# Patient Record
Sex: MALE | Race: WHITE | Employment: OTHER | URBAN - METROPOLITAN AREA
[De-identification: names, ages, dates, MRNs, and addresses within clinical notes are randomized per-mention and may not be internally consistent; named-entity substitution may affect disease eponyms.]

---

## 2017-01-06 DIAGNOSIS — D72.829 LEUKOCYTOSIS, UNSPECIFIED TYPE: ICD-10-CM

## 2017-01-06 LAB
BASOPHILS # BLD AUTO: 0 10E9/L (ref 0–0.2)
BASOPHILS NFR BLD AUTO: 0.3 %
COPATH REPORT: NORMAL
DIFFERENTIAL METHOD BLD: NORMAL
EOSINOPHIL # BLD AUTO: 0.1 10E9/L (ref 0–0.7)
EOSINOPHIL NFR BLD AUTO: 0.8 %
ERYTHROCYTE [DISTWIDTH] IN BLOOD BY AUTOMATED COUNT: 13.4 % (ref 10–15)
HCT VFR BLD AUTO: 47.8 % (ref 40–53)
HGB BLD-MCNC: 15.6 G/DL (ref 13.3–17.7)
LYMPHOCYTES # BLD AUTO: 1.9 10E9/L (ref 0.8–5.3)
LYMPHOCYTES NFR BLD AUTO: 17.8 %
MCH RBC QN AUTO: 32.4 PG (ref 26.5–33)
MCHC RBC AUTO-ENTMCNC: 32.6 G/DL (ref 31.5–36.5)
MCV RBC AUTO: 99 FL (ref 78–100)
MONOCYTES # BLD AUTO: 1 10E9/L (ref 0–1.3)
MONOCYTES NFR BLD AUTO: 9.4 %
NEUTROPHILS # BLD AUTO: 7.6 10E9/L (ref 1.6–8.3)
NEUTROPHILS NFR BLD AUTO: 71.7 %
PLATELET # BLD AUTO: 324 10E9/L (ref 150–450)
RBC # BLD AUTO: 4.81 10E12/L (ref 4.4–5.9)
RETICS # AUTO: 85.1 10E9/L (ref 25–95)
RETICS/RBC NFR AUTO: 1.8 % (ref 0.5–2)
WBC # BLD AUTO: 10.6 10E9/L (ref 4–11)

## 2017-01-06 PROCEDURE — 99207 ZZC NO CHARGE LOS: CPT | Performed by: INTERNAL MEDICINE

## 2017-01-06 PROCEDURE — 36415 COLL VENOUS BLD VENIPUNCTURE: CPT | Performed by: INTERNAL MEDICINE

## 2017-01-06 PROCEDURE — 99000 SPECIMEN HANDLING OFFICE-LAB: CPT | Performed by: INTERNAL MEDICINE

## 2017-01-06 PROCEDURE — 85060 BLOOD SMEAR INTERPRETATION: CPT | Mod: 90 | Performed by: INTERNAL MEDICINE

## 2017-01-06 PROCEDURE — 85027 COMPLETE CBC AUTOMATED: CPT | Performed by: INTERNAL MEDICINE

## 2017-01-06 PROCEDURE — 85045 AUTOMATED RETICULOCYTE COUNT: CPT | Mod: 90 | Performed by: INTERNAL MEDICINE

## 2017-05-09 ENCOUNTER — OFFICE VISIT (OUTPATIENT)
Dept: PODIATRY | Facility: CLINIC | Age: 77
End: 2017-05-09
Payer: COMMERCIAL

## 2017-05-09 ENCOUNTER — RADIANT APPOINTMENT (OUTPATIENT)
Dept: GENERAL RADIOLOGY | Facility: CLINIC | Age: 77
End: 2017-05-09
Attending: PODIATRIST
Payer: COMMERCIAL

## 2017-05-09 VITALS
DIASTOLIC BLOOD PRESSURE: 81 MMHG | BODY MASS INDEX: 29.83 KG/M2 | SYSTOLIC BLOOD PRESSURE: 146 MMHG | HEART RATE: 61 BPM | WEIGHT: 185.6 LBS | HEIGHT: 66 IN

## 2017-05-09 DIAGNOSIS — M19.072 PRIMARY OSTEOARTHRITIS OF LEFT FOOT: ICD-10-CM

## 2017-05-09 DIAGNOSIS — M21.40 PES PLANUS, UNSPECIFIED LATERALITY: Primary | ICD-10-CM

## 2017-05-09 DIAGNOSIS — M79.672 ARCH PAIN, LEFT: ICD-10-CM

## 2017-05-09 PROCEDURE — 99203 OFFICE O/P NEW LOW 30 MIN: CPT | Performed by: PODIATRIST

## 2017-05-09 PROCEDURE — 73630 X-RAY EXAM OF FOOT: CPT | Mod: LT

## 2017-05-09 NOTE — MR AVS SNAPSHOT
After Visit Summary   5/9/2017    Aly Sorensen    MRN: [id number]           Patient Information     Date Of Birth          1940        Visit Information        Provider Department      5/9/2017 11:00 AM Mamadou Quintana DPM [facility]        Today's Diagnoses     Pes planus, unspecified laterality    -  1    Arch pain, left        Primary osteoarthritis of left foot          Care Instructions    Over the Counter Inserts    Super Feet are the most common and easiest to find.    Locations include any [employer], [employer] in [place] on [address]  and in [place] on [address], [employer] in [facility] on [address], [employer] in [facility] on [address], [facility] in [place] on [address], [employer] in [place] on [address] and [employer] in [facility] on [address] and in [place] on [address].    Spenco can be found online and at [employer] in [facility] on [address], [employer] in [facility] on [address], [employer] in [place] on [address], [employer] in [place] on [address] and [employer] in [place] on [address].    Power Step can be a little harder to find.  Locations include [employer] in [place] on [address], [place] in [facility], [employer] in [place] on [address] and online    [employer] - [employer]     [facility]    **  A good high quality over the counter insert can cost around $40-$50.      PRICE Therapy    Many aches and pains throughout the foot and ankle can be helped with many simple treatments.  This is usually described as PRICE Therapy.      P - Protection - often times, inflammation/pain in the lower extremity is not able to improve simply because the areas involved are never allowed to rest.  Every step we take can bother the problematic area.  Protecting those areas is an important step in the healing  process.  This may involve a walking cast boot, a special insert/orthotic device, an ankle brace, or simply avoiding barefoot walking.    R - Rest - in addition to protecting the foot/ankle, resting is an important, but often times difficult, treatment option.  Getting off your feet when they bother you, and specifically avoiding activities that cause pain/discomfort, are very beneficial to prevent, and treat, foot/ankle pain.      I - Ice - icing regularly can help to decrease inflammation and swelling in the foot, thus decreasing pain.  Using an ice pack or a bag of frozen peas works very well.  Ice for 20 minutes multiple times per day as needed.  Do not place the ice directly on the skin as this can cause tissue damage.    C - Compression - using a compression wrap or an ACE wrap can help to decrease swelling, which can help to decrease pain.  Wearing the wraps is generally not needed at night, but they should be worn on a regular basis when you are going to be on your feet for prolonged periods as gravity tends to pull fluids down to your feet/ankles.    E - Elevation - elevating your lower extremities multiple times daily for 15-20 minutes can help to decrease swelling, which works well in decreasing pain levels.      NSAID/Tylenol - An anti-inflammatory, like Aleve or ibuprofen, and/or a pain medication, such as Tylenol, can help to improve pain levels and get the issue resolved sooner rather than later.  Anyone with liver issues should be careful with Tylenol, and anyone with high blood pressure or heart, stomach or kidney issues should be careful with anti-inflammatories.  Please ask if you have questions about these medications, including dosage.        FLAT FEET     Flatfoot is often a complex disorder, with diverse symptoms and varying degrees of deformity and disability. There are several types of flatfoot, all of which have one characteristic in common: partial or total collapse (loss) of the  arch.  Other characteristics shared by most types of flatfoot include:   Toe drift,  in which the toes and front part of the foot point outward   The heel tilts toward the outside and the ankle appears to turn in   A tight Achilles tendon, which causes the heel to lift off the ground earlier when walking and may make the problem worse   Bunions and hammertoes may develop as a result of a flatfoot.   Flexible Flatfoot  Flexible flatfoot is one of the most common types of flatfoot. It typically begins in childhood or adolescence and continues into adulthood. It usually occurs in both feet and progresses in severity throughout the adult years. As the deformity worsens, the soft tissues (tendons and ligaments) of the arch may stretch or tear and can become inflamed.  The term  flexible  means that while the foot is flat when standing (weight-bearing), the arch returns when not standing.  Symptoms  Pain in the heel, arch, ankle, or along the outside of the foot    Rolled-in  ankle (over-pronation)   Pain along the shin bone (shin splint)   General aching or fatigue in the foot or leg   Low back, hip or knee pain.   Diagnosis  In diagnosing flatfoot, the foot and ankle surgeon examines the foot and observes how it looks when you stand and sit. X-rays are usually taken to determine the severity of the disorder. If you are diagnosed with flexible flatfoot but you don t have any symptoms, your surgeon will explain what you might expect in the future.  Non-surgical Treatment  If you experience symptoms with flexible flatfoot, the surgeon may recommend non-surgical treatment options, including:  Activity modifications. Cut down on activities that bring you pain and avoid prolonged walking and standing to give your arches a rest.   Weight loss. If you are overweight, try to lose weight. Putting too much weight on your arches may aggravate your symptoms.   Orthotic devices. Your foot and ankle surgeon can provide you with custom  orthotic devices for your shoes to give more support to the arches.   Immobilization. In some cases, it may be necessary to use a walking cast or to completely avoid weight-bearing.   Medications. Nonsteroidal anti-inflammatory drugs (NSAIDs), such as ibuprofen, help reduce pain and inflammation.   Physical therapy. Ultrasound therapy or other physical therapy modalities may be used to provide temporary relief.   Shoe modifications. Wearing shoes that support the arches is important for anyone who has flatfoot.   When is Surgery Necessary?  In some patients whose pain is not adequately relieved by other treatments, surgery may be considered. A variety of surgical techniques is available to correct flexible flatfoot, and one or a combination of procedures may be required to relieve the symptoms and improve foot function.  In selecting the procedure or combination of procedures for your particular case, the foot and ankle surgeon will take into consideration the extent of your deformity based on the x-ray findings, your age, your activity level, and other factors. The length of the recovery period will vary, depending on the procedure or procedures performed.      OSTEOARTHRITIS OF THE FOOT & ANKLE  Osteoarthritis is a condition characterized by the breakdown and eventual loss of cartilage in one or more joints. Cartilage (the connective tissue found at the end of the bones in the joints) protects and cushions the bones during movement. When cartilage deteriorates or is lost, symptoms develop that can restrict one s ability to easily perform daily activities.  Osteoarthritis is also known as degenerative arthritis, reflecting its nature to develop as part of the aging process. As the most common form of arthritis, osteoarthritis affects millions of Americans. Some people refer to osteoarthritis simply as arthritis, even though there are many different types of arthritis.  Osteoarthritis appears at various joints  throughout the body, including the hands, feet, spine, hips, and knees. In the foot, the disease most frequently occurs in the big toe, although it is also often found in the midfoot and ankle.  CAUSES  Osteoarthritis is considered a  wear and tear  disease because the cartilage in the joint wears down with repeated stress and use over time. As the cartilage deteriorates and gets thinner, the bones lose their protective covering and eventually may rub together, causing pain and inflammation of the joint.  An injury may also lead to osteoarthritis, although it may take months or years after the injury for the condition to develop. For example, osteoarthritis in the big toe is often caused by kicking or jamming the toe, or by dropping something on the toe. Osteoarthritis in the midfoot is often caused by dropping something on it, or by a sprain or fracture. In the ankle, osteoarthritis is usually caused by a fracture and occasionally by a severe sprain.  Sometimes osteoarthritis develops as a result of abnormal foot mechanics such as flat feet or high arches. A flat foot causes less stability in the ligaments (bands of tissue that connect bones), resulting in excessive strain on the joints, which can cause arthritis. A high arch is rigid and lacks mobility, causing a jamming of joints that creates an increased risk of arthritis.  SYMPTOMS  People with osteoarthritis in the foot or ankle experience, in varying degrees, one or more of the following: Pain and stiffness in the joint, swelling in or near the joint, or difficulty walking or bending the joint.   Some patients with osteoarthritis also develop a bone spur (a bony protrusion) at the affected joint. Shoe pressure may cause pain at the site of a bone spur, and in some cases blisters or calluses may form over its surface. Bone spurs can also limit the movement of the joint.    DIAGNOSIS  In diagnosing osteoarthritis, the foot and ankle surgeon will examine the  foot thoroughly, looking for swelling in the joint, limited mobility, and pain with movement. In some cases, deformity and/or enlargement (spur) of the joint may be noted. X-rays may be ordered to evaluate the extent of the disease.  NON-SURGICAL TREATMENT  To help relieve symptoms, the surgeon may begin treating osteoarthritis with one or more of the following non-surgical approaches:  Oral medications. Nonsteroidal anti-inflammatory drugs (NSAIDs), such as ibuprofen, are often helpful in reducing the inflammation and pain. Occasionally a prescription for a steroid medication is needed to adequately reduce symptoms.   Orthotic devices. Custom orthotic devices (shoe inserts) are often prescribed to provide support to improve the foot s mechanics or cushioning to help minimize pain.   Bracing. Bracing, which restricts motion and supports the joint, can reduce pain during walking and help prevent further deformity.   Immobilization. Protecting the foot from movement by wearing a cast or removable cast-boot may be necessary to allow the inflammation to resolve.   Steroid injections. In some cases, steroid injections are applied to the affected joint to deliver anti-inflammatory medication.   Physical therapy. Exercises to strengthen the muscles, especially when the osteoarthritis occurs in the ankle, may give the patient greater stability and help avoid injury that might worsen the condition.   DO I NEED SURGERY?  When osteoarthritis has progressed substantially or failed to improve with non-surgical treatment, surgery may be recommended. In advanced cases, surgery may be the only option. The goal of surgery is to decrease pain and improve function. The foot and ankle surgeon will consider a number of factors when selecting the procedure best suited to the patient s condition and lifestyle.      Body Mass Index (BMI)  Many things can cause foot and ankle problems. Foot structure, activity level, foot mechanics and  "injuries are common causes of pain.  One very important issue that often goes unmentioned is body weight. Extra weight can cause increased stress on muscles, ligaments, bones and tendons. Sometimes just a few extra pounds is all it takes to put one over her/his threshold. Without reducing that stress, it can be difficult to alleviate pain.   Some people are uncomfortable addressing this issue, but we feel it is important for you to think about it. As Foot & Ankle specialists, our job is addressing the lower extremity problem and possible causes.   Regarding extra body weight, we encourage patients to discuss diet and weight management plans with their primary care doctors. It is this team approach that gives you the best opportunity for pain relief and getting you back on your feet.           Follow-ups after your visit        Follow-up notes from your care team     Return if symptoms worsen or fail to improve.      Who to contact     If you have questions or need follow up information about today's clinic visit or your schedule please contact [facility] directly at [phone].  Normal or non-critical lab and imaging results will be communicated to you by PeopleAdminhart, letter or phone within 4 business days after the clinic has received the results. If you do not hear from us within 7 days, please contact the clinic through [employer] or phone. If you have a critical or abnormal lab result, we will notify you by phone as soon as possible.  Submit refill requests through [employer] or call your pharmacy and they will forward the refill request to us. Please allow 3 business days for your refill to be completed.          Additional Information About Your Visit        [employer] Information     [employer] lets you send messages to your doctor, view your test results, renew your prescriptions, schedule appointments and more. To sign up, go to [email or web address] . Click on \"Log in\" on the left side of the screen, " "which will take you to the Welcome page. Then click on \"Sign up Now\" on the right side of the page.     You will be asked to enter the access code listed below, as well as some personal information. Please follow the directions to create your username and password.     Your access code is: [email or web address]  Expires: 2017 11:47 AM     Your access code will  in 90 days. If you need help or a new code, please call your [facility] clinic or [phone].        Care EveryWhere ID     This is your Care EveryWhere ID. This could be used by other organizations to access your [facility] medical records  [id number]        Your Vitals Were     Pulse Height BMI (Body Mass Index)             61 5' 5.5\" (1.664 m) 30.42 kg/m2          Blood Pressure from Last 3 Encounters:   17 146/81   16 121/78   11/17/15 115/79    Weight from Last 3 Encounters:   17 185 lb 9.6 oz (84.2 kg)   16 182 lb (82.6 kg)   11/17/15 199 lb (90.3 kg)              We Performed the Following     XR Foot Left G/E 3 Views        Primary Care Provider Office Phone # Fax #    Merlin Emery Brown, [phone]       [facility] INTERNAL [facility] [address]  [address] [address]        Thank you!     Thank you for choosing [facility]  for your care. Our goal is always to provide you with excellent care. Hearing back from our patients is one way we can continue to improve our services. Please take a few minutes to complete the written survey that you may receive in the mail after your visit with us. Thank you!             Your Updated Medication List - Protect others around you: Learn how to safely use, store and throw away your medicines at www.disposemymeds.org.          This list is accurate as of: 17 11:47 AM.  Always use your most recent med list.                   Brand Name Dispense Instructions for use    diazepam 5 MG tablet    VALIUM    12 tablet    Take 1 tablet by mouth every 6 hours as needed " for anxiety.       ibuprofen 600 MG tablet    ADVIL/MOTRIN    30 tablet    Take 1 tablet by mouth every 6 hours as needed for pain.       oxyCODONE-acetaminophen 5-325 MG per tablet    PERCOCET    20 tablet    Take 1-2 tablets by mouth every 6 hours as needed for pain.

## 2017-05-09 NOTE — PATIENT INSTRUCTIONS
Over the Counter Inserts    Super Feet are the most common and easiest to find.    Locations include any [employer], [facility] [employer] in [place] on [address] and in [place] on [address], [employer] in hospitals on [address], [employer] in hospitals on [address], [facility] in [place] on [address], [employer] in [place] on [address] and [employer] in hospitals on [address] and in [place] on [address].    Spenco can be found online and at [employer] in hospitals on [address], [employer] in [facility] on [address], [employer] in [place] on [address], [employer] in [place] on [address] and [employer] in [place] on [address].    Power Step can be a little harder to find.  Locations include [employer] in [place] on [address], [place] in hospitals, [employer] in [place] on [address] and online    [employer] - [employer]     [facility]    **  A good high quality over the counter insert can cost around $40-$50.      PRICE Therapy    Many aches and pains throughout the foot and ankle can be helped with many simple treatments.  This is usually described as PRICE Therapy.      P - Protection - often times, inflammation/pain in the lower extremity is not able to improve simply because the areas involved are never allowed to rest.  Every step we take can bother the problematic area.  Protecting those areas is an important step in the healing process.  This may involve a walking cast boot, a special insert/orthotic device, an ankle brace, or simply avoiding barefoot walking.    R - Rest - in addition to protecting the foot/ankle, resting is an important, but often times difficult, treatment option.  Getting off your feet when they bother you, and specifically avoiding activities that cause pain/discomfort, are very beneficial to prevent, and treat, foot/ankle pain.       I - Ice - icing regularly can help to decrease inflammation and swelling in the foot, thus decreasing pain.  Using an ice pack or a bag of frozen peas works very well.  Ice for 20 minutes multiple times per day as needed.  Do not place the ice directly on the skin as this can cause tissue damage.    C - Compression - using a compression wrap or an ACE wrap can help to decrease swelling, which can help to decrease pain.  Wearing the wraps is generally not needed at night, but they should be worn on a regular basis when you are going to be on your feet for prolonged periods as gravity tends to pull fluids down to your feet/ankles.    E - Elevation - elevating your lower extremities multiple times daily for 15-20 minutes can help to decrease swelling, which works well in decreasing pain levels.      NSAID/Tylenol - An anti-inflammatory, like Aleve or ibuprofen, and/or a pain medication, such as Tylenol, can help to improve pain levels and get the issue resolved sooner rather than later.  Anyone with liver issues should be careful with Tylenol, and anyone with high blood pressure or heart, stomach or kidney issues should be careful with anti-inflammatories.  Please ask if you have questions about these medications, including dosage.        FLAT FEET     Flatfoot is often a complex disorder, with diverse symptoms and varying degrees of deformity and disability. There are several types of flatfoot, all of which have one characteristic in common: partial or total collapse (loss) of the arch.  Other characteristics shared by most types of flatfoot include:   Toe drift,  in which the toes and front part of the foot point outward   The heel tilts toward the outside and the ankle appears to turn in   A tight Achilles tendon, which causes the heel to lift off the ground earlier when walking and may make the problem worse   Bunions and hammertoes may develop as a result of a flatfoot.   Flexible Flatfoot  Flexible flatfoot  is one of the most common types of flatfoot. It typically begins in childhood or adolescence and continues into adulthood. It usually occurs in both feet and progresses in severity throughout the adult years. As the deformity worsens, the soft tissues (tendons and ligaments) of the arch may stretch or tear and can become inflamed.  The term  flexible  means that while the foot is flat when standing (weight-bearing), the arch returns when not standing.  Symptoms  Pain in the heel, arch, ankle, or along the outside of the foot    Rolled-in  ankle (over-pronation)   Pain along the shin bone (shin splint)   General aching or fatigue in the foot or leg   Low back, hip or knee pain.   Diagnosis  In diagnosing flatfoot, the foot and ankle surgeon examines the foot and observes how it looks when you stand and sit. X-rays are usually taken to determine the severity of the disorder. If you are diagnosed with flexible flatfoot but you don t have any symptoms, your surgeon will explain what you might expect in the future.  Non-surgical Treatment  If you experience symptoms with flexible flatfoot, the surgeon may recommend non-surgical treatment options, including:  Activity modifications. Cut down on activities that bring you pain and avoid prolonged walking and standing to give your arches a rest.   Weight loss. If you are overweight, try to lose weight. Putting too much weight on your arches may aggravate your symptoms.   Orthotic devices. Your foot and ankle surgeon can provide you with custom orthotic devices for your shoes to give more support to the arches.   Immobilization. In some cases, it may be necessary to use a walking cast or to completely avoid weight-bearing.   Medications. Nonsteroidal anti-inflammatory drugs (NSAIDs), such as ibuprofen, help reduce pain and inflammation.   Physical therapy. Ultrasound therapy or other physical therapy modalities may be used to provide temporary relief.   Shoe modifications.  Wearing shoes that support the arches is important for anyone who has flatfoot.   When is Surgery Necessary?  In some patients whose pain is not adequately relieved by other treatments, surgery may be considered. A variety of surgical techniques is available to correct flexible flatfoot, and one or a combination of procedures may be required to relieve the symptoms and improve foot function.  In selecting the procedure or combination of procedures for your particular case, the foot and ankle surgeon will take into consideration the extent of your deformity based on the x-ray findings, your age, your activity level, and other factors. The length of the recovery period will vary, depending on the procedure or procedures performed.      OSTEOARTHRITIS OF THE FOOT & ANKLE  Osteoarthritis is a condition characterized by the breakdown and eventual loss of cartilage in one or more joints. Cartilage (the connective tissue found at the end of the bones in the joints) protects and cushions the bones during movement. When cartilage deteriorates or is lost, symptoms develop that can restrict one s ability to easily perform daily activities.  Osteoarthritis is also known as degenerative arthritis, reflecting its nature to develop as part of the aging process. As the most common form of arthritis, osteoarthritis affects millions of Americans. Some people refer to osteoarthritis simply as arthritis, even though there are many different types of arthritis.  Osteoarthritis appears at various joints throughout the body, including the hands, feet, spine, hips, and knees. In the foot, the disease most frequently occurs in the big toe, although it is also often found in the midfoot and ankle.  CAUSES  Osteoarthritis is considered a  wear and tear  disease because the cartilage in the joint wears down with repeated stress and use over time. As the cartilage deteriorates and gets thinner, the bones lose their protective covering and  eventually may rub together, causing pain and inflammation of the joint.  An injury may also lead to osteoarthritis, although it may take months or years after the injury for the condition to develop. For example, osteoarthritis in the big toe is often caused by kicking or jamming the toe, or by dropping something on the toe. Osteoarthritis in the midfoot is often caused by dropping something on it, or by a sprain or fracture. In the ankle, osteoarthritis is usually caused by a fracture and occasionally by a severe sprain.  Sometimes osteoarthritis develops as a result of abnormal foot mechanics such as flat feet or high arches. A flat foot causes less stability in the ligaments (bands of tissue that connect bones), resulting in excessive strain on the joints, which can cause arthritis. A high arch is rigid and lacks mobility, causing a jamming of joints that creates an increased risk of arthritis.  SYMPTOMS  People with osteoarthritis in the foot or ankle experience, in varying degrees, one or more of the following: Pain and stiffness in the joint, swelling in or near the joint, or difficulty walking or bending the joint.   Some patients with osteoarthritis also develop a bone spur (a bony protrusion) at the affected joint. Shoe pressure may cause pain at the site of a bone spur, and in some cases blisters or calluses may form over its surface. Bone spurs can also limit the movement of the joint.    DIAGNOSIS  In diagnosing osteoarthritis, the foot and ankle surgeon will examine the foot thoroughly, looking for swelling in the joint, limited mobility, and pain with movement. In some cases, deformity and/or enlargement (spur) of the joint may be noted. X-rays may be ordered to evaluate the extent of the disease.  NON-SURGICAL TREATMENT  To help relieve symptoms, the surgeon may begin treating osteoarthritis with one or more of the following non-surgical approaches:  Oral medications. Nonsteroidal anti-inflammatory  drugs (NSAIDs), such as ibuprofen, are often helpful in reducing the inflammation and pain. Occasionally a prescription for a steroid medication is needed to adequately reduce symptoms.   Orthotic devices. Custom orthotic devices (shoe inserts) are often prescribed to provide support to improve the foot s mechanics or cushioning to help minimize pain.   Bracing. Bracing, which restricts motion and supports the joint, can reduce pain during walking and help prevent further deformity.   Immobilization. Protecting the foot from movement by wearing a cast or removable cast-boot may be necessary to allow the inflammation to resolve.   Steroid injections. In some cases, steroid injections are applied to the affected joint to deliver anti-inflammatory medication.   Physical therapy. Exercises to strengthen the muscles, especially when the osteoarthritis occurs in the ankle, may give the patient greater stability and help avoid injury that might worsen the condition.   DO I NEED SURGERY?  When osteoarthritis has progressed substantially or failed to improve with non-surgical treatment, surgery may be recommended. In advanced cases, surgery may be the only option. The goal of surgery is to decrease pain and improve function. The foot and ankle surgeon will consider a number of factors when selecting the procedure best suited to the patient s condition and lifestyle.      Body Mass Index (BMI)  Many things can cause foot and ankle problems. Foot structure, activity level, foot mechanics and injuries are common causes of pain.  One very important issue that often goes unmentioned is body weight. Extra weight can cause increased stress on muscles, ligaments, bones and tendons. Sometimes just a few extra pounds is all it takes to put one over her/his threshold. Without reducing that stress, it can be difficult to alleviate pain.   Some people are uncomfortable addressing this issue, but we feel it is important for you to think  about it. As Foot & Ankle specialists, our job is addressing the lower extremity problem and possible causes.   Regarding extra body weight, we encourage patients to discuss diet and weight management plans with their primary care doctors. It is this team approach that gives you the best opportunity for pain relief and getting you back on your feet.

## 2017-05-09 NOTE — LETTER
5/9/2017       RE: Aly Sorensen  [address]   [facility] [address]           Dear Colleague,    Thank you for referring your patient, Aly Sorensen, to the [facility]. Please see a copy of my visit note below.    PATIENT HISTORY:  Aly Sorensen is a 76 year old male who presents to clinic for left plantar arch pain with walking.  Several month duration.  No injury.  Seen in an ED in [place] in March with diagnosis of tendonitis.  XRs at that time were neg.  2-9/10 pain.  He has tried ice, heat, rest, with some improvement.      Review of Systems:  Patient denies fever, chills, rash, wound, stiffness, numbness, weakness, heart burn, blood in stool, chest pain with activity, calf pain when walking, shortness of breath with activity, chronic cough, easy bleeding/bruising, swelling of ankles, excessive thirst, fatigue, depression, anxiety.  Patient admits to limping.     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        PAST SURGICAL HISTORY:   Past Surgical History:   Procedure Laterality Date     ARTHROPLASTY KNEE  12/8/2011    Procedure:ARTHROPLASTY KNEE; Left Total Knee Arthroplasty (JILLIAN)^; Surgeon:YASMIN PELAEZ; Location: OR     BACK SURGERY       COLONOSCOPY       GENITOURINARY SURGERY  history of prostatectomy     HERNIA REPAIR  inguinal hernia repair as a teenager     ORTHOPEDIC SURGERY  back fusion 2002     PHACOEMULSIFICATION CLEAR CORNEA WITH STANDARD INTRAOCULAR LENS IMPLANT Right 11/10/2015    Procedure: PHACOEMULSIFICATION CLEAR CORNEA WITH STANDARD INTRAOCULAR LENS IMPLANT;  Surgeon: Criss Pulliam MD;  [facility]     PHACOEMULSIFICATION CLEAR CORNEA WITH STANDARD INTRAOCULAR LENS IMPLANT Left  "11/17/2015    Procedure: PHACOEMULSIFICATION CLEAR CORNEA WITH STANDARD INTRAOCULAR LENS IMPLANT;  Surgeon: Criss Pulliam MD;  Location: [facility]     removal of melanoma[  approx. 20 years ago        MEDICATIONS:   Current Outpatient Prescriptions:      oxyCODONE-acetaminophen (PERCOCET) 5-325 MG per tablet, Take 1-2 tablets by mouth every 6 hours as needed for pain., Disp: 20 tablet, Rfl: 0     diazepam (VALIUM) 5 MG tablet, Take 1 tablet by mouth every 6 hours as needed for anxiety., Disp: 12 tablet, Rfl: 0     ibuprofen (ADVIL,MOTRIN) 600 MG tablet, Take 1 tablet by mouth every 6 hours as needed for pain., Disp: 30 tablet, Rfl: 0     ALLERGIES:  No Known Allergies     SOCIAL HISTORY:   Social History     Social History     Marital status:      Spouse name: N/A     Number of children: N/A     Years of education: N/A     Occupational History     Not on file.     Social History Main Topics     Smoking status: Former Smoker     Types: Cigars     Smokeless tobacco: Never Used      Comment: quit smoking cigars 2008     Alcohol use 1.8 - 2.4 oz/week     3 - 4 Standard drinks or equivalent per week      Comment: beer, wine or cocktail  1 glass approx 3-4 x week     Drug use: No     Sexual activity: Not Currently     Other Topics Concern     Not on file     Social History Narrative        FAMILY HISTORY:   Family History   Problem Relation Age of Onset     Uterine Cancer Mother      Colon Cancer Father      DIABETES Brother      Heart Failure Brother         EXAM:Vitals: /81 (BP Location: Right arm, Patient Position: Chair, Cuff Size: Adult Large)  Pulse 61  Ht 5' 5.5\" (1.664 m)  Wt 185 lb 9.6 oz (84.2 kg)  BMI 30.42 kg/m2  BMI= Body mass index is 30.42 kg/(m^2).    General appearance: Patient is alert and fully cooperative with history & exam.  No sign of distress is noted during the visit.     Psychiatric: Affect is pleasant & appropriate.  Patient appears motivated to improve health.     Respiratory: " Breathing is regular & unlabored while sitting.     HEENT: Hearing is intact to spoken word.  Speech is clear.  No gross evidence of visual impairment that would impact ambulation.     Dermatologic: Skin is intact to both feet.  No paronychia or evidence of soft tissue infection is noted.     Vascular: DP & PT pulses are intact & regular bilaterally.  No significant edema or varicosities noted.  CFT and skin temperature are normal to both lower extremities.     Neurologic: Lower extremity sensation is intact to light touch.  No evidence of weakness or contracture in the lower extremities.  No evidence of neuropathy.     Musculoskeletal: Pes planus noted.  Stiffness of medial column consistent with arthritis.  Pain with plantar palpation of left arch area.  No medial ankle pain.  No heel or plantar fascial pain.  Patient is ambulatory without assistive device or brace.  No gross ankle deformity noted.  No foot or ankle joint effusion is noted.    XRs of left foot reviewed with pt.  Pes planus with medial column osteoarthritis.  No acute findings.     ASSESSMENT: Left foot pain, arch pain, related pes planus with OA, possible tendonitis     PLAN:  Reviewed patient's chart in epic.  Discussed condition and treatment options including pros and cons.    Discussed flatfoot, OA, possibly concurrent PT tendonitis.  This is a progressive problem.      I advised PRICE, orthotics.  Pt will start with otc options.  Stiff soled athletic shoes advised.  No barefoot walking.    Discussed possible custom inserts if not improving.  Possible MRI, immobilization.    F/u with me if worsening or failing to improve.       Mamadou Quintana DPM, FACFAS      Weight management plan: Patient was referred to their PCP to discuss a diet and exercise plan.     OMAR Abad MA May 9, 2017 11:03 AM        Again, thank you for allowing me to participate in the care of your patient.        Sincerely,              Mamadou Quintana DPM

## 2017-05-09 NOTE — PROGRESS NOTES
PATIENT HISTORY:  Aly Sorensen is a 76 year old male who presents to clinic for left plantar arch pain with walking.  Several month duration.  No injury.  Seen in an ED in [place] in March with diagnosis of tendonitis.  XRs at that time were neg.  2-9/10 pain.  He has tried ice, heat, rest, with some improvement.      Review of Systems:  Patient denies fever, chills, rash, wound, stiffness, numbness, weakness, heart burn, blood in stool, chest pain with activity, calf pain when walking, shortness of breath with activity, chronic cough, easy bleeding/bruising, swelling of ankles, excessive thirst, fatigue, depression, anxiety.  Patient admits to limping.     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        PAST SURGICAL HISTORY:   Past Surgical History:   Procedure Laterality Date     ARTHROPLASTY KNEE  12/8/2011    Procedure:ARTHROPLASTY KNEE; Left Total Knee Arthroplasty (JILLIAN)^; Surgeon:YASMIN PELAEZ; Location: OR     BACK SURGERY       COLONOSCOPY       GENITOURINARY SURGERY  history of prostatectomy     HERNIA REPAIR  inguinal hernia repair as a teenager     ORTHOPEDIC SURGERY  back fusion 2002     PHACOEMULSIFICATION CLEAR CORNEA WITH STANDARD INTRAOCULAR LENS IMPLANT Right 11/10/2015    Procedure: PHACOEMULSIFICATION CLEAR CORNEA WITH STANDARD INTRAOCULAR LENS IMPLANT;  Surgeon: Criss Pulliam MD;  [facility]     PHACOEMULSIFICATION CLEAR CORNEA WITH STANDARD INTRAOCULAR LENS IMPLANT Left 11/17/2015    Procedure: PHACOEMULSIFICATION CLEAR CORNEA WITH STANDARD INTRAOCULAR LENS IMPLANT;  Surgeon: Criss Pulliam MD;  [facility]     removal of melanoma[  approx. 20 years ago        MEDICATIONS:   Current Outpatient  "Prescriptions:      oxyCODONE-acetaminophen (PERCOCET) 5-325 MG per tablet, Take 1-2 tablets by mouth every 6 hours as needed for pain., Disp: 20 tablet, Rfl: 0     diazepam (VALIUM) 5 MG tablet, Take 1 tablet by mouth every 6 hours as needed for anxiety., Disp: 12 tablet, Rfl: 0     ibuprofen (ADVIL,MOTRIN) 600 MG tablet, Take 1 tablet by mouth every 6 hours as needed for pain., Disp: 30 tablet, Rfl: 0     ALLERGIES:  No Known Allergies     SOCIAL HISTORY:   Social History     Social History     Marital status:      Spouse name: N/A     Number of children: N/A     Years of education: N/A     Occupational History     Not on file.     Social History Main Topics     Smoking status: Former Smoker     Types: Cigars     Smokeless tobacco: Never Used      Comment: quit smoking cigars 2008     Alcohol use 1.8 - 2.4 oz/week     3 - 4 Standard drinks or equivalent per week      Comment: beer, wine or cocktail  1 glass approx 3-4 x week     Drug use: No     Sexual activity: Not Currently     Other Topics Concern     Not on file     Social History Narrative        FAMILY HISTORY:   Family History   Problem Relation Age of Onset     Uterine Cancer Mother      Colon Cancer Father      DIABETES Brother      Heart Failure Brother         EXAM:Vitals: /81 (BP Location: Right arm, Patient Position: Chair, Cuff Size: Adult Large)  Pulse 61  Ht 5' 5.5\" (1.664 m)  Wt 185 lb 9.6 oz (84.2 kg)  BMI 30.42 kg/m2  BMI= Body mass index is 30.42 kg/(m^2).    General appearance: Patient is alert and fully cooperative with history & exam.  No sign of distress is noted during the visit.     Psychiatric: Affect is pleasant & appropriate.  Patient appears motivated to improve health.     Respiratory: Breathing is regular & unlabored while sitting.     HEENT: Hearing is intact to spoken word.  Speech is clear.  No gross evidence of visual impairment that would impact ambulation.     Dermatologic: Skin is intact to both feet.  No " paronychia or evidence of soft tissue infection is noted.     Vascular: DP & PT pulses are intact & regular bilaterally.  No significant edema or varicosities noted.  CFT and skin temperature are normal to both lower extremities.     Neurologic: Lower extremity sensation is intact to light touch.  No evidence of weakness or contracture in the lower extremities.  No evidence of neuropathy.     Musculoskeletal: Pes planus noted.  Stiffness of medial column consistent with arthritis.  Pain with plantar palpation of left arch area.  No medial ankle pain.  No heel or plantar fascial pain.  Patient is ambulatory without assistive device or brace.  No gross ankle deformity noted.  No foot or ankle joint effusion is noted.    XRs of left foot reviewed with pt.  Pes planus with medial column osteoarthritis.  No acute findings.     ASSESSMENT: Left foot pain, arch pain, related pes planus with OA, possible tendonitis     PLAN:  Reviewed patient's chart in epic.  Discussed condition and treatment options including pros and cons.    Discussed flatfoot, OA, possibly concurrent PT tendonitis.  This is a progressive problem.      I advised PRICE, orthotics.  Pt will start with otc options.  Stiff soled athletic shoes advised.  No barefoot walking.    Discussed possible custom inserts if not improving.  Possible MRI, immobilization.    F/u with me if worsening or failing to improve.       Mamadou Quintana, CRISTOPHER, FACFAS

## 2017-05-09 NOTE — NURSING NOTE
"Chief Complaint   Patient presents with     Plantar Fascitis     L arch pain       Initial /81 (BP Location: Right arm, Patient Position: Chair, Cuff Size: Adult Large)  Pulse 61  Ht 5' 5.5\" (1.664 m)  Wt 185 lb 9.6 oz (84.2 kg)  BMI 30.42 kg/m2 Estimated body mass index is 30.42 kg/(m^2) as calculated from the following:    Height as of this encounter: 5' 5.5\" (1.664 m).    Weight as of this encounter: 185 lb 9.6 oz (84.2 kg).  Medication Reconciliation: memo Abad MA May 9, 2017 11:03 AM      "

## 2017-05-09 NOTE — PROGRESS NOTES
Weight management plan: Patient was referred to their PCP to discuss a diet and exercise plan.     OMAR Abad MA May 9, 2017 11:03 AM

## 2017-12-04 ENCOUNTER — RADIANT APPOINTMENT (OUTPATIENT)
Dept: GENERAL RADIOLOGY | Facility: CLINIC | Age: 77
End: 2017-12-04
Attending: INTERNAL MEDICINE
Payer: COMMERCIAL

## 2017-12-04 ENCOUNTER — OFFICE VISIT (OUTPATIENT)
Dept: FAMILY MEDICINE | Facility: CLINIC | Age: 77
End: 2017-12-04
Payer: COMMERCIAL

## 2017-12-04 VITALS
DIASTOLIC BLOOD PRESSURE: 72 MMHG | HEART RATE: 76 BPM | BODY MASS INDEX: 31.02 KG/M2 | TEMPERATURE: 98.1 F | HEIGHT: 66 IN | WEIGHT: 193 LBS | SYSTOLIC BLOOD PRESSURE: 110 MMHG | OXYGEN SATURATION: 97 %

## 2017-12-04 DIAGNOSIS — Z00.00 ROUTINE GENERAL MEDICAL EXAMINATION AT A HEALTH CARE FACILITY: Primary | ICD-10-CM

## 2017-12-04 DIAGNOSIS — Z13.220 LIPID SCREENING: ICD-10-CM

## 2017-12-04 DIAGNOSIS — Z91.81 AT RISK FOR FALLING: ICD-10-CM

## 2017-12-04 DIAGNOSIS — R06.02 SHORTNESS OF BREATH: ICD-10-CM

## 2017-12-04 DIAGNOSIS — M47.816 SPONDYLOSIS OF LUMBAR REGION WITHOUT MYELOPATHY OR RADICULOPATHY: ICD-10-CM

## 2017-12-04 DIAGNOSIS — C61 MALIGNANT NEOPLASM OF PROSTATE (H): ICD-10-CM

## 2017-12-04 DIAGNOSIS — C43.9 MELANOMA OF SKIN (H): ICD-10-CM

## 2017-12-04 LAB
D DIMER PPP FEU-MCNC: 0.5 UG/ML FEU (ref 0–0.5)
ERYTHROCYTE [DISTWIDTH] IN BLOOD BY AUTOMATED COUNT: 13.7 % (ref 10–15)
FEF 25/75: NORMAL
FEV-1: NORMAL
FEV1/FVC: NORMAL
FVC: NORMAL
HCT VFR BLD AUTO: 45.2 % (ref 40–53)
HGB BLD-MCNC: 15.2 G/DL (ref 13.3–17.7)
MCH RBC QN AUTO: 32.8 PG (ref 26.5–33)
MCHC RBC AUTO-ENTMCNC: 33.6 G/DL (ref 31.5–36.5)
MCV RBC AUTO: 97 FL (ref 78–100)
PLATELET # BLD AUTO: 268 10E9/L (ref 150–450)
RBC # BLD AUTO: 4.64 10E12/L (ref 4.4–5.9)
WBC # BLD AUTO: 9.4 10E9/L (ref 4–11)

## 2017-12-04 PROCEDURE — G0439 PPPS, SUBSEQ VISIT: HCPCS | Performed by: INTERNAL MEDICINE

## 2017-12-04 PROCEDURE — 80061 LIPID PANEL: CPT | Performed by: INTERNAL MEDICINE

## 2017-12-04 PROCEDURE — 80053 COMPREHEN METABOLIC PANEL: CPT | Performed by: INTERNAL MEDICINE

## 2017-12-04 PROCEDURE — 94010 BREATHING CAPACITY TEST: CPT | Performed by: INTERNAL MEDICINE

## 2017-12-04 PROCEDURE — 85379 FIBRIN DEGRADATION QUANT: CPT | Performed by: INTERNAL MEDICINE

## 2017-12-04 PROCEDURE — 36415 COLL VENOUS BLD VENIPUNCTURE: CPT | Performed by: INTERNAL MEDICINE

## 2017-12-04 PROCEDURE — 84153 ASSAY OF PSA TOTAL: CPT | Performed by: INTERNAL MEDICINE

## 2017-12-04 PROCEDURE — 85027 COMPLETE CBC AUTOMATED: CPT | Performed by: INTERNAL MEDICINE

## 2017-12-04 PROCEDURE — 71020 XR CHEST 2 VW: CPT

## 2017-12-04 PROCEDURE — 99214 OFFICE O/P EST MOD 30 MIN: CPT | Mod: 25 | Performed by: INTERNAL MEDICINE

## 2017-12-04 RX ORDER — METHOCARBAMOL 750 MG/1
750 TABLET, FILM COATED ORAL 3 TIMES DAILY PRN
Qty: 45 TABLET | Refills: 2 | Status: SHIPPED | OUTPATIENT
Start: 2017-12-04 | End: 2017-12-06

## 2017-12-04 NOTE — MR AVS SNAPSHOT
After Visit Summary   12/4/2017    Aly Sorensen    MRN: [id number]           Patient Information     Date Of Birth          1940        Visit Information        Provider Department      12/4/2017 1:00 PM Zev Austin MD [facility]        Today's Diagnoses     Routine general medical examination at a health care facility    -  1    Malignant neoplasm of prostate (H)        Melanoma of skin (H)        Shortness of breath        Spondylosis of lumbar region without myelopathy or radiculopathy        At risk for falling        Lipid screening          Care Instructions      Preventive Health Recommendations:       Male Ages 65 and over    Yearly exam:             See your health care provider every year in order to  o   Review health changes.   o   Discuss preventive care.    o   Review your medicines if your doctor has prescribed any.    Talk with your health care provider about whether you should have a test to screen for prostate cancer (PSA).    Every 3 years, have a diabetes test (fasting glucose). If you are at risk for diabetes, you should have this test more often.    Every 5 years, have a cholesterol test. Have this test more often if you are at risk for high cholesterol or heart disease.     Every 10 years, have a colonoscopy. Or, have a yearly FIT test (stool test). These exams will check for colon cancer.    Talk to with your health care provider about screening for Abdominal Aortic Aneurysm if you have a family history of AAA or have a history of smoking.  Shots:     Get a flu shot each year.     Get a tetanus shot every 10 years.     Talk to your doctor about your pneumonia vaccines. There are now two you should receive - Pneumovax (PPSV 23) and Prevnar (PCV 13).    Talk to your doctor about a shingles vaccine.     Talk to your doctor about the hepatitis B vaccine.  Nutrition:     Eat at least 5 servings of fruits and vegetables each day.     Eat whole-grain bread,  whole-wheat pasta and brown rice instead of white grains and rice.     Talk to your doctor about Calcium and Vitamin D.   Lifestyle    Exercise for at least 150 minutes a week (30 minutes a day, 5 days a week). This will help you control your weight and prevent disease.     Limit alcohol to one drink per day.     No smoking.     Wear sunscreen to prevent skin cancer.     See your dentist every six months for an exam and cleaning.     See your eye doctor every 1 to 2 years to screen for conditions such as glaucoma, macular degeneration and cataracts.          Follow-ups after your visit        Additional Services     DERMATOLOGY REFERRAL       Your provider has referred you to: [facility]: [facility] Carlos Garcia [phone]   [email or web address]    Please be aware that coverage of these services is subject to the terms and limitations of your health insurance plan.  Call member services at your health plan with any benefit or coverage questions.      Please bring the following with you to your appointment:    (1) Any X-Rays, CTs or MRIs which have been performed.  Contact the facility where they were done to arrange for  prior to your scheduled appointment.    (2) List of current medications  (3) This referral request   (4) Any documents/labs given to you for this referral                  Future tests that were ordered for you today     Open Future Orders        Priority Expected Expires Ordered    XR Chest 2 Views Routine 12/4/2017 12/4/2018 12/4/2017    General PFT Lab (Please always keep checked) Routine  12/4/2018 12/4/2017    Pulmonary Function Test Routine  2/20/2027 12/4/2017            Who to contact     If you have questions or need follow up information about today's clinic visit or your schedule please contact [facility] directly at [phone].  Normal or non-critical lab and imaging results will be communicated to you by MyChart, letter or phone within 4 business days  "after the clinic has received the results. If you do not hear from us within 7 days, please contact the clinic through [employer] or phone. If you have a critical or abnormal lab result, we will notify you by phone as soon as possible.  Submit refill requests through [employer] or call your pharmacy and they will forward the refill request to us. Please allow 3 business days for your refill to be completed.          Additional Information About Your Visit        [employer] Information     [employer] lets you send messages to your doctor, view your test results, renew your prescriptions, schedule appointments and more. To sign up, go to [email or web address] . Click on \"Log in\" on the left side of the screen, which will take you to the Welcome page. Then click on \"Sign up Now\" on the right side of the page.     You will be asked to enter the access code listed below, as well as some personal information. Please follow the directions to create your username and password.     Your access code is: [email or web address]  Expires: 3/4/2018  1:50 PM     Your access code will  in 90 days. If you need help or a new code, please call your [facility] clinic or [phone].        Care EveryWhere ID     This is your Care EveryWhere ID. This could be used by other organizations to access your [facility] medical records  [id number]        Your Vitals Were     Pulse Temperature Height Pulse Oximetry BMI (Body Mass Index)       76 98.1  F (36.7  C) (Tympanic) 5' 5.5\" (1.664 m) 97% 31.63 kg/m2        Blood Pressure from Last 3 Encounters:   17 110/72   17 146/81   16 121/78    Weight from Last 3 Encounters:   17 193 lb (87.5 kg)   17 185 lb 9.6 oz (84.2 kg)   16 182 lb (82.6 kg)              We Performed the Following     CBC with platelets     Comprehensive metabolic panel     D dimer, quantitative     DERMATOLOGY REFERRAL     Lipid panel reflex to direct LDL Fasting     PSA tumor marker          Today's " Medication Changes          These changes are accurate as of: 12/4/17  1:50 PM.  If you have any questions, ask your nurse or doctor.               Start taking these medicines.        Dose/Directions    methocarbamol 750 MG tablet   Commonly known as:  ROBAXIN   Used for:  Spondylosis of lumbar region without myelopathy or radiculopathy   Started by:  Zev Austin MD        Dose:  750 mg   Take 1 tablet (750 mg) by mouth 3 times daily as needed for muscle spasms   Quantity:  45 tablet   Refills:  2            Where to get your medicines      These medications were sent to [id number] - [place] - [address] [facility] [address]  [address], [place] [address]     Phone:  [phone]     methocarbamol 750 MG tablet                Primary Care Provider Office Phone # Fax #    Zev Austin [phone]       [facility] [address]  [facility] [address]        Equal Access to Services     KRYS PLUNKETT AH: Hadii aad ku hadasho Soomaali, waaxda luqadaha, qaybta kaalmada adeegyada, waxay idiin hayaan juan diego read . So [facility] [phone].    ATENCIÓN: Si habla español, tiene a hinojosa disposición servicios gratuitos de asistencia lingüística. [facility] [phone].    We comply with applicable federal civil rights laws and [place] laws. We do not discriminate on the basis of race, color, national origin, age, disability, sex, sexual orientation, or gender identity.            Thank you!     Thank you for choosing [facility]  for your care. Our goal is always to provide you with excellent care. Hearing back from our patients is one way we can continue to improve our services. Please take a few minutes to complete the written survey that you may receive in the mail after your visit with us. Thank you!             Your Updated Medication List - Protect others around you: Learn how to safely use, store and throw away your  medicines at www.disposemymeds.org.          This list is accurate as of: 12/4/17  1:50 PM.  Always use your most recent med list.                   Brand Name Dispense Instructions for use Diagnosis    diazepam 5 MG tablet    VALIUM    12 tablet    Take 1 tablet by mouth every 6 hours as needed for anxiety.        ibuprofen 600 MG tablet    ADVIL/MOTRIN    30 tablet    Take 1 tablet by mouth every 6 hours as needed for pain.        methocarbamol 750 MG tablet    ROBAXIN    45 tablet    Take 1 tablet (750 mg) by mouth 3 times daily as needed for muscle spasms    Spondylosis of lumbar region without myelopathy or radiculopathy       oxyCODONE-acetaminophen 5-325 MG per tablet    PERCOCET    20 tablet    Take 1-2 tablets by mouth every 6 hours as needed for pain.

## 2017-12-04 NOTE — PROGRESS NOTES
SUBJECTIVE:   Aly Sorensen is a 77 year old male who presents for Preventive Visit.  Are you in the first 12 months of your Medicare Part B coverage?  No    Healthy Habits:    Do you get at least three servings of calcium containing foods daily (dairy, green leafy vegetables, etc.)? yes    Amount of exercise or daily activities, outside of work: 2-3 day(s) per week    Problems taking medications regularly No    Medication side effects: No    Have you had an eye exam in the past two years? yes    Do you see a dentist twice per year? No - once a year    Do you have sleep apnea, excessive snoring or daytime drowsiness? no    COGNITIVE SCREEN  1) Repeat 3 items (Banana, Sunrise, Chair)    2) Clock draw: NORMAL  3) 3 item recall: Recalls 3 objects  Results: 3 items recalled: COGNITIVE IMPAIRMENT LESS LIKELY    Mini-CogTM Copyright S Keyon. Licensed by the author for use in [facility]; reprinted with permission (andra@[facility]). All rights reserved.      He drove to [place] in October  Since then he has had intermittent mild breathlessness with exertion  No chest pains  No cough or hemoptysis  No fevers or chills  No edema, orthopnea, PND      Reviewed and updated as needed this visit by clinical staff  Tobacco  Allergies  Meds  Med Hx  Surg Hx  Fam Hx  Soc Hx        Reviewed and updated as needed this visit by Provider  Tobacco  Med Hx  Surg Hx  Fam Hx  Soc Hx       Social History   Substance Use Topics     Smoking status: Former Smoker     Types: Cigars     Smokeless tobacco: Never Used      Comment: quit smoking cigars 2008     Alcohol use 1.8 - 2.4 oz/week     3 - 4 Standard drinks or equivalent per week      Comment: beer, wine or cocktail  1 glass approx 3-4 x week       The patient does not drink >3 drinks per day nor >7 drinks per week.     Today's PHQ-2 Score:   PHQ-2 ( 1999 Pfizer) 12/29/2016   Q1: Little interest or pleasure in doing things 0   Q2: Feeling down, depressed or hopeless 0    PHQ-2 Score 0         Do you feel safe in your environment - Yes    Do you have a Health Care Directive?: Yes: Advance Directive has been received and scanned.    Current providers sharing in care for this patient include: Patient Care Team:  Zev Austin MD as PCP - General (Internal Medicine)      Hearing impairment: Yes, Difficulty following a conversation in a noisy restaurant or crowded room.    Feel that people are mumbling or not speaking clearly.    Difficulty following dialogue in the theater.    Need to ask people to speak up or repeat themselves.    Find that men's voices are easier to understand than women's.    Difficulty understanding soft or whispered speech.    Ability to successfully perform activities of daily living: Yes, no assistance needed     Fall risk:         Home safety:  none identified      The following health maintenance items are reviewed in Epic and correct as of today:  Health Maintenance   Topic Date Due     ADVANCE DIRECTIVE PLANNING Q5 YRS  07/15/1994     FALL RISK ASSESSMENT  12/29/2017     LIPID SCREEN Q5 YR MALE (SYSTEM ASSIGNED)  10/19/2020     TETANUS IMMUNIZATION (SYSTEM ASSIGNED)  01/26/2025     INFLUENZA VACCINE (SYSTEM ASSIGNED)  Completed     PNEUMOCOCCAL  Completed     Patient Active Problem List   Diagnosis     Malignant neoplasm of prostate (H)     Melanoma of skin (H)     Spondylosis of lumbar region without myelopathy or radiculopathy     Past Surgical History:   Procedure Laterality Date     ARTHROPLASTY KNEE  12/8/2011    Procedure:ARTHROPLASTY KNEE; Left Total Knee Arthroplasty (JILLIAN)^; Surgeon:YASMIN PELAEZ; Location:[facility] OR     BACK SURGERY       COLONOSCOPY       GENITOURINARY SURGERY  history of prostatectomy     HERNIA REPAIR  inguinal hernia repair as a teenager     ORTHOPEDIC SURGERY  back fusion 2002     PHACOEMULSIFICATION CLEAR CORNEA WITH STANDARD INTRAOCULAR LENS IMPLANT Right 11/10/2015    Procedure: PHACOEMULSIFICATION CLEAR CORNEA WITH  "STANDARD INTRAOCULAR LENS IMPLANT;  Surgeon: Criss Pulliam MD;  Location:  [facility]     PHACOEMULSIFICATION CLEAR CORNEA WITH STANDARD INTRAOCULAR LENS IMPLANT Left 11/17/2015    Procedure: PHACOEMULSIFICATION CLEAR CORNEA WITH STANDARD INTRAOCULAR LENS IMPLANT;  Surgeon: Criss Pulliam MD;  Location: [facility]     removal of melanoma[  approx. 20 years ago       Social History   Substance Use Topics     Smoking status: Former Smoker     Types: Cigars     Smokeless tobacco: Never Used      Comment: quit smoking cigars 2008     Alcohol use 1.8 - 2.4 oz/week     3 - 4 Standard drinks or equivalent per week      Comment: beer, wine or cocktail  1 glass approx 3-4 x week     Family History   Problem Relation Age of Onset     Uterine Cancer Mother      Colon Cancer Father      DIABETES Brother      Heart Failure Brother          Current Outpatient Prescriptions   Medication Sig Dispense Refill     oxyCODONE-acetaminophen (PERCOCET) 5-325 MG per tablet Take 1-2 tablets by mouth every 6 hours as needed for pain. 20 tablet 0     diazepam (VALIUM) 5 MG tablet Take 1 tablet by mouth every 6 hours as needed for anxiety. 12 tablet 0     ibuprofen (ADVIL,MOTRIN) 600 MG tablet Take 1 tablet by mouth every 6 hours as needed for pain. 30 tablet 0     No Known Allergies          ROS:  A 12 organ systems ROS is negative    OBJECTIVE:   /72 (BP Location: Right arm, Cuff Size: Adult Regular)  Pulse 76  Temp 98.1  F (36.7  C) (Tympanic)  Ht 5' 5.5\" (1.664 m)  Wt 193 lb (87.5 kg)  SpO2 97%  BMI 31.63 kg/m2 Estimated body mass index is 31.63 kg/(m^2) as calculated from the following:    Height as of this encounter: 5' 5.5\" (1.664 m).    Weight as of this encounter: 193 lb (87.5 kg).  EXAM:   GENERAL: healthy, alert and no distress  EYES: Eyes grossly normal to inspection, PERRL and conjunctivae and sclerae normal  HENT: ear canals and TM's normal, nose and mouth without ulcers or lesions  NECK: no adenopathy, no asymmetry, " "masses, or scars and thyroid normal to palpation  RESP: lungs clear to auscultation - no rales, rhonchi or wheezes  CV: regular rate and rhythm, normal S1 S2, no S3 or S4, no murmur, click or rub, no peripheral edema and peripheral pulses strong  ABDOMEN: soft, nontender, no hepatosplenomegaly, no masses and bowel sounds normal  RECTAL:  No prostate tissue palpated, no rectal masses   MS: no gross musculoskeletal defects noted, no edema  SKIN: no suspicious lesions or rashes  NEURO: Normal strength and tone, mentation intact and speech normal  PSYCH: mentation appears normal, affect normal/bright    ASSESSMENT / PLAN:   1. Routine general medical examination at a health care facility      2. Malignant neoplasm of prostate (H)    - PSA tumor marker    3. Melanoma of skin (H)  He needs to return to dermatology for skin check; referred to Dr. Stapleton  - DERMATOLOGY REFERRAL  - Comprehensive metabolic panel  - CBC with platelets    4. Shortness of breath    Evaluate symptom as below, if D-dimer abnormal then proceed to CT chest PE protocol  -CXR  - D dimer, quantitative  - General PFT Lab (Please always keep checked); Future  - Pulmonary Function Test; Future    5. Spondylosis of lumbar region without myelopathy or radiculopathy  He asked for a refill of oxycodone that he takes intermittently for \"back spasms\" he has needed to go the ER for this in the past  I told him to try below instead of oxycodone given the emerging understanding of the risk associated with oxycodone  Side effects and risks of robaxin discussed  - methocarbamol (ROBAXIN) 750 MG tablet; Take 1 tablet (750 mg) by mouth 3 times daily as needed for muscle spasms  Dispense: 45 tablet; Refill: 2    6. At risk for falling      7. Lipid screening    - Lipid panel reflex to direct LDL Fasting    End of Life Planning:  Patient currently has an advanced directive: Yes.  Practitioner is supportive of decision.    COUNSELING:  Reviewed preventive health " "counseling, as reflected in patient instructions  Special attention given to:       Regular exercise       Healthy diet/nutrition       Immunizations    Vaccinated for: Influenza              Estimated body mass index is 31.63 kg/(m^2) as calculated from the following:    Height as of this encounter: 5' 5.5\" (1.664 m).    Weight as of this encounter: 193 lb (87.5 kg).  Weight management plan: Discussed healthy diet and exercise guidelines and patient will follow up in 12 months in clinic to re-evaluate.   reports that he has quit smoking. His smoking use included Cigars. He has never used smokeless tobacco.        Appropriate preventive services were discussed with this patient, including applicable screening as appropriate for cardiovascular disease, diabetes, osteopenia/osteoporosis, and glaucoma.  As appropriate for age/gender, discussed screening for colorectal cancer, prostate cancer, breast cancer, and cervical cancer. Checklist reviewing preventive services available has been given to the patient.    Reviewed patients plan of care and provided an AVS. The Basic Care Plan (routine screening as documented in Health Maintenance) for Aly meets the Care Plan requirement. This Care Plan has been established and reviewed with the Patient.    Counseling Resources:  ATP IV Guidelines  Pooled Cohorts Equation Calculator  Breast Cancer Risk Calculator  FRAX Risk Assessment  ICSI Preventive Guidelines  Dietary Guidelines for Americans, 2010  USDA's MyPlate  ASA Prophylaxis  Lung CA Screening    Zev Austin MD  [facility]  "

## 2017-12-04 NOTE — NURSING NOTE
"Chief Complaint   Patient presents with     Wellness Visit     Fasting       Initial /72 (BP Location: Right arm, Cuff Size: Adult Regular)  Pulse 76  Temp 98.1  F (36.7  C) (Tympanic)  Ht 5' 5.5\" (1.664 m)  Wt 193 lb (87.5 kg)  SpO2 97%  BMI 31.63 kg/m2 Estimated body mass index is 31.63 kg/(m^2) as calculated from the following:    Height as of this encounter: 5' 5.5\" (1.664 m).    Weight as of this encounter: 193 lb (87.5 kg).  Medication Reconciliation: complete  Bette Martin MA      "

## 2017-12-04 NOTE — LETTER
[facility]  [address] [facility]  [address]  [place]  [address]  Tel: [phone]    December 5, 2017    Aly Sorensen  [address]   [facility] [address]        Dear Mr. Sorensen,    Good news! Your lung function testing did not show evidence of obstructive lung disease (COPD/emphysema).    Your chest x-ray does not show any dangerous findings to explain your breathlessness.     Follow up:  Again, I would schedule a stress test to complete the evaluation for concerning causes for your breathlessness.  Call [phone] to schedule your stress test.      The following letter pertains to your most recent diagnostic tests:     -Your prostate specific antigen (PSA) test result returned normal.     -Liver and gallbladder tests are normal for you. (ALT,AST, Alk phos, bilirubin), kidney function is normal for you (Creatinine, GFR), Sodium is normal, Potassium is normal for you, Calcium is normal for you, Glucose (blood sugar) is normal for you.       -Your cholesterol panel looks healthy.     -Your D-dimer blood test is in the normal range, making blood clots in the lung an unlikely cause of your breathlessness symptoms.   -Your complete blood counts including your hemoglobin returned normal for you.       Bottom line:  Your labs look healthy and at goal.  If your breathlessness is persistent the next step would be to schedule cardiac stress test.  I have sent an order for you to schedule that test.  Please call [phone] to schedule your stress test.  I will inform you of those results when they are available.         If you have any further questions or problems, please contact our office.      Sincerely,    Zev Austin MD/yue    Results for orders placed or performed in visit on 12/04/17   Spirometry, Breathing Capacity: Normal Order, Clinic Performed   Result Value Ref Range    FEV-1      FVC      FEV1/FVC      FEF 25/75     PSA tumor marker   Result Value Ref Range    PSA <0.01 0 - 4 ug/L    Lipid panel reflex to direct LDL Fasting   Result Value Ref Range    Cholesterol 164 <200 mg/dL    Triglycerides 63 <150 mg/dL    HDL Cholesterol 59 >39 mg/dL    LDL Cholesterol Calculated 92 <100 mg/dL    Non HDL Cholesterol 105 <130 mg/dL   Comprehensive metabolic panel   Result Value Ref Range    Sodium 141 133 - 144 mmol/L    Potassium 4.4 3.4 - 5.3 mmol/L    Chloride 107 94 - 109 mmol/L    Carbon Dioxide 27 20 - 32 mmol/L    Anion Gap 7 3 - 14 mmol/L    Glucose 85 70 - 99 mg/dL    Urea Nitrogen 18 7 - 30 mg/dL    Creatinine 0.89 0.66 - 1.25 mg/dL    GFR Estimate 83 >60 mL/min/1.7m2    GFR Estimate If Black >90 >60 mL/min/1.7m2    Calcium 8.7 8.5 - 10.1 mg/dL    Bilirubin Total 0.6 0.2 - 1.3 mg/dL    Albumin 3.8 3.4 - 5.0 g/dL    Protein Total 7.0 6.8 - 8.8 g/dL    Alkaline Phosphatase 78 40 - 150 U/L    ALT 22 0 - 70 U/L    AST 17 0 - 45 U/L   CBC with platelets   Result Value Ref Range    WBC 9.4 4.0 - 11.0 10e9/L    RBC Count 4.64 4.4 - 5.9 10e12/L    Hemoglobin 15.2 13.3 - 17.7 g/dL    Hematocrit 45.2 40.0 - 53.0 %    MCV 97 78 - 100 fl    MCH 32.8 26.5 - 33.0 pg    MCHC 33.6 31.5 - 36.5 g/dL    RDW 13.7 10.0 - 15.0 %    Platelet Count 268 150 - 450 10e9/L   D dimer, quantitative   Result Value Ref Range    D Dimer 0.5 0.0 - 0.50 ug/ml FEU               Enclosure: Lab Results

## 2017-12-05 ENCOUNTER — TELEPHONE (OUTPATIENT)
Dept: FAMILY MEDICINE | Facility: CLINIC | Age: 77
End: 2017-12-05

## 2017-12-05 DIAGNOSIS — M62.830 BACK MUSCLE SPASM: Primary | ICD-10-CM

## 2017-12-05 LAB
ALBUMIN SERPL-MCNC: 3.8 G/DL (ref 3.4–5)
ALP SERPL-CCNC: 78 U/L (ref 40–150)
ALT SERPL W P-5'-P-CCNC: 22 U/L (ref 0–70)
ANION GAP SERPL CALCULATED.3IONS-SCNC: 7 MMOL/L (ref 3–14)
AST SERPL W P-5'-P-CCNC: 17 U/L (ref 0–45)
BILIRUB SERPL-MCNC: 0.6 MG/DL (ref 0.2–1.3)
BUN SERPL-MCNC: 18 MG/DL (ref 7–30)
CALCIUM SERPL-MCNC: 8.7 MG/DL (ref 8.5–10.1)
CHLORIDE SERPL-SCNC: 107 MMOL/L (ref 94–109)
CHOLEST SERPL-MCNC: 164 MG/DL
CO2 SERPL-SCNC: 27 MMOL/L (ref 20–32)
CREAT SERPL-MCNC: 0.89 MG/DL (ref 0.66–1.25)
GFR SERPL CREATININE-BSD FRML MDRD: 83 ML/MIN/1.7M2
GLUCOSE SERPL-MCNC: 85 MG/DL (ref 70–99)
HDLC SERPL-MCNC: 59 MG/DL
LDLC SERPL CALC-MCNC: 92 MG/DL
NONHDLC SERPL-MCNC: 105 MG/DL
POTASSIUM SERPL-SCNC: 4.4 MMOL/L (ref 3.4–5.3)
PROT SERPL-MCNC: 7 G/DL (ref 6.8–8.8)
PSA SERPL-MCNC: <0.01 UG/L (ref 0–4)
SODIUM SERPL-SCNC: 141 MMOL/L (ref 133–144)
TRIGL SERPL-MCNC: 63 MG/DL

## 2017-12-05 RX ORDER — BACLOFEN 20 MG/1
20 TABLET ORAL 3 TIMES DAILY
Qty: 270 TABLET | Refills: 3 | Status: CANCELLED | OUTPATIENT
Start: 2017-12-05

## 2017-12-05 NOTE — PROGRESS NOTES
The following letter pertains to your most recent diagnostic tests:    -Your prostate specific antigen (PSA) test result returned normal.     -Liver and gallbladder tests are normal for you. (ALT,AST, Alk phos, bilirubin), kidney function is normal for you (Creatinine, GFR), Sodium is normal, Potassium is normal for you, Calcium is normal for you, Glucose (blood sugar) is normal for you.      -Your cholesterol panel looks healthy.     -Your D-dimer blood test is in the normal range, making blood clots in the lung an unlikely cause of your breathlessness symptoms.  -Your complete blood counts including your hemoglobin returned normal for you.           Bottom line:  Your labs look healthy and at goal.  If your breathlessness is persistent the next step would be to schedule cardiac stress test.  I have sent an order for you to schedule that test.  Please call [phone] to schedule your stress test.  I will inform you of those results when they are available.        Sincerely,    Dr. Austin

## 2017-12-05 NOTE — PROGRESS NOTES
The following letter pertains to your most recent diagnostic tests:    Good news! Your lung function testing did not show evidence of obstructive lung disease (COPD/emphysema).        Follow up:  Again, I would schedule a stress test to complete the evaluation for concerning causes for your breathlessness.  Call [phone] to schedule your stress test.        Sincerely,    Dr. Austin

## 2017-12-05 NOTE — PROGRESS NOTES
The following letter pertains to your most recent diagnostic tests:    Good news! Your chest x-ray does not show any dangerous findings to explain your breathlessness.         Sincerely,    Dr. Austin

## 2017-12-06 NOTE — TELEPHONE ENCOUNTER
Patient's insurance does not cover methocarbamol. They do cover tizanidine or baclofen, would either of these be suitable for the patient?    If alternatives are not suitable let me know and I will try for PA, thanks.    Austin Gordon, [facility]

## 2017-12-07 NOTE — TELEPHONE ENCOUNTER
Ok I left detailed voice message letting patient know of the change in drug.    Austin Gordon, [facility]

## 2017-12-18 ENCOUNTER — HOSPITAL ENCOUNTER (OUTPATIENT)
Dept: CARDIOLOGY | Facility: CLINIC | Age: 77
Discharge: HOME | End: 2017-12-18
Attending: INTERNAL MEDICINE | Admitting: INTERNAL MEDICINE
Payer: MEDICARE

## 2017-12-18 DIAGNOSIS — R06.02 SHORTNESS OF BREATH: ICD-10-CM

## 2017-12-18 PROCEDURE — 93321 DOPPLER ECHO F-UP/LMTD STD: CPT | Mod: 26 | Performed by: INTERNAL MEDICINE

## 2017-12-18 PROCEDURE — 93325 DOPPLER ECHO COLOR FLOW MAPG: CPT | Mod: 26 | Performed by: INTERNAL MEDICINE

## 2017-12-18 PROCEDURE — 93018 CV STRESS TEST I&R ONLY: CPT | Performed by: INTERNAL MEDICINE

## 2017-12-18 PROCEDURE — 93350 STRESS TTE ONLY: CPT | Mod: 26 | Performed by: INTERNAL MEDICINE

## 2017-12-18 PROCEDURE — 25500064 ZZH RX 255 OP 636: Performed by: INTERNAL MEDICINE

## 2017-12-18 PROCEDURE — 40000264 ECHO STRESS TEST WITH LUMASON

## 2017-12-18 PROCEDURE — 93016 CV STRESS TEST SUPVJ ONLY: CPT | Performed by: INTERNAL MEDICINE

## 2017-12-18 RX ADMIN — SULFUR HEXAFLUORIDE 5 ML: KIT at 15:29

## 2017-12-18 NOTE — LETTER
[facility]  [address]  [address]  [place]  [address]  Tel: [phone]    2017    Aly Alcala  [address]   [place] [facility] [address]        Dear Mr. Alcala,    The following letter pertains to your most recent diagnostic tests:     Good news! Your stress test does not show evidence of blockage of heart blood vessels that could be causing your breathlessness.  If your symptoms are persistent, then return to clinic for further evaluation.         Sincerely,     Dr. Luo/yue    Results for orders placed or performed during the hospital encounter of 17   Echo Stress Test with Lumason    Narrative    [id number]  [id number]  [id number]  [id number]^VALERIO^SEMAJ^BRENDON        [facility]  [address]  [place] [address]  Phone [phone]  Fax [phone]        Name: ALY ALCALA  MRN: [id number]  : 1940  Study Date: 2017 03:03 PM  Age: 77 yrs  Gender: Male  [place] [facility] [place]  Reason For Study: Shortness of breath  Ordering Physician: SEMAJ LUO  Referring Physician: SEMAJ LUO  Performed By: Mathew Bates RDCS     BSA: 2.0 m2  Height: 66 in  Weight: 193 lb  HR: 80  BP: 124/76 mmHg  _____________________________________________________________________________  __     Procedure  Stress Echo Complete. Contrast Lumason.     _____________________________________________________________________________  __        Interpretation Summary  A moderate workload was achieved.  The patient exhibited no chest pain during exercise.  This was a normal stress echocardiogram with no evidence of stress-induced  ischemia.  Right ventricular systolic pressure could not be approximated due to  inadequate tricuspid regurgitation.  _____________________________________________________________________________  __  Stress  The patient exercised 8:15 min.  A moderate workload was achieved.  RPP  88028.  Exercise was stopped due to fatigue.  The patient did not exhibit any symptoms during exercise.  There was a normal BP response to exercise.  The patient exhibited no chest pain during exercise.  Target Heart Rate was achieved.  A treadmill exercise test according to the Gerber protocol was performed.  No arrhythmia noted.  There was a maximum 0.5mm ST segment depression in the inferior lead(s).  The visual ejection fraction is estimated at 65-70%.  This was a normal stress echocardiogram with no evidence of stress-induced  ischemia.  Normal resting wall motion and no stress-induced wall motion abnormality.     Baseline  Normal baseline electrocardiogram.  The visual ejection fraction is estimated at 55-60%.  Normal left ventricular function and wall motion at rest.        Stress Results         Protocol:  Gerber Protocol        Maximum Predicted HR:   142 bpm         Target HR: 121 bpm               % Maximum Predicted HR: 108 %       Stage  DurationHeart Rate  BP                     Comment            (mm:ss)   (bpm)   Stage 1   3:00      114   158/80   Stage 2   3:00      130   170/80   Stage 3   2:15      153   180/80FAC: above average, Duke score: 8 (low risk)  RecoveryR  4:00      94    132/80                Stress Duration:   8:15 mm:ss        Recovery Time: 4:00 mm:ss          Maximum Stress HR: 153 bpm           METS:          10     Tricuspid Valve  Right ventricular systolic pressure could not be approximated due to  inadequate tricuspid regurgitation.     Aortic Valve  No hemodynamically significant valvular aortic stenosis.     _____________________________________________________________________________  __                             Report approved by: Henrry Mcmahon 12/18/2017 04:12 PM                       _____________________________________________________________________________  __              Enclosure: Lab Results

## 2017-12-19 NOTE — PROGRESS NOTES
The following letter pertains to your most recent diagnostic tests:    Good news! Your stress test does not show evidence of blockage of heart blood vessels that could be causing your breathlessness.  If your symptoms are persistent, then return to clinic for further evaluation.        Sincerely,    Dr. Austin

## 2018-02-28 ENCOUNTER — OFFICE VISIT (OUTPATIENT)
Dept: FAMILY MEDICINE | Facility: CLINIC | Age: 78
End: 2018-02-28
Payer: COMMERCIAL

## 2018-02-28 VITALS
SYSTOLIC BLOOD PRESSURE: 120 MMHG | BODY MASS INDEX: 31.02 KG/M2 | HEART RATE: 59 BPM | TEMPERATURE: 98.4 F | DIASTOLIC BLOOD PRESSURE: 73 MMHG | HEIGHT: 66 IN | WEIGHT: 193 LBS | OXYGEN SATURATION: 97 %

## 2018-02-28 DIAGNOSIS — M79.671 RIGHT FOOT PAIN: Primary | ICD-10-CM

## 2018-02-28 PROCEDURE — 99213 OFFICE O/P EST LOW 20 MIN: CPT | Performed by: NURSE PRACTITIONER

## 2018-02-28 NOTE — NURSING NOTE
"Chief Complaint   Patient presents with     Foot Problems       Initial /73 (BP Location: Right arm, Cuff Size: Adult Large)  Pulse 59  Temp 98.4  F (36.9  C) (Tympanic)  Ht 5' 5.5\" (1.664 m)  Wt 193 lb (87.5 kg)  SpO2 97%  BMI 31.63 kg/m2 Estimated body mass index is 31.63 kg/(m^2) as calculated from the following:    Height as of this encounter: 5' 5.5\" (1.664 m).    Weight as of this encounter: 193 lb (87.5 kg).  Medication Reconciliation: complete   Bette Martin MA      "

## 2018-02-28 NOTE — PATIENT INSTRUCTIONS
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

## 2018-02-28 NOTE — MR AVS SNAPSHOT
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  Call member services at your health plan with any benefit or coverage questions.      Please bring the following to your appointment:  >>   Any x-rays, CTs or MRIs which have been performed.  Contact the facility where they were done to arrange for  prior to your scheduled appointment.    >>   List of current medications   >>   This referral request   >>   Any documents/labs given to you for this referral                  Who to contact     If you have questions or need follow up information about today's clinic visit or your schedule please contact [facility] directly at [phone].  Normal or non-critical lab and imaging results will be communicated to you by MyChart, letter or phone within 4 business days after the clinic has received the results. If you do not hear from us within 7 days, please contact the clinic through MyChart or phone. If you have a critical or abnormal lab result, we will notify you by phone as soon as possible.  Submit refill requests through [employer] or call your pharmacy and they will forward the refill request to us. Please allow 3  "business days for your refill to be completed.          Additional Information About Your Visit        MyChart Information     [employer] lets you send messages to your doctor, view your test results, renew your prescriptions, schedule appointments and more. To sign up, go to [email or web address] . Click on \"Log in\" on the left side of the screen, which will take you to the Welcome page. Then click on \"Sign up Now\" on the right side of the page.     You will be asked to enter the access code listed below, as well as some personal information. Please follow the directions to create your username and password.     Your access code is: [email or web address]  Expires: 3/4/2018  1:50 PM     Your access code will  in 90 days. If you need help or a new code, please call your [facility] clinic or [phone].        Care EveryWhere ID     This is your Care EveryWhere ID. This could be used by other organizations to access your [facility] medical records  [id number]        Your Vitals Were     Pulse Temperature Height Pulse Oximetry BMI (Body Mass Index)       59 98.4  F (36.9  C) (Tympanic) 5' 5.5\" (1.664 m) 97% 31.63 kg/m2        Blood Pressure from Last 3 Encounters:   18 120/73   17 110/72   17 146/81    Weight from Last 3 Encounters:   18 193 lb (87.5 kg)   17 193 lb (87.5 kg)   17 185 lb 9.6 oz (84.2 kg)              We Performed the Following     PODIATRY/FOOT & ANKLE SURGERY REFERRAL        Primary Care Provider Office Phone # Fax #    Zev Austin [phone]       [facility] [address] [address]        Equal Access to Services     KIRBY PLUNKETT : Kenji Valentni, waaxda luqadaha, qaybta kaalmada nate, kapil escoto. [facility] [phone].    ATENCIÓN: Si habla español, tiene a hinojosa disposición servicios gratuitos de asistencia lingüística. Llame al [phone].    We comply with " applicable federal civil rights laws and [place] laws. We do not discriminate on the basis of race, color, national origin, age, disability, sex, sexual orientation, or gender identity.            Thank you!     Thank you for choosing [facility]  for your care. Our goal is always to provide you with excellent care. Hearing back from our patients is one way we can continue to improve our services. Please take a few minutes to complete the written survey that you may receive in the mail after your visit with us. Thank you!             Your Updated Medication List - Protect others around you: Learn how to safely use, store and throw away your medicines at www.disposemymeds.org.          This list is accurate as of 2/28/18  2:44 PM.  Always use your most recent med list.                   Brand Name Dispense Instructions for use Diagnosis    ibuprofen 600 MG tablet    ADVIL/MOTRIN    30 tablet    Take 1 tablet by mouth every 6 hours as needed for pain.        tiZANidine 4 MG tablet    ZANAFLEX    90 tablet    Take 1 tablet (4 mg) by mouth 3 times daily    Back muscle spasm

## 2018-02-28 NOTE — PROGRESS NOTES
HPI      SUBJECTIVE:   Aly Sorensen is a 77 year old male who presents to clinic today for the following health issues:    Foot Problem      Duration: several weeks    Description (location/character/radiation): burning sensation on the top of the right foot and a couple of the toes. Feels like someone is holding a match on the skin. Happens at night, when at rest. Does nots happen while walking. Makes it hard to fall asleep at night sometimes. Has not tried any preventative therapies.     This pain comes and goes. Moving into a certain positions it goes away   No back pain but hx multiple back surgeries   Has supportive shoes  Goes to the gym and does the bike   Always better in the morning. Pain Doesn't usually start until late morning/early afternoon        Past Medical History:   Diagnosis Date     Arthritis degenerative joint disease     Malignant neoplasm of prostate (H) 12/29/2016     Melanoma (H)      Melanoma of skin (H) 12/29/2016    Back     Prostate cancer (H)      Spondylosis of lumbar region without myelopathy or radiculopathy 12/29/2016     Past Surgical History:   Procedure Laterality Date     ARTHROPLASTY KNEE  12/8/2011    Procedure:ARTHROPLASTY KNEE; Left Total Knee Arthroplasty (JILLIAN)^; Surgeon:YASMIN PELAEZ; Location: OR     BACK SURGERY       COLONOSCOPY       GENITOURINARY SURGERY  history of prostatectomy     HERNIA REPAIR  inguinal hernia repair as a teenager     ORTHOPEDIC SURGERY  back fusion 2002     PHACOEMULSIFICATION CLEAR CORNEA WITH STANDARD INTRAOCULAR LENS IMPLANT Right 11/10/2015    Procedure: PHACOEMULSIFICATION CLEAR CORNEA WITH STANDARD INTRAOCULAR LENS IMPLANT;  Surgeon: Criss Pulliam MD;  Location: [facility]     PHACOEMULSIFICATION CLEAR CORNEA WITH STANDARD INTRAOCULAR LENS IMPLANT Left 11/17/2015    Procedure: PHACOEMULSIFICATION CLEAR CORNEA WITH STANDARD INTRAOCULAR LENS IMPLANT;  Surgeon: Criss Pulliam MD;  Location:  [facility]     removal of melanoma[  approx. 20  "years ago     Social History   Substance Use Topics     Smoking status: Former Smoker     Types: Cigars     Smokeless tobacco: Never Used      Comment: quit smoking cigars 2008     Alcohol use 1.8 - 2.4 oz/week     3 - 4 Standard drinks or equivalent per week      Comment: beer, wine or cocktail  1 glass approx 3-4 x week     Current Outpatient Prescriptions   Medication Sig Dispense Refill     ibuprofen (ADVIL,MOTRIN) 600 MG tablet Take 1 tablet by mouth every 6 hours as needed for pain. 30 tablet 0     tiZANidine (ZANAFLEX) 4 MG tablet Take 1 tablet (4 mg) by mouth 3 times daily (Patient not taking: Reported on 2/28/2018) 90 tablet 0     No Known Allergies    Reviewed and updated as needed this visit by clinical staff and provider      ROS  Detailed as above      /73 (BP Location: Right arm, Cuff Size: Adult Large)  Pulse 59  Temp 98.4  F (36.9  C) (Tympanic)  Ht 5' 5.5\" (1.664 m)  Wt 193 lb (87.5 kg)  SpO2 97%  BMI 31.63 kg/m2      Physical Exam   Constitutional: He is well-developed, well-nourished, and in no distress.   HENT:   Head: Normocephalic.   Eyes: Conjunctivae are normal.   Cardiovascular: Intact distal pulses.    Pulmonary/Chest: Effort normal.   Musculoskeletal: Normal range of motion. He exhibits no edema or tenderness.   Neurological: He is alert.   Skin: Skin is warm and dry. No rash noted. No erythema.   Psychiatric: Mood and affect normal.   Vitals reviewed.      Assessment and Plan:       ICD-10-CM    1. 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 Recommend comfortable shoes and not too tight.   Will refer to podiatry for f/u       Jovita De Los Santos APRN, CNP  [facility]"

## 2018-03-02 ENCOUNTER — RADIANT APPOINTMENT (OUTPATIENT)
Dept: GENERAL RADIOLOGY | Facility: CLINIC | Age: 78
End: 2018-03-02
Attending: PODIATRIST
Payer: COMMERCIAL

## 2018-03-02 ENCOUNTER — OFFICE VISIT (OUTPATIENT)
Dept: PODIATRY | Facility: CLINIC | Age: 78
End: 2018-03-02
Payer: COMMERCIAL

## 2018-03-02 VITALS
BODY MASS INDEX: 31.02 KG/M2 | SYSTOLIC BLOOD PRESSURE: 116 MMHG | WEIGHT: 193 LBS | DIASTOLIC BLOOD PRESSURE: 70 MMHG | HEIGHT: 66 IN

## 2018-03-02 DIAGNOSIS — D36.10 NEUROMA: ICD-10-CM

## 2018-03-02 DIAGNOSIS — M79.671 RIGHT FOOT PAIN: ICD-10-CM

## 2018-03-02 DIAGNOSIS — M79.671 RIGHT FOOT PAIN: Primary | ICD-10-CM

## 2018-03-02 PROCEDURE — 73630 X-RAY EXAM OF FOOT: CPT | Mod: RT

## 2018-03-02 PROCEDURE — 64450 NJX AA&/STRD OTHER PN/BRANCH: CPT | Mod: RT | Performed by: PODIATRIST

## 2018-03-02 PROCEDURE — 99203 OFFICE O/P NEW LOW 30 MIN: CPT | Mod: 25 | Performed by: PODIATRIST

## 2018-03-02 NOTE — MR AVS SNAPSHOT
After Visit Summary   3/2/2018    Aly Sorensen    MRN: [id number]           Patient Information     Date Of Birth          1940        Visit Information        Provider Department      3/2/2018 7:45 AM Mamadou Quintana DPM [facility]        Today's Diagnoses     Right foot pain    -  1    Neuroma          Care Instructions    Try orthotics with metatarsal pads from [employer]      HITCHCOCK'S NEUROMA     What is a Hitchcock's Neuroma?     Hitchcock's neuroma is an enlargement or thickening of a nerve in the foot. It is also sometimes referred to as an intermetatarsal neuroma, interdigital neuroma, Hitchcock's metatarsalgia (pain in the metatarsal head area), jagjit-neural fibrosis (scar tissue around a nerve) or entrapment neuropathy (abnormal nerve due to compression). A Hitchcock's neuroma most commonly occurs in the third interspace between the third and fourth toes, followed by the second interspace between the second and third toes. Hitchcock's neuromas have also occurred in the fourth and first interspaces, but these are rare. If you have a Hitchcock's neuroma, there is a 15% chance it will occur bilaterally (on both feet). Hitchcock's neuromas occur most commonly in women who are between 30 to 50 years old. The reason they are more common in women is thought to be due to the shoes women wear.   What Causes a Hitchcock's Neuroma?   A Hitchcock's neuroma is thought to be caused by trauma to the nerve, but scientists are still not sure about the exact cause of the trauma. The trauma may be caused by the metatarsal heads, the deep transverse intermetatarsal ligament (holds the metatarsal heads together) or an intermetatarsal bursa (fluid-filled sac). All of these structures can cause compression/trauma on the nerve which initially causes swelling and injury in the nerve. Over time if the compression/trauma continues, the nerve repairs itself with very fibrous tissue that leads to enlargement and  "thickening of the nerve. Other causes of trauma to the nerve may include; overpronation (foot rolls inward), hypermobility (too much motion), cavo varus (high arch foot) and excessive dorsiflexion (toes bend upward) of the toes. These biomechanical (how the foot moves) factors may cause trauma to the nerve with every step. If the nerve becomes irritated and enlarged then it takes up more space and gets even more compressed and irritated. It becomes a vicious cycle.   Signs & Symptoms of a Hitchcock's Neuroma    - Pain (sharp, stabbing, throbbing, shooting)    - Numbness    - Tingling or \"pins & needles\"    - Burning    - Cramping    - A feeling that you are stepping on something or that something is in your shoe    - Initially the symptoms may happen once in a while, but as the condition gets worse, the symptoms may happen all of the time    - It usually feels better by taking off your shoe and massaging your foot     Diagnosis/Tests (Exam) for a Hitchcock's Neuroma   Your podiatrist (foot doctor) will ask many questions about your signs and symptoms and will perform a physical exam. Some of the exams may include a web space compression test. This is done by squeezing the metatarsals together with one hand and using the thumb and index finger of the other hand to compress the affected web space to reproduce the pain/symptoms. A palpable click (Katherine's click) is usually present. This test may also cause pain to shoot into the toes and that is called a Tinel's sign. Alfonzo's test involves squeezing the metatarsals together and moving the toes up and down for 30 seconds. This will usually cause pain or it will bring on your other symptoms. Mcfarland's sign is positive when you stand and the affected toes spread apart. A Hitchcock's neuroma is usually diagnosed based on the history and physical exam findings, but sometimes other tests such as an x-ray, ultrasound or an MRI are needed.   Treatment of a Hitchcock's Neuroma    1.  " Footwear changes: Wear shoes that are wide and deep in the toe box so they do not put pressure on your toes and metatarsals. Avoid wearing high heels because they cause increased pressure on the ball of your foot (forefoot).     2.  Metatarsal pads: These help to lift and separate the metatarsal heads to take pressure off of the nerve. They are placed just behind where you feel the pain, not on top of the painful spot.    3.  Activity modification: For example, you may try swimming instead of running until your symptoms go away.    4.  Taping    5.  Icing    6.  NSAIDs (anti-inflammatories): aleve, ibuprofen, etc.    7.  Arch supports or orthotics: These help to control some of the abnormal motion in your feet. The abnormal motion can lead to extra torque and pressure on the nerve.    8.  Physical Therapy   9.  Cortisone injection: Helps to decrease the size of the irritated, enlarged nerve.    10.  Sclerosing Alcohol injection: Helps to destroy the nerve chemically. Does cause permanent numbness.   11.  Surgery: If conservative treatment does not help surgery may be needed. Surgery may involve cutting out the nerve or cutting the intermetatarsal ligament. Studies have shown surgery has an 80-85% success rate.  This will result in numbness.     Prevention of a Hitchcock's Neuroma    -Avoid wearing narrow, pointed toe shoes    -Avoid wearing high heel shoes             What is Expected After Steroid Injection   The injection that you received today included a steroid. This is a strong steroid that decreases inflammation, reduces pain, and promotes healing. This type of steroid is different than anabolic steroids abused by body builders and professional athletes.   The injection also included a small dose of local anesthetic. This will result in local numbness for at least a few hours. The initial reduction in pain may simply be the effect of the anesthetic. The steroid will start to work as the local anesthetic is  wearing off. It is hard to predict the degree of pain relief or the duration of benefit from a steroid injection. The injection may need to be repeated depending on your body's response and ongoing pain and problems.   Some people experience a few days of increased pain after a steroid injection. This reaction is partly due to bleeding or bruising immediately after the injection. The localized pain may last for a few days and can be treated with extra strength tylenol, local ice, and decreased activity. The pain should resolve as the steroid starts to take effect which can take up to two weeks. Please do not hesitate to call if you continue having problems beyond what is described above.         Body Mass Index (BMI)  Many things can cause foot and ankle problems. Foot structure, activity level, foot mechanics and injuries are common causes of pain.  One very important issue that often goes unmentioned is body weight. Extra weight can cause increased stress on muscles, ligaments, bones and tendons. Sometimes just a few extra pounds is all it takes to put one over her/his threshold. Without reducing that stress, it can be difficult to alleviate pain.   Some people are uncomfortable addressing this issue, but we feel it is important for you to think about it. As Foot & Ankle specialists, our job is addressing the lower extremity problem and possible causes.   Regarding extra body weight, we encourage patients to discuss diet and weight management plans with their primary care doctors. It is this team approach that gives you the best opportunity for pain relief and getting you back on your feet.             Follow-ups after your visit        Follow-up notes from your care team     Return if symptoms worsen or fail to improve.      Who to contact     If you have questions or need follow up information about today's clinic visit or your schedule please contact [facility] directly at [phone].  Normal or  "non-critical lab and imaging results will be communicated to you by MyChart, letter or phone within 4 business days after the clinic has received the results. If you do not hear from us within 7 days, please contact the clinic through Print[employer]art or phone. If you have a critical or abnormal lab result, we will notify you by phone as soon as possible.  Submit refill requests through OrderMyGear or call your pharmacy and they will forward the refill request to us. Please allow 3 business days for your refill to be completed.          Additional Information About Your Visit        [facility] [employer] Information     OrderMyGear lets you send messages to your doctor, view your test results, renew your prescriptions, schedule appointments and more. To sign up, go to [email or web address] . Click on \"Log in\" on the left side of the screen, which will take you to the Welcome page. Then click on \"Sign up Now\" on the right side of the page.     You will be asked to enter the access code listed below, as well as some personal information. Please follow the directions to create your username and password.     Your access code is: [email or web address]  Expires: 3/4/2018  1:50 PM     Your access code will  in 90 days. If you need help or a new code, please call your [facility] clinic or [phone].        Care EveryWhere ID     This is your Care EveryWhere ID. This could be used by other organizations to access your [facility] medical records  [id number]        Your Vitals Were     Height BMI (Body Mass Index)                5' 5.5\" (1.664 m) 31.63 kg/m2           Blood Pressure from Last 3 Encounters:   18 116/70   18 120/73   17 110/72    Weight from Last 3 Encounters:   18 193 lb (87.5 kg)   18 193 lb (87.5 kg)   17 193 lb (87.5 kg)               Primary Care Provider Office Phone # Fax #    Zev Austin [phone]       [facility] [address]  [facility] [address]      "   Equal Access to Services     [facility] : Hadii aad ku hadalljohnathon Beatrizjason, wahanyda luqadaha, qaybta leahtrinykapil rausch. So [place] [phone].    ATENCIÓN: Si habla español, tiene a hinojosa disposición servicios gratuitos de asistencia lingüística. Llame al [phone].    We comply with applicable federal civil rights laws and [place] laws. We do not discriminate on the basis of race, color, national origin, age, disability, sex, sexual orientation, or gender identity.            Thank you!     Thank you for choosing [facility]  for your care. Our goal is always to provide you with excellent care. Hearing back from our patients is one way we can continue to improve our services. Please take a few minutes to complete the written survey that you may receive in the mail after your visit with us. Thank you!             Your Updated Medication List - Protect others around you: Learn how to safely use, store and throw away your medicines at www.disposemymeds.org.          This list is accurate as of 3/2/18  8:29 AM.  Always use your most recent med list.                   Brand Name Dispense Instructions for use Diagnosis    ibuprofen 600 MG tablet    ADVIL/MOTRIN    30 tablet    Take 1 tablet by mouth every 6 hours as needed for pain.        tiZANidine 4 MG tablet    ZANAFLEX    90 tablet    Take 1 tablet (4 mg) by mouth 3 times daily    Back muscle spasm

## 2018-03-02 NOTE — PROGRESS NOTES
PATIENT HISTORY:  Aly Sorensen is a 77 year old male who presents to clinic for right foot burning/tingling pain, near 3rd and 4th toes.  Pain is more on top of the foot.  Worse with activity.  1 month duration.  8/10 pain.  Denies fevers, chills, injury.  No limping.  Reports numbness.  Nonsmoker.  Retired.  Nondiabetic.  Family hx of DM.    I was requested to see this patient for this issue by Kareem De Los Santos CNP.    Review of Systems:  Patient denies fever, chills, injury, limping.  Reports numbness.     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        PAST SURGICAL HISTORY:   Past Surgical History:   Procedure Laterality Date     ARTHROPLASTY KNEE  12/8/2011    Procedure:ARTHROPLASTY KNEE; Left Total Knee Arthroplasty (Qranio)^; Surgeon:YASMIN PELAEZ; Location: OR     BACK SURGERY       COLONOSCOPY       GENITOURINARY SURGERY  history of prostatectomy     HERNIA REPAIR  inguinal hernia repair as a teenager     ORTHOPEDIC SURGERY  back fusion 2002     PHACOEMULSIFICATION CLEAR CORNEA WITH STANDARD INTRAOCULAR LENS IMPLANT Right 11/10/2015    Procedure: PHACOEMULSIFICATION CLEAR CORNEA WITH STANDARD INTRAOCULAR LENS IMPLANT;  Surgeon: Criss Pulliam MD;  Location: [facility]     PHACOEMULSIFICATION CLEAR CORNEA WITH STANDARD INTRAOCULAR LENS IMPLANT Left 11/17/2015    Procedure: PHACOEMULSIFICATION CLEAR CORNEA WITH STANDARD INTRAOCULAR LENS IMPLANT;  Surgeon: Criss Pulliam MD;  [facility]     removal of melanoma[  approx. 20 years ago        MEDICATIONS:   Current Outpatient Prescriptions:      tiZANidine (ZANAFLEX) 4 MG tablet, Take 1 tablet (4 mg) by mouth 3 times daily, Disp: 90 tablet, Rfl: 0     ibuprofen (ADVIL,MOTRIN)  "600 MG tablet, Take 1 tablet by mouth every 6 hours as needed for pain., Disp: 30 tablet, Rfl: 0     ALLERGIES:  No Known Allergies     SOCIAL HISTORY:   Social History     Social History     Marital status:      Spouse name: N/A     Number of children: N/A     Years of education: N/A     Occupational History     Not on file.     Social History Main Topics     Smoking status: Former Smoker     Types: Cigars     Smokeless tobacco: Never Used      Comment: quit smoking cigars 2008     Alcohol use 1.8 - 2.4 oz/week     3 - 4 Standard drinks or equivalent per week      Comment: beer, wine or cocktail  1 glass approx 3-4 x week     Drug use: No     Sexual activity: Not Currently     Other Topics Concern     Not on file     Social History Narrative        FAMILY HISTORY:   Family History   Problem Relation Age of Onset     Uterine Cancer Mother      Colon Cancer Father      DIABETES Brother      Heart Failure Brother         EXAM:Vitals: /70  Ht 5' 5.5\" (1.664 m)  Wt 193 lb (87.5 kg)  BMI 31.63 kg/m2  BMI= Body mass index is 31.63 kg/(m^2).    General appearance: Patient is alert and fully cooperative with history & exam.  No sign of distress is noted during the visit.     Psychiatric: Affect is pleasant & appropriate.  Patient appears motivated to improve health.     Respiratory: Breathing is regular & unlabored while sitting.     HEENT: Hearing is intact to spoken word.  Speech is clear.  No gross evidence of visual impairment that would impact ambulation.     Dermatologic: Skin is intact to both feet without significant lesions, rash or abrasion.  No paronychia or evidence of soft tissue infection is noted.     Vascular: DP & PT pulses are intact & regular bilaterally.  No significant edema or varicosities noted.  CFT and skin temperature are normal to both lower extremities.     Neurologic: Lower extremity sensation is intact to light touch.  No evidence of weakness or contracture in the lower " extremities.  No evidence of neuropathy.     Musculoskeletal: Focal pain to palpation of right forefoot over 3rd interspace with some diffuse pain over dorsal metatarsals 3 and 4.  Patient is ambulatory without assistive device or brace.  No gross ankle deformity noted.  No foot or ankle joint effusion is noted.    XRs of right foot reviewed with pt.  No acute findings.     ASSESSMENT: Right foot pain, neuroma     PLAN:  Reviewed patient's chart in epic.  Discussed condition and treatment options including pros and cons.    Treatment options for Hitchcock's neuroma were discussed.  Non-operative treatment would include wide shoes, orthotics, injection and avoidance of activities that cause pain.  Patient is aware of the progressive nature of this problem and I would anticipate further nerve symptoms over time.  Current symptoms will likely progress and limitations of activities and shoe intolerance may escalate over the years.  Non-operative treatments can be quite effective but that might depend on how much damage has occurred to the nerve.  Narrow fitting shoes will not likely be tolerated.  Surgery is a last resort.    Advised otc orthotics with metatarsal pads, icing, no barefoot walking.  He elected to proceed with steroid injection.    Procedure:  In addition to office visit.  Risks vs benefits discussed including risk of infection, flare.  After consent, the right foot was prepped with alcohol.  A mixture of 0.5cc of 1% lidocaine plain and 1.0cc of Kenalog 10 was injected into the R 3rd interspace from a dorsal approach.  Band-aid applied.  Patient tolerated procedure well.    F/u prn.         Mamadou Quintana DPM, FACFAS    Weight management plan: Patient was referred to their PCP to discuss a diet and exercise plan.

## 2018-03-02 NOTE — LETTER
3/2/2018         RE: Aly Sorensen  [address]   [facility] [address]        Dear Colleague,    Thank you for referring your patient, Aly Sorensen, to the [facility]. Please see a copy of my visit note below.    PATIENT HISTORY:  Aly Sorensen is a 77 year old male who presents to clinic for right foot burning/tingling pain, near 3rd and 4th toes.  Pain is more on top of the foot.  Worse with activity.  1 month duration.  8/10 pain.  Denies fevers, chills, injury.  No limping.  Reports numbness.  Nonsmoker.  Retired.  Nondiabetic.  Family hx of DM.    I was requested to see this patient for this issue by Kareem De Los Santos CNP.    Review of Systems:  Patient denies fever, chills, injury, limping.  Reports numbness.     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        PAST SURGICAL HISTORY:   Past Surgical History:   Procedure Laterality Date     ARTHROPLASTY KNEE  12/8/2011    Procedure:ARTHROPLASTY KNEE; Left Total Knee Arthroplasty (JILLIAN)^; Surgeon:YASMIN PELAEZ; Location: OR     BACK SURGERY       COLONOSCOPY       GENITOURINARY SURGERY  history of prostatectomy     HERNIA REPAIR  inguinal hernia repair as a teenager     ORTHOPEDIC SURGERY  back fusion 2002     PHACOEMULSIFICATION CLEAR CORNEA WITH STANDARD INTRAOCULAR LENS IMPLANT Right 11/10/2015    Procedure: PHACOEMULSIFICATION CLEAR CORNEA WITH STANDARD INTRAOCULAR LENS IMPLANT;  Surgeon: Criss Pulliam MD;  Location: [facility]     PHACOEMULSIFICATION CLEAR CORNEA WITH STANDARD INTRAOCULAR LENS IMPLANT Left 11/17/2015    Procedure: PHACOEMULSIFICATION CLEAR CORNEA WITH STANDARD INTRAOCULAR LENS IMPLANT;  Surgeon: Criss Pulliam MD;  Location: [facility]      "removal of melanoma[  approx. 20 years ago        MEDICATIONS:   Current Outpatient Prescriptions:      tiZANidine (ZANAFLEX) 4 MG tablet, Take 1 tablet (4 mg) by mouth 3 times daily, Disp: 90 tablet, Rfl: 0     ibuprofen (ADVIL,MOTRIN) 600 MG tablet, Take 1 tablet by mouth every 6 hours as needed for pain., Disp: 30 tablet, Rfl: 0     ALLERGIES:  No Known Allergies     SOCIAL HISTORY:   Social History     Social History     Marital status:      Spouse name: N/A     Number of children: N/A     Years of education: N/A     Occupational History     Not on file.     Social History Main Topics     Smoking status: Former Smoker     Types: Cigars     Smokeless tobacco: Never Used      Comment: quit smoking cigars 2008     Alcohol use 1.8 - 2.4 oz/week     3 - 4 Standard drinks or equivalent per week      Comment: beer, wine or cocktail  1 glass approx 3-4 x week     Drug use: No     Sexual activity: Not Currently     Other Topics Concern     Not on file     Social History Narrative        FAMILY HISTORY:   Family History   Problem Relation Age of Onset     Uterine Cancer Mother      Colon Cancer Father      DIABETES Brother      Heart Failure Brother         EXAM:Vitals: /70  Ht 5' 5.5\" (1.664 m)  Wt 193 lb (87.5 kg)  BMI 31.63 kg/m2  BMI= Body mass index is 31.63 kg/(m^2).    General appearance: Patient is alert and fully cooperative with history & exam.  No sign of distress is noted during the visit.     Psychiatric: Affect is pleasant & appropriate.  Patient appears motivated to improve health.     Respiratory: Breathing is regular & unlabored while sitting.     HEENT: Hearing is intact to spoken word.  Speech is clear.  No gross evidence of visual impairment that would impact ambulation.     Dermatologic: Skin is intact to both feet without significant lesions, rash or abrasion.  No paronychia or evidence of soft tissue infection is noted.     Vascular: DP & PT pulses are intact & regular bilaterally.  " No significant edema or varicosities noted.  CFT and skin temperature are normal to both lower extremities.     Neurologic: Lower extremity sensation is intact to light touch.  No evidence of weakness or contracture in the lower extremities.  No evidence of neuropathy.     Musculoskeletal: Focal pain to palpation of right forefoot over 3rd interspace with some diffuse pain over dorsal metatarsals 3 and 4.  Patient is ambulatory without assistive device or brace.  No gross ankle deformity noted.  No foot or ankle joint effusion is noted.    XRs of right foot reviewed with pt.  No acute findings.     ASSESSMENT: Right foot pain, neuroma     PLAN:  Reviewed patient's chart in epic.  Discussed condition and treatment options including pros and cons.    Treatment options for Hitchcock's neuroma were discussed.  Non-operative treatment would include wide shoes, orthotics, injection and avoidance of activities that cause pain.  Patient is aware of the progressive nature of this problem and I would anticipate further nerve symptoms over time.  Current symptoms will likely progress and limitations of activities and shoe intolerance may escalate over the years.  Non-operative treatments can be quite effective but that might depend on how much damage has occurred to the nerve.  Narrow fitting shoes will not likely be tolerated.  Surgery is a last resort.    Advised otc orthotics with metatarsal pads, icing, no barefoot walking.  He elected to proceed with steroid injection.    Procedure:  In addition to office visit.  Risks vs benefits discussed including risk of infection, flare.  After consent, the right foot was prepped with alcohol.  A mixture of 0.5cc of 1% lidocaine plain and 1.0cc of Kenalog 10 was injected into the R 3rd interspace from a dorsal approach.  Band-aid applied.  Patient tolerated procedure well.    F/u prn.         Mamadou Quintana DPM, FACFAS        Again, thank you for allowing me to participate in the  care of your patient.        Sincerely,        Mamadou Quintana DPM

## 2018-03-02 NOTE — PATIENT INSTRUCTIONS
Try orthotics with metatarsal pads from [employer]      HITCHCOCK'S NEUROMA     What is a Hitchcock's Neuroma?     Hitchcock's neuroma is an enlargement or thickening of a nerve in the foot. It is also sometimes referred to as an intermetatarsal neuroma, interdigital neuroma, Hitchcock's metatarsalgia (pain in the metatarsal head area), jagjit-neural fibrosis (scar tissue around a nerve) or entrapment neuropathy (abnormal nerve due to compression). A Hitchcock's neuroma most commonly occurs in the third interspace between the third and fourth toes, followed by the second interspace between the second and third toes. Hitchcock's neuromas have also occurred in the fourth and first interspaces, but these are rare. If you have a Hitchcock's neuroma, there is a 15% chance it will occur bilaterally (on both feet). Hitchcock's neuromas occur most commonly in women who are between 30 to 50 years old. The reason they are more common in women is thought to be due to the shoes women wear.   What Causes a Hitchcock's Neuroma?   A Hitchcock's neuroma is thought to be caused by trauma to the nerve, but scientists are still not sure about the exact cause of the trauma. The trauma may be caused by the metatarsal heads, the deep transverse intermetatarsal ligament (holds the metatarsal heads together) or an intermetatarsal bursa (fluid-filled sac). All of these structures can cause compression/trauma on the nerve which initially causes swelling and injury in the nerve. Over time if the compression/trauma continues, the nerve repairs itself with very fibrous tissue that leads to enlargement and thickening of the nerve. Other causes of trauma to the nerve may include; overpronation (foot rolls inward), hypermobility (too much motion), cavo varus (high arch foot) and excessive dorsiflexion (toes bend upward) of the toes. These biomechanical (how the foot moves) factors may cause trauma to the nerve with every step. If the nerve becomes irritated and enlarged  "then it takes up more space and gets even more compressed and irritated. It becomes a vicious cycle.   Signs & Symptoms of a Hitchcock's Neuroma    - Pain (sharp, stabbing, throbbing, shooting)    - Numbness    - Tingling or \"pins & needles\"    - Burning    - Cramping    - A feeling that you are stepping on something or that something is in your shoe    - Initially the symptoms may happen once in a while, but as the condition gets worse, the symptoms may happen all of the time    - It usually feels better by taking off your shoe and massaging your foot     Diagnosis/Tests (Exam) for a Hitchcock's Neuroma   Your podiatrist (foot doctor) will ask many questions about your signs and symptoms and will perform a physical exam. Some of the exams may include a web space compression test. This is done by squeezing the metatarsals together with one hand and using the thumb and index finger of the other hand to compress the affected web space to reproduce the pain/symptoms. A palpable click (Katherine's click) is usually present. This test may also cause pain to shoot into the toes and that is called a Tinel's sign. Alfonzo's test involves squeezing the metatarsals together and moving the toes up and down for 30 seconds. This will usually cause pain or it will bring on your other symptoms. Mcfarland's sign is positive when you stand and the affected toes spread apart. A Hitchcock's neuroma is usually diagnosed based on the history and physical exam findings, but sometimes other tests such as an x-ray, ultrasound or an MRI are needed.   Treatment of a Hitchcock's Neuroma    1.  Footwear changes: Wear shoes that are wide and deep in the toe box so they do not put pressure on your toes and metatarsals. Avoid wearing high heels because they cause increased pressure on the ball of your foot (forefoot).     2.  Metatarsal pads: These help to lift and separate the metatarsal heads to take pressure off of the nerve. They are placed just behind " where you feel the pain, not on top of the painful spot.    3.  Activity modification: For example, you may try swimming instead of running until your symptoms go away.    4.  Taping    5.  Icing    6.  NSAIDs (anti-inflammatories): aleve, ibuprofen, etc.    7.  Arch supports or orthotics: These help to control some of the abnormal motion in your feet. The abnormal motion can lead to extra torque and pressure on the nerve.    8.  Physical Therapy   9.  Cortisone injection: Helps to decrease the size of the irritated, enlarged nerve.    10.  Sclerosing Alcohol injection: Helps to destroy the nerve chemically. Does cause permanent numbness.   11.  Surgery: If conservative treatment does not help surgery may be needed. Surgery may involve cutting out the nerve or cutting the intermetatarsal ligament. Studies have shown surgery has an 80-85% success rate.  This will result in numbness.     Prevention of a Hitchcock's Neuroma    -Avoid wearing narrow, pointed toe shoes    -Avoid wearing high heel shoes             What is Expected After Steroid Injection   The injection that you received today included a steroid. This is a strong steroid that decreases inflammation, reduces pain, and promotes healing. This type of steroid is different than anabolic steroids abused by body builders and professional athletes.   The injection also included a small dose of local anesthetic. This will result in local numbness for at least a few hours. The initial reduction in pain may simply be the effect of the anesthetic. The steroid will start to work as the local anesthetic is wearing off. It is hard to predict the degree of pain relief or the duration of benefit from a steroid injection. The injection may need to be repeated depending on your body's response and ongoing pain and problems.   Some people experience a few days of increased pain after a steroid injection. This reaction is partly due to bleeding or bruising immediately after the  injection. The localized pain may last for a few days and can be treated with extra strength tylenol, local ice, and decreased activity. The pain should resolve as the steroid starts to take effect which can take up to two weeks. Please do not hesitate to call if you continue having problems beyond what is described above.         Body Mass Index (BMI)  Many things can cause foot and ankle problems. Foot structure, activity level, foot mechanics and injuries are common causes of pain.  One very important issue that often goes unmentioned is body weight. Extra weight can cause increased stress on muscles, ligaments, bones and tendons. Sometimes just a few extra pounds is all it takes to put one over her/his threshold. Without reducing that stress, it can be difficult to alleviate pain.   Some people are uncomfortable addressing this issue, but we feel it is important for you to think about it. As [facility] Ank[facility] specialists, our job is addressing the lower extremity problem and possible causes.   Regarding extra body weight, we encourage patients to discuss diet and weight management plans with their primary care doctors. It is this team approach that gives you the best opportunity for pain relief and getting you back on your feet.

## 2018-03-02 NOTE — NURSING NOTE
"Chief Complaint   Patient presents with     Foot Problems     right foot pain feels burning sensation [id number] on the toes        Initial /70  Ht 5' 5.5\" (1.664 m)  Wt 193 lb (87.5 kg)  BMI 31.63 kg/m2 Estimated body mass index is 31.63 kg/(m^2) as calculated from the following:    Height as of this encounter: 5' 5.5\" (1.664 m).    Weight as of this encounter: 193 lb (87.5 kg).  Medication Reconciliation: complete   Yang Howard MA      "

## 2018-12-03 ENCOUNTER — HOSPITAL ENCOUNTER (OUTPATIENT)
Dept: LAB | Facility: CLINIC | Age: 78
Discharge: HOME | End: 2018-12-03
Attending: ORTHOPAEDIC SURGERY | Admitting: ORTHOPAEDIC SURGERY
Payer: MEDICARE

## 2018-12-03 DIAGNOSIS — Z01.812 PRE-OPERATIVE LABORATORY EXAMINATION: ICD-10-CM

## 2018-12-03 LAB
MRSA DNA SPEC QL NAA+PROBE: NEGATIVE
SPECIMEN SOURCE: NORMAL

## 2018-12-03 PROCEDURE — 87640 STAPH A DNA AMP PROBE: CPT | Performed by: ORTHOPAEDIC SURGERY

## 2018-12-03 PROCEDURE — 40000830 ZZHCL STATISTIC STAPH AUREUS METH RESIST SCREEN PCR: Performed by: ORTHOPAEDIC SURGERY

## 2018-12-03 PROCEDURE — 87641 MR-STAPH DNA AMP PROBE: CPT | Performed by: ORTHOPAEDIC SURGERY

## 2018-12-03 PROCEDURE — 40000829 ZZHCL STATISTIC STAPH AUREUS SUSCEPT SCREEN PCR: Performed by: ORTHOPAEDIC SURGERY

## 2018-12-13 ENCOUNTER — OFFICE VISIT (OUTPATIENT)
Dept: FAMILY MEDICINE | Facility: CLINIC | Age: 78
End: 2018-12-13
Payer: COMMERCIAL

## 2018-12-13 ENCOUNTER — ANCILLARY PROCEDURE (OUTPATIENT)
Dept: GENERAL RADIOLOGY | Facility: CLINIC | Age: 78
End: 2018-12-13
Attending: INTERNAL MEDICINE
Payer: COMMERCIAL

## 2018-12-13 VITALS
HEIGHT: 66 IN | WEIGHT: 192.2 LBS | HEART RATE: 62 BPM | BODY MASS INDEX: 30.89 KG/M2 | TEMPERATURE: 97.8 F | DIASTOLIC BLOOD PRESSURE: 79 MMHG | SYSTOLIC BLOOD PRESSURE: 119 MMHG | OXYGEN SATURATION: 96 %

## 2018-12-13 DIAGNOSIS — C61 MALIGNANT NEOPLASM OF PROSTATE (H): ICD-10-CM

## 2018-12-13 DIAGNOSIS — R05.9 COUGH: ICD-10-CM

## 2018-12-13 DIAGNOSIS — Z87.891 PERSONAL HISTORY OF TOBACCO USE: ICD-10-CM

## 2018-12-13 DIAGNOSIS — Z01.818 PREOP GENERAL PHYSICAL EXAM: Primary | ICD-10-CM

## 2018-12-13 DIAGNOSIS — R73.01 IMPAIRED FASTING GLUCOSE: ICD-10-CM

## 2018-12-13 LAB
ERYTHROCYTE [DISTWIDTH] IN BLOOD BY AUTOMATED COUNT: 13.5 % (ref 10–15)
HBA1C MFR BLD: 5.9 % (ref 0–5.6)
HCT VFR BLD AUTO: 47.2 % (ref 40–53)
HGB BLD-MCNC: 15.3 G/DL (ref 13.3–17.7)
MCH RBC QN AUTO: 31.1 PG (ref 26.5–33)
MCHC RBC AUTO-ENTMCNC: 32.4 G/DL (ref 31.5–36.5)
MCV RBC AUTO: 96 FL (ref 78–100)
PLATELET # BLD AUTO: 315 10E9/L (ref 150–450)
RBC # BLD AUTO: 4.92 10E12/L (ref 4.4–5.9)
WBC # BLD AUTO: 11.1 10E9/L (ref 4–11)

## 2018-12-13 PROCEDURE — 84153 ASSAY OF PSA TOTAL: CPT | Performed by: INTERNAL MEDICINE

## 2018-12-13 PROCEDURE — 85027 COMPLETE CBC AUTOMATED: CPT | Performed by: INTERNAL MEDICINE

## 2018-12-13 PROCEDURE — 71046 X-RAY EXAM CHEST 2 VIEWS: CPT

## 2018-12-13 PROCEDURE — G0296 VISIT TO DETERM LDCT ELIG: HCPCS | Performed by: INTERNAL MEDICINE

## 2018-12-13 PROCEDURE — 80048 BASIC METABOLIC PNL TOTAL CA: CPT | Performed by: INTERNAL MEDICINE

## 2018-12-13 PROCEDURE — 83036 HEMOGLOBIN GLYCOSYLATED A1C: CPT | Performed by: INTERNAL MEDICINE

## 2018-12-13 PROCEDURE — 99215 OFFICE O/P EST HI 40 MIN: CPT | Performed by: INTERNAL MEDICINE

## 2018-12-13 PROCEDURE — 93000 ELECTROCARDIOGRAM COMPLETE: CPT | Performed by: INTERNAL MEDICINE

## 2018-12-13 PROCEDURE — 36415 COLL VENOUS BLD VENIPUNCTURE: CPT | Performed by: INTERNAL MEDICINE

## 2018-12-13 ASSESSMENT — MIFFLIN-ST. JEOR: SCORE: 1521.62

## 2018-12-13 NOTE — NURSING NOTE
"Chief Complaint   Patient presents with     Pre-Op Exam     /79   Pulse 62   Temp 97.8  F (36.6  C) (Tympanic)   Ht 1.664 m (5' 5.5\")   Wt 87.2 kg (192 lb 3.2 oz)   SpO2 96%   BMI 31.50 kg/m   Estimated body mass index is 31.5 kg/m  as calculated from the following:    Height as of this encounter: 1.664 m (5' 5.5\").    Weight as of this encounter: 87.2 kg (192 lb 3.2 oz).  Medication Reconciliation: complete      Health Maintenance that is potentially due pending provider review:  NONE    n/a    JENNIFER Cisse  "

## 2018-12-13 NOTE — PATIENT INSTRUCTIONS
"You should get the new shingles vaccine \"SHINGRIX\" (not Zostavax) at a pharmacy.      Please call [phone] to schedule lung function testing, and try to get this done before surgery.      Please call [facility] at [phone] to schedule your lung cancer screening.          Before Your Surgery      Call your surgeon if there is any change in your health. This includes signs of a cold or flu (such as a sore throat, runny nose, cough, rash or fever).    Do not smoke, drink alcohol or take over the counter medicine (unless your surgeon or primary care doctor tells you to) for the 24 hours before and after surgery.    If you take prescribed drugs: Follow your doctor s orders about which medicines to take and which to stop until after surgery.    Eating and drinking prior to surgery: follow the instructions from your surgeon    Take a shower or bath the night before surgery. Use the soap your surgeon gave you to gently clean your skin. If you do not have soap from your surgeon, use your regular soap. Do not shave or scrub the surgery site.  Wear clean pajamas and have clean sheets on your bed.   Lung Cancer Screening   Frequently Asked Questions  If you are at high-risk for lung cancer, getting screened with low-dose computed tomography (LDCT) every year can help save your life. This handout offers answers to some of the most common questions about lung cancer screening. If you have other questions, please call [phone] ([phone]).     What is it?  Lung cancer screening uses special X-ray technology to create an image of your lung tissue. The exam is quick and easy and takes less than 10 seconds. We don t give you any medicine or use any needles. You can eat before and after the exam. You don t need to change your clothes as long as the clothing on your chest doesn t contain metal. But, you do need to be able to hold your breath for at least 6 seconds during the exam.    What is the goal of " lung cancer screening?  The goal of lung cancer screening is to save lives. Many times, lung cancer is not found until a person starts having physical symptoms. Lung cancer screening can help detect lung cancer in the earliest stages when it may be easier to treat.    Who should be screened for lung cancer?  We suggest lung cancer screening for anyone who is at high-risk for lung cancer. You are in the high-risk group if you:      are between the ages of 55 and 79, and    have smoked at least 1 pack of cigarettes a day for 30 or more years, and    still smoke or have quit within the past 15 years.    However, if you have a new cough or shortness of breath, you should talk to your doctor before being screened.    Some national lung health advocacy groups also recommend screening for people ages 50 to 79 who have smoked an average of 1 pack of cigarettes a day for 20 years. They must also have at least 1 other risk factor for lung cancer, not including exposure to secondhand smoke. Other risk factors are having had cancer in the past, emphysema, pulmonary fibrosis, COPD, a family history of lung cancer, or exposure to certain materials such as arsenic, asbestos, beryllium, cadmium, chromium, diesel fumes, nickel, radon or silica. Your care team can help you know if you have one of these risk factors.     Why does it matter if I have symptoms?  Certain symptoms can be a sign that you have a condition in your lungs that should be checked and treated by your doctor. These symptoms include fever, chest pain, a new or changing cough, shortness of breath that you have never felt before, coughing up blood or unexplained weight loss. Having any of these symptoms can greatly affect the results of lung cancer screening.       Should all smokers get an LDCT lung cancer screening exam?  It depends. Lung cancer screening is for a very specific group of men and women who have a history of heavy smoking over a long period of time  (see  Who should be screened for lung cancer  above).  I am in the high-risk group, but have been diagnosed with cancer in the past. Is LDCT lung cancer screening right for me?  In some cases, you should not have LDCT lung screening, such as when your doctor is already following your cancer with CT scan studies. Your doctor will help you decide if LDCT lung screening is right for you.  Do I need to have a screening exam every year?  Yes. If you are in the high-risk group described earlier, you should get an LDCT lung cancer screening exam every year until you are 79, or are no longer willing or able to undergo screening and possible procedures to diagnose and treat lung cancer.  How effective is LDCT at preventing death from lung cancer?  Studies have shown that LDCT lung cancer screening can lower the risk of death from lung cancer by 20 percent in people who are at high-risk.  What are the risks?  There are some risks and limitations of LDCT lung cancer screening. We want to make sure you understand the risks and benefits, so please let us know if you have any questions. Your doctor may want to talk with you more about these risks.    Radiation exposure: As with any exam that uses radiation, there is a very small increased risk of cancer. The amount of radiation in LDCT is small--about the same amount a person would get from a mammogram. Your doctor orders the exam when he or she feels the potential benefits outweigh the risks.    False negatives: No test is perfect, including LDCT. It is possible that you may have a medical condition, including lung cancer, that is not found during your exam. This is called a false negative result.    False positives and more testing: LDCT very often finds something in the lung that could be cancer, but in fact is not. This is called a false positive result. False positive tests often cause anxiety. To make sure these findings are not cancer, you may need to have more tests.  These tests will be done only if you give us permission. Sometimes patients need a treatment that can have side effects, such as a biopsy. For more information on false positives, see  What can I expect from the results?     Findings not related to lung cancer: Your LDCT exam also takes pictures of areas of your body next to your lungs. In a very small number of cases, the CT scan will show an abnormal finding in one of these areas, such as your kidneys, adrenal glands, liver or thyroid. This finding may not be serious, but you may need more tests. Your doctor can help you decide what other tests you may need, if any.  What can I expect from the results?  About 1 out of 4 LDCT exams will find something that may need more tests. Most of the time, these findings are lung nodules. Lung nodules are very small collections of tissue in the lung. These nodules are very common, and the vast majority--more than 97 percent--are not cancer (benign). Most are normal lymph nodes or small areas of scarring from past infections.  But, if a small lung nodule is found to be cancer, the cancer can be cured more than 90 percent of the time. To know if the nodule is cancer, we may need to get more images before your next yearly screening exam. If the nodule has suspicious features (for example, it is large, has an odd shape or grows over time), we will refer you to a specialist for further testing.  Will my doctor also get the results?  Yes. Your doctor will get a copy of your results.  Is it okay to keep smoking now that there s a cancer screening exam?  No. Tobacco is one of the strongest cancer-causing agents. It causes not only lung cancer, but other cancers and cardiovascular (heart) diseases as well. The damage caused by smoking builds over time. This means that the longer you smoke, the higher your risk of disease. While it is never too late to quit, the sooner you quit, the better.  Where can I find help to quit smoking?  The  best way to prevent lung cancer is to stop smoking. If you have already quit smoking, congratulations and keep it up! For help on quitting smoking, please call [employer] at 0-865-300-NOMO ([phone]) or the [employer] at 1-211.277.3661 to find local resources near you.  One-on-one health coaching:  If you d prefer to work individually with a health care provider on tobacco cessation, we offer:      Medication Therapy Management:  Our specially trained pharmacists work closely with you and your doctor to help you quit smoking.  Call [phone] or [phone] (toll free).     Can Do: Health coaching offered by [facility].  [email or web address]

## 2018-12-13 NOTE — H&P (VIEW-ONLY)
[facility]  [address] [facility] [address]  [phone]  Dept: [phone]    PRE-OP EVALUATION:  Today's date: 2018    Aly Sorensen (: 1940) presents for pre-operative evaluation assessment as requested by Dr. Michael Quintana.  He requires evaluation and anesthesia risk assessment prior to undergoing surgery/procedure for treatment of RT knee.    Proposed Surgery/ Procedure: RIGHT TOTAL KNEE ARTHROPLASTY (JILLIAN)  Date of Surgery/ Procedure: 18  Time of Surgery/ Procedure: 7:30AM  Hospital/Surgical Facility: [facility]   Primary Physician: Zev Austin  Type of Anesthesia Anticipated: to be determined    Patient has a Health Care Directive or Living Will:  NO    1. NO - Do you have a history of heart attack, stroke, stent, bypass or surgery on an artery in the head, neck, heart or legs?  2. NO - Do you ever have any pain or discomfort in your chest?  3. NO - Do you have a history of  Heart Failure?  4. NO - Are you troubled by shortness of breath when: walking on the level, up a slight hill or at night?  5. NO - Do you currently have a cold, bronchitis or other respiratory infection?  6. NO - Do you have a cough, shortness of breath or wheezing?  7. NO - Do you sometimes get pains in the calves of your legs when you walk?  8. NO - Do you or anyone in your family have previous history of blood clots?  9. NO - Do you or does anyone in your family have a serious bleeding problem such as prolonged bleeding following surgeries or cuts?  10. NO - Have you ever had problems with anemia or been told to take iron pills?  11. NO - Have you had any abnormal blood loss such as black, tarry or bloody stools, or abnormal vaginal bleeding?  12. NO - Have you ever had a blood transfusion?  13. NO - Have you or any of your relatives ever had problems with anesthesia?  14. NO - Do you have sleep apnea, excessive snoring or daytime drowsiness?  15. NO - Do you have any  prosthetic heart valves?  16. YES - Do you have prosthetic joints? Left knee replacement.    17. NO - Is there any chance that you may be pregnant?      HPI:     HPI related to upcoming procedure: Several year history of progressive right knee pain that is now severe.  He can perform 4 METS of physical activity without chest pain or dyspnea.   However, he describes a several month history of intermittent cough and sputum production.  He has a history of tobacco use.  He denies recent fevers, wheezing or dyspnea.      MEDICAL HISTORY:     Patient Active Problem List    Diagnosis Date Noted     Malignant neoplasm of prostate (H) 12/29/2016     Priority: Medium     Melanoma of skin (H) 12/29/2016     Priority: Medium     Back       Spondylosis of lumbar region without myelopathy or radiculopathy 12/29/2016     Priority: Medium      Past Medical History:   Diagnosis Date     Arthritis degenerative joint disease     Malignant neoplasm of prostate (H) 12/29/2016     Melanoma (H)      Melanoma of skin (H) 12/29/2016    Back     Prostate cancer (H)      Spondylosis of lumbar region without myelopathy or radiculopathy 12/29/2016     Past Surgical History:   Procedure Laterality Date     ARTHROPLASTY KNEE  12/8/2011    Procedure:ARTHROPLASTY KNEE; Left Total Knee Arthroplasty (JILLIAN)^; Surgeon:YASMIN PELAEZ; Location: OR     BACK SURGERY       COLONOSCOPY       GENITOURINARY SURGERY  history of prostatectomy     HERNIA REPAIR  inguinal hernia repair as a teenager     ORTHOPEDIC SURGERY  back fusion 2002     PHACOEMULSIFICATION CLEAR CORNEA WITH STANDARD INTRAOCULAR LENS IMPLANT Right 11/10/2015    Procedure: PHACOEMULSIFICATION CLEAR CORNEA WITH STANDARD INTRAOCULAR LENS IMPLANT;  Surgeon: Criss Pulliam MD;  Location: [facility]     PHACOEMULSIFICATION CLEAR CORNEA WITH STANDARD INTRAOCULAR LENS IMPLANT Left 11/17/2015    Procedure: PHACOEMULSIFICATION CLEAR CORNEA WITH STANDARD INTRAOCULAR LENS IMPLANT;  Surgeon: Heraclio  "Criss JAQUEZ MD;  Location: [facility]     removal of melanoma[  approx. 20 years ago     Current Outpatient Medications   Medication Sig Dispense Refill     ibuprofen (ADVIL,MOTRIN) 600 MG tablet Take 1 tablet by mouth every 6 hours as needed for pain. 30 tablet 0     tiZANidine (ZANAFLEX) 4 MG tablet Take 1 tablet (4 mg) by mouth 3 times daily 90 tablet 0       No Known Allergies       Social History     Tobacco Use     Smoking status: Former Smoker     Types: Cigars     Smokeless tobacco: Never Used     Tobacco comment: quit smoking cigars 2008   Substance Use Topics     Alcohol use: Yes     Alcohol/week: 1.8 - 2.4 oz     Types: 3 - 4 Standard drinks or equivalent per week     Comment: beer, wine or cocktail  1 glass approx 3-4 x week     History   Drug Use No       REVIEW OF SYSTEMS:   Constitutional, neuro, ENT, endocrine, pulmonary, cardiac, gastrointestinal, genitourinary, musculoskeletal, integument and psychiatric systems are negative, except as otherwise noted.    EXAM:   /79   Pulse 62   Temp 97.8  F (36.6  C) (Tympanic)   Ht 1.664 m (5' 5.5\")   Wt 87.2 kg (192 lb 3.2 oz)   SpO2 96%   BMI 31.50 kg/m      GENERAL APPEARANCE: healthy, alert and no distress     EYES: EOMI,  PERRL     HENT: ear canals and TM's normal and nose and mouth without ulcers or lesions     NECK: no adenopathy, no asymmetry, masses, or scars and thyroid normal to palpation     RESP: lungs clear to auscultation - no rales, rhonchi or wheezes     CV: regular rates and rhythm, normal S1 S2, no S3 or S4 and no murmur, click or rub     ABDOMEN:  soft, nontender, no HSM or masses and bowel sounds normal     MS: extremities normal- no gross deformities noted, no evidence of inflammation in joints, FROM in all extremities.     SKIN: no suspicious lesions or rashes     NEURO: Normal strength and tone, sensory exam grossly normal, mentation intact and speech normal     PSYCH: mentation appears normal. and affect normal/bright     " LYMPHATICS: No cervical adenopathy    DIAGNOSTICS:   EKG: Sinus bradycardia no acute ST changes    Recent Labs   Lab Test 12/04/17  1426 01/06/17  0935 12/29/16  1412 10/19/15   HGB 15.2 15.6 15.5  --     324 316  --      --  140  --    POTASSIUM 4.4  --  4.2 4.4   CR 0.89  --  0.78 0.97   A1C  --   --   --  5.7          IMPRESSION:   Reason for surgery/procedure: Right knee primary osteoarthritis   Diagnosis/reason for consult: preoperative evaluation     The proposed surgical procedure is considered INTERMEDIATE risk.    REVISED CARDIAC RISK INDEX  The patient has the following serious cardiovascular risks for perioperative complications such as (MI, PE, VFib and 3  AV Block):  No serious cardiac risks  INTERPRETATION: 1 risks: Class II (low risk - 0.9% complication rate)    The patient has the following additional risks for perioperative complications:  No identified additional risks      ICD-10-CM    1. Preop general physical exam Z01.818 EKG 12-lead complete w/read - Clinics     Basic metabolic panel     CBC with platelets     CANCELED: MRSA MSSA Presurgical Screen   2. Cough R05 General PFT Lab (Please always keep checked)     Pulmonary Function Test     XR Chest 2 Views   3. Malignant neoplasm of prostate (H) C61 PSA tumor marker   4. Personal history of tobacco use Z87.891 Prof Fee: Shared Decision Making Visit for Lung Cancer Screening     CT Chest Lung Cancer Scrn Low Dose wo   5. Impaired fasting glucose R73.01 Hemoglobin A1c     I think he can proceed with knee replacement surgery.  I recommended that he schedule pulmonary function test to evaluate his more chronic intermittent cough and sputum production symptoms that sound suspicious for COPD.  I did check a chest x-ray today.  He is a candidate for lung cancer screening and can schedule this after his knee replacement.  It has been a year since he has had a PSA check and we will add that to his preop labs today.      RECOMMENDATIONS:      --Consult hospital rounder / IM to assist post-op medical management    --Patient is to take all scheduled medications on the day of surgery EXCEPT for modifications listed below.  --Patient is on no chronic medications    APPROVAL GIVEN to proceed with proposed procedure, without further diagnostic evaluation       Signed Electronically by: Zev Austin MD    Copy of this evaluation report is provided to requesting physician.    [place] Preop Guidelines    Revised Cardiac Risk Index  Lung Cancer Screening Shared Decision Making Visit     Aly Sorensen is eligible for lung cancer screening on the basis of the information provided in my signed lung cancer screening order.     I have discussed with patient the risks and benefits of screening for lung cancer with low-dose CT.     The risks include:  radiation exposure: one low dose chest CT has as much ionizing radiation as about 15 chest x-rays or 6 months of background radiation living in [place]    false positives: 96% of positive findings/nodules are NOT cancer, but some might still require additional diagnostic evaluation, including biopsy  over-diagnosis: some slow growing cancers that might never have been clinically significant will be detected and treated unnecessarily     The benefit of early detection of lung cancer is contingent upon adherence to annual screening or more frequent follow up if indicated.     Furthermore, reaping the benefits of screening requires Aly Sorensen to be willing and physically able to undergo diagnostic procedures, if indicated. Although no specific guide is available for determining severity of comorbidities, it is reasonable to withhold screening in patients who have greater mortality risk from other diseases.     We did discuss that the only way to prevent lung cancer is to not smoke. Smoking cessation assistance was not offered.    I did not offer risk estimation using a calculator such as this  one:    ShouldIScreen

## 2018-12-13 NOTE — PROGRESS NOTES
[facility]  [address] [facility] [address]  [phone]  Dept: [phone]    PRE-OP EVALUATION:  Today's date: 2018    Aly Sorensen (: 1940) presents for pre-operative evaluation assessment as requested by Dr. Michael Quintana.  He requires evaluation and anesthesia risk assessment prior to undergoing surgery/procedure for treatment of RT knee.    Proposed Surgery/ Procedure: RIGHT TOTAL KNEE ARTHROPLASTY (JILLIAN)  Date of Surgery/ Procedure: 18  Time of Surgery/ Procedure: 7:30AM  Hospital/Surgical Facility: [facility]   Primary Physician: Zev Austin  Type of Anesthesia Anticipated: to be determined    Patient has a Health Care Directive or Living Will:  NO    1. NO - Do you have a history of heart attack, stroke, stent, bypass or surgery on an artery in the head, neck, heart or legs?  2. NO - Do you ever have any pain or discomfort in your chest?  3. NO - Do you have a history of  Heart Failure?  4. NO - Are you troubled by shortness of breath when: walking on the level, up a slight hill or at night?  5. NO - Do you currently have a cold, bronchitis or other respiratory infection?  6. NO - Do you have a cough, shortness of breath or wheezing?  7. NO - Do you sometimes get pains in the calves of your legs when you walk?  8. NO - Do you or anyone in your family have previous history of blood clots?  9. NO - Do you or does anyone in your family have a serious bleeding problem such as prolonged bleeding following surgeries or cuts?  10. NO - Have you ever had problems with anemia or been told to take iron pills?  11. NO - Have you had any abnormal blood loss such as black, tarry or bloody stools, or abnormal vaginal bleeding?  12. NO - Have you ever had a blood transfusion?  13. NO - Have you or any of your relatives ever had problems with anesthesia?  14. NO - Do you have sleep apnea, excessive snoring or daytime drowsiness?  15. NO - Do you have any  prosthetic heart valves?  16. YES - Do you have prosthetic joints? Left knee replacement.    17. NO - Is there any chance that you may be pregnant?      HPI:     HPI related to upcoming procedure: Several year history of progressive right knee pain that is now severe.  He can perform 4 METS of physical activity without chest pain or dyspnea.   However, he describes a several month history of intermittent cough and sputum production.  He has a history of tobacco use.  He denies recent fevers, wheezing or dyspnea.      MEDICAL HISTORY:     Patient Active Problem List    Diagnosis Date Noted     Malignant neoplasm of prostate (H) 12/29/2016     Priority: Medium     Melanoma of skin (H) 12/29/2016     Priority: Medium     Back       Spondylosis of lumbar region without myelopathy or radiculopathy 12/29/2016     Priority: Medium      Past Medical History:   Diagnosis Date     Arthritis degenerative joint disease     Malignant neoplasm of prostate (H) 12/29/2016     Melanoma (H)      Melanoma of skin (H) 12/29/2016    Back     Prostate cancer (H)      Spondylosis of lumbar region without myelopathy or radiculopathy 12/29/2016     Past Surgical History:   Procedure Laterality Date     ARTHROPLASTY KNEE  12/8/2011    Procedure:ARTHROPLASTY KNEE; Left Total Knee Arthroplasty (JILLIAN)^; Surgeon:YASMIN PELAEZ; Location: OR     BACK SURGERY       COLONOSCOPY       GENITOURINARY SURGERY  history of prostatectomy     HERNIA REPAIR  inguinal hernia repair as a teenager     ORTHOPEDIC SURGERY  back fusion 2002     PHACOEMULSIFICATION CLEAR CORNEA WITH STANDARD INTRAOCULAR LENS IMPLANT Right 11/10/2015    Procedure: PHACOEMULSIFICATION CLEAR CORNEA WITH STANDARD INTRAOCULAR LENS IMPLANT;  Surgeon: Criss Pulliam MD;  Location: [facility]     PHACOEMULSIFICATION CLEAR CORNEA WITH STANDARD INTRAOCULAR LENS IMPLANT Left 11/17/2015    Procedure: PHACOEMULSIFICATION CLEAR CORNEA WITH STANDARD INTRAOCULAR LENS IMPLANT;  Surgeon: Heraclio  "Criss JAQUEZ MD;  Location: [facility]     removal of melanoma[  approx. 20 years ago     Current Outpatient Medications   Medication Sig Dispense Refill     ibuprofen (ADVIL,MOTRIN) 600 MG tablet Take 1 tablet by mouth every 6 hours as needed for pain. 30 tablet 0     tiZANidine (ZANAFLEX) 4 MG tablet Take 1 tablet (4 mg) by mouth 3 times daily 90 tablet 0       No Known Allergies       Social History     Tobacco Use     Smoking status: Former Smoker     Types: Cigars     Smokeless tobacco: Never Used     Tobacco comment: quit smoking cigars 2008   Substance Use Topics     Alcohol use: Yes     Alcohol/week: 1.8 - 2.4 oz     Types: 3 - 4 Standard drinks or equivalent per week     Comment: beer, wine or cocktail  1 glass approx 3-4 x week     History   Drug Use No       REVIEW OF SYSTEMS:   Constitutional, neuro, ENT, endocrine, pulmonary, cardiac, gastrointestinal, genitourinary, musculoskeletal, integument and psychiatric systems are negative, except as otherwise noted.    EXAM:   /79   Pulse 62   Temp 97.8  F (36.6  C) (Tympanic)   Ht 1.664 m (5' 5.5\")   Wt 87.2 kg (192 lb 3.2 oz)   SpO2 96%   BMI 31.50 kg/m      GENERAL APPEARANCE: healthy, alert and no distress     EYES: EOMI,  PERRL     HENT: ear canals and TM's normal and nose and mouth without ulcers or lesions     NECK: no adenopathy, no asymmetry, masses, or scars and thyroid normal to palpation     RESP: lungs clear to auscultation - no rales, rhonchi or wheezes     CV: regular rates and rhythm, normal S1 S2, no S3 or S4 and no murmur, click or rub     ABDOMEN:  soft, nontender, no HSM or masses and bowel sounds normal     MS: extremities normal- no gross deformities noted, no evidence of inflammation in joints, FROM in all extremities.     SKIN: no suspicious lesions or rashes     NEURO: Normal strength and tone, sensory exam grossly normal, mentation intact and speech normal     PSYCH: mentation appears normal. and affect normal/bright     " LYMPHATICS: No cervical adenopathy    DIAGNOSTICS:   EKG: Sinus bradycardia no acute ST changes    Recent Labs   Lab Test 12/04/17  1426 01/06/17  0935 12/29/16  1412 10/19/15   HGB 15.2 15.6 15.5  --     324 316  --      --  140  --    POTASSIUM 4.4  --  4.2 4.4   CR 0.89  --  0.78 0.97   A1C  --   --   --  5.7          IMPRESSION:   Reason for surgery/procedure: Right knee primary osteoarthritis   Diagnosis/reason for consult: preoperative evaluation     The proposed surgical procedure is considered INTERMEDIATE risk.    REVISED CARDIAC RISK INDEX  The patient has the following serious cardiovascular risks for perioperative complications such as (MI, PE, VFib and 3  AV Block):  No serious cardiac risks  INTERPRETATION: 1 risks: Class II (low risk - 0.9% complication rate)    The patient has the following additional risks for perioperative complications:  No identified additional risks      ICD-10-CM    1. Preop general physical exam Z01.818 EKG 12-lead complete w/read - Clinics     Basic metabolic panel     CBC with platelets     CANCELED: MRSA MSSA Presurgical Screen   2. Cough R05 General PFT Lab (Please always keep checked)     Pulmonary Function Test     XR Chest 2 Views   3. Malignant neoplasm of prostate (H) C61 PSA tumor marker   4. Personal history of tobacco use Z87.891 Prof Fee: Shared Decision Making Visit for Lung Cancer Screening     CT Chest Lung Cancer Scrn Low Dose wo   5. Impaired fasting glucose R73.01 Hemoglobin A1c     I think he can proceed with knee replacement surgery.  I recommended that he schedule pulmonary function test to evaluate his more chronic intermittent cough and sputum production symptoms that sound suspicious for COPD.  I did check a chest x-ray today.  He is a candidate for lung cancer screening and can schedule this after his knee replacement.  It has been a year since he has had a PSA check and we will add that to his preop labs today.      RECOMMENDATIONS:      --Consult hospital rounder / IM to assist post-op medical management    --Patient is to take all scheduled medications on the day of surgery EXCEPT for modifications listed below.  --Patient is on no chronic medications    APPROVAL GIVEN to proceed with proposed procedure, without further diagnostic evaluation       Signed Electronically by: Zev Austin MD    Copy of this evaluation report is provided to requesting physician.    [place] Preop Guidelines    Revised Cardiac Risk Index  Lung Cancer Screening Shared Decision Making Visit     Aly Sorensen is eligible for lung cancer screening on the basis of the information provided in my signed lung cancer screening order.     I have discussed with patient the risks and benefits of screening for lung cancer with low-dose CT.     The risks include:  radiation exposure: one low dose chest CT has as much ionizing radiation as about 15 chest x-rays or 6 months of background radiation living in [place]    false positives: 96% of positive findings/nodules are NOT cancer, but some might still require additional diagnostic evaluation, including biopsy  over-diagnosis: some slow growing cancers that might never have been clinically significant will be detected and treated unnecessarily     The benefit of early detection of lung cancer is contingent upon adherence to annual screening or more frequent follow up if indicated.     Furthermore, reaping the benefits of screening requires Aly Sorensen to be willing and physically able to undergo diagnostic procedures, if indicated. Although no specific guide is available for determining severity of comorbidities, it is reasonable to withhold screening in patients who have greater mortality risk from other diseases.     We did discuss that the only way to prevent lung cancer is to not smoke. Smoking cessation assistance was not offered.    I did not offer risk estimation using a calculator such as this  one:    ShouldIScreen

## 2018-12-13 NOTE — LETTER
[address] [facility]  [address]  [place]  [address]  Tel: [phone]    December 17, 2018    Aly LANDIS Janetnancy  [address]    [facility] [address]        Dear Monica Edu,    The following letter pertains to your most recent diagnostic tests:     -Your prostate specific antigen (PSA) test result returned normal.     -Kidney function is normal for you (Creatinine, GFR), Sodium is normal for you, Potassium is normal for you, Calcium is normal for you, Glucose (blood sugar) is normal for you.     -Your complete blood counts including your hemoglobin returned normal for you.       -Your hemoglobin A1c test which is a diabetes blood test that represents and average of your blood sugars over the last 3 months returned at 5.9 which is OK.         Bottom line:  Schedule your lung tests when you can.  Lab test results look OK for surgery.         Sincerely,     Dr. Austin  / forest     Results for orders placed or performed in visit on 12/13/18   Basic metabolic panel   Result Value Ref Range    Sodium 138 133 - 144 mmol/L    Potassium 4.1 3.4 - 5.3 mmol/L    Chloride 107 94 - 109 mmol/L    Carbon Dioxide 24 20 - 32 mmol/L    Anion Gap 7 3 - 14 mmol/L    Glucose 88 70 - 99 mg/dL    Urea Nitrogen 17 7 - 30 mg/dL    Creatinine 0.85 0.66 - 1.25 mg/dL    GFR Estimate 87 >60 mL/min/1.7m2    GFR Estimate If Black >90 >60 mL/min/1.7m2    Calcium 8.9 8.5 - 10.1 mg/dL   CBC with platelets   Result Value Ref Range    WBC 11.1 (H) 4.0 - 11.0 10e9/L    RBC Count 4.92 4.4 - 5.9 10e12/L    Hemoglobin 15.3 13.3 - 17.7 g/dL    Hematocrit 47.2 40.0 - 53.0 %    MCV 96 78 - 100 fl    MCH 31.1 26.5 - 33.0 pg    MCHC 32.4 31.5 - 36.5 g/dL    RDW 13.5 10.0 - 15.0 %    Platelet Count 315 150 - 450 10e9/L   PSA tumor marker   Result Value Ref Range    PSA <0.01 0 - 4 ug/L   Hemoglobin A1c   Result Value Ref Range    Hemoglobin A1C 5.9 (H) 0 - 5.6 %

## 2018-12-13 NOTE — LETTER
[place] [address] [facility]  [address]  [place]  [address]  Tel: [phone]    December 14, 2018    Aly Sorensen  [address] [facility] [address]  [facility] [address]        Dear Mr. Gonzalesnancy,    The following letter pertains to your most recent diagnostic tests:    Good news!  Your chest x-ray looks okay for surgery.        Sincerely,    Zev Austin MD/MICHELLE

## 2018-12-14 LAB
ANION GAP SERPL CALCULATED.3IONS-SCNC: 7 MMOL/L (ref 3–14)
BUN SERPL-MCNC: 17 MG/DL (ref 7–30)
CALCIUM SERPL-MCNC: 8.9 MG/DL (ref 8.5–10.1)
CHLORIDE SERPL-SCNC: 107 MMOL/L (ref 94–109)
CO2 SERPL-SCNC: 24 MMOL/L (ref 20–32)
CREAT SERPL-MCNC: 0.85 MG/DL (ref 0.66–1.25)
GFR SERPL CREATININE-BSD FRML MDRD: 87 ML/MIN/1.7M2
GLUCOSE SERPL-MCNC: 88 MG/DL (ref 70–99)
POTASSIUM SERPL-SCNC: 4.1 MMOL/L (ref 3.4–5.3)
PSA SERPL-MCNC: <0.01 UG/L (ref 0–4)
SODIUM SERPL-SCNC: 138 MMOL/L (ref 133–144)

## 2018-12-14 NOTE — RESULT ENCOUNTER NOTE
The following letter pertains to your most recent diagnostic tests:    Good news!  Your chest x-ray looks okay for surgery.          Sincerely,    Dr. Austin

## 2018-12-16 NOTE — RESULT ENCOUNTER NOTE
The following letter pertains to your most recent diagnostic tests:    -Your prostate specific antigen (PSA) test result returned normal.     -Kidney function is normal for you (Creatinine, GFR), Sodium is normal for you, Potassium is normal for you, Calcium is normal for you, Glucose (blood sugar) is normal for you.     -Your complete blood counts including your hemoglobin returned normal for you.       -Your hemoglobin A1c test which is a diabetes blood test that represents and average of your blood sugars over the last 3 months returned at 5.9 which is OK.                Bottom line:  Schedule your lung tests when you can.  Lab test results look OK for surgery.          Sincerely,    Dr. Austin

## 2018-12-17 ENCOUNTER — TELEPHONE (OUTPATIENT)
Dept: FAMILY MEDICINE | Facility: CLINIC | Age: 78
End: 2018-12-17

## 2018-12-17 NOTE — TELEPHONE ENCOUNTER
Reason for Call:  Other fyi    Detailed comments: patient was scheduled to get a Lung test done , it was canceled- cannot get in before surgery is that Ok? Please let him know    Phone Number Patient can be reached at: Home number on file [phone] (home)    Best Time: n/a    Can we leave a detailed message on this number? YES    Call taken on 12/17/2018 at 4:22 PM by Sujatha Lincoln

## 2018-12-26 RX ORDER — IBUPROFEN 200 MG
600 TABLET ORAL DAILY PRN
Status: ON HOLD | COMMUNITY
End: 2018-12-28

## 2018-12-27 RX ORDER — CLINDAMYCIN PHOSPHATE 900 MG/50ML
900 INJECTION, SOLUTION INTRAVENOUS SEE ADMIN INSTRUCTIONS
Status: CANCELLED | OUTPATIENT
Start: 2018-12-27

## 2018-12-27 RX ORDER — CLINDAMYCIN PHOSPHATE 900 MG/50ML
900 INJECTION, SOLUTION INTRAVENOUS
Status: CANCELLED | OUTPATIENT
Start: 2018-12-27

## 2018-12-28 ENCOUNTER — ANESTHESIA EVENT (OUTPATIENT)
Dept: SURGERY | Facility: CLINIC | Age: 78
DRG: 470 | End: 2018-12-28
Payer: MEDICARE

## 2018-12-28 ENCOUNTER — HOSPITAL ENCOUNTER (INPATIENT)
Facility: CLINIC | Age: 78
LOS: 3 days | Discharge: NURSING, REHAB OR LONG-TERM CARE | DRG: 470 | End: 2018-12-31
Attending: ORTHOPAEDIC SURGERY | Admitting: ORTHOPAEDIC SURGERY
Payer: MEDICARE

## 2018-12-28 ENCOUNTER — ANESTHESIA (OUTPATIENT)
Dept: SURGERY | Facility: CLINIC | Age: 78
DRG: 470 | End: 2018-12-28
Payer: MEDICARE

## 2018-12-28 ENCOUNTER — APPOINTMENT (OUTPATIENT)
Dept: PHYSICAL THERAPY | Facility: CLINIC | Age: 78
DRG: 470 | End: 2018-12-28
Attending: ORTHOPAEDIC SURGERY
Payer: MEDICARE

## 2018-12-28 ENCOUNTER — APPOINTMENT (OUTPATIENT)
Dept: GENERAL RADIOLOGY | Facility: CLINIC | Age: 78
DRG: 470 | End: 2018-12-28
Attending: ORTHOPAEDIC SURGERY
Payer: MEDICARE

## 2018-12-28 DIAGNOSIS — Z96.651 HISTORY OF TOTAL RIGHT KNEE REPLACEMENT: Primary | ICD-10-CM

## 2018-12-28 LAB
ABO + RH BLD: NORMAL
ABO + RH BLD: NORMAL
BLD GP AB SCN SERPL QL: NORMAL
BLOOD BANK CMNT PATIENT-IMP: NORMAL
CREAT SERPL-MCNC: 0.75 MG/DL (ref 0.66–1.25)
GFR SERPL CREATININE-BSD FRML MDRD: 87 ML/MIN/{1.73_M2}
PLATELET # BLD AUTO: 277 10E9/L (ref 150–450)
SPECIMEN EXP DATE BLD: NORMAL

## 2018-12-28 PROCEDURE — 36000063 ZZH SURGERY LEVEL 4 EA 15 ADDTL MIN: Performed by: ORTHOPAEDIC SURGERY

## 2018-12-28 PROCEDURE — 25000566 ZZH SEVOFLURANE, EA 15 MIN: Performed by: ORTHOPAEDIC SURGERY

## 2018-12-28 PROCEDURE — 71000012 ZZH RECOVERY PHASE 1 LEVEL 1 FIRST HR: Performed by: ORTHOPAEDIC SURGERY

## 2018-12-28 PROCEDURE — A9270 NON-COVERED ITEM OR SERVICE: HCPCS | Mod: GY | Performed by: ORTHOPAEDIC SURGERY

## 2018-12-28 PROCEDURE — 25000128 H RX IP 250 OP 636: Performed by: ORTHOPAEDIC SURGERY

## 2018-12-28 PROCEDURE — 25000128 H RX IP 250 OP 636: Performed by: ANESTHESIOLOGY

## 2018-12-28 PROCEDURE — 97161 PT EVAL LOW COMPLEX 20 MIN: CPT | Mod: GP

## 2018-12-28 PROCEDURE — 40000986 XR KNEE PORT RT 1/2 VW: Mod: RT

## 2018-12-28 PROCEDURE — 27110028 ZZH OR GENERAL SUPPLY NON-STERILE: Performed by: ORTHOPAEDIC SURGERY

## 2018-12-28 PROCEDURE — 86901 BLOOD TYPING SEROLOGIC RH(D): CPT | Performed by: ORTHOPAEDIC SURGERY

## 2018-12-28 PROCEDURE — 27210794 ZZH OR GENERAL SUPPLY STERILE: Performed by: ORTHOPAEDIC SURGERY

## 2018-12-28 PROCEDURE — 82565 ASSAY OF CREATININE: CPT | Performed by: ORTHOPAEDIC SURGERY

## 2018-12-28 PROCEDURE — 12000007 ZZH R&B INTERMEDIATE

## 2018-12-28 PROCEDURE — 25000128 H RX IP 250 OP 636: Performed by: NURSE ANESTHETIST, CERTIFIED REGISTERED

## 2018-12-28 PROCEDURE — 27810169 ZZH OR IMPLANT GENERAL: Performed by: ORTHOPAEDIC SURGERY

## 2018-12-28 PROCEDURE — 40000170 ZZH STATISTIC PRE-PROCEDURE ASSESSMENT II: Performed by: ORTHOPAEDIC SURGERY

## 2018-12-28 PROCEDURE — 37000008 ZZH ANESTHESIA TECHNICAL FEE, 1ST 30 MIN: Performed by: ORTHOPAEDIC SURGERY

## 2018-12-28 PROCEDURE — 36415 COLL VENOUS BLD VENIPUNCTURE: CPT | Performed by: ORTHOPAEDIC SURGERY

## 2018-12-28 PROCEDURE — 85049 AUTOMATED PLATELET COUNT: CPT | Performed by: ORTHOPAEDIC SURGERY

## 2018-12-28 PROCEDURE — 25000125 ZZHC RX 250: Performed by: ORTHOPAEDIC SURGERY

## 2018-12-28 PROCEDURE — 86850 RBC ANTIBODY SCREEN: CPT | Performed by: ORTHOPAEDIC SURGERY

## 2018-12-28 PROCEDURE — 0SRC0J9 REPLACEMENT OF RIGHT KNEE JOINT WITH SYNTHETIC SUBSTITUTE, CEMENTED, OPEN APPROACH: ICD-10-PCS | Performed by: ORTHOPAEDIC SURGERY

## 2018-12-28 PROCEDURE — 25000132 ZZH RX MED GY IP 250 OP 250 PS 637: Mod: GY | Performed by: ORTHOPAEDIC SURGERY

## 2018-12-28 PROCEDURE — C1776 JOINT DEVICE (IMPLANTABLE): HCPCS | Performed by: ORTHOPAEDIC SURGERY

## 2018-12-28 PROCEDURE — 86900 BLOOD TYPING SEROLOGIC ABO: CPT | Performed by: ORTHOPAEDIC SURGERY

## 2018-12-28 PROCEDURE — 40000193 ZZH STATISTIC PT WARD VISIT

## 2018-12-28 PROCEDURE — 36000093 ZZH SURGERY LEVEL 4 1ST 30 MIN: Performed by: ORTHOPAEDIC SURGERY

## 2018-12-28 PROCEDURE — 97116 GAIT TRAINING THERAPY: CPT | Mod: GP

## 2018-12-28 PROCEDURE — 37000009 ZZH ANESTHESIA TECHNICAL FEE, EACH ADDTL 15 MIN: Performed by: ORTHOPAEDIC SURGERY

## 2018-12-28 PROCEDURE — 25000125 ZZHC RX 250: Performed by: NURSE ANESTHETIST, CERTIFIED REGISTERED

## 2018-12-28 PROCEDURE — 25000125 ZZHC RX 250: Performed by: ANESTHESIOLOGY

## 2018-12-28 PROCEDURE — 25800025 ZZH RX 258: Performed by: ORTHOPAEDIC SURGERY

## 2018-12-28 DEVICE — IMP BASEPLATE TIBIAL HOWM TRI 5 5520-B-500: Type: IMPLANTABLE DEVICE | Site: KNEE | Status: FUNCTIONAL

## 2018-12-28 DEVICE — IMP COMP FEM STRK TRIATHLN PS RT 5 5515-F-502: Type: IMPLANTABLE DEVICE | Site: KNEE | Status: FUNCTIONAL

## 2018-12-28 DEVICE — IMP COMP PATELLA HOWM ASYM TRI 32X10MM 5551-L-320: Type: IMPLANTABLE DEVICE | Site: KNEE | Status: FUNCTIONAL

## 2018-12-28 DEVICE — BONE CEMENT RADIOPAQUE SIMPLEX HV FULL DOSE 6194-1-001: Type: IMPLANTABLE DEVICE | Site: KNEE | Status: FUNCTIONAL

## 2018-12-28 RX ORDER — LANOLIN ALCOHOL/MO/W.PET/CERES
3-6 CREAM (GRAM) TOPICAL
Status: DISCONTINUED | OUTPATIENT
Start: 2018-12-29 | End: 2018-12-31 | Stop reason: HOSPADM

## 2018-12-28 RX ORDER — LIDOCAINE 40 MG/G
CREAM TOPICAL
Status: DISCONTINUED | OUTPATIENT
Start: 2018-12-28 | End: 2018-12-31 | Stop reason: HOSPADM

## 2018-12-28 RX ORDER — DEXAMETHASONE SODIUM PHOSPHATE 4 MG/ML
INJECTION, SOLUTION INTRA-ARTICULAR; INTRALESIONAL; INTRAMUSCULAR; INTRAVENOUS; SOFT TISSUE PRN
Status: DISCONTINUED | OUTPATIENT
Start: 2018-12-28 | End: 2018-12-28

## 2018-12-28 RX ORDER — DIPHENHYDRAMINE HYDROCHLORIDE 50 MG/ML
12.5 INJECTION INTRAMUSCULAR; INTRAVENOUS EVERY 6 HOURS PRN
Status: DISCONTINUED | OUTPATIENT
Start: 2018-12-28 | End: 2018-12-31 | Stop reason: HOSPADM

## 2018-12-28 RX ORDER — DEXTROSE MONOHYDRATE, SODIUM CHLORIDE, AND POTASSIUM CHLORIDE 50; 1.49; 4.5 G/1000ML; G/1000ML; G/1000ML
INJECTION, SOLUTION INTRAVENOUS CONTINUOUS
Status: DISCONTINUED | OUTPATIENT
Start: 2018-12-28 | End: 2018-12-31 | Stop reason: HOSPADM

## 2018-12-28 RX ORDER — AMOXICILLIN 250 MG
1 CAPSULE ORAL 2 TIMES DAILY
Status: DISCONTINUED | OUTPATIENT
Start: 2018-12-28 | End: 2018-12-31 | Stop reason: HOSPADM

## 2018-12-28 RX ORDER — ONDANSETRON 4 MG/1
4 TABLET, ORALLY DISINTEGRATING ORAL EVERY 6 HOURS PRN
Status: DISCONTINUED | OUTPATIENT
Start: 2018-12-28 | End: 2018-12-31 | Stop reason: HOSPADM

## 2018-12-28 RX ORDER — ONDANSETRON 2 MG/ML
4 INJECTION INTRAMUSCULAR; INTRAVENOUS EVERY 30 MIN PRN
Status: DISCONTINUED | OUTPATIENT
Start: 2018-12-28 | End: 2018-12-28 | Stop reason: HOSPADM

## 2018-12-28 RX ORDER — FENTANYL CITRATE 50 UG/ML
100 INJECTION, SOLUTION INTRAMUSCULAR; INTRAVENOUS
Status: COMPLETED | OUTPATIENT
Start: 2018-12-28 | End: 2018-12-28

## 2018-12-28 RX ORDER — CEFAZOLIN SODIUM 1 G/3ML
1 INJECTION, POWDER, FOR SOLUTION INTRAMUSCULAR; INTRAVENOUS SEE ADMIN INSTRUCTIONS
Status: DISCONTINUED | OUTPATIENT
Start: 2018-12-28 | End: 2018-12-28 | Stop reason: HOSPADM

## 2018-12-28 RX ORDER — ASPIRIN 325 MG
325 TABLET ORAL 2 TIMES DAILY
Qty: 90 TABLET | Refills: 0 | DISCHARGE
Start: 2018-12-28 | End: 2019-01-27

## 2018-12-28 RX ORDER — ACETAMINOPHEN 325 MG/1
975 TABLET ORAL EVERY 8 HOURS
Status: COMPLETED | OUTPATIENT
Start: 2018-12-28 | End: 2018-12-31

## 2018-12-28 RX ORDER — SODIUM CHLORIDE, SODIUM LACTATE, POTASSIUM CHLORIDE, CALCIUM CHLORIDE 600; 310; 30; 20 MG/100ML; MG/100ML; MG/100ML; MG/100ML
INJECTION, SOLUTION INTRAVENOUS CONTINUOUS
Status: DISCONTINUED | OUTPATIENT
Start: 2018-12-28 | End: 2018-12-28 | Stop reason: HOSPADM

## 2018-12-28 RX ORDER — HYDROMORPHONE HYDROCHLORIDE 1 MG/ML
.3-.5 INJECTION, SOLUTION INTRAMUSCULAR; INTRAVENOUS; SUBCUTANEOUS EVERY 30 MIN PRN
Status: DISCONTINUED | OUTPATIENT
Start: 2018-12-28 | End: 2018-12-31 | Stop reason: HOSPADM

## 2018-12-28 RX ORDER — PROPOFOL 10 MG/ML
INJECTION, EMULSION INTRAVENOUS PRN
Status: DISCONTINUED | OUTPATIENT
Start: 2018-12-28 | End: 2018-12-28

## 2018-12-28 RX ORDER — CEFAZOLIN SODIUM 2 G/100ML
2 INJECTION, SOLUTION INTRAVENOUS EVERY 8 HOURS
Status: COMPLETED | OUTPATIENT
Start: 2018-12-28 | End: 2018-12-29

## 2018-12-28 RX ORDER — AMOXICILLIN 250 MG
1 CAPSULE ORAL 2 TIMES DAILY
Qty: 60 TABLET | Refills: 0 | DISCHARGE
Start: 2018-12-28 | End: 2019-01-27

## 2018-12-28 RX ORDER — OXYCODONE HCL 10 MG/1
10 TABLET, FILM COATED, EXTENDED RELEASE ORAL EVERY 12 HOURS
Status: COMPLETED | OUTPATIENT
Start: 2018-12-28 | End: 2018-12-31

## 2018-12-28 RX ORDER — FENTANYL CITRATE 50 UG/ML
INJECTION, SOLUTION INTRAMUSCULAR; INTRAVENOUS PRN
Status: DISCONTINUED | OUTPATIENT
Start: 2018-12-28 | End: 2018-12-28

## 2018-12-28 RX ORDER — AMOXICILLIN 250 MG
2 CAPSULE ORAL 2 TIMES DAILY
Status: DISCONTINUED | OUTPATIENT
Start: 2018-12-28 | End: 2018-12-31 | Stop reason: HOSPADM

## 2018-12-28 RX ORDER — FENTANYL CITRATE 50 UG/ML
25-50 INJECTION, SOLUTION INTRAMUSCULAR; INTRAVENOUS
Status: DISCONTINUED | OUTPATIENT
Start: 2018-12-28 | End: 2018-12-28 | Stop reason: HOSPADM

## 2018-12-28 RX ORDER — CELECOXIB 100 MG/1
100 CAPSULE ORAL DAILY
Qty: 30 CAPSULE | Refills: 0 | Status: ON HOLD | DISCHARGE
Start: 2018-12-28 | End: 2019-11-15

## 2018-12-28 RX ORDER — ACETAMINOPHEN 325 MG/1
650 TABLET ORAL EVERY 4 HOURS PRN
Status: DISCONTINUED | OUTPATIENT
Start: 2018-12-31 | End: 2018-12-31 | Stop reason: HOSPADM

## 2018-12-28 RX ORDER — OXYCODONE HYDROCHLORIDE 5 MG/1
5-10 TABLET ORAL
Qty: 40 TABLET | Status: SHIPPED | DISCHARGE
Start: 2018-12-28 | End: 2019-01-01

## 2018-12-28 RX ORDER — ONDANSETRON 4 MG/1
4 TABLET, ORALLY DISINTEGRATING ORAL EVERY 30 MIN PRN
Status: DISCONTINUED | OUTPATIENT
Start: 2018-12-28 | End: 2018-12-28 | Stop reason: HOSPADM

## 2018-12-28 RX ORDER — CELECOXIB 100 MG/1
100 CAPSULE ORAL 2 TIMES DAILY
Status: COMPLETED | OUTPATIENT
Start: 2018-12-28 | End: 2018-12-29

## 2018-12-28 RX ORDER — ONDANSETRON 2 MG/ML
INJECTION INTRAMUSCULAR; INTRAVENOUS PRN
Status: DISCONTINUED | OUTPATIENT
Start: 2018-12-28 | End: 2018-12-28

## 2018-12-28 RX ORDER — OXYCODONE HYDROCHLORIDE 5 MG/1
5-10 TABLET ORAL
Status: DISCONTINUED | OUTPATIENT
Start: 2018-12-28 | End: 2018-12-31 | Stop reason: HOSPADM

## 2018-12-28 RX ORDER — DIPHENHYDRAMINE HCL 12.5MG/5ML
12.5 LIQUID (ML) ORAL EVERY 6 HOURS PRN
Status: DISCONTINUED | OUTPATIENT
Start: 2018-12-28 | End: 2018-12-31 | Stop reason: HOSPADM

## 2018-12-28 RX ORDER — EPHEDRINE SULFATE 50 MG/ML
INJECTION, SOLUTION INTRAMUSCULAR; INTRAVENOUS; SUBCUTANEOUS PRN
Status: DISCONTINUED | OUTPATIENT
Start: 2018-12-28 | End: 2018-12-28

## 2018-12-28 RX ORDER — PROCHLORPERAZINE MALEATE 5 MG
5 TABLET ORAL EVERY 6 HOURS PRN
Status: DISCONTINUED | OUTPATIENT
Start: 2018-12-28 | End: 2018-12-31 | Stop reason: HOSPADM

## 2018-12-28 RX ORDER — NALOXONE HYDROCHLORIDE 0.4 MG/ML
.1-.4 INJECTION, SOLUTION INTRAMUSCULAR; INTRAVENOUS; SUBCUTANEOUS
Status: ACTIVE | OUTPATIENT
Start: 2018-12-28 | End: 2018-12-29

## 2018-12-28 RX ORDER — HYDROMORPHONE HYDROCHLORIDE 1 MG/ML
.3-.5 INJECTION, SOLUTION INTRAMUSCULAR; INTRAVENOUS; SUBCUTANEOUS EVERY 5 MIN PRN
Status: DISCONTINUED | OUTPATIENT
Start: 2018-12-28 | End: 2018-12-28 | Stop reason: HOSPADM

## 2018-12-28 RX ORDER — LIDOCAINE HYDROCHLORIDE 20 MG/ML
INJECTION, SOLUTION INFILTRATION; PERINEURAL PRN
Status: DISCONTINUED | OUTPATIENT
Start: 2018-12-28 | End: 2018-12-28

## 2018-12-28 RX ORDER — CEFAZOLIN SODIUM 2 G/100ML
2 INJECTION, SOLUTION INTRAVENOUS
Status: COMPLETED | OUTPATIENT
Start: 2018-12-28 | End: 2018-12-28

## 2018-12-28 RX ORDER — OXYCODONE HCL 10 MG/1
10 TABLET, FILM COATED, EXTENDED RELEASE ORAL EVERY 12 HOURS
Qty: 10 TABLET | Refills: 0 | Status: SHIPPED | DISCHARGE
Start: 2018-12-28 | End: 2019-01-01

## 2018-12-28 RX ORDER — ONDANSETRON 2 MG/ML
4 INJECTION INTRAMUSCULAR; INTRAVENOUS EVERY 6 HOURS PRN
Status: DISCONTINUED | OUTPATIENT
Start: 2018-12-28 | End: 2018-12-31 | Stop reason: HOSPADM

## 2018-12-28 RX ORDER — NALOXONE HYDROCHLORIDE 0.4 MG/ML
.1-.4 INJECTION, SOLUTION INTRAMUSCULAR; INTRAVENOUS; SUBCUTANEOUS
Status: DISCONTINUED | OUTPATIENT
Start: 2018-12-28 | End: 2018-12-31 | Stop reason: HOSPADM

## 2018-12-28 RX ORDER — BUPIVACAINE HYDROCHLORIDE AND EPINEPHRINE 5; 5 MG/ML; UG/ML
INJECTION, SOLUTION PERINEURAL PRN
Status: DISCONTINUED | OUTPATIENT
Start: 2018-12-28 | End: 2018-12-28 | Stop reason: HOSPADM

## 2018-12-28 RX ORDER — PROPOFOL 10 MG/ML
INJECTION, EMULSION INTRAVENOUS CONTINUOUS PRN
Status: DISCONTINUED | OUTPATIENT
Start: 2018-12-28 | End: 2018-12-28

## 2018-12-28 RX ADMIN — CELECOXIB 100 MG: 100 CAPSULE ORAL at 20:20

## 2018-12-28 RX ADMIN — DEXAMETHASONE SODIUM PHOSPHATE 4 MG: 4 INJECTION, SOLUTION INTRA-ARTICULAR; INTRALESIONAL; INTRAMUSCULAR; INTRAVENOUS; SOFT TISSUE at 07:55

## 2018-12-28 RX ADMIN — Medication 20 MCG: at 09:31

## 2018-12-28 RX ADMIN — OXYCODONE HYDROCHLORIDE 5 MG: 5 TABLET ORAL at 15:24

## 2018-12-28 RX ADMIN — FENTANYL CITRATE 100 MCG: 50 INJECTION, SOLUTION INTRAMUSCULAR; INTRAVENOUS at 07:00

## 2018-12-28 RX ADMIN — SODIUM CHLORIDE 1 G: 9 INJECTION, SOLUTION INTRAVENOUS at 07:49

## 2018-12-28 RX ADMIN — POTASSIUM CHLORIDE, DEXTROSE MONOHYDRATE AND SODIUM CHLORIDE: 150; 5; 450 INJECTION, SOLUTION INTRAVENOUS at 11:49

## 2018-12-28 RX ADMIN — Medication 5 MG: at 08:02

## 2018-12-28 RX ADMIN — CEFAZOLIN SODIUM 2 G: 2 INJECTION, SOLUTION INTRAVENOUS at 17:06

## 2018-12-28 RX ADMIN — CEFAZOLIN SODIUM 1 G: 2 INJECTION, SOLUTION INTRAVENOUS at 09:43

## 2018-12-28 RX ADMIN — PROPOFOL 20 MCG/KG/MIN: 10 INJECTION, EMULSION INTRAVENOUS at 08:11

## 2018-12-28 RX ADMIN — OXYCODONE HYDROCHLORIDE 5 MG: 5 TABLET ORAL at 20:20

## 2018-12-28 RX ADMIN — OXYCODONE HYDROCHLORIDE 10 MG: 10 TABLET, FILM COATED, EXTENDED RELEASE ORAL at 13:18

## 2018-12-28 RX ADMIN — LIDOCAINE HYDROCHLORIDE 80 MG: 20 INJECTION, SOLUTION INFILTRATION; PERINEURAL at 07:35

## 2018-12-28 RX ADMIN — SENNOSIDES AND DOCUSATE SODIUM 1 TABLET: 8.6; 5 TABLET ORAL at 13:18

## 2018-12-28 RX ADMIN — PROPOFOL 200 MG: 10 INJECTION, EMULSION INTRAVENOUS at 07:35

## 2018-12-28 RX ADMIN — LIDOCAINE HYDROCHLORIDE 0.5 ML: 10 INJECTION, SOLUTION EPIDURAL; INFILTRATION; INTRACAUDAL; PERINEURAL at 06:32

## 2018-12-28 RX ADMIN — Medication 15 MG: at 07:41

## 2018-12-28 RX ADMIN — PHENYLEPHRINE HYDROCHLORIDE 200 MCG: 10 INJECTION, SOLUTION INTRAMUSCULAR; INTRAVENOUS; SUBCUTANEOUS at 07:40

## 2018-12-28 RX ADMIN — CELECOXIB 100 MG: 100 CAPSULE ORAL at 13:19

## 2018-12-28 RX ADMIN — HYDROMORPHONE HYDROCHLORIDE 0.5 MG: 1 INJECTION, SOLUTION INTRAMUSCULAR; INTRAVENOUS; SUBCUTANEOUS at 08:20

## 2018-12-28 RX ADMIN — ACETAMINOPHEN 975 MG: 325 TABLET, FILM COATED ORAL at 13:18

## 2018-12-28 RX ADMIN — MIDAZOLAM HYDROCHLORIDE 2 MG: 1 INJECTION, SOLUTION INTRAMUSCULAR; INTRAVENOUS at 07:00

## 2018-12-28 RX ADMIN — Medication 5 MG: at 07:45

## 2018-12-28 RX ADMIN — ONDANSETRON 4 MG: 2 INJECTION INTRAMUSCULAR; INTRAVENOUS at 09:19

## 2018-12-28 RX ADMIN — OXYCODONE HYDROCHLORIDE 10 MG: 10 TABLET, FILM COATED, EXTENDED RELEASE ORAL at 23:58

## 2018-12-28 RX ADMIN — SODIUM CHLORIDE 1 G: 9 INJECTION, SOLUTION INTRAVENOUS at 09:10

## 2018-12-28 RX ADMIN — SENNOSIDES AND DOCUSATE SODIUM 1 TABLET: 8.6; 5 TABLET ORAL at 20:20

## 2018-12-28 RX ADMIN — SODIUM CHLORIDE, POTASSIUM CHLORIDE, SODIUM LACTATE AND CALCIUM CHLORIDE: 600; 310; 30; 20 INJECTION, SOLUTION INTRAVENOUS at 08:22

## 2018-12-28 RX ADMIN — Medication 0.3 MG: at 12:29

## 2018-12-28 RX ADMIN — FENTANYL CITRATE 50 MCG: 50 INJECTION, SOLUTION INTRAMUSCULAR; INTRAVENOUS at 07:35

## 2018-12-28 RX ADMIN — CEFAZOLIN SODIUM 2 G: 2 INJECTION, SOLUTION INTRAVENOUS at 07:43

## 2018-12-28 RX ADMIN — FENTANYL CITRATE 25 MCG: 50 INJECTION, SOLUTION INTRAMUSCULAR; INTRAVENOUS at 08:10

## 2018-12-28 RX ADMIN — ACETAMINOPHEN 975 MG: 325 TABLET, FILM COATED ORAL at 20:19

## 2018-12-28 RX ADMIN — PHENYLEPHRINE HYDROCHLORIDE 100 MCG: 10 INJECTION, SOLUTION INTRAMUSCULAR; INTRAVENOUS; SUBCUTANEOUS at 08:02

## 2018-12-28 RX ADMIN — SODIUM CHLORIDE, POTASSIUM CHLORIDE, SODIUM LACTATE AND CALCIUM CHLORIDE: 600; 310; 30; 20 INJECTION, SOLUTION INTRAVENOUS at 06:32

## 2018-12-28 RX ADMIN — FENTANYL CITRATE 25 MCG: 50 INJECTION, SOLUTION INTRAMUSCULAR; INTRAVENOUS at 08:13

## 2018-12-28 RX ADMIN — PHENYLEPHRINE HYDROCHLORIDE 100 MCG: 10 INJECTION, SOLUTION INTRAMUSCULAR; INTRAVENOUS; SUBCUTANEOUS at 08:55

## 2018-12-28 SDOH — HEALTH STABILITY: MENTAL HEALTH: HOW OFTEN DO YOU HAVE A DRINK CONTAINING ALCOHOL?: NEVER

## 2018-12-28 ASSESSMENT — MIFFLIN-ST. JEOR: SCORE: 1494.64

## 2018-12-28 ASSESSMENT — ACTIVITIES OF DAILY LIVING (ADL)
ADLS_ACUITY_SCORE: 13

## 2018-12-28 ASSESSMENT — LIFESTYLE VARIABLES: TOBACCO_USE: 0

## 2018-12-28 NOTE — PLAN OF CARE
Discharge Planner PT   Patient plan for discharge: TCU (per family and MD note)  Current status: PT eval completed, treatment initiated. Pt is s/p R TKA, POD 0 on 12/28/2018. Pt lives with spouse in a condo with elevator access. Pt was ind with all ADL's and gait at baseline. Pt stated that he owns a RW at home. Pt was received supine in bed with spouse at bs. Pt and spouse is educated on PT POC, TKA precautions and WBAT per ortho orders. Pt is at CGA with supine>sit, sat at EOB x 5 min with supervision, stood with min assist x 1 and verbal cues on RLE and B hand placement. Pt ambulated 120 ft with RW and CGA for safety. Pt noted with poor strength of R DF along with impaired sensation. Pt was assisted back to bed with min assist x 1 and is left in bed with all needs within reach and bed alarm on. Pt c/o minimal pain during mobility.   Barriers to return to prior living situation: Fall risk, Decreased strength, Decreased activity tolerance.   Recommendations for discharge: TCU or Home with family assist.   Rationale for recommendations: Pt will benefit from continued skilled PT at TCU to achieve PLOF.          Entered by: Amadna Dasilva 12/28/2018 4:46 PM

## 2018-12-28 NOTE — PLAN OF CARE
Pt is A & O x4. Lungs sound clear, bowel sounds active, cms intact except for numbess and inability to dorsiflex on right foot. IV fluid, silverman and hemovac in place. Received oxycodone, tylenol, oxycontin and celebrex for pain. Dressing is CDI. Will continue to monitor.

## 2018-12-28 NOTE — DISCHARGE SUMMARY
Admit Date:     2018   Discharge Date:           ADMITTING DIAGNOSIS:  Right knee osteoarthritis.      PROCEDURES THIS ADMISSION:  Right total knee arthroplasty.      HOSPITAL COURSE:  Mr. Alcala was admitted.  He underwent the above noted procedure.  He tolerated it well.  Postoperatively, he mobilized, stabilized and was ready for discharge to the nursing home setting.      DISCHARGE MEDICATIONS:  Those of admission plus OxyContin 10 mg p.o. b.i.d. for 3 days and at bedtime for 4 days.  Oxycodone 5 to 10 mg p.o. q.3h p.r.n. pain, Senokot-S for stool softening, Celebrex 100 mg p.o. daily for 30 days and aspirin 325 mg p.o. b.i.d. for 6 weeks for DVT prophylaxis.      DISCHARGE ACTIVITY:  Weightbear as tolerated right lower extremity.  Participate in routine physical therapy and occupational therapy.      DISCHARGE FOLLOWUP:  He should have a daily dry dressing change until his wound is dry.  He should wear his INGRIS hose until seen in followup.  They may be off at night and to shower.  He should follow up with Dr. Pelaez two weeks postop for staple removal and wound check.         YASMIN PELAEZ MD             D: 2018   T: 2018   MT: LYNNETTE      Name:     IRIS ALCALA   MRN:      [id number]        Account:        [id number]   :      1940           Admit Date:     2018                                  Discharge Date:       Document: [id number]       cc: Zev Austin MD

## 2018-12-28 NOTE — ANESTHESIA CARE TRANSFER NOTE
Patient: Aly Sorensen    Procedure(s):  RIGHT TOTAL KNEE ARTHROPLASTY    Diagnosis: RIGHT KNEE DJD  Diagnosis Additional Information: No value filed.    Anesthesia Type:   General, LMA     Note:  Airway :Face Mask  Patient transferred to:PACU  Comments: Patient awake, LMA removed, and transferred to PACU. VS stable and report given to RN. Handoff Report: Identifed the Patient, Identified the Reponsible Provider, Reviewed the pertinent medical history, Discussed the surgical course, Reviewed Intra-OP anesthesia mangement and issues during anesthesia, Set expectations for post-procedure period and Allowed opportunity for questions and acknowledgement of understanding      Vitals: (Last set prior to Anesthesia Care Transfer)    CRNA VITALS  12/28/2018 0946 - 12/28/2018 1022      12/28/2018             Pulse:  85    SpO2:  96 %    Resp Rate (set):  10                Electronically Signed By: FCO Kelly CRNA  December 28, 2018  10:22 AM

## 2018-12-28 NOTE — OR NURSING
Patient Aly Sorensen is in stable condition and has met criteria for PACU discharge.  LISA Desouza at bedside and a sign out was given for Unit/Station transfer.

## 2018-12-28 NOTE — ANESTHESIA POSTPROCEDURE EVALUATION
Patient: Aly Sorensen    Procedure(s):  RIGHT TOTAL KNEE ARTHROPLASTY    Diagnosis:RIGHT KNEE DJD  Diagnosis Additional Information: No value filed.    Anesthesia Type:  General, LMA    Note:  Anesthesia Post Evaluation    Patient location during evaluation: Bedside  Patient participation: Able to fully participate in evaluation  Level of consciousness: awake and alert  Pain management: adequate  Airway patency: patent  Cardiovascular status: acceptable  Respiratory status: acceptable  Hydration status: acceptable  PONV: none             Last vitals:  Vitals:    12/28/18 1140 12/28/18 1220 12/28/18 1254   BP: 126/76 132/84 140/87   Pulse:      Resp: 10 11 15   Temp: 36.6  C (97.8  F)     SpO2: 94% 96% 97%         Electronically Signed By: Mac Desouza MD  December 28, 2018  2:22 PM

## 2018-12-28 NOTE — OP NOTE
Procedure Date: 12/28/2018      DATE OF SURGERY:  12/28/2018      PREOPERATIVE DIAGNOSIS:  Right knee osteoarthritis.      POSTOPERATIVE DIAGNOSIS:  Right knee osteoarthritis.      PROCEDURE PERFORMED:  Right total knee arthroplasty.      SURGEON:  Michael Farooq MD      ASSISTANT:  Erik Soto PA-C      ANESTHESIA:  Regional.      COMPLICATIONS:  None.      DESCRIPTION OF PROCEDURE:  Mr. Sorensen was brought to the operating room.  An anesthetic was administered.  He received prophylactic antibiotics.  These will be discontinued within 24 hours of surgery.  He will be on Lovenox for DVT prophylaxis, switched over to aspirin.  His right lower extremity was prepped and draped in the usual sterile fashion.  A timeout was called.      The limb was exsanguinated and the tourniquet inflated.  I made a sharp incision from the superior pole of the patella to the tibial tubercle, sharp through the skin and subcutaneous.  Gained the extensor mechanism.  Made a medial arthrotomy.  There was no effusion.  There was full-thickness chondral loss in the flexion gap of the medial compartment with eburnated bone and large marginal osteophytes.  The patella was measured at a 24 and cut to a 14, prepared for a 37 button.  Placed extramedullary guide in the femur, used a 5 degree valgus bushing, made my distal femoral cut, sized it for a 5 Jf Triathlon component.  Made my 5-in-1 and box cuts.      I then set the tibial extramedullary cutting guide in the long axis of the tibia.  Took 2 mm from the more deficient medial tibial plateau.  Sized it for a size 5 baseplate.  Cemented in place for an11.  During cementation, I copiously irrigated and soaked for a total of 3 minutes with a dilute Betadine solution.  Previously, I had infiltrated the posterior capsule with 0.5% Marcaine with epinephrine.  I then trialled a 13 and a 9 and ultimately liked 11 the best.  No lateral release was needed.  I went ahead and placed a drain deep.   Closed the extensor mechanism with Ethibond, 2-0 in the subcutaneous,  staples in the skin.  Bulky sterile dressing was applied.  He returned to the recovery room in stable condition.  All sponge and needle counts were correct at the end of the procedure.  There were no known complications.         YASMIN PELAEZ MD             D: 2018   T: 2018   MT: KELL      Name:     IRIS ALCALA   MRN:      [id number]        Account:        [id number]   :      1940           Procedure Date: 2018      Document: [id number]

## 2018-12-28 NOTE — PROGRESS NOTES
12/28/18 1600   Quick Adds   Type of Visit Initial PT Evaluation   Living Environment   Lives With spouse   Living Arrangements condominium   Home Accessibility no concerns   Living Environment Comment Pt lives with spouse in a condo with elevator access.    Self-Care   Usual Activity Tolerance good   Current Activity Tolerance fair   Equipment Currently Used at Home none   Activity/Exercise/Self-Care Comment Pt owns a RW. Pt was ind with all ADL's    Functional Level Prior   Ambulation 0-->independent   Transferring 0-->independent   Toileting 0-->independent   Bathing 0-->independent   Communication 0-->understands/communicates without difficulty   Swallowing 0-->swallows foods/liquids without difficulty   Cognition 0 - no cognition issues reported   Fall history within last six months no   Which of the above functional risks had a recent onset or change? none   Prior Functional Level Comment Pt was ind with ambulation.   General Information   Onset of Illness/Injury or Date of Surgery - Date 12/28/18   Referring Physician Michael Quintana MD   Patient/Family Goals Statement get stronger    Pertinent History of Current Problem (include personal factors and/or comorbidities that impact the POC) Pt admitted for R TKA, POD 0   Precautions/Limitations fall precautions   Weight-Bearing Status - LLE full weight-bearing   Weight-Bearing Status - RLE weight-bearing as tolerated   General Observations Pleasant and cooperative   General Info Comments Activity: Up in chair, WBAT, KI on at night.    Cognitive Status Examination   Orientation orientation to person, place and time   Level of Consciousness alert   Follows Commands and Answers Questions 100% of the time   Personal Safety and Judgment intact   Memory intact   Pain Assessment   Patient Currently in Pain (Pt c/o minimal pain during mobility.)   Range of Motion (ROM)   ROM Comment BLE:WFL   Strength   Strength Comments R ankle DF: 1/5, otherwise: 4/5   Bed Mobility  "  Bed Mobility Comments Supine>sit: CGA, Sit>supine: min assist x 1    Transfer Skills   Transfer Comments STS with min assist x 1    Gait   Gait Comments 120 ft with RW and CGA for safety.    Balance   Balance Comments Sitting: good, standing static: good    Sensory Examination   Sensory Perception Comments Impiared on the medial aspect of lower leg and medial aspect of the dorsum of the R foot.    Coordination   Coordination no deficits were identified   Muscle Tone   Muscle Tone no deficits were identified   General Therapy Interventions   Planned Therapy Interventions balance training;bed mobility training;gait training;neuromuscular re-education;ROM;strengthening;stretching;transfer training;risk factor education;home program guidelines;progressive activity/exercise   Clinical Impression   Criteria for Skilled Therapeutic Intervention yes, treatment indicated   PT Diagnosis Impaired gait   Influenced by the following impairments decreased strength, decreased activity tolerance, decreased ROM   Functional limitations due to impairments Assistance needed with all mobility   Clinical Presentation Stable/Uncomplicated   Clinical Presentation Rationale Current clinical presentation   Clinical Decision Making (Complexity) Low complexity   Therapy Frequency` 2 times/day   Predicted Duration of Therapy Intervention (days/wks) 3   Anticipated Discharge Disposition Transitional Care Facility   Risk & Benefits of therapy have been explained Yes   Patient, Family & other staff in agreement with plan of care Yes   Clinical Impression Comments Pt presents with imparied R DF strength, impaired ROM of R knee and decreased activity tolerance. Pt will benefit from continued skilled PT to achieve PLOF and independence.    [facility] AM-PAC  \"6 Clicks\" V.2 Basic Mobility Inpatient Short Form   1. Turning from your back to your side while in a flat bed without using bedrails? 3 - A Little   2. Moving from lying on your back " to sitting on the side of a flat bed without using bedrails? 3 - A Little   3. Moving to and from a bed to a chair (including a wheelchair)? 3 - A Little   4. Standing up from a chair using your arms (e.g., wheelchair, or bedside chair)? 3 - A Little   5. To walk in hospital room? 3 - A Little   6. Climbing 3-5 steps with a railing? 3 - A Little   Basic Mobility Raw Score (Score out of 24.Lower scores equate to lower levels of function) 18   Total Evaluation Time   Total Evaluation Time (Minutes) 20

## 2018-12-28 NOTE — BRIEF OP NOTE
[facility]    Brief Operative Note    Pre-operative diagnosis: RIGHT KNEE DJD  Post-operative diagnosis * No post-op diagnosis entered *  Procedure: Procedure(s):  RIGHT TOTAL KNEE ARTHROPLASTY  Surgeon: Surgeon(s) and Role:     * Michael Quintana MD - Primary     * Erik Soto PA-C - Assisting  Anesthesia: General   Estimated blood loss: Less than 50 ml  Drains: None  Specimens: * No specimens in log *  Findings:   None.  Complications: None.  Implants: Materials Involved:  Metalic  Grafts:  None.

## 2018-12-28 NOTE — ANESTHESIA PREPROCEDURE EVALUATION
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     Past Surgical History:   Procedure Laterality Date     ARTHROPLASTY KNEE  2011    Procedure:ARTHROPLASTY KNEE; Left Total Knee Arthroplasty (JILLIAN)^; Surgeon:YASMIN PELAEZ; Location:[facility] OR     BACK SURGERY       COLONOSCOPY       GENITOURINARY SURGERY  history of prostatectomy     HERNIA REPAIR  inguinal hernia repair as a teenager     ORTHOPEDIC SURGERY  back fusion      PHACOEMULSIFICATION CLEAR CORNEA WITH STANDARD INTRAOCULAR LENS IMPLANT Right 11/10/2015    Procedure: PHACOEMULSIFICATION CLEAR CORNEA WITH STANDARD INTRAOCULAR LENS IMPLANT;  Surgeon: Criss Pulliam MD;  Location:  [facility]     PHACOEMULSIFICATION CLEAR CORNEA WITH STANDARD INTRAOCULAR LENS IMPLANT Left 2015    Procedure: PHACOEMULSIFICATION CLEAR CORNEA WITH STANDARD INTRAOCULAR LENS IMPLANT;  Surgeon: Criss Pulliam MD;  Location:  [facility]     removal of melanoma[  approx. 20 years ago       Anesthesia Evaluation     . Pt has had prior anesthetic.     No history of anesthetic complications          ROS/MED HX    ENT/Pulmonary:      (-) tobacco use, asthma and sleep apnea   Neurologic:       Cardiovascular:         METS/Exercise Tolerance:     Hematologic:         Musculoskeletal: Comment: Hx of spinal fusion        GI/Hepatic:        (-) GERD   Renal/Genitourinary:         Endo:         Psychiatric:         Infectious Disease:         Malignancy:         Other:                          Physical Exam  Normal systems: dental    Airway  "  Mallampati: II  TM distance: >3 FB  Neck ROM: full    Dental     Cardiovascular   Rhythm and rate: regular and normal      Pulmonary    breath sounds clear to auscultation            Lab Results   Component Value Date    WBC 11.1 (H) 12/13/2018    HGB 15.3 12/13/2018    HCT 47.2 12/13/2018     12/13/2018     12/13/2018    POTASSIUM 4.1 12/13/2018    CHLORIDE 107 12/13/2018    CO2 24 12/13/2018    BUN 17 12/13/2018    CR 0.85 12/13/2018    GLC 88 12/13/2018    ELIA 8.9 12/13/2018    ALBUMIN 3.8 12/04/2017    PROTTOTAL 7.0 12/04/2017    ALT 22 12/04/2017    AST 17 12/04/2017    ALKPHOS 78 12/04/2017    BILITOTAL 0.6 12/04/2017    TSH 1.14 01/26/2015       Preop Vitals  BP Readings from Last 3 Encounters:   12/28/18 (P) 102/67   12/13/18 119/79   03/02/18 116/70    Pulse Readings from Last 3 Encounters:   12/28/18 (P) 65   12/13/18 62   02/28/18 59      Resp Readings from Last 3 Encounters:   12/28/18 (P) 14   12/29/16 18   11/17/15 16    SpO2 Readings from Last 3 Encounters:   12/28/18 (P) 90%   12/13/18 96%   02/28/18 97%      Temp Readings from Last 1 Encounters:   12/28/18 36.7  C (98  F) (Temporal)    Ht Readings from Last 1 Encounters:   12/28/18 1.702 m (5' 7\")      Wt Readings from Last 1 Encounters:   12/28/18 85.3 kg (188 lb)    Estimated body mass index is 29.44 kg/m  as calculated from the following:    Height as of this encounter: 1.702 m (5' 7\").    Weight as of this encounter: 85.3 kg (188 lb).       Anesthesia Plan      History & Physical Review  History and physical reviewed and following examination; no interval change.    ASA Status:  2 .        Plan for General and LMA with Intravenous induction. Maintenance will be Balanced.    PONV prophylaxis:  Ondansetron (or other 5HT-3) and Dexamethasone or Solumedrol       Postoperative Care      Consents  Anesthetic plan, risks, benefits and alternatives discussed with:  Patient..                 Rima Ott  "

## 2018-12-28 NOTE — PROGRESS NOTES
Admission medication history interview status for the 12/28/2018  admission is complete. See EPIC admission navigator for prior to admission medications     Medication history source reliability:Good    Medication history interview source(s):  Patient    Medication history resources (including written lists, pill bottles, clinic record):None    Primary pharmacy.[facility]    Additional medication history information not noted on PTA med list :None    Time spent in this activity: 45 minutes    Prior to Admission medications    Medication Sig Last Dose Taking? Auth Provider   aspirin (ASA) 325 MG [facility] tablet Take 650 mg by mouth daily as needed for moderate pain (takes 2 x 325mg tablet = 650mg dose) more than a week at prn Yes Reported, Patient   ibuprofen (ADVIL/MOTRIN) 200 MG tablet Take 600 mg by mouth daily as needed for mild pain (Takes 3 x 200mg tablet = 600mg dose) 12/24/2018 at prn  Yes Reported, Patient

## 2018-12-29 ENCOUNTER — APPOINTMENT (OUTPATIENT)
Dept: PHYSICAL THERAPY | Facility: CLINIC | Age: 78
DRG: 470 | End: 2018-12-29
Attending: ORTHOPAEDIC SURGERY
Payer: MEDICARE

## 2018-12-29 LAB
GLUCOSE SERPL-MCNC: 128 MG/DL (ref 70–99)
HGB BLD-MCNC: 12.8 G/DL (ref 13.3–17.7)

## 2018-12-29 PROCEDURE — 40000193 ZZH STATISTIC PT WARD VISIT: Performed by: PHYSICAL THERAPIST

## 2018-12-29 PROCEDURE — 82947 ASSAY GLUCOSE BLOOD QUANT: CPT | Performed by: ORTHOPAEDIC SURGERY

## 2018-12-29 PROCEDURE — 12000007 ZZH R&B INTERMEDIATE

## 2018-12-29 PROCEDURE — 25000128 H RX IP 250 OP 636: Performed by: ORTHOPAEDIC SURGERY

## 2018-12-29 PROCEDURE — 36415 COLL VENOUS BLD VENIPUNCTURE: CPT | Performed by: ORTHOPAEDIC SURGERY

## 2018-12-29 PROCEDURE — 97116 GAIT TRAINING THERAPY: CPT | Mod: GP | Performed by: PHYSICAL THERAPIST

## 2018-12-29 PROCEDURE — 40000894 ZZH STATISTIC OT IP EVAL DEFER: Performed by: OCCUPATIONAL THERAPIST

## 2018-12-29 PROCEDURE — 25000132 ZZH RX MED GY IP 250 OP 250 PS 637: Mod: GY | Performed by: ORTHOPAEDIC SURGERY

## 2018-12-29 PROCEDURE — 85018 HEMOGLOBIN: CPT | Performed by: ORTHOPAEDIC SURGERY

## 2018-12-29 PROCEDURE — A9270 NON-COVERED ITEM OR SERVICE: HCPCS | Mod: GY | Performed by: ORTHOPAEDIC SURGERY

## 2018-12-29 PROCEDURE — 97110 THERAPEUTIC EXERCISES: CPT | Mod: GP | Performed by: PHYSICAL THERAPIST

## 2018-12-29 RX ADMIN — OXYCODONE HYDROCHLORIDE 10 MG: 5 TABLET ORAL at 14:39

## 2018-12-29 RX ADMIN — SENNOSIDES AND DOCUSATE SODIUM 2 TABLET: 8.6; 5 TABLET ORAL at 20:16

## 2018-12-29 RX ADMIN — ENOXAPARIN SODIUM 40 MG: 40 INJECTION SUBCUTANEOUS at 08:04

## 2018-12-29 RX ADMIN — OXYCODONE HYDROCHLORIDE 10 MG: 5 TABLET ORAL at 19:06

## 2018-12-29 RX ADMIN — SENNOSIDES AND DOCUSATE SODIUM 2 TABLET: 8.6; 5 TABLET ORAL at 08:04

## 2018-12-29 RX ADMIN — ACETAMINOPHEN 975 MG: 325 TABLET, FILM COATED ORAL at 04:19

## 2018-12-29 RX ADMIN — CELECOXIB 100 MG: 100 CAPSULE ORAL at 20:16

## 2018-12-29 RX ADMIN — OXYCODONE HYDROCHLORIDE 5 MG: 5 TABLET ORAL at 01:00

## 2018-12-29 RX ADMIN — CEFAZOLIN SODIUM 2 G: 2 INJECTION, SOLUTION INTRAVENOUS at 00:57

## 2018-12-29 RX ADMIN — OXYCODONE HYDROCHLORIDE 5 MG: 5 TABLET ORAL at 11:30

## 2018-12-29 RX ADMIN — ACETAMINOPHEN 975 MG: 325 TABLET, FILM COATED ORAL at 20:16

## 2018-12-29 RX ADMIN — OXYCODONE HYDROCHLORIDE 10 MG: 5 TABLET ORAL at 23:28

## 2018-12-29 RX ADMIN — OXYCODONE HYDROCHLORIDE 5 MG: 5 TABLET ORAL at 08:04

## 2018-12-29 RX ADMIN — OXYCODONE HYDROCHLORIDE 10 MG: 10 TABLET, FILM COATED, EXTENDED RELEASE ORAL at 23:28

## 2018-12-29 RX ADMIN — CELECOXIB 100 MG: 100 CAPSULE ORAL at 08:04

## 2018-12-29 RX ADMIN — OXYCODONE HYDROCHLORIDE 10 MG: 10 TABLET, FILM COATED, EXTENDED RELEASE ORAL at 12:30

## 2018-12-29 RX ADMIN — ACETAMINOPHEN 975 MG: 325 TABLET, FILM COATED ORAL at 12:28

## 2018-12-29 ASSESSMENT — ACTIVITIES OF DAILY LIVING (ADL)
ADLS_ACUITY_SCORE: 13
ADLS_ACUITY_SCORE: 11

## 2018-12-29 NOTE — PLAN OF CARE
Discharge Planner PT   Patient plan for discharge: TCU.   Current status: Patient in supine upon arrival of therapist with report of R knee pain of 2/10 at rest. Patient performed supine-sit, SBA. Sit <> stand and ambulation of 125 feet with FWW and CGA, noted no R knee buckling with KI donned. R knee ROM: 12-62 degrees. Pt agreeable to sit up in chair at end of session.   Barriers to return to prior living situation: Level of assist, Pain, Impaired R knee ROM  Recommendations for discharge: TCU  Rationale for recommendations: Pt will benefit from continued skilled PT intervention at TCU prior to returning home with spouse.        Entered by: Evie Tillman 12/29/2018 10:58 AM

## 2018-12-29 NOTE — PROGRESS NOTES
"Aly Sorensen  2018  POD # 1    Doing well.  No immediate surgical complications identified.  No excessive bleeding  Pain well-controlled.  Tolerating physical therapy and rehabilitation well.  Objective:  Blood pressure 118/74, pulse 88, temperature 98.3  F (36.8  C), temperature source Oral, resp. rate 15, height 1.702 m (5' 7\"), weight 85.3 kg (188 lb), SpO2 (!) 35 %.    Temperatures:  Current - Temp: 98.3  F (36.8  C); Max - Temp  Av  F (36.7  C)  Min: 97.7  F (36.5  C)  Max: 98.7  F (37.1  C)  Pulse range: Pulse  Av.2  Min: 60  Max: 89  Blood pressure range: Systolic (24hrs), Av , Min:102 , Max:140   ; Diastolic (24hrs), Av, Min:67, Max:94    Exam:  CMS: intact  alert, stable, dressing dry      Labs:  Recent Labs   Lab Test 18  1552 17  1426 16  1412   POTASSIUM 4.1 4.4 4.2     Recent Labs   Lab Test 18  1552 17  1426 17  0935   HGB 15.3 15.2 15.6     No results for input(s): INR in the last 20325 hours.  Recent Labs   Lab Test 18  1333 18  1552 17  1426    315 268       PLAN:  Continue physical therapy  Pain control measures  Social service to see for tcu placement      "

## 2018-12-29 NOTE — CONSULTS
Care Transition Initial Assessment -   Reason For Consult: discharge planning  Met with: Patient    Active Problems:    History of total right knee replacement       DATA  Lives With: spouse   Living Arrangements: condominium  Quality of Family Relationships: supportive, involved  Description of Support System: Supportive, Involved  Who is your support system?: Wife  Support Assessment: Adequate family and caregiver support.   Identified issues/concerns regarding health management:       Quality of Family Relationships: supportive, involved     Per social work consult for discharge planning.  Patient was admitted on 12-28-18 for an elective right total knee arthroplasty.  The tentative date of discharge is 12-31-18.  Reviewed chart.  Patient lives with his wife in a condo with elevator access.  Patient uses a rolling walker at baseline.  Patient is independent at baseline with ADL's.  Reviewed the therapy discharge recommendations of tcu placement on discharge and patient states he has pre-registered at [facility] and would like to go there on discharge.  Referral sent, via discharge on the double, to check bed availability.      ASSESSMENT  Cognitive Status:  awake and alert  Concerns to be addressed: discharge planning, tcu placement.     PLAN  Financial costs for the patient includes N/A.  Patient given options and choices for discharge TCU choices.  Patient/family is agreeable to the plan?  Yes  Patient Goals and Preferences: TCU on discharge.  Patient anticipates discharging to:  TCU.    Will confirm a bed, continue to follow, and assist with a safe discharge plan.    EBONY Tran, [facility]  [phone]

## 2018-12-29 NOTE — PLAN OF CARE
Discharge Planner OT   Patient plan for discharge: None stated  Current status: Following chart review, pt is discharging to TCU. WIll defer OT eval to next level of care as IP needs are met with PT at this time.   Barriers to return to prior living situation: Defer to PT  Recommendations for discharge: Defer to PT  Rationale for recommendations: OT eval most appropriate at next level of care, Orders completed.        Entered by: Lakshmi Galvan 12/29/2018 11:51 AM

## 2018-12-29 NOTE — PLAN OF CARE
A&OX4. Numbness present to top of the foot, but improving. VSS on RA. Dressing CDI. KI on at hs. Taking oxycodone for pain. Pt DTV as herrera discontinued this am. Up w/ A1. Continue to monitor.

## 2018-12-29 NOTE — PLAN OF CARE
Patient up with assist of 1 and walker.  Pain managed with Oxycodone and scheduled Oxycontin.  VSS on RA.  A/Ox4.  Dressing CDI.  CMS intact.  Hemovac removed.  TEDs on.  Knee immobilizer on @[facility] and with PT.  Voiding adequately.  Good PO intake.  Plan to discharge to TCU.

## 2018-12-30 ENCOUNTER — APPOINTMENT (OUTPATIENT)
Dept: PHYSICAL THERAPY | Facility: CLINIC | Age: 78
DRG: 470 | End: 2018-12-30
Attending: ORTHOPAEDIC SURGERY
Payer: MEDICARE

## 2018-12-30 LAB
GLUCOSE SERPL-MCNC: 125 MG/DL (ref 70–99)
HGB BLD-MCNC: 12.7 G/DL (ref 13.3–17.7)

## 2018-12-30 PROCEDURE — 97116 GAIT TRAINING THERAPY: CPT | Mod: GP | Performed by: PHYSICAL THERAPIST

## 2018-12-30 PROCEDURE — 85018 HEMOGLOBIN: CPT | Performed by: ORTHOPAEDIC SURGERY

## 2018-12-30 PROCEDURE — 82947 ASSAY GLUCOSE BLOOD QUANT: CPT | Performed by: ORTHOPAEDIC SURGERY

## 2018-12-30 PROCEDURE — 36415 COLL VENOUS BLD VENIPUNCTURE: CPT | Performed by: ORTHOPAEDIC SURGERY

## 2018-12-30 PROCEDURE — A9270 NON-COVERED ITEM OR SERVICE: HCPCS | Mod: GY | Performed by: ORTHOPAEDIC SURGERY

## 2018-12-30 PROCEDURE — 40000193 ZZH STATISTIC PT WARD VISIT: Performed by: PHYSICAL THERAPIST

## 2018-12-30 PROCEDURE — 25000128 H RX IP 250 OP 636: Performed by: ORTHOPAEDIC SURGERY

## 2018-12-30 PROCEDURE — 12000007 ZZH R&B INTERMEDIATE

## 2018-12-30 PROCEDURE — 97110 THERAPEUTIC EXERCISES: CPT | Mod: GP | Performed by: PHYSICAL THERAPIST

## 2018-12-30 PROCEDURE — 25000132 ZZH RX MED GY IP 250 OP 250 PS 637: Mod: GY | Performed by: ORTHOPAEDIC SURGERY

## 2018-12-30 RX ADMIN — OXYCODONE HYDROCHLORIDE 10 MG: 5 TABLET ORAL at 22:14

## 2018-12-30 RX ADMIN — SENNOSIDES AND DOCUSATE SODIUM 2 TABLET: 8.6; 5 TABLET ORAL at 08:11

## 2018-12-30 RX ADMIN — OXYCODONE HYDROCHLORIDE 5 MG: 5 TABLET ORAL at 19:28

## 2018-12-30 RX ADMIN — ENOXAPARIN SODIUM 40 MG: 40 INJECTION SUBCUTANEOUS at 08:11

## 2018-12-30 RX ADMIN — OXYCODONE HYDROCHLORIDE 10 MG: 5 TABLET ORAL at 11:14

## 2018-12-30 RX ADMIN — OXYCODONE HYDROCHLORIDE 5 MG: 5 TABLET ORAL at 03:13

## 2018-12-30 RX ADMIN — OXYCODONE HYDROCHLORIDE 10 MG: 5 TABLET ORAL at 08:10

## 2018-12-30 RX ADMIN — ACETAMINOPHEN 975 MG: 325 TABLET, FILM COATED ORAL at 12:25

## 2018-12-30 RX ADMIN — ACETAMINOPHEN 975 MG: 325 TABLET, FILM COATED ORAL at 19:48

## 2018-12-30 RX ADMIN — OXYCODONE HYDROCHLORIDE 10 MG: 10 TABLET, FILM COATED, EXTENDED RELEASE ORAL at 12:25

## 2018-12-30 RX ADMIN — OXYCODONE HYDROCHLORIDE 10 MG: 5 TABLET ORAL at 14:57

## 2018-12-30 RX ADMIN — ACETAMINOPHEN 975 MG: 325 TABLET, FILM COATED ORAL at 03:13

## 2018-12-30 RX ADMIN — SENNOSIDES AND DOCUSATE SODIUM 2 TABLET: 8.6; 5 TABLET ORAL at 19:49

## 2018-12-30 ASSESSMENT — ACTIVITIES OF DAILY LIVING (ADL)
ADLS_ACUITY_SCORE: 13

## 2018-12-30 NOTE — PLAN OF CARE
Pt progress toward plan of care. Adequate I/O. Taking oxycodone, oxycotin, and celebrex. Dressing- dried drainage. KI on at hs. Continue to monitor.

## 2018-12-30 NOTE — PROGRESS NOTES
"POD# 2 R TKA    SUBJECTIVE  Pain well controlled  Making good progress in PT    OBJECTIVE:   /75 (BP Location: Left arm)   Pulse 74   Temp 97.8  F (36.6  C) (Oral)   Resp 16   Ht 1.702 m (5' 7\")   Wt 85.3 kg (188 lb)   SpO2 91%   BMI 29.44 kg/m     Hemoglobin   Date Value Ref Range Status   12/30/2018 12.7 (L) 13.3 - 17.7 g/dL Final   ]   Wound: dressing dry  Drain pulled    ASSESSMENT   Doing well    PLAN   TKA pathway  Anticipate discharge to TCU tomorrow    Shayne Waterman   "

## 2018-12-30 NOTE — PLAN OF CARE
AOx4. VSS. Regular diet. A1 + walker - WBAT. Voiding in urinal. Knee immobilizer on at bedtime and PT. Pain rating 3-5, managed with PRN and scheduled meds. Dressing - CDI. Possible discharge to TCU 12/31.

## 2018-12-30 NOTE — PLAN OF CARE
Discharge Planner PT   Patient plan for discharge: TCU tomorrow.   Current status: Patient reported R knee pain of 3/10 at rest. Patient performed supine <> sit with Sukhdeep to guide R LE on/off treatment mat. Sit <> stand and ambulation of 175' with FWW and CGA, noted no R knee buckling with no KI donned. R knee ROM: 8-66 degrees.   Barriers to return to prior living situation: Level of assist, Pain   Recommendations for discharge: TCU  Rationale for recommendations: Pt will benefit from TCU in order to increase independence and safety with mobility prior to returning home with spouse.       Entered by: Evie Tillman 12/30/2018 12:18 PM

## 2018-12-30 NOTE — PLAN OF CARE
Patient up with assist of 1 and walker.  Pain managed with Oxycodone and scheduled Oxycontin.  VSS on RA.  A/Ox4.  Dressing changed, CDI.  CMS intact.  Voiding adequately.  Good PO intake.  Plan to discharge to TCU tomorrow.

## 2018-12-31 ENCOUNTER — APPOINTMENT (OUTPATIENT)
Dept: PHYSICAL THERAPY | Facility: CLINIC | Age: 78
DRG: 470 | End: 2018-12-31
Attending: ORTHOPAEDIC SURGERY
Payer: MEDICARE

## 2018-12-31 VITALS
OXYGEN SATURATION: 94 % | DIASTOLIC BLOOD PRESSURE: 80 MMHG | SYSTOLIC BLOOD PRESSURE: 131 MMHG | WEIGHT: 188 LBS | RESPIRATION RATE: 16 BRPM | HEIGHT: 67 IN | BODY MASS INDEX: 29.51 KG/M2 | HEART RATE: 74 BPM | TEMPERATURE: 98.3 F

## 2018-12-31 LAB — PLATELET # BLD AUTO: 263 10E9/L (ref 150–450)

## 2018-12-31 PROCEDURE — 97530 THERAPEUTIC ACTIVITIES: CPT | Mod: GP | Performed by: PHYSICAL THERAPIST

## 2018-12-31 PROCEDURE — 25000132 ZZH RX MED GY IP 250 OP 250 PS 637: Mod: GY | Performed by: ORTHOPAEDIC SURGERY

## 2018-12-31 PROCEDURE — 85049 AUTOMATED PLATELET COUNT: CPT | Performed by: ORTHOPAEDIC SURGERY

## 2018-12-31 PROCEDURE — 40000193 ZZH STATISTIC PT WARD VISIT: Performed by: PHYSICAL THERAPIST

## 2018-12-31 PROCEDURE — A9270 NON-COVERED ITEM OR SERVICE: HCPCS | Mod: GY | Performed by: ORTHOPAEDIC SURGERY

## 2018-12-31 PROCEDURE — 97116 GAIT TRAINING THERAPY: CPT | Mod: GP | Performed by: PHYSICAL THERAPIST

## 2018-12-31 PROCEDURE — 97110 THERAPEUTIC EXERCISES: CPT | Mod: GP | Performed by: PHYSICAL THERAPIST

## 2018-12-31 PROCEDURE — 36415 COLL VENOUS BLD VENIPUNCTURE: CPT | Performed by: ORTHOPAEDIC SURGERY

## 2018-12-31 PROCEDURE — 25000128 H RX IP 250 OP 636: Performed by: ORTHOPAEDIC SURGERY

## 2018-12-31 RX ADMIN — ACETAMINOPHEN 650 MG: 325 TABLET, FILM COATED ORAL at 00:58

## 2018-12-31 RX ADMIN — OXYCODONE HYDROCHLORIDE 10 MG: 5 TABLET ORAL at 01:34

## 2018-12-31 RX ADMIN — SENNOSIDES AND DOCUSATE SODIUM 2 TABLET: 8.6; 5 TABLET ORAL at 08:36

## 2018-12-31 RX ADMIN — OXYCODONE HYDROCHLORIDE 10 MG: 5 TABLET ORAL at 05:05

## 2018-12-31 RX ADMIN — ENOXAPARIN SODIUM 40 MG: 40 INJECTION SUBCUTANEOUS at 08:36

## 2018-12-31 RX ADMIN — OXYCODONE HYDROCHLORIDE 10 MG: 10 TABLET, FILM COATED, EXTENDED RELEASE ORAL at 00:58

## 2018-12-31 RX ADMIN — OXYCODONE HYDROCHLORIDE 10 MG: 5 TABLET ORAL at 08:35

## 2018-12-31 RX ADMIN — OXYCODONE HYDROCHLORIDE 10 MG: 5 TABLET ORAL at 11:47

## 2018-12-31 RX ADMIN — ACETAMINOPHEN 975 MG: 325 TABLET, FILM COATED ORAL at 05:05

## 2018-12-31 ASSESSMENT — ACTIVITIES OF DAILY LIVING (ADL)
ADLS_ACUITY_SCORE: 13

## 2018-12-31 NOTE — PROGRESS NOTES
JAIME  D: Received discharge orders for pt. Met with pt to discuss transportation. Pt's wife will pick him up and bring him to [facility]. Pt stated she would be here around 1200. Called and updated [facility] on discharge time. Faxed orders, scripts, and PAS.     PAS-RR    D: Per DHS regulation, SW completed and submitted PAS-RR to [place] [employer] Direct Connect via the Senior LinkAge Line.  PAS-RR confirmation # is : [id number]    I: JAIME spoke with pt and they are aware a PAS-RR has been submitted.  JAIME reviewed with pt that they may be contacted for a follow up appointment within 10 days of hospital discharge if their SNF stay is < 30 days.  Contact information for [facility] Line was also provided.    A: Pt verbalized understanding.    P: Further questions may be directed to [facility] LinkAge Line at #[phone], option #4 for PAS-RR staff.    Paulette Alexis MSW, LGSW

## 2018-12-31 NOTE — PLAN OF CARE
Discharge Planner PT   Patient plan for discharge: TCU today  Current status: PT - pt still stiff and weak R knee but improving daily. Transfers with min assist 1 for R LE assist, ambulated 150' with walker and CGA, step-to gait pattern. R knee ROM 4-80 degrees.  Barriers to return to prior living situation: requires assist for all mobility  Recommendations for discharge: TCU  Rationale for recommendations: Pt will benefit from cont skilled PT at TCU to address ROM, strength and mobility to allow eventual discharge home with spouse.    Physical Therapy Discharge Summary    Reason for therapy discharge:    Discharged to transitional care facility.    Progress towards therapy goal(s). See goals on Care Plan in [facility] electronic health record for goal details.  Goals partially met.  Barriers to achieving goals:   limited tolerance for therapy and discharge from facility.    Therapy recommendation(s):    Continued therapy is recommended.  Rationale/Recommendations:  Pt will benefit from cont skilled PT at TCU to address strength, ROM and strength deficits as well as mobility.           Entered by: Nu Ribera 12/31/2018 10:15 AM

## 2018-12-31 NOTE — PLAN OF CARE
Pt having good pain control. Dressing changed prior to discharge. Patient and spouse given AVS for review. Discharge to TCU with spouse.

## 2018-12-31 NOTE — PLAN OF CARE
POD 2, A&OX4, VSS on RA. Pain managed with a dose of 5mg of Oxy this shift. Dressing on R knee c/d/I. On regular diet.IV saline locked. Plan to discharge to TCU tomorrow.Continue to monitor.

## 2018-12-31 NOTE — PLAN OF CARE
Pt A&O x4. VSS on RA. Pain controlled with scheduled Oxycontin and Tylenol and PRN Oxycodone. Dressing CDI with small amount of dried drainage. Pt A1 w/GB and walker. Using urinal. No BM but passing gas. No IV access. CMS intact. Tolerating regular diet. Discharge today to TCU.

## 2018-12-31 NOTE — PROGRESS NOTES
"[facility] Orthopedic Post-Op Progress Note    Aly Sorensen MRN# [id number]   YOB: 1940 Age: 78 year old            Interval History:   3 Days Post-Op from Total knee arthoplasty (Right)  Doing well.  Continues to improve.  Pain is well-controlled.  No fevers.  Tolerating physical therapy well.            Physical Exam:   /80 (BP Location: Left arm)   Pulse 74   Temp 98.3  F (36.8  C) (Oral)   Resp 16   Ht 1.702 m (5' 7\")   Wt 85.3 kg (188 lb)   SpO2 94%   BMI 29.44 kg/m      Dressing currently in place.  Mild swelling.  Movement and sensation intact distal to surgical site.  Calves non-tender.           Data:     Hemoglobin   Date Value Ref Range Status   12/30/2018 12.7 (L) 13.3 - 17.7 g/dL Final   12/29/2018 12.8 (L) 13.3 - 17.7 g/dL Final            Assessment and Plan:    Assessment:   Post-operative day #3  Total knee arthoplasty (Right)     Doing well.  No excessive bleeding  Pain well-controlled.  Tolerating physical therapy and rehabilitation well.           Plan:   Continue current medical management  Continue working with physical therapy  Plan on discharge to TCU later today  Continue current DVT prophylaxis           Tomi Soto PA-C    "

## 2019-01-01 ENCOUNTER — OFFICE VISIT (OUTPATIENT)
Dept: FAMILY MEDICINE | Facility: CLINIC | Age: 79
End: 2019-01-01
Payer: COMMERCIAL

## 2019-01-01 VITALS
BODY MASS INDEX: 29.01 KG/M2 | HEIGHT: 66 IN | DIASTOLIC BLOOD PRESSURE: 68 MMHG | HEART RATE: 59 BPM | SYSTOLIC BLOOD PRESSURE: 115 MMHG | OXYGEN SATURATION: 98 % | WEIGHT: 180.5 LBS | TEMPERATURE: 97 F

## 2019-01-01 VITALS
SYSTOLIC BLOOD PRESSURE: 121 MMHG | TEMPERATURE: 98.9 F | WEIGHT: 189 LBS | OXYGEN SATURATION: 94 % | RESPIRATION RATE: 18 BRPM | HEART RATE: 76 BPM | BODY MASS INDEX: 29.6 KG/M2 | DIASTOLIC BLOOD PRESSURE: 81 MMHG

## 2019-01-01 DIAGNOSIS — R05.9 COUGH: Primary | ICD-10-CM

## 2019-01-01 DIAGNOSIS — E04.1 THYROID NODULE: ICD-10-CM

## 2019-01-01 PROCEDURE — 99213 OFFICE O/P EST LOW 20 MIN: CPT | Performed by: INTERNAL MEDICINE

## 2019-01-01 RX ORDER — OXYCODONE HCL 10 MG/1
TABLET, FILM COATED, EXTENDED RELEASE ORAL
COMMUNITY
Start: 2018-12-31 | End: 2019-01-07

## 2019-01-01 ASSESSMENT — MIFFLIN-ST. JEOR: SCORE: 1463.55

## 2019-01-02 ENCOUNTER — NURSING HOME VISIT (OUTPATIENT)
Dept: GERIATRICS | Facility: CLINIC | Age: 79
End: 2019-01-02
Payer: COMMERCIAL

## 2019-01-02 DIAGNOSIS — Z96.651 AFTERCARE FOLLOWING RIGHT KNEE JOINT REPLACEMENT SURGERY: ICD-10-CM

## 2019-01-02 DIAGNOSIS — M17.11 PRIMARY OSTEOARTHRITIS OF RIGHT KNEE: Primary | ICD-10-CM

## 2019-01-02 DIAGNOSIS — Z47.1 AFTERCARE FOLLOWING RIGHT KNEE JOINT REPLACEMENT SURGERY: ICD-10-CM

## 2019-01-02 DIAGNOSIS — K59.03 DRUG-INDUCED CONSTIPATION: ICD-10-CM

## 2019-01-02 DIAGNOSIS — D62 ANEMIA DUE TO BLOOD LOSS, ACUTE: ICD-10-CM

## 2019-01-02 PROCEDURE — 99310 SBSQ NF CARE HIGH MDM 45: CPT | Performed by: NURSE PRACTITIONER

## 2019-01-02 NOTE — PROGRESS NOTES
[facility]  PRIMARY CARE PROVIDER AND CLINIC:  Zev Austin [address] / [place] [address]  Chief Complaint   Patient presents with     Hospital F/U     [facility] Medical Record Number:  [id number]  Place of Service where encounter took place:  [facility] ([facility]) [[id number]]    HPI:    Aly Sorensen is a 78 year old  (1940),admitted to the above facility from  [facility].  Hospital stay 12/28/18 through 12/31/18.   DJD s/p right TKA: Dr. Waterman  Anemia: Hgb stable at DC no transfusion Admitted to this facility for  rehab, medical management and nursing care.  HPI information obtained from: facility chart records, facility staff, patient report and [facility] chart review.  Current issues are: On exam today patient is alert, sitting up in WC, pleasant, states pain in right knee is rated 2/10 at rest, he is taking oxycodone q 3 hours on top of scheduled oxycontin. Patient denies fever, chills, cough, congestion, SOB, N/V/D states he had a BM yesterday has had constipation. No other concerns at this time.       Last 3 BPs: 121/81, 128/80, 125/80 mmHg  HR Ranges: 76-88 bpm  Admission Weight: 12/31/18: 191.2 lbs  Current Weight: 1/1/19: 189 lbs    CODE STATUS/ADVANCE DIRECTIVES DISCUSSION:   CPR/Full code   Patient's living condition: lives alone    ALLERGIES:Patient has no known allergies.  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 He also has no past medical history of Malignant hyperthermia or PONV (postoperative nausea and vomiting).  PAST SURGICAL HISTORY:  has a past surgical history that includes hernia repair (inguinal hernia repair as a teenager); orthopedic surgery (back fusion 2002); Abdomen  surgery (history of prostatectomy); removal of melanoma[ (approx. 20 years ago); Arthroplasty knee (12/8/2011); colonoscopy; back surgery; Phacoemulsification clear cornea with standard intraocular lens implant (Right, 11/10/2015); Phacoemulsification clear cornea with standard intraocular lens implant (Left, 11/17/2015); and Arthroplasty knee (Right, 12/28/2018).  FAMILY HISTORY: family history includes Colon Cancer in his father; Diabetes in his brother; Heart Failure in his brother; Uterine Cancer in his mother.  SOCIAL HISTORY:  reports that he has quit smoking. His smoking use included cigars. he has never used smokeless tobacco. He reports that he does not drink alcohol or use drugs.    Post Discharge Medication Reconciliation Status: discharge medications reconciled, continue medications without change.  Current Outpatient Medications   Medication Sig Dispense Refill     aspirin (ASA) 325 MG tablet Take 1 tablet (325 mg) by mouth 2 times daily 90 tablet 0     celecoxib (CELEBREX) 100 MG capsule Take 1 capsule (100 mg) by mouth daily 30 capsule 0     oxyCODONE (OXYCONTIN) 10 MG 12 hr tablet Give 1 tablet by mouth every 12 hours for pain for 3 Days AND Give 1 tablet by mouth one time a day for pain for 4 Days       oxyCODONE HCl (OXAYDO) 5 MG TABA Take 5-10 mg by mouth every 3 hours as needed       senna-docusate (SENOKOT-S/PERICOLACE) 8.6-50 MG tablet Take 1 tablet by mouth 2 times daily 60 tablet 0       ROS:  10 point ROS of systems including Constitutional, Eyes, Respiratory, Cardiovascular, Gastroenterology, Genitourinary, Integumentary, Musculoskeletal, Psychiatric were all negative except for pertinent positives noted in my HPI.    Exam:  /81   Pulse 76   Temp 98.9  F (37.2  C)   Resp 18   Wt 85.7 kg (189 lb)   SpO2 94%   BMI 29.60 kg/m    GENERAL APPEARANCE:  Alert, in no distress  ENT:  Mouth and posterior oropharynx normal, moist mucous membranes, normal hearing acuity  EYES:  EOM,  conjunctivae, lids, pupils and irises normal, PERRL  RESP:  respiratory effort and palpation of chest normal, lungs clear to auscultation , no respiratory distress  CV:  Palpation and auscultation of heart done , regular rate and rhythm, no murmur, rub, or gallop, no edema  ABDOMEN:  normal bowel sounds, soft, nontender, no hepatosplenomegaly or other masses  M/S:   Examination of:   right upper extremity, left upper extremity and left lower extremity  Inspection, ROM, stability and muscle strength normal and decreased ROM and strength RLE  SKIN:  Inspection of skin and subcutaneous tissue baseline, did not visualize right knee incision  NEURO:   Cranial nerves 2-12 are normal tested and grossly at patient's baseline, speech WNL  PSYCH:  affect and mood normal    Lab/Diagnostic data:  CBC RESULTS:   Recent Labs   Lab Test 12/31/18  0716 12/30/18  0659 12/29/18  0720 12/28/18  1333 12/13/18  1552 12/04/17  1426   WBC  --   --   --   --  11.1* 9.4   RBC  --   --   --   --  4.92 4.64   HGB  --  12.7* 12.8*  --  15.3 15.2   HCT  --   --   --   --  47.2 45.2   MCV  --   --   --   --  96 97   MCH  --   --   --   --  31.1 32.8   MCHC  --   --   --   --  32.4 33.6   RDW  --   --   --   --  13.5 13.7     --   --  277 315 268       Last Basic Metabolic Panel:  Recent Labs   Lab Test 12/30/18  0659 12/29/18  0720 12/28/18  1333 12/13/18  1552 12/04/17  1426   NA  --   --   --  138 141   POTASSIUM  --   --   --  4.1 4.4   CHLORIDE  --   --   --  107 107   ELIA  --   --   --  8.9 8.7   CO2  --   --   --  24 27   BUN  --   --   --  17 18   CR  --   --  0.75 0.85 0.89   * 128*  --  88 85       Liver Function Studies -   Recent Labs   Lab Test 12/04/17  1426 12/29/16  1412   PROTTOTAL 7.0 7.1   ALBUMIN 3.8 3.8   BILITOTAL 0.6 0.5   ALKPHOS 78 81   AST 17 18   ALT 22 19       Lab Results   Component Value Date    A1C 5.9 12/13/2018    A1C 5.7 10/19/2015       ASSESSMENT/PLAN:  Primary osteoarthritis of right  knee  Aftercare following right knee joint replacement surgery  Acute/ongoing: PT and OT for strengthening, oxycontin 10mg BID for 3 days then QD for 4 days then DC, oxycodone 5-10mg q 3 hours prn, celebrex 100mg QD and ASA 325mg BID    Anemia due to blood loss, acute  Acute: Hgb in AM, follow    Drug-induced constipation  Acute/ongoing: senna s 1 PO BID add miralax 17gm QD prn       Orders:  BMP and Hgb in AM  miralax 17gm QD prn    Total time with patient visit: 40 minutes including discussions about the POC and care coordination with the patient. Greater than 50% of total time spent with counseling and coordinating care due to , review chart, update orders.    Electronically signed by:  Tonya Lynn Haase, APRN CNP

## 2019-01-02 NOTE — LETTER
1/2/2019        RE: Aly Sorensen  [address]  [place] [address]        [facility]  PRIMARY CARE PROVIDER AND CLINIC:  Zev Austin [address] / [place] [address]  Chief Complaint   Patient presents with     Hospital F/U     [place] Medical Record Number:  [id number]  Place of Service where encounter took place:  [facility] ([facility]) [[id number]]    HPI:    Aly Sorensen is a 78 year old  (1940),admitted to the above facility from  [facility].  Hospital stay 12/28/18 through 12/31/18.   DJD s/p right TKA: Dr. Waterman  Anemia: Hgb stable at DC no transfusion Admitted to this facility for  rehab, medical management and nursing care.  HPI information obtained from: facility chart records, facility staff, patient report and [facility] chart review.  Current issues are: On exam today patient is alert, sitting up in WC, pleasant, states pain in right knee is rated 2/10 at rest, he is taking oxycodone q 3 hours on top of scheduled oxycontin. Patient denies fever, chills, cough, congestion, SOB, N/V/D states he had a BM yesterday has had constipation. No other concerns at this time.       Last 3 BPs: 121/81, 128/80, 125/80 mmHg  HR Ranges: 76-88 bpm  Admission Weight: 12/31/18: 191.2 lbs  Current Weight: 1/1/19: 189 lbs    CODE STATUS/ADVANCE DIRECTIVES DISCUSSION:   CPR/Full code   Patient's living condition: lives alone    ALLERGIES:Patient has no known allergies.  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 He also has no past medical history of Malignant hyperthermia or PONV (postoperative nausea and vomiting).  PAST SURGICAL HISTORY:  has a past surgical history that includes hernia repair  (inguinal hernia repair as a teenager); orthopedic surgery (back fusion 2002); Abdomen surgery (history of prostatectomy); removal of melanoma[ (approx. 20 years ago); Arthroplasty knee (12/8/2011); colonoscopy; back surgery; Phacoemulsification clear cornea with standard intraocular lens implant (Right, 11/10/2015); Phacoemulsification clear cornea with standard intraocular lens implant (Left, 11/17/2015); and Arthroplasty knee (Right, 12/28/2018).  FAMILY HISTORY: family history includes Colon Cancer in his father; Diabetes in his brother; Heart Failure in his brother; Uterine Cancer in his mother.  SOCIAL HISTORY:  reports that he has quit smoking. His smoking use included cigars. he has never used smokeless tobacco. He reports that he does not drink alcohol or use drugs.    Post Discharge Medication Reconciliation Status: discharge medications reconciled, continue medications without change.  Current Outpatient Medications   Medication Sig Dispense Refill     aspirin (ASA) 325 MG tablet Take 1 tablet (325 mg) by mouth 2 times daily 90 tablet 0     celecoxib (CELEBREX) 100 MG capsule Take 1 capsule (100 mg) by mouth daily 30 capsule 0     oxyCODONE (OXYCONTIN) 10 MG 12 hr tablet Give 1 tablet by mouth every 12 hours for pain for 3 Days AND Give 1 tablet by mouth one time a day for pain for 4 Days       oxyCODONE HCl (OXAYDO) 5 MG TABA Take 5-10 mg by mouth every 3 hours as needed       senna-docusate (SENOKOT-S/PERICOLACE) 8.6-50 MG tablet Take 1 tablet by mouth 2 times daily 60 tablet 0       ROS:  10 point ROS of systems including Constitutional, Eyes, Respiratory, Cardiovascular, Gastroenterology, Genitourinary, Integumentary, Musculoskeletal, Psychiatric were all negative except for pertinent positives noted in my HPI.    Exam:  /81   Pulse 76   Temp 98.9  F (37.2  C)   Resp 18   Wt 85.7 kg (189 lb)   SpO2 94%   BMI 29.60 kg/m     GENERAL APPEARANCE:  Alert, in no distress  ENT:  Mouth and  posterior oropharynx normal, moist mucous membranes, normal hearing acuity  EYES:  EOM, conjunctivae, lids, pupils and irises normal, PERRL  RESP:  respiratory effort and palpation of chest normal, lungs clear to auscultation , no respiratory distress  CV:  Palpation and auscultation of heart done , regular rate and rhythm, no murmur, rub, or gallop, no edema  ABDOMEN:  normal bowel sounds, soft, nontender, no hepatosplenomegaly or other masses  M/S:   Examination of:   right upper extremity, left upper extremity and left lower extremity  Inspection, ROM, stability and muscle strength normal and decreased ROM and strength RLE  SKIN:  Inspection of skin and subcutaneous tissue baseline, did not visualize right knee incision  NEURO:   Cranial nerves 2-12 are normal tested and grossly at patient's baseline, speech WNL  PSYCH:  affect and mood normal    Lab/Diagnostic data:  CBC RESULTS:   Recent Labs   Lab Test 12/31/18  0716 12/30/18  0659 12/29/18  0720 12/28/18  1333 12/13/18  1552 12/04/17  1426   WBC  --   --   --   --  11.1* 9.4   RBC  --   --   --   --  4.92 4.64   HGB  --  12.7* 12.8*  --  15.3 15.2   HCT  --   --   --   --  47.2 45.2   MCV  --   --   --   --  96 97   MCH  --   --   --   --  31.1 32.8   MCHC  --   --   --   --  32.4 33.6   RDW  --   --   --   --  13.5 13.7     --   --  277 315 268       Last Basic Metabolic Panel:  Recent Labs   Lab Test 12/30/18  0659 12/29/18  0720 12/28/18  1333 12/13/18  1552 12/04/17  1426   NA  --   --   --  138 141   POTASSIUM  --   --   --  4.1 4.4   CHLORIDE  --   --   --  107 107   ELIA  --   --   --  8.9 8.7   CO2  --   --   --  24 27   BUN  --   --   --  17 18   CR  --   --  0.75 0.85 0.89   * 128*  --  88 85       Liver Function Studies -   Recent Labs   Lab Test 12/04/17  1426 12/29/16  1412   PROTTOTAL 7.0 7.1   ALBUMIN 3.8 3.8   BILITOTAL 0.6 0.5   ALKPHOS 78 81   AST 17 18   ALT 22 19       Lab Results   Component Value Date    A1C 5.9 12/13/2018     A1C 5.7 10/19/2015       ASSESSMENT/PLAN:  Primary osteoarthritis of right knee  Aftercare following right knee joint replacement surgery  Acute/ongoing: PT and OT for strengthening, oxycontin 10mg BID for 3 days then QD for 4 days then DC, oxycodone 5-10mg q 3 hours prn, celebrex 100mg QD and ASA 325mg BID    Anemia due to blood loss, acute  Acute: Hgb in AM, follow    Drug-induced constipation  Acute/ongoing: senna s 1 PO BID add miralax 17gm QD prn       Orders:  BMP and Hgb in AM  miralax 17gm QD prn    Total time with patient visit: 40 minutes including discussions about the POC and care coordination with the patient. Greater than 50% of total time spent with counseling and coordinating care due to , review chart, update orders.    Electronically signed by:  Tonya Lynn Haase, APRN CNP                    Sincerely,        Tonya Lynn Haase, APRN CNP

## 2019-01-03 ENCOUNTER — TRANSFERRED RECORDS (OUTPATIENT)
Dept: HEALTH INFORMATION MANAGEMENT | Facility: CLINIC | Age: 79
End: 2019-01-03

## 2019-01-03 LAB
BUN SERPL-MCNC: 12 MG/DL (ref 9–26)
CALCIUM SERPL-MCNC: 9.1 MG/DL (ref 8.4–10.4)
CHLORIDE SERPLBLD-SCNC: 102 MMOL/L (ref 98–109)
CO2 SERPL-SCNC: 21 MMOL/L (ref 22–31)
CREAT SERPL-MCNC: 0.76 MG/DL (ref 0.73–1.18)
GFR SERPL CREATININE-BSD FRML MDRD: >60 ML/MIN/1.73M2
GLUCOSE SERPL-MCNC: 129 MG/DL (ref 70–100)
HEMOGLOBIN: 13.1 G/DL (ref 13.4–17.5)
POTASSIUM SERPL-SCNC: 4.2 MMOL/L (ref 3.5–5.2)
SODIUM SERPL-SCNC: 137 MMOL/L (ref 136–145)

## 2019-01-05 ENCOUNTER — NURSING HOME VISIT (OUTPATIENT)
Dept: GERIATRICS | Facility: CLINIC | Age: 79
End: 2019-01-05
Payer: COMMERCIAL

## 2019-01-05 DIAGNOSIS — Z47.1 AFTERCARE FOLLOWING RIGHT KNEE JOINT REPLACEMENT SURGERY: Primary | ICD-10-CM

## 2019-01-05 DIAGNOSIS — Z96.651 AFTERCARE FOLLOWING RIGHT KNEE JOINT REPLACEMENT SURGERY: Primary | ICD-10-CM

## 2019-01-05 PROCEDURE — 99304 1ST NF CARE SF/LOW MDM 25: CPT | Performed by: INTERNAL MEDICINE

## 2019-01-06 NOTE — PROGRESS NOTES
[facility]  PRIMARY CARE PROVIDER AND CLINIC:  Zev Austin [address] / [place] [address]      Pt was seen by Dr Linares on 1/5/19 for an initial TCU visit at the [facility]    HPI:    Aly Sorensen is a 78 year old  (1940) , from  [facility].  Hospital stay 12/28/18 through 12/31/18 after undergoing an elective R TKA by Dr Quintana    Post op course was medically uncomplicated     Admitted to this facility for  rehab, medical management and nursing care   Pt feels well, is progressing in therapies, anticipates discharge to home in 4 days.  Pain has been controlled  He is voiding without difficulty. + BM    He denies chest pain, SOB, dizziness, abd pain  Vitals have been stable since admission to the TCU    CODE STATUS/ADVANCE DIRECTIVES DISCUSSION:   CPR/Full code   Patient's living condition: lives alone    ALLERGIES:Patient has no known allergies.  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 He also has no past medical history of Malignant hyperthermia or PONV (postoperative nausea and vomiting).  PAST SURGICAL HISTORY:  has a past surgical history that includes hernia repair (inguinal hernia repair as a teenager); orthopedic surgery (back fusion 2002); Abdomen surgery (history of prostatectomy); removal of melanoma[ (approx. 20 years ago); Arthroplasty knee (12/8/2011); colonoscopy; back surgery; Phacoemulsification clear cornea with standard intraocular lens implant (Right, 11/10/2015); Phacoemulsification clear cornea with standard intraocular lens implant (Left, 11/17/2015); and Arthroplasty knee (Right, 12/28/2018).  FAMILY HISTORY: family history includes Colon Cancer in his father; Diabetes in his  brother; Heart Failure in his brother; Uterine Cancer in his mother.  SOCIAL HISTORY:  reports that he has quit smoking. His smoking use included cigars. he has never used smokeless tobacco. He reports that he does not drink alcohol or use drugs.    Post Discharge Medication Reconciliation Status:   .  Current Outpatient Medications   Medication Sig Dispense Refill     aspirin (ASA) 325 MG tablet Take 1 tablet (325 mg) by mouth 2 times daily 90 tablet 0     celecoxib (CELEBREX) 100 MG capsule Take 1 capsule (100 mg) by mouth daily 30 capsule 0     oxyCODONE (OXYCONTIN) 10 MG 12 hr tablet Give 1 tablet by mouth every 12 hours for pain for 3 Days AND Give 1 tablet by mouth one time a day for pain for 4 Days       oxyCODONE HCl (OXAYDO) 5 MG TABA Take 5-10 mg by mouth every 3 hours as needed       senna-docusate (SENOKOT-S/PERICOLACE) 8.6-50 MG tablet Take 1 tablet by mouth 2 times daily 60 tablet 0       ROS:  10 point ROS neg except as noted above    Exam:    GENERAL APPEARANCE:  Alert, in no distress, well appearing, lying in bed  ENT: oral mucosa moist  EYES:  EOM, conjunctiva nl  RESP:  Lungs clear  CV:  RRR no M  ABDOMEN: soft, non-distended  M/S:   R knee incision intact without drainage  No calf edema, no calf tenderness  NEURO:  Alert, fully oriented  PSYCH:  affect and mood normal    Lab/Diagnostic data:  CBC RESULTS:   Recent Labs   Lab Test 12/31/18  0716 12/30/18  0659 12/29/18  0720 12/28/18  1333 12/13/18  1552 12/04/17  1426   WBC  --   --   --   --  11.1* 9.4   RBC  --   --   --   --  4.92 4.64   HGB  --  12.7* 12.8*  --  15.3 15.2   HCT  --   --   --   --  47.2 45.2   MCV  --   --   --   --  96 97   MCH  --   --   --   --  31.1 32.8   MCHC  --   --   --   --  32.4 33.6   RDW  --   --   --   --  13.5 13.7     --   --  277 315 268       Last Basic Metabolic Panel:  Recent Labs   Lab Test 12/30/18  0659 12/29/18  0720 12/28/18  1333 12/13/18  1552 12/04/17  1426   NA  --   --   --  138 141    POTASSIUM  --   --   --  4.1 4.4   CHLORIDE  --   --   --  107 107   ELIA  --   --   --  8.9 8.7   CO2  --   --   --  24 27   BUN  --   --   --  17 18   CR  --   --  0.75 0.85 0.89   * 128*  --  88 85       Liver Function Studies -   Recent Labs   Lab Test 12/04/17  1426 12/29/16  1412   PROTTOTAL 7.0 7.1   ALBUMIN 3.8 3.8   BILITOTAL 0.6 0.5   ALKPHOS 78 81   AST 17 18   ALT 22 19       Lab Results   Component Value Date    A1C 5.9 12/13/2018    A1C 5.7 10/19/2015       ASSESSMENT/PLAN:    Primary osteoarthritis of right knee  Aftercare following right knee joint replacement surgery  Pt is doing well  Plan continue therapies, continue oxycodone  ASA for DVT proph  Bowel regimen   Anticipate discharge in a few days    Yang Linares MD

## 2019-01-07 VITALS
DIASTOLIC BLOOD PRESSURE: 80 MMHG | HEART RATE: 82 BPM | RESPIRATION RATE: 16 BRPM | WEIGHT: 189 LBS | OXYGEN SATURATION: 96 % | BODY MASS INDEX: 29.6 KG/M2 | SYSTOLIC BLOOD PRESSURE: 130 MMHG | TEMPERATURE: 99 F

## 2019-01-07 RX ORDER — POLYETHYLENE GLYCOL 3350 17 G/17G
1 POWDER, FOR SOLUTION ORAL DAILY PRN
Status: ON HOLD | COMMUNITY
End: 2019-11-15

## 2019-01-08 ENCOUNTER — DISCHARGE SUMMARY NURSING HOME (OUTPATIENT)
Dept: GERIATRICS | Facility: CLINIC | Age: 79
End: 2019-01-08
Payer: COMMERCIAL

## 2019-01-08 DIAGNOSIS — K59.03 DRUG-INDUCED CONSTIPATION: ICD-10-CM

## 2019-01-08 DIAGNOSIS — M17.11 PRIMARY OSTEOARTHRITIS OF RIGHT KNEE: ICD-10-CM

## 2019-01-08 DIAGNOSIS — D62 ANEMIA DUE TO BLOOD LOSS, ACUTE: ICD-10-CM

## 2019-01-08 DIAGNOSIS — Z47.1 AFTERCARE FOLLOWING RIGHT KNEE JOINT REPLACEMENT SURGERY: ICD-10-CM

## 2019-01-08 DIAGNOSIS — Z96.651 AFTERCARE FOLLOWING RIGHT KNEE JOINT REPLACEMENT SURGERY: ICD-10-CM

## 2019-01-08 PROCEDURE — 99316 NF DSCHRG MGMT 30 MIN+: CPT | Performed by: NURSE PRACTITIONER

## 2019-01-08 RX ORDER — OXYCODONE HYDROCHLORIDE 5 MG/1
5 TABLET ORAL EVERY 6 HOURS PRN
Qty: 20 TABLET | Refills: 0 | Status: SHIPPED | OUTPATIENT
Start: 2019-01-08 | End: 2019-01-11

## 2019-01-08 NOTE — LETTER
1/8/2019        RE: Aly Sorensen  [address]  [place] [address]          [place] [facility] DISCHARGE SUMMARY    PATIENT'S NAME: Aly Sorensen  YOB: 1940  MEDICAL RECORD NUMBER:  [id number]  Place of Service where encounter took place:  [facility] [[id number]]    PRIMARY CARE PROVIDER AND CLINIC RESPONSIBLE AFTER TRANSFER: Zev Austin [address] / [place] [address]     TRANSFERRING PROVIDERS: Tonya Lynn Haase, APRN CNP, Dr. Milagros MD  DATE OF SNF ADMISSION:  December / 31 / 2018  DATE OF SNF (anticipated) DISCHARGE: January / 09 / 2019  DISCHARGE DISPOSITION: [facility] Provider   RECENT HOSPITALIZATION/ED:  [facility] stay 12/28/18 to 12/31/18.     CODE STATUS/ADVANCE DIRECTIVES DISCUSSION:   CPR/Full code    No Known Allergies  Condition on Discharge:  Stable.  Function:  Walking with RW > 200 feet indep  Cognitive Scores: BIMS: 15/15, SBT: 4/28    Equipment: walker    DISCHARGE DIAGNOSIS:   1. Primary osteoarthritis of right knee    2. Aftercare following right knee joint replacement surgery    3. Anemia due to blood loss, acute    4. 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 He also has no past medical history of Malignant hyperthermia or PONV (postoperative nausea and vomiting).    DISCHARGE MEDICATIONS:  Current Outpatient Medications   Medication Sig Dispense Refill     aspirin (ASA) 325 MG tablet Take 1 tablet (325 mg) by mouth 2 times daily 90 tablet 0     celecoxib (CELEBREX) 100 MG capsule Take 1 capsule (100 mg) by mouth daily 30 capsule 0     oxyCODONE HCl (OXAYDO) 5 MG TABA Take 5-10 mg by mouth every 3 hours as needed        polyethylene glycol (MIRALAX/GLYCOLAX) packet Take 1 packet by mouth daily as needed for constipation       senna-docusate (SENOKOT-S/PERICOLACE) 8.6-50 MG tablet Take 1 tablet by mouth 2 times daily 60 tablet 0       MEDICATION CHANGES/RATIONALE:   There were no medication changes made during TCU stay  Last 3 BPs: 130/80, 135/84, 121/76 mmHg  HR Ranges: 75-82 bpm  Admission Weight: 12/31/18: 191.2 lbs  Current Weights: 1/1/19: 189 lbs  Controlled medications sent with patient:   Script for oxycodone 5mg q 6 hours prn medication for 20 tabs and 0 refills given to patient at dischage to have them fill at their out patient pharmacy     ROS:    10 point ROS of systems including Constitutional, Eyes, Respiratory, Cardiovascular, Gastroenterology, Genitourinary, Integumentary, Musculoskeletal, Psychiatric were all negative except for pertinent positives noted in my HPI.    Physical Exam:   Vitals: /80   Pulse 82   Temp 99  F (37.2  C)   Resp 16   Wt 85.7 kg (189 lb)   SpO2 96%   BMI 29.60 kg/m     BMI= Body mass index is 29.6 kg/m .  GENERAL APPEARANCE:  Alert, in no distress  ENT:  Mouth and posterior oropharynx normal, moist mucous membranes, normal hearing acuity  EYES:  EOM, conjunctivae, lids, pupils and irises normal, PERRL  RESP:  respiratory effort and palpation of chest normal, lungs clear to auscultation , no respiratory distress  CV:  Palpation and auscultation of heart done , regular rate and rhythm, no murmur, rub, or gallop, no edema  ABDOMEN:  normal bowel sounds, soft, nontender, no hepatosplenomegaly or other masses  M/S:   Examination of:   right upper extremity, left upper extremity and left lower extremity  Inspection, ROM, stability and muscle strength normal and decreased ROM and strength RLE  SKIN:  Inspection of skin and subcutaneous tissue baseline, did not visualize right knee incision  NEURO:   Cranial nerves 2-12 are normal tested and grossly at patient's baseline, speech  WNL  PSYCH:  affect and mood normal    HPI Nursing Facility Course: Patient progressed in therapy to walking > 200 feet using a RW indep, indep with ADL's, ROM to right knee is only 74 degrees of flexion. Patient will have OP PT.   HPI information obtained from: facility chart records, facility staff, patient report and [facility] chart review.    Primary osteoarthritis of right knee  Aftercare following right knee joint replacement surgery  Acute/ongoing: DC home with OP PT, oxycontin taper finished , oxycodone 5mg q 6 hours prn, celebrex 100mg QD and ASA 325mg BID  F/u with ortho as directed patient states next appt is 1/10/19     Anemia due to blood loss, acute  Acute: Hgb stable, f/u with PCP      Drug-induced constipation  Acute/ongoing: senna s 1 PO BID and miralax 17gm QD prn    DISCHARGE PLAN:  Outpatient Physical Therapy  Patient instructed to follow-up with:  PCP in 5-7 days       Current [place] scheduled appointments:  Future Appointments   Date Time Provider Department Center   1/16/2019  2:30 PM Zev Austin MD [facility]       MTM referral needed and placed by this provider: No    Pending labs: none  SNF labs   CBC RESULTS:   Recent Labs   Lab Test 01/03/19 12/31/18  0716 12/30/18  0659  12/28/18  1333 12/13/18  1552 12/04/17  1426   WBC  --   --   --   --   --  11.1* 9.4   RBC  --   --   --   --   --  4.92 4.64   HGB 13.1*  --  12.7*   < >  --  15.3 15.2   HCT  --   --   --   --   --  47.2 45.2   MCV  --   --   --   --   --  96 97   MCH  --   --   --   --   --  31.1 32.8   MCHC  --   --   --   --   --  32.4 33.6   RDW  --   --   --   --   --  13.5 13.7   PLT  --  263  --   --  277 315 268    < > = values in this interval not displayed.       Last Basic Metabolic Panel:  Recent Labs   Lab Test 01/03/19 12/30/18  0659  12/28/18  1333 12/13/18  1552     --   --   --  138   POTASSIUM 4.2  --   --   --  4.1   CHLORIDE 102  --   --   --  107   ELIA 9.1  --   --   --  8.9   CO2 21*  --   --   --   24   BUN 12  --   --   --  17   CR 0.76  --   --  0.75 0.85   * 125*   < >  --  88    < > = values in this interval not displayed.         Lab Results   Component Value Date    A1C 5.9 12/13/2018    A1C 5.7 10/19/2015           Discharge Treatments:none    TOTAL DISCHARGE TIME:   Greater than 30 minutes  Electronically signed by:  Tonya Lynn Haase, APRN CNP      Sincerely,        Tonya Lynn Haase, APRN CNP

## 2019-01-08 NOTE — PROGRESS NOTES
[place] [facility] DISCHARGE SUMMARY    PATIENT'S NAME: Aly Sorensen  YOB: 1940  MEDICAL RECORD NUMBER:  [id number]  Place of Service where encounter took place:  [facility] [[id number]]    PRIMARY CARE PROVIDER AND CLINIC RESPONSIBLE AFTER TRANSFER: Zev Austin [address] / [place] [address]     TRANSFERRING PROVIDERS: Tonya Lynn Haase, APRN CNP, Dr. Milagros MD  DATE OF SNF ADMISSION:  December / 31 / 2018  DATE OF SNF (anticipated) DISCHARGE: January / 09 / 2019  DISCHARGE DISPOSITION: [facility] Provider   RECENT HOSPITALIZATION/ED:  [facility] stay 12/28/18 to 12/31/18.     CODE STATUS/ADVANCE DIRECTIVES DISCUSSION:   CPR/Full code    No Known Allergies  Condition on Discharge:  Stable.  Function:  Walking with RW > 200 feet indep  Cognitive Scores: BIMS: 15/15, SBT: 4/28    Equipment: walker    DISCHARGE DIAGNOSIS:   1. Primary osteoarthritis of right knee    2. Aftercare following right knee joint replacement surgery    3. Anemia due to blood loss, acute    4. 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 He also has no past medical history of Malignant hyperthermia or PONV (postoperative nausea and vomiting).    DISCHARGE MEDICATIONS:  Current Outpatient Medications   Medication Sig Dispense Refill     aspirin (ASA) 325 MG tablet Take 1 tablet (325 mg) by mouth 2 times daily 90 tablet 0     celecoxib (CELEBREX) 100 MG capsule Take 1 capsule (100 mg) by mouth daily 30 capsule 0     oxyCODONE HCl (OXAYDO) 5 MG TABA Take 5-10 mg by mouth every 3 hours as needed       polyethylene glycol (MIRALAX/GLYCOLAX) packet Take 1 packet by mouth daily as needed for  constipation       senna-docusate (SENOKOT-S/PERICOLACE) 8.6-50 MG tablet Take 1 tablet by mouth 2 times daily 60 tablet 0       MEDICATION CHANGES/RATIONALE:   There were no medication changes made during TCU stay  Last 3 BPs: 130/80, 135/84, 121/76 mmHg  HR Ranges: 75-82 bpm  Admission Weight: 12/31/18: 191.2 lbs  Current Weights: 1/1/19: 189 lbs  Controlled medications sent with patient:   Script for oxycodone 5mg q 6 hours prn medication for 20 tabs and 0 refills given to patient at dischage to have them fill at their out patient pharmacy     ROS:    10 point ROS of systems including Constitutional, Eyes, Respiratory, Cardiovascular, Gastroenterology, Genitourinary, Integumentary, Musculoskeletal, Psychiatric were all negative except for pertinent positives noted in my HPI.    Physical Exam:   Vitals: /80   Pulse 82   Temp 99  F (37.2  C)   Resp 16   Wt 85.7 kg (189 lb)   SpO2 96%   BMI 29.60 kg/m    BMI= Body mass index is 29.6 kg/m .  GENERAL APPEARANCE:  Alert, in no distress  ENT:  Mouth and posterior oropharynx normal, moist mucous membranes, normal hearing acuity  EYES:  EOM, conjunctivae, lids, pupils and irises normal, PERRL  RESP:  respiratory effort and palpation of chest normal, lungs clear to auscultation , no respiratory distress  CV:  Palpation and auscultation of heart done , regular rate and rhythm, no murmur, rub, or gallop, no edema  ABDOMEN:  normal bowel sounds, soft, nontender, no hepatosplenomegaly or other masses  M/S:   Examination of:   right upper extremity, left upper extremity and left lower extremity  Inspection, ROM, stability and muscle strength normal and decreased ROM and strength RLE  SKIN:  Inspection of skin and subcutaneous tissue baseline, did not visualize right knee incision  NEURO:   Cranial nerves 2-12 are normal tested and grossly at patient's baseline, speech WNL  PSYCH:  affect and mood normal    HPI Nursing Facility Course: Patient progressed in therapy  to walking > 200 feet using a RW indep, indep with ADL's, ROM to right knee is only 74 degrees of flexion. Patient will have OP PT.   HPI information obtained from: facility chart records, facility staff, patient report and [facility] chart review.    Primary osteoarthritis of right knee  Aftercare following right knee joint replacement surgery  Acute/ongoing: DC home with OP PT, oxycontin taper finished , oxycodone 5mg q 6 hours prn, celebrex 100mg QD and ASA 325mg BID  F/u with ortho as directed patient states next appt is 1/10/19     Anemia due to blood loss, acute  Acute: Hgb stable, f/u with PCP      Drug-induced constipation  Acute/ongoing: senna s 1 PO BID and miralax 17gm QD prn    DISCHARGE PLAN:  Outpatient Physical Therapy  Patient instructed to follow-up with:  PCP in 5-7 days       Current [place] scheduled appointments:  Future Appointments   Date Time Provider Department Center   1/16/2019  2:30 PM Zev Austin MD [facility]       MTM referral needed and placed by this provider: No    Pending labs: none  SNF labs   CBC RESULTS:   Recent Labs   Lab Test 01/03/19 12/31/18  0716 12/30/18  0659  12/28/18  1333 12/13/18  1552 12/04/17  1426   WBC  --   --   --   --   --  11.1* 9.4   RBC  --   --   --   --   --  4.92 4.64   HGB 13.1*  --  12.7*   < >  --  15.3 15.2   HCT  --   --   --   --   --  47.2 45.2   MCV  --   --   --   --   --  96 97   MCH  --   --   --   --   --  31.1 32.8   MCHC  --   --   --   --   --  32.4 33.6   RDW  --   --   --   --   --  13.5 13.7   PLT  --  263  --   --  277 315 268    < > = values in this interval not displayed.       Last Basic Metabolic Panel:  Recent Labs   Lab Test 01/03/19 12/30/18  0659  12/28/18  1333 12/13/18  1552     --   --   --  138   POTASSIUM 4.2  --   --   --  4.1   CHLORIDE 102  --   --   --  107   ELIA 9.1  --   --   --  8.9   CO2 21*  --   --   --  24   BUN 12  --   --   --  17   CR 0.76  --   --  0.75 0.85   * 125*   < >  --  88    < >  = values in this interval not displayed.         Lab Results   Component Value Date    A1C 5.9 12/13/2018    A1C 5.7 10/19/2015           Discharge Treatments:none    TOTAL DISCHARGE TIME:   Greater than 30 minutes  Electronically signed by:  Tonya Lynn Haase, APRN CNP

## 2019-01-10 ENCOUNTER — PATIENT OUTREACH (OUTPATIENT)
Dept: FAMILY MEDICINE | Facility: CLINIC | Age: 79
End: 2019-01-10

## 2019-01-10 NOTE — PROGRESS NOTES
"Clinic Care Coordination Contact  Care Coordination Communication    Clinical Data: Patient was hospitalized at [facility] from 12/28/18 to 12/31/18 for a 'Right total knee arthroplasty\". [facility]. Patient returned home from [facility] yesterday, 1/09/19.    Writer called patient's home and talked with patient's wife, Daija. Daija said that patient is doing fine. Daija said that patient saw Dr. Quintana today and was told that he(patient) is progressing well. Per Daija, patient has outpatient P.T. scheduled--with the first P.T. session tomorrow.     Daija was not aware of the scheduled follow up appointment for patient with Dr. Austin on 1/16/19 at 2:30 pm. Daija will check with patient to make sure that this appointment time works for patient. Writer gave Daija the number ([phone]) to call if patient would like to reschedule this appointment.     Plan: Patient is home from TCU and reportedly doing well. [facility] plans no further outreach.    Alexsandra Miguel, [facility] Care Coordination  Clinic: [facility]   Email: [email or web address]  Tele: [phone]              "

## 2019-05-23 ENCOUNTER — HOSPITAL ENCOUNTER (OUTPATIENT)
Dept: CARDIAC REHAB | Facility: CLINIC | Age: 79
End: 2019-05-23
Attending: INTERNAL MEDICINE
Payer: COMMERCIAL

## 2019-05-23 DIAGNOSIS — R05.9 COUGH: ICD-10-CM

## 2019-05-23 DIAGNOSIS — R06.02 SHORTNESS OF BREATH: ICD-10-CM

## 2019-05-23 PROCEDURE — 94726 PLETHYSMOGRAPHY LUNG VOLUMES: CPT

## 2019-05-23 PROCEDURE — 40001038 ZZH STATISTIC RESPIRATORY TESTING VISIT

## 2019-05-23 PROCEDURE — 94729 DIFFUSING CAPACITY: CPT

## 2019-05-23 PROCEDURE — 94375 RESPIRATORY FLOW VOLUME LOOP: CPT

## 2019-05-23 NOTE — LETTER
[facility]  [address]  [address]  [place]  [address]  Tel: [phone]    May 31, 2019    Aly Sorensen  [address]   [place] [address]        Dear Mr. Sorensen,    The following letter pertains to your most recent diagnostic tests:     Good news! This is a normal lung function test.     Resulted Orders   General PFT Lab (Please always keep checked)   Result Value Ref Range    FVC-Pred 3.31 L    FVC-Pre 3.54 L    FVC-%Pred-Pre 106 %    FEV1-Pre 2.62 L    FEV1-%Pred-Pre 105 %    FEV1FVC-Pred 76 %    FEV1FVC-Pre 74 %    FEFMax-Pred 6.32 L/sec    FEFMax-Pre 7.09 L/sec    FEFMax-%Pred-Pre 112 %    FEF2575-Pred 1.84 L/sec    FEF2575-Pre 1.91 L/sec    EHU9461-%Pred-Pre 104 %    ExpTime-Pre 6.83 sec    FIFMax-Pre 2.45 L/sec    VC-Pred 3.72 L    VC-Pre 3.61 L    VC-%Pred-Pre 96 %    IC-Pred 3.23 L    IC-Pre 2.89 L    IC-%Pred-Pre 89 %    ERV-Pred 0.49 L    ERV-Pre 0.70 L    ERV-%Pred-Pre 142 %    FEV1FEV6-Pred 76 %    FEV1FEV6-Pre 74 %    FRCPleth-Pred 3.50 L    FRCPleth-Pre 3.82 L    FRCPleth-%Pred-Pre 109 %    RVPleth-Pred 2.68 L    RVPleth-Pre 3.10 L    RVPleth-%Pred-Pre 115 %    TLCPleth-Pred 6.16 L    TLCPleth-Pre 6.71 L    TLCPleth-%Pred-Pre 108 %    DLCOunc-Pred 21.10 ml/min/mmHg    DLCOunc-Pre 26.25 ml/min/mmHg    DLCOunc-%Pred-Pre 124 %    VA-Pre 6.33 L    VA-%Pred-Pre 119 %    FEV1SVC-Pred 67 %    FEV1SVC-Pre 73 %    Narrative    The FVC, FEV1, FEV1/FVC ratio and ULW62-46% are within normal limits.  The inspiratory flow rates are reduced.  Lung volumes are within normal limits.  The diffusing capacity is normal.  However, the diffusing capacity was not corrected for the patient's   hemoglobin.  The results are within normal limits.  IMPRESSION:  Normal Pulmonary Function  Willy Diaz  ____________________________________________M.D.    This interpretation has been electronically signed:  Devante Diaz 05/31/2019  07:49:23 AM         If you have any further questions or problems,  please contact our office.      Sincerely,    Zev Austin MD /norma

## 2019-05-31 LAB
DLCOUNC-%PRED-PRE: 124 %
DLCOUNC-PRE: 26.25 ML/MIN/MMHG
DLCOUNC-PRED: 21.1 ML/MIN/MMHG
ERV-%PRED-PRE: 142 %
ERV-PRE: 0.7 L
ERV-PRED: 0.49 L
EXPTIME-PRE: 6.83 SEC
FEF2575-%PRED-PRE: 104 %
FEF2575-PRE: 1.91 L/SEC
FEF2575-PRED: 1.84 L/SEC
FEFMAX-%PRED-PRE: 112 %
FEFMAX-PRE: 7.09 L/SEC
FEFMAX-PRED: 6.32 L/SEC
FEV1-%PRED-PRE: 105 %
FEV1-PRE: 2.62 L
FEV1FEV6-PRE: 74 %
FEV1FEV6-PRED: 76 %
FEV1FVC-PRE: 74 %
FEV1FVC-PRED: 76 %
FEV1SVC-PRE: 73 %
FEV1SVC-PRED: 67 %
FIFMAX-PRE: 2.45 L/SEC
FRCPLETH-%PRED-PRE: 109 %
FRCPLETH-PRE: 3.82 L
FRCPLETH-PRED: 3.5 L
FVC-%PRED-PRE: 106 %
FVC-PRE: 3.54 L
FVC-PRED: 3.31 L
IC-%PRED-PRE: 89 %
IC-PRE: 2.89 L
IC-PRED: 3.23 L
RVPLETH-%PRED-PRE: 115 %
RVPLETH-PRE: 3.1 L
RVPLETH-PRED: 2.68 L
TLCPLETH-%PRED-PRE: 108 %
TLCPLETH-PRE: 6.71 L
TLCPLETH-PRED: 6.16 L
VA-%PRED-PRE: 119 %
VA-PRE: 6.33 L
VC-%PRED-PRE: 96 %
VC-PRE: 3.61 L
VC-PRED: 3.72 L

## 2019-05-31 NOTE — RESULT ENCOUNTER NOTE
The following letter pertains to your most recent diagnostic tests:    Good news! This is a normal lung function test.        Sincerely,    Dr. Austin

## 2019-08-14 ENCOUNTER — NURSE TRIAGE (OUTPATIENT)
Dept: FAMILY MEDICINE | Facility: CLINIC | Age: 79
End: 2019-08-14

## 2019-08-14 NOTE — TELEPHONE ENCOUNTER
Reason for call:  Patient reporting a symptom    Symptom or request: hacking cough    Duration (how long have symptoms been present): chronic    Have you been treated for this before? Yes    Additional comments: please call to advise / treatment options    Phone Number patient can be reached at:  Cell number on file:    Telephone Information:   Mobile [phone]       Best Time:      Can we leave a detailed message on this number:  YES    Call taken on 8/14/2019 at 1:09 PM by Winston Cueva

## 2019-08-14 NOTE — TELEPHONE ENCOUNTER
Pt has been experiencing a cough for multiple weeks with runny nose- scheduled OV with team    Patrick ROE RN    Reason for Disposition    Cough has been present for > 3 weeks    [1] Nasal discharge AND [2] present > 10 days    Additional Information    Negative: Severe difficulty breathing (e.g., struggling for each breath, speaks in single words)    Negative: Bluish (or gray) lips or face now    Negative: [1] Difficulty breathing AND [2] exposure to flames, smoke, or fumes    Negative: [1] Stridor AND [2] difficulty breathing    Negative: Sounds like a life-threatening emergency to the triager    Negative: [1] Previous asthma attacks AND [2] this feels like asthma attack    Negative: Dry (non-productive) cough (i.e., no sputum or minimal clear sputum)    Negative: Chest pain  (Exception: MILD central chest pain, present only when coughing)    Negative: Difficulty breathing    Negative: Patient sounds very sick or weak to the triager    Negative: [1] Coughed up blood AND [2] > 1 tablespoon (15 ml) (Exception: blood-tinged sputum)    Negative: Fever > 103 F (39.4 C)    Negative: [1] Fever > 101 F (38.3 C) AND [2] age > 60    Negative: [1] Fever > 100.0 F (37.8 C) AND [2] bedridden (e.g., nursing home patient, CVA, chronic illness, recovering from surgery)    Negative: [1] Fever > 100.0 F (37.8 C) AND [2] diabetes mellitus or weak immune system (e.g., HIV positive, cancer chemo, splenectomy, chronic steroids)    Negative: Wheezing is present    Negative: SEVERE coughing spells (e.g., whooping sound after coughing, vomiting after coughing)    Negative: [1] Continuous (nonstop) coughing interferes with work or school AND [2] no improvement using cough treatment per Care Advice    Negative: Coughing up beena-colored (reddish-brown) sputum    Negative: Fever present > 3 days (72 hours)    Negative: [1] Fever returns after gone for over 24 hours AND [2] symptoms worse or not improved    Negative: [1] Using nasal washes and  "pain medicine > 24 hours AND [2] sinus pain (around cheekbone or eye) persists    Negative: Earache    Negative: [1] Known COPD or other severe lung disease (i.e., bronchiectasis, cystic fibrosis, lung surgery) AND [2] worsening symptoms (i.e., increased sputum purulence or amount, increased breathing difficulty    Negative: [1] Coughed up blood-tinged sputum AND [2] more than once    Negative: Exposure to TB (Tuberculosis)    Cough    Cough with cold symptoms (e.g., runny nose, postnasal drip, throat clearing)    Negative: ALSO, mild central chest pain occurs only when coughing    Negative: ALSO, mild vomiting occurs only when coughing    Answer Assessment - Initial Assessment Questions  1. ONSET: \"When did the cough begin?\"       A couple weeks ago  2. SEVERITY: \"How bad is the cough today?\"       Moderate, sometimes \"rattles\"  3. RESPIRATORY DISTRESS: \"Describe your breathing.\"       Not affected- mild SOB occasionally  4. FEVER: \"Do you have a fever?\" If so, ask: \"What is your temperature, how was it measured, and when did it start?\"      no  5. SPUTUM: \"Describe the color of your sputum\" (clear, white, yellow, green)      Unsure- swallowed  6. HEMOPTYSIS: \"Are you coughing up any blood?\" If so ask: \"How much?\" (flecks, streaks, tablespoons, etc.)      no  7. CARDIAC HISTORY: \"Do you have any history of heart disease?\" (e.g., heart attack, congestive heart failure)       none  8. LUNG HISTORY: \"Do you have any history of lung disease?\"  (e.g., pulmonary embolus, asthma, emphysema)      none  9. PE RISK FACTORS: \"Do you have a history of blood clots?\" (or: recent major surgery, recent prolonged travel, bedridden )      none  10. OTHER SYMPTOMS: \"Do you have any other symptoms?\" (e.g., runny nose, wheezing, chest pain)        \"bit of a head hold, runny nose\"  11. PREGNANCY: \"Is there any chance you are pregnant?\" \"When was your last menstrual period?\"        n/a  12. TRAVEL: \"Have you traveled out of the country " "in the last month?\" (e.g., travel history, exposures)        no    Protocols used: COUGH - ACUTE [id number]    "

## 2019-08-16 ENCOUNTER — OFFICE VISIT (OUTPATIENT)
Dept: URGENT CARE | Facility: URGENT CARE | Age: 79
End: 2019-08-16
Payer: COMMERCIAL

## 2019-08-16 VITALS
DIASTOLIC BLOOD PRESSURE: 81 MMHG | OXYGEN SATURATION: 95 % | HEART RATE: 86 BPM | BODY MASS INDEX: 29.44 KG/M2 | SYSTOLIC BLOOD PRESSURE: 114 MMHG | WEIGHT: 188 LBS | TEMPERATURE: 98.1 F

## 2019-08-16 DIAGNOSIS — J20.9 ACUTE BRONCHITIS WITH SYMPTOMS > 10 DAYS: Primary | ICD-10-CM

## 2019-08-16 PROCEDURE — 99214 OFFICE O/P EST MOD 30 MIN: CPT | Performed by: PHYSICIAN ASSISTANT

## 2019-08-16 RX ORDER — CEPHALEXIN 500 MG/1
CAPSULE ORAL
Refills: 5 | COMMUNITY
Start: 2019-08-15 | End: 2019-01-01

## 2019-08-16 RX ORDER — AZITHROMYCIN 250 MG/1
TABLET, FILM COATED ORAL
Qty: 6 TABLET | Refills: 0 | Status: SHIPPED | OUTPATIENT
Start: 2019-08-16 | End: 2019-11-14

## 2019-08-16 ASSESSMENT — ENCOUNTER SYMPTOMS
WEAKNESS: 0
DIARRHEA: 0
MUSCULOSKELETAL NEGATIVE: 1
CONSTITUTIONAL NEGATIVE: 1
CHEST TIGHTNESS: 0
SHORTNESS OF BREATH: 0
GASTROINTESTINAL NEGATIVE: 1
EYE REDNESS: 0
ENDOCRINE NEGATIVE: 1
ABDOMINAL PAIN: 0
FREQUENCY: 0
HEMATURIA: 0
DIZZINESS: 0
NEUROLOGICAL NEGATIVE: 1
POLYDIPSIA: 0
WHEEZING: 0
VOMITING: 0
LIGHT-HEADEDNESS: 0
MYALGIAS: 0
DIAPHORESIS: 0
DYSURIA: 0
NAUSEA: 0
SORE THROAT: 0
ADENOPATHY: 0
HEADACHES: 0
EYE ITCHING: 0
CHILLS: 0
RHINORRHEA: 0
EYE DISCHARGE: 0
FEVER: 0
EYES NEGATIVE: 1
CARDIOVASCULAR NEGATIVE: 1
COUGH: 1
PALPITATIONS: 0

## 2019-08-16 NOTE — PROGRESS NOTES
Chief Complaint:     Chief Complaint   Patient presents with     Urgent Care     Cough     2 weeks maybe 3. Mucus. occasional SOB        HPI: Aly Sorensen is an 79 year old male who presents with productive cough and nasal congestion. It began  3 week(s) ago and has unchanged.  Cough is occasional, productive green, mucoid and yellow There is no shortness of breath, wheezing and chest pain.      Recent travel?  no.      ROS:     Review of Systems   Constitutional: Negative.  Negative for chills, diaphoresis and fever.   HENT: Positive for congestion. Negative for ear pain, rhinorrhea and sore throat.    Eyes: Negative.  Negative for discharge, redness and itching.   Respiratory: Positive for cough. Negative for chest tightness, shortness of breath and wheezing.    Cardiovascular: Negative.  Negative for chest pain and palpitations.   Gastrointestinal: Negative.  Negative for abdominal pain, diarrhea, nausea and vomiting.   Endocrine: Negative.  Negative for polydipsia and polyuria.   Genitourinary: Negative for dysuria, frequency, hematuria and urgency.   Musculoskeletal: Negative.  Negative for myalgias.   Skin: Negative for rash.   Allergic/Immunologic: Negative for immunocompromised state.   Neurological: Negative.  Negative for dizziness, weakness, light-headedness and headaches.   Hematological: Negative for adenopathy.        Respiratory History  occasional episodes of bronchitis and pneumonia       Family History   Family History   Problem Relation Age of Onset     Uterine Cancer Mother      Colon Cancer Father      Diabetes Brother      Heart Failure Brother         Problem history  Patient Active Problem List   Diagnosis     Malignant neoplasm of prostate (H)     Melanoma of skin (H)     Spondylosis of lumbar region without myelopathy or radiculopathy     History of total right knee replacement        Allergies  No Known Allergies     Social History  Social History     Socioeconomic History     Marital  status:      Spouse name: Not on file     Number of children: Not on file     Years of education: Not on file     Highest education level: Not on file   Occupational History     Not on file   Social Needs     Financial resource strain: Not on file     Food insecurity:     Worry: Not on file     Inability: Not on file     Transportation needs:     Medical: Not on file     Non-medical: Not on file   Tobacco Use     Smoking status: Former Smoker     Types: Cigars     Smokeless tobacco: Never Used     Tobacco comment: quit smoking cigars 2008   Substance and Sexual Activity     Alcohol use: No     Alcohol/week: 1.8 - 2.4 oz     Types: 3 - 4 Standard drinks or equivalent per week     Frequency: Never     Comment: none for last yr     Drug use: No     Sexual activity: Not Currently   Lifestyle     Physical activity:     Days per week: Not on file     Minutes per session: Not on file     Stress: Not on file   Relationships     Social connections:     Talks on phone: Not on file     Gets together: Not on file     Attends Orthodoxy service: Not on file     Active member of club or organization: Not on file     Attends meetings of clubs or organizations: Not on file     Relationship status: Not on file     Intimate partner violence:     Fear of current or ex partner: Not on file     Emotionally abused: Not on file     Physically abused: Not on file     Forced sexual activity: Not on file   Other Topics Concern     Parent/sibling w/ CABG, MI or angioplasty before 65F 55M? Not Asked   Social History Narrative     Not on file        Current Meds    Current Outpatient Medications:      azithromycin (ZITHROMAX) 250 MG tablet, Two tablets first day, then one tablet daily for four days., Disp: 6 tablet, Rfl: 0     celecoxib (CELEBREX) 100 MG capsule, Take 1 capsule (100 mg) by mouth daily, Disp: 30 capsule, Rfl: 0     cephALEXin (KEFLEX) 500 MG capsule, , Disp: , Rfl: 5     oxyCODONE HCl (OXAYDO) 5 MG TABA, Take 5-10 mg by  mouth every 3 hours as needed, Disp: , Rfl:      polyethylene glycol (MIRALAX/GLYCOLAX) packet, Take 1 packet by mouth daily as needed for constipation, Disp: , Rfl:         OBJECTIVE     Vital signs reviewed by Mati Taylor  /81   Pulse 86   Temp 98.1  F (36.7  C) (Oral)   Wt 85.3 kg (188 lb)   SpO2 95%   BMI 29.44 kg/m       Physical Exam   Constitutional: He is oriented to person, place, and time. He appears well-developed and well-nourished. He is cooperative.  Non-toxic appearance. He does not have a sickly appearance. He does not appear ill. No distress.   HENT:   Head: Normocephalic and atraumatic.   Right Ear: Hearing, tympanic membrane, external ear and ear canal normal. Tympanic membrane is not perforated, not erythematous, not retracted and not bulging.   Left Ear: Hearing, tympanic membrane, external ear and ear canal normal. Tympanic membrane is not perforated, not erythematous, not retracted and not bulging.   Nose: No mucosal edema or rhinorrhea. Right sinus exhibits no maxillary sinus tenderness and no frontal sinus tenderness. Left sinus exhibits no maxillary sinus tenderness and no frontal sinus tenderness.   Mouth/Throat: Mucous membranes are normal. Posterior oropharyngeal erythema present. No oropharyngeal exudate or posterior oropharyngeal edema. Tonsils are 0 on the right. Tonsils are 0 on the left. No tonsillar exudate.   Eyes: Pupils are equal, round, and reactive to light. Conjunctivae, EOM and lids are normal. Right eye exhibits no discharge and no exudate. Left eye exhibits no discharge and no exudate. Right conjunctiva is not injected. Left conjunctiva is not injected.   Neck: Normal range of motion. Neck supple.   Cardiovascular: Normal rate, regular rhythm, normal heart sounds and intact distal pulses. Exam reveals no gallop and no friction rub.   No murmur heard.  Pulmonary/Chest: Effort normal and breath sounds normal. No stridor. No respiratory distress. He has no  decreased breath sounds. He has no wheezes. He has no rhonchi. He has no rales. He exhibits no tenderness.   Abdominal: Soft. Bowel sounds are normal. He exhibits no distension and no mass. There is no tenderness. There is no rigidity, no rebound, no guarding and no CVA tenderness.   Musculoskeletal: Normal range of motion.   Neurological: He is alert and oriented to person, place, and time. He displays normal reflexes. No cranial nerve deficit or sensory deficit. He exhibits normal muscle tone. Coordination normal.   Skin: Skin is warm. Capillary refill takes less than 2 seconds. He is not diaphoretic.   Psychiatric: He has a normal mood and affect. His behavior is normal. Judgment and thought content normal.         Labs:       Medical Decision Making:    Differential Diagnosis:  URI Adult/Peds:  Bronchitis-viral, Pneumonia and Viral upper respiratory illness        ASSESSMENT    1. Acute bronchitis with symptoms > 10 days        PLAN    Patient presents with 3 week(s) productive cough and nasal congestion.  Patient is in no acute distress.  Temp is 98.1 in clinic today.  Lung sounds were clear and O2 sats at 95% on RA.  Imaging to rule out pneumonia is not indicated at this time.  With length of symptoms, Rx for Zithromax tonight.  Rest, Push fluids, vaporizer, elevation of head of bed.  Ibuprofen and or Tylenol for any fever or body aches.  Over the counter cough suppressant- PRN- as discussed.   If symptoms worsen, recheck immediately otherwise follow up with your PCP in 1 week if symptoms are not improving.  Worrisome symptoms discussed with instructions to go to the ED.  Patient verbalized understanding and agreed with this plan.         Mati Taylor  8/16/2019, 4:48 PM

## 2019-10-28 ENCOUNTER — OFFICE VISIT (OUTPATIENT)
Dept: FAMILY MEDICINE | Facility: CLINIC | Age: 79
End: 2019-10-28
Payer: COMMERCIAL

## 2019-10-28 VITALS
TEMPERATURE: 97 F | HEIGHT: 66 IN | DIASTOLIC BLOOD PRESSURE: 81 MMHG | HEART RATE: 62 BPM | SYSTOLIC BLOOD PRESSURE: 127 MMHG | WEIGHT: 181.7 LBS | BODY MASS INDEX: 29.2 KG/M2 | OXYGEN SATURATION: 98 %

## 2019-10-28 DIAGNOSIS — R05.9 COUGH: ICD-10-CM

## 2019-10-28 DIAGNOSIS — E04.1 THYROID NODULE: ICD-10-CM

## 2019-10-28 DIAGNOSIS — J98.01 BRONCHOSPASM: Primary | ICD-10-CM

## 2019-10-28 PROCEDURE — 99214 OFFICE O/P EST MOD 30 MIN: CPT | Performed by: INTERNAL MEDICINE

## 2019-10-28 RX ORDER — OMEGA-3 FATTY ACIDS/FISH OIL 300-1000MG
200-800 CAPSULE ORAL EVERY 6 HOURS PRN
COMMUNITY

## 2019-10-28 RX ORDER — BUDESONIDE AND FORMOTEROL FUMARATE DIHYDRATE 80; 4.5 UG/1; UG/1
1-2 AEROSOL RESPIRATORY (INHALATION) 2 TIMES DAILY PRN
Qty: 1 INHALER | Refills: 11 | Status: SHIPPED | OUTPATIENT
Start: 2019-10-28 | End: 2020-01-01

## 2019-10-28 ASSESSMENT — MIFFLIN-ST. JEOR: SCORE: 1469

## 2019-10-28 NOTE — PROGRESS NOTES
Chris Sorensen is a 79 year old male who presents to clinic today for the following health issues:    HPI   Chief Complaint   Patient presents with     Follow Up     Bronchitis - patient report wheezing and SOB after going for a walk, cough x 1 year       Pleasant 79-year-old man who was a former cigar smoker who also has a history of knee osteoarthritis status post bilateral knee arthroplasties, melanoma of the skin and prostate cancer.  He presents with a more than 1 year history of mild breathlessness and intermittent nonproductive moderate cough.  The symptoms seem to be worse after he gets back into his car after exercising.  He has had several test to evaluate the symptoms including a chest x-ray, pulmonary function testing and a stress echocardiogram none of which revealed a specific etiology for his symptoms.  He denies exertional chest pains.    Patient Active Problem List   Diagnosis     Malignant neoplasm of prostate (H)     Melanoma of skin (H)     Spondylosis of lumbar region without myelopathy or radiculopathy     History of total right knee replacement     Past Surgical History:   Procedure Laterality Date     ARTHROPLASTY KNEE  12/8/2011    Procedure:ARTHROPLASTY KNEE; Left Total Knee Arthroplasty (JILLIAN)^; Surgeon:MICHAEL PELAEZ; Location: OR     ARTHROPLASTY KNEE Right 12/28/2018    Procedure: RIGHT TOTAL KNEE ARTHROPLASTY;  Surgeon: Michael Pelaez MD;  Location:  OR     BACK SURGERY       COLONOSCOPY       GENITOURINARY SURGERY  history of prostatectomy     HERNIA REPAIR  inguinal hernia repair as a teenager     ORTHOPEDIC SURGERY  back fusion 2002     PHACOEMULSIFICATION CLEAR CORNEA WITH STANDARD INTRAOCULAR LENS IMPLANT Right 11/10/2015    Procedure: PHACOEMULSIFICATION CLEAR CORNEA WITH STANDARD INTRAOCULAR LENS IMPLANT;  Surgeon: Criss Pulliam MD;  Location: [facility]     PHACOEMULSIFICATION CLEAR CORNEA WITH STANDARD INTRAOCULAR LENS IMPLANT Left 11/17/2015    Procedure:  "PHACOEMULSIFICATION CLEAR CORNEA WITH STANDARD INTRAOCULAR LENS IMPLANT;  Surgeon: Criss Pulliam MD;  Location: [facility]     removal of melanoma[  approx. 20 years ago       Social History     Tobacco Use     Smoking status: Former Smoker     Types: Cigars     Smokeless tobacco: Never Used     Tobacco comment: quit smoking cigars 2008   Substance Use Topics     Alcohol use: No     Alcohol/week: 3.0 - 4.0 standard drinks     Types: 3 - 4 Standard drinks or equivalent per week     Frequency: Never     Comment: none for last yr     Family History   Problem Relation Age of Onset     Uterine Cancer Mother      Colon Cancer Father      Diabetes Brother      Heart Failure Brother          Current Outpatient Medications   Medication Sig Dispense Refill     Acetaminophen (TYLENOL 8 HOUR PO)        budesonide-formoterol (SYMBICORT) 80-4.5 MCG/ACT Inhaler Inhale 1-2 puffs into the lungs 2 times daily as needed (wheezing, shorntess of breath, and cough) 1 Inhaler 11     IBUPROFEN PO        azithromycin (ZITHROMAX) 250 MG tablet Two tablets first day, then one tablet daily for four days. (Patient not taking: Reported on 10/28/2019) 6 tablet 0     celecoxib (CELEBREX) 100 MG capsule Take 1 capsule (100 mg) by mouth daily 30 capsule 0     cephALEXin (KEFLEX) 500 MG capsule   5     oxyCODONE HCl (OXAYDO) 5 MG TABA Take 5-10 mg by mouth every 3 hours as needed       polyethylene glycol (MIRALAX/GLYCOLAX) packet Take 1 packet by mouth daily as needed for constipation       No Known Allergies    Reviewed and updated as needed this visit by Provider         Review of Systems   ROS COMP: No fevers, chills, hematuria, rash, orthopnea, PND, edema, knee pain is improving after knee replacement      Objective    /81 (BP Location: Right arm, Patient Position: Sitting, Cuff Size: Adult Regular)   Pulse 62   Temp 97  F (36.1  C) (Oral)   Ht 1.664 m (5' 5.5\")   Wt 82.4 kg (181 lb 11.2 oz)   SpO2 98%   BMI 29.78 kg/m    Body mass " "index is 29.78 kg/m .  Physical Exam   GENERAL: healthy, alert and no distress  RESP: lungs clear to auscultation - no rales, rhonchi or wheezes  CV: Heart with regular rate and rhythm.   MS: no gross musculoskeletal defects noted, no edema  NEURO: Normal strength and tone, mentation intact and speech normal  PSYCH: mentation appears normal, affect normal/bright    Diagnostic Test Results:  Labs reviewed in [facility]  CT chest pending        Assessment & Plan     1. Bronchospasm  Given new asthma guidelines, trial of Symbicort prior to exercise, also can use as needed for wheezing or coughing, return in 1 month to assess therapy  - budesonide-formoterol (SYMBICORT) 80-4.5 MCG/ACT Inhaler; Inhale 1-2 puffs into the lungs 2 times daily as needed (wheezing, shorntess of breath, and cough)  Dispense: 1 Inhaler; Refill: 11    2. Cough  Check CT to exclude pulmonary fibrosis or lung malignancy as potential cause of symptoms  - CT Chest w/o Contrast; Future     BMI:   Estimated body mass index is 29.78 kg/m  as calculated from the following:    Height as of this encounter: 1.664 m (5' 5.5\").    Weight as of this encounter: 82.4 kg (181 lb 11.2 oz).   Weight management plan: Discussed healthy diet and exercise guidelines            Return in about 1 month (around 11/28/2019) for cough recheck .  To review response to as needed Symbicort and review results of CT scan    Zev Austin MD  [facility]    "

## 2019-10-30 ENCOUNTER — HOSPITAL ENCOUNTER (OUTPATIENT)
Dept: CT IMAGING | Facility: CLINIC | Age: 79
Discharge: HOME | End: 2019-10-30
Attending: INTERNAL MEDICINE | Admitting: INTERNAL MEDICINE
Payer: COMMERCIAL

## 2019-10-30 DIAGNOSIS — R05.9 COUGH: ICD-10-CM

## 2019-10-30 PROCEDURE — 71250 CT THORAX DX C-: CPT

## 2019-10-30 NOTE — LETTER
[facility]  [address] [facility]  [address]  [place]  [address]  Tel: [phone]    November 5, 2019    Aly LANDIS Janetnancy  [address]   [facility] [address]        Dear Mr. Sorensen,    The following letter pertains to your most recent diagnostic tests:    Good news!  The chest CT does not show any dangerous explanations for your persistent cough and breathing symptoms.  However, it did show some incidental calcifications and a possible nodule in the thyroid gland.  These are common incidental findings and rarely represent anything dangerous, but my recommendation would be to schedule a thyroid ultrasound to evaluate further.  Ultrasounds are better at evaluating thyroid problems then CAT scans.      Bottom line:  Please call [place] [facility] at [phone] to schedule your thyroid ultrasound.     If you have any further questions or problems, please contact our office.      Sincerely,    Zev Austin MD/ Bhavana Sung CMA            Enclosure: Lab Results

## 2019-11-05 NOTE — RESULT ENCOUNTER NOTE
The following letter pertains to your most recent diagnostic tests:    Good news!  The chest CT does not show any dangerous explanations for your persistent cough and breathing symptoms.  However, it did show some incidental calcifications and a possible nodule in the thyroid gland.  These are common incidental findings and rarely represent anything dangerous, but my recommendation would be to schedule a thyroid ultrasound to evaluate further.  Ultrasounds are better at evaluating thyroid problems then CAT scans.      Bottom line:  Please call [place] [facility] at [phone] to schedule your thyroid ultrasound.         Sincerely,    Dr. Austin

## 2019-11-07 ENCOUNTER — TELEPHONE (OUTPATIENT)
Dept: FAMILY MEDICINE | Facility: CLINIC | Age: 79
End: 2019-11-07

## 2019-11-07 NOTE — TELEPHONE ENCOUNTER
Reason for Call:  Request for results:    Name of test or procedure: chest CT     Date of test of procedure: 10.30.19    Location of the test or procedure: [facility]    OK to leave the result message on voice mail or with a family member? YES    Phone number Patient can be reached at:  Cell number on file:    Telephone Information:   Mobile [phone]     Additional comments: Pt is requesting results prior to his OV on 12.11.19    Call taken on 11/7/2019 at 1:51 PM by Jenny Douglas

## 2019-11-07 NOTE — TELEPHONE ENCOUNTER
Dear Mr. Sorensen,    The following letter pertains to your most recent diagnostic tests:    Good news!  The chest CT does not show any dangerous explanations for your persistent cough and breathing symptoms.  However, it did show some incidental calcifications and a possible nodule in the thyroid gland.  These are common incidental findings and rarely represent anything dangerous, but my recommendation would be to schedule a thyroid ultrasound to evaluate further.  Ultrasounds are better at evaluating thyroid problems then CAT scans.      Bottom line:  Please call [place] [facility] at [phone] to schedule your thyroid ultrasound.     If you have any further questions or problems, please contact our office.      Sincerely,    Zev Austin MD/ Bhavana Sung CMA

## 2019-11-08 ENCOUNTER — HOSPITAL ENCOUNTER (OUTPATIENT)
Dept: ULTRASOUND IMAGING | Facility: CLINIC | Age: 79
Discharge: HOME | End: 2019-11-08
Attending: INTERNAL MEDICINE | Admitting: INTERNAL MEDICINE
Payer: COMMERCIAL

## 2019-11-08 DIAGNOSIS — E04.1 THYROID NODULE: ICD-10-CM

## 2019-11-08 PROCEDURE — 76536 US EXAM OF HEAD AND NECK: CPT

## 2019-11-08 NOTE — LETTER
[facility]  [address] [facility]  [address]  [place]  [address]  Tel: [phone]    November 11, 2019    Aly LANDIS Janetnancy  [address]   [facility] [address]        Dear Mr. Sorensen,    The following letter pertains to your most recent diagnostic tests:    The following letter pertains to your most recent diagnostic tests:    The ultrasound confirms the presence of thyroid nodules.  Two nodule demonstrates slightly concerning features and should be biopsied.  The majority of thyroid nodules are NOT cancerous, but in this scenario it is important to be thorough.   Please call [place] [facility] at [phone] to schedule your thyroid biopsy.  I will contact you with your biopsy results when they are available.     If you have any further questions or problems, please contact our office.      Sincerely,    Zev Austin MD/ Bhavana Sung CMA            Enclosure: Lab Results

## 2019-11-11 NOTE — RESULT ENCOUNTER NOTE
The following letter pertains to your most recent diagnostic tests:    The following letter pertains to your most recent diagnostic tests:    The ultrasound confirms the presence of thyroid nodules.  Two nodule demonstrates slightly concerning features and should be biopsied.  The majority of thyroid nodules are NOT cancerous, but in this scenario it is important to be thorough.   Please call [place] [facility] at [phone] to schedule your thyroid biopsy.  I will contact you with your biopsy results when they are available.         Sincerely,    Dr. Austin

## 2019-11-15 ENCOUNTER — HOSPITAL ENCOUNTER (OUTPATIENT)
Dept: ULTRASOUND IMAGING | Facility: CLINIC | Age: 79
End: 2019-11-15
Attending: INTERNAL MEDICINE
Payer: COMMERCIAL

## 2019-11-15 ENCOUNTER — HOSPITAL ENCOUNTER (OUTPATIENT)
Facility: CLINIC | Age: 79
Discharge: HOME | End: 2019-11-15
Admitting: INTERNAL MEDICINE
Payer: COMMERCIAL

## 2019-11-15 VITALS — RESPIRATION RATE: 16 BRPM | SYSTOLIC BLOOD PRESSURE: 134 MMHG | DIASTOLIC BLOOD PRESSURE: 76 MMHG

## 2019-11-15 DIAGNOSIS — E04.1 THYROID NODULE: ICD-10-CM

## 2019-11-15 PROCEDURE — 88173 CYTOPATH EVAL FNA REPORT: CPT

## 2019-11-15 PROCEDURE — 10005 FNA BX W/US GDN 1ST LES: CPT

## 2019-11-15 PROCEDURE — 88173 CYTOPATH EVAL FNA REPORT: CPT | Mod: 26

## 2019-11-15 PROCEDURE — 25000125 ZZHC RX 250: Performed by: INTERNAL MEDICINE

## 2019-11-15 PROCEDURE — 40000863 ZZH STATISTIC RADIOLOGY XRAY, US, CT, MAR, NM

## 2019-11-15 RX ORDER — LIDOCAINE HYDROCHLORIDE 10 MG/ML
10 INJECTION, SOLUTION EPIDURAL; INFILTRATION; INTRACAUDAL; PERINEURAL ONCE
Status: COMPLETED | OUTPATIENT
Start: 2019-11-15 | End: 2019-11-15

## 2019-11-15 RX ADMIN — LIDOCAINE HYDROCHLORIDE 10 ML: 10 INJECTION, SOLUTION EPIDURAL; INFILTRATION; INTRACAUDAL; PERINEURAL at 11:12

## 2019-11-15 NOTE — DISCHARGE INSTRUCTIONS
Thyroid/Lymph Node Biopsy Discharge Instructions     After you go home:      You may resume your normal diet    Care of Puncture Site:      You may have mild bruising, soreness & swelling at the puncture site. This will go away in a few days    For swelling & bruising, you may use an ice pack on the site.     Activity:      You may go back to your normal activity    Avoid strenuous activity for 24 hours    Medicines:      You may resume all medications    For minor pain, you may take Acetaminophen (Tylenol) or Ibuprofen (Advil)            Call the provider who ordered this test if:      Increased redness or swelling at the site    Fluid or blood oozing from the site    Severe pain at the site    Chills or a fever greater than 101 F (38 C).    Any questions or concerns    Call  911 or go to the Emergency Room if:      Trouble breathing    Your neck swells    Bleeding that you cannot control    Severe difficulty swallowing    If you have questions call:      [facility] Radiology Dept @ [phone]    The provider who performed your procedure was Dr. Quesada.

## 2019-11-15 NOTE — LETTER
[address] [facility] [address]  [address]  [place]  [address]  Tel: [phone]    November 20, 2019    Aly Alcala  [address]   [facility] [address]        Dear Mr. Alcala,    The following letter pertains to your most recent diagnostic tests:     Good news! The thyroid nodules that were biopsied are benign (NOT CANCEROUS) nodules.   We can check another ultrasound 2-3 years form now to make sure the nodules do not change.       Resulted Orders   Fine needle aspiration   Result Value Ref Range    Copath Report       Patient Name: ALY ALCALA  MR#: [id number]  Specimen #: [id number]  Collected: 11/15/2019  Received: 11/18/2019  Reported: 11/19/2019 16:46  Ordering Phy(s): RADIOLOGY NON-FV CREDENTIALED PROVIDER  Additional Phy(s): EBONY LUO    For improved result formatting, select 'View Enhanced Report Format' under   Linked Documents section.    SPECIMEN/STAIN PROCESS:  A: Thyroid, left #2 , ultrasound guided fine needle aspiration       Pap-Cyto x 6  B: Thyroid, left #3, ultrasound guided fine needle aspiration       Pap-Cyto x 6    ----------------------------------------------------------------  CYTOLOGIC INTERPRETATION:    A.  Thyroid, left #2 , ultrasound guided fine needle aspiration:   Benign  Consistent with a benign nodule (includes adenomatoid nodule, colloid   nodule, etc.)    The Ciales implied risk of malignancy and recommended clinical   management:  Benign has a 0-3% risk of malignancy, recommended management is clinical   follow-up    Specimen Adequac y: Satisfactory for evaluation.    B.  Thyroid, left #3, ultrasound guided fine needle aspiration:   Benign  Consistent with a benign nodule (includes adenomatoid nodule, colloid   nodule, etc.)    The Ciales implied risk of malignancy and recommended clinical   management:  Benign has a 0-3% risk of malignancy, recommended management is clinical   follow-up    Specimen Adequacy: Satisfactory  for evaluation but limited by:  ...scant follicular cells present.    I have personally reviewed all specimens and/or slides, including the   listed special stains, and used them  with my medical judgement to determine or confirm the final diagnosis.    Electronically signed out by:    Cheri Real M.D.    CLINICAL HISTORY:    ,    GROSS:  A. Thyroid, left #2 , ultrasound guided fine needle aspiration:  Received   are 6 fixed slides, all Pap stained.    B. Thyroid, left #3, ultrasound guided fine needle aspiration:  Received   are 6 fixed slides, all Pap stained.    MICROSCOPIC:  A. The aspir ate smears are mildly cellular showing scattered clusters of   cytologically bland follicular  epithelial cells, Hï  rthle cells, hemosiderin-laden histiocytes, and   moderate amounts of watery colloid. No  features of classic papillary carcinoma are identified. Only occasional   small follicles are seen. These  findings are consistent with a benign thyroid nodule. Suggest clinical and   ultrasound correlation.    B. The aspirate smears show scant cellularity, composed of rare esters of   bland follicular epithelial cells.  Watery and thick colloid is also identified. The findings are those of a   benign thyroid nodule, but the  cellularity is scant.    Dr. Sung has reviewed this case and concurs.    CPT Codes:  A: 41195-AAII  B: 56071-HCSK    COLLECTION SITE:  Client[facility]  [facility]  [facility]    The technical component of this testing was completed at the [facility], with the professional component  performed   at the [facility]  [facility]atory, [address] [place] [phone] ([phone])           If you have any further questions or problems, please contact our office.      Sincerely,    Zev Austin MD / norma

## 2019-11-15 NOTE — PROGRESS NOTES
Care Suites Arrival    Reason for Visit: FNA Thyroid Biopsy     A/O. Denies pain. D/C instructions reviewed with pt with verbal understanding received. Copy given to pt.  All questions & concerns addressed. Family in waiting room.    Post Procedure:    Mid neck thyroid biopsy site CDI. No reddness or swelling noted. Pt denies discomfort, resp distress or difficulty swallowing. VSS. Discharge instructions given prior.    Pt discharged per ambulatory to private vehicle. All personal belongings taken with pt.

## 2019-11-19 DIAGNOSIS — E04.1 THYROID NODULE: ICD-10-CM

## 2019-11-19 LAB — COPATH REPORT: NORMAL

## 2019-11-20 ENCOUNTER — TELEPHONE (OUTPATIENT)
Dept: FAMILY MEDICINE | Facility: CLINIC | Age: 79
End: 2019-11-20

## 2019-11-20 DIAGNOSIS — J98.01 BRONCHOSPASM: Primary | ICD-10-CM

## 2019-11-20 NOTE — RESULT ENCOUNTER NOTE
The following letter pertains to your most recent diagnostic tests:    Good news! The thyroid nodules that were biopsied are benign (NOT CANCEROUS) nodules.   We can check another ultrasound 2-3 years form now to make sure the nodules do not change.        Sincerely,    Dr. Austin

## 2019-11-20 NOTE — TELEPHONE ENCOUNTER
Reason for Call:  Request for results:    Name of test or procedure: fine needle aprirate    Date of test of procedure: 11/15/19    Location of the test or procedure:     OK to leave the result message on voice mail or with a family member? YES    Phone number Patient can be reached at:  Home number on file [phone] (home)    Additional comments: pt would like a call when these results are available    Call taken on 11/20/2019 at 2:46 PM by Félix Sidhu

## 2019-11-22 RX ORDER — ALBUTEROL SULFATE 90 UG/1
2 AEROSOL, METERED RESPIRATORY (INHALATION) EVERY 6 HOURS PRN
Qty: 1 INHALER | Refills: 11 | Status: SHIPPED | OUTPATIENT
Start: 2019-11-22 | End: 2020-01-01

## 2019-11-22 NOTE — TELEPHONE ENCOUNTER
"Attempted to call patient:     No answer and received phone message \"service is temporary unavailable\"     Letter sent to patient on 11/20    Kerri FRIEND RN    "

## 2019-11-22 NOTE — TELEPHONE ENCOUNTER
Left message for patient (ok per permission from patient) informing him that new RX Albuterol sent to pharmacy.     Patient to call back with any questions, concerns, or ongoing symptoms.      Elle MALONEY RN,BSN

## 2019-11-22 NOTE — TELEPHONE ENCOUNTER
1. Called patient and read letter with bx results to patient, as he hasn't received letter in mail yet.      Patient stated understanding and agreed with recommendation.     2. Patient then reported ongoing cough (no improvement) since using new inhaler, Symbicort 80-4.5    Patient doesn't plan to refill this inhaler due to no improvement and very high cost: $400.     Patient asking if Dr Austin can RX a different inhaler---or other recommendation?     [phone]  May leave detailed msg/orders on voice mail.     Elle MALONEY RN,BSN

## 2020-01-01 ENCOUNTER — APPOINTMENT (OUTPATIENT)
Dept: GENERAL RADIOLOGY | Facility: CLINIC | Age: 80
DRG: 207 | End: 2020-01-01
Attending: INTERNAL MEDICINE
Payer: COMMERCIAL

## 2020-01-01 ENCOUNTER — APPOINTMENT (OUTPATIENT)
Dept: CT IMAGING | Facility: CLINIC | Age: 80
DRG: 207 | End: 2020-01-01
Attending: INTERNAL MEDICINE
Payer: COMMERCIAL

## 2020-01-01 ENCOUNTER — APPOINTMENT (OUTPATIENT)
Dept: GENERAL RADIOLOGY | Facility: CLINIC | Age: 80
DRG: 207 | End: 2020-01-01
Attending: PHYSICIAN ASSISTANT
Payer: COMMERCIAL

## 2020-01-01 ENCOUNTER — ANESTHESIA (OUTPATIENT)
Dept: INTENSIVE CARE | Facility: CLINIC | Age: 80
DRG: 207 | End: 2020-01-01
Payer: COMMERCIAL

## 2020-01-01 ENCOUNTER — OFFICE VISIT (OUTPATIENT)
Dept: FAMILY MEDICINE | Facility: CLINIC | Age: 80
End: 2020-01-01
Payer: COMMERCIAL

## 2020-01-01 ENCOUNTER — TELEPHONE (OUTPATIENT)
Dept: FAMILY MEDICINE | Facility: CLINIC | Age: 80
End: 2020-01-01

## 2020-01-01 ENCOUNTER — APPOINTMENT (OUTPATIENT)
Dept: ULTRASOUND IMAGING | Facility: CLINIC | Age: 80
DRG: 207 | End: 2020-01-01
Attending: INTERNAL MEDICINE
Payer: COMMERCIAL

## 2020-01-01 ENCOUNTER — NURSE TRIAGE (OUTPATIENT)
Dept: FAMILY MEDICINE | Facility: CLINIC | Age: 80
End: 2020-01-01

## 2020-01-01 ENCOUNTER — TELEPHONE (OUTPATIENT)
Dept: EMERGENCY MEDICINE | Facility: CLINIC | Age: 80
End: 2020-01-01

## 2020-01-01 ENCOUNTER — ANESTHESIA EVENT (OUTPATIENT)
Dept: INTENSIVE CARE | Facility: CLINIC | Age: 80
DRG: 207 | End: 2020-01-01
Payer: COMMERCIAL

## 2020-01-01 ENCOUNTER — APPOINTMENT (OUTPATIENT)
Dept: CT IMAGING | Facility: CLINIC | Age: 80
DRG: 207 | End: 2020-01-01
Attending: EMERGENCY MEDICINE
Payer: COMMERCIAL

## 2020-01-01 ENCOUNTER — HOSPITAL ENCOUNTER (INPATIENT)
Facility: CLINIC | Age: 80
LOS: 30 days | DRG: 207 | End: 2020-12-10
Attending: EMERGENCY MEDICINE | Admitting: INTERNAL MEDICINE
Payer: COMMERCIAL

## 2020-01-01 ENCOUNTER — APPOINTMENT (OUTPATIENT)
Dept: CARDIOLOGY | Facility: CLINIC | Age: 80
DRG: 207 | End: 2020-01-01
Attending: INTERNAL MEDICINE
Payer: COMMERCIAL

## 2020-01-01 VITALS
RESPIRATION RATE: 26 BRPM | OXYGEN SATURATION: 31 % | TEMPERATURE: 100 F | HEIGHT: 67 IN | BODY MASS INDEX: 25.26 KG/M2 | WEIGHT: 160.94 LBS | SYSTOLIC BLOOD PRESSURE: 124 MMHG | DIASTOLIC BLOOD PRESSURE: 81 MMHG

## 2020-01-01 VITALS
DIASTOLIC BLOOD PRESSURE: 81 MMHG | TEMPERATURE: 97.1 F | HEART RATE: 59 BPM | OXYGEN SATURATION: 97 % | WEIGHT: 183 LBS | BODY MASS INDEX: 30.49 KG/M2 | SYSTOLIC BLOOD PRESSURE: 118 MMHG | HEIGHT: 65 IN

## 2020-01-01 VITALS — SYSTOLIC BLOOD PRESSURE: 108 MMHG | DIASTOLIC BLOOD PRESSURE: 78 MMHG

## 2020-01-01 DIAGNOSIS — L81.4 SOLAR LENTIGINOSIS: ICD-10-CM

## 2020-01-01 DIAGNOSIS — I78.1 CAPILLARY HEMANGIOMA: ICD-10-CM

## 2020-01-01 DIAGNOSIS — U07.1 CLINICAL DIAGNOSIS OF COVID-19: ICD-10-CM

## 2020-01-01 DIAGNOSIS — R32 URINARY INCONTINENCE, UNSPECIFIED TYPE: ICD-10-CM

## 2020-01-01 DIAGNOSIS — E04.1 THYROID NODULE: ICD-10-CM

## 2020-01-01 DIAGNOSIS — Z20.822 EXPOSURE TO 2019 NOVEL CORONAVIRUS: ICD-10-CM

## 2020-01-01 DIAGNOSIS — C43.9 MELANOMA OF SKIN (H): ICD-10-CM

## 2020-01-01 DIAGNOSIS — Z00.00 ROUTINE GENERAL MEDICAL EXAMINATION AT A HEALTH CARE FACILITY: Primary | ICD-10-CM

## 2020-01-01 DIAGNOSIS — C43.9 MELANOMA OF SKIN (H): Primary | ICD-10-CM

## 2020-01-01 DIAGNOSIS — Z20.822 EXPOSURE TO 2019 NOVEL CORONAVIRUS: Primary | ICD-10-CM

## 2020-01-01 DIAGNOSIS — L82.1 SEBORRHEIC KERATOSIS: ICD-10-CM

## 2020-01-01 DIAGNOSIS — C61 MALIGNANT NEOPLASM OF PROSTATE (H): ICD-10-CM

## 2020-01-01 LAB
A-TUMOR NECROSIS FACT SERPL-MCNC: 14 PG/ML
ABO + RH BLD: NORMAL
ABO + RH BLD: NORMAL
ALBUMIN SERPL-MCNC: 1.2 G/DL (ref 3.4–5)
ALBUMIN SERPL-MCNC: 1.3 G/DL (ref 3.4–5)
ALBUMIN SERPL-MCNC: 1.4 G/DL (ref 3.4–5)
ALBUMIN SERPL-MCNC: 1.5 G/DL (ref 3.4–5)
ALBUMIN SERPL-MCNC: 2 G/DL (ref 3.4–5)
ALBUMIN SERPL-MCNC: 2.2 G/DL (ref 3.4–5)
ALBUMIN SERPL-MCNC: 2.3 G/DL (ref 3.4–5)
ALBUMIN SERPL-MCNC: 2.4 G/DL (ref 3.4–5)
ALBUMIN UR-MCNC: 30 MG/DL
ALBUMIN UR-MCNC: NEGATIVE MG/DL
ALP SERPL-CCNC: 58 U/L (ref 40–150)
ALP SERPL-CCNC: 58 U/L (ref 40–150)
ALP SERPL-CCNC: 60 U/L (ref 40–150)
ALP SERPL-CCNC: 63 U/L (ref 40–150)
ALP SERPL-CCNC: 66 U/L (ref 40–150)
ALP SERPL-CCNC: 67 U/L (ref 40–150)
ALP SERPL-CCNC: 73 U/L (ref 40–150)
ALP SERPL-CCNC: 75 U/L (ref 40–150)
ALP SERPL-CCNC: 78 U/L (ref 40–150)
ALP SERPL-CCNC: 82 U/L (ref 40–150)
ALT SERPL W P-5'-P-CCNC: 18 U/L (ref 0–70)
ALT SERPL W P-5'-P-CCNC: 18 U/L (ref 0–70)
ALT SERPL W P-5'-P-CCNC: 20 U/L (ref 0–70)
ALT SERPL W P-5'-P-CCNC: 24 U/L (ref 0–70)
ALT SERPL W P-5'-P-CCNC: 24 U/L (ref 0–70)
ALT SERPL W P-5'-P-CCNC: 25 U/L (ref 0–70)
ALT SERPL W P-5'-P-CCNC: 26 U/L (ref 0–70)
ALT SERPL W P-5'-P-CCNC: 28 U/L (ref 0–70)
ALT SERPL W P-5'-P-CCNC: 29 U/L (ref 0–70)
ALT SERPL W P-5'-P-CCNC: 34 U/L (ref 0–70)
ALT SERPL W P-5'-P-CCNC: 40 U/L (ref 0–70)
ANION GAP SERPL CALCULATED.3IONS-SCNC: 1 MMOL/L (ref 3–14)
ANION GAP SERPL CALCULATED.3IONS-SCNC: 13 MMOL/L (ref 3–14)
ANION GAP SERPL CALCULATED.3IONS-SCNC: 2 MMOL/L (ref 3–14)
ANION GAP SERPL CALCULATED.3IONS-SCNC: 3 MMOL/L (ref 3–14)
ANION GAP SERPL CALCULATED.3IONS-SCNC: 4 MMOL/L (ref 3–14)
ANION GAP SERPL CALCULATED.3IONS-SCNC: 5 MMOL/L (ref 3–14)
ANION GAP SERPL CALCULATED.3IONS-SCNC: 6 MMOL/L (ref 3–14)
ANION GAP SERPL CALCULATED.3IONS-SCNC: 7 MMOL/L (ref 3–14)
ANION GAP SERPL CALCULATED.3IONS-SCNC: 7 MMOL/L (ref 3–14)
ANION GAP SERPL CALCULATED.3IONS-SCNC: 8 MMOL/L (ref 3–14)
ANION GAP SERPL CALCULATED.3IONS-SCNC: 8 MMOL/L (ref 3–14)
ANION GAP SERPL CALCULATED.3IONS-SCNC: 9 MMOL/L (ref 3–14)
ANION GAP SERPL CALCULATED.3IONS-SCNC: <1 MMOL/L (ref 3–14)
APPEARANCE UR: ABNORMAL
APPEARANCE UR: CLEAR
APTT PPP: 32 SEC (ref 22–37)
AST SERPL W P-5'-P-CCNC: 15 U/L (ref 0–45)
AST SERPL W P-5'-P-CCNC: 18 U/L (ref 0–45)
AST SERPL W P-5'-P-CCNC: 19 U/L (ref 0–45)
AST SERPL W P-5'-P-CCNC: 21 U/L (ref 0–45)
AST SERPL W P-5'-P-CCNC: 22 U/L (ref 0–45)
AST SERPL W P-5'-P-CCNC: 23 U/L (ref 0–45)
AST SERPL W P-5'-P-CCNC: 27 U/L (ref 0–45)
AST SERPL W P-5'-P-CCNC: 29 U/L (ref 0–45)
AST SERPL W P-5'-P-CCNC: 29 U/L (ref 0–45)
AST SERPL W P-5'-P-CCNC: 33 U/L (ref 0–45)
AST SERPL W P-5'-P-CCNC: 54 U/L (ref 0–45)
AT III ACT/NOR PPP CHRO: 98 % (ref 85–135)
BACTERIA #/AREA URNS HPF: ABNORMAL /HPF
BACTERIA SPEC CULT: NO GROWTH
BACTERIA SPEC CULT: NORMAL
BASE DEFICIT BLDA-SCNC: 0 MMOL/L
BASE DEFICIT BLDA-SCNC: 0.5 MMOL/L
BASE DEFICIT BLDA-SCNC: 0.7 MMOL/L
BASE DEFICIT BLDA-SCNC: 2.4 MMOL/L
BASE DEFICIT BLDA-SCNC: 4 MMOL/L
BASE EXCESS BLDA CALC-SCNC: 0.8 MMOL/L
BASE EXCESS BLDA CALC-SCNC: 1.6 MMOL/L
BASE EXCESS BLDA CALC-SCNC: 10.3 MMOL/L
BASE EXCESS BLDA CALC-SCNC: 10.7 MMOL/L
BASE EXCESS BLDA CALC-SCNC: 10.8 MMOL/L
BASE EXCESS BLDA CALC-SCNC: 10.9 MMOL/L
BASE EXCESS BLDA CALC-SCNC: 12.1 MMOL/L
BASE EXCESS BLDA CALC-SCNC: 13 MMOL/L
BASE EXCESS BLDA CALC-SCNC: 13.2 MMOL/L
BASE EXCESS BLDA CALC-SCNC: 13.5 MMOL/L
BASE EXCESS BLDA CALC-SCNC: 13.5 MMOL/L
BASE EXCESS BLDA CALC-SCNC: 14.3 MMOL/L
BASE EXCESS BLDA CALC-SCNC: 14.9 MMOL/L
BASE EXCESS BLDA CALC-SCNC: 15 MMOL/L
BASE EXCESS BLDA CALC-SCNC: 15.2 MMOL/L
BASE EXCESS BLDA CALC-SCNC: 16.8 MMOL/L
BASE EXCESS BLDA CALC-SCNC: 17.5 MMOL/L
BASE EXCESS BLDA CALC-SCNC: 18.5 MMOL/L
BASE EXCESS BLDA CALC-SCNC: 18.6 MMOL/L
BASE EXCESS BLDA CALC-SCNC: 2.1 MMOL/L
BASE EXCESS BLDA CALC-SCNC: 2.6 MMOL/L
BASE EXCESS BLDA CALC-SCNC: 4.8 MMOL/L
BASE EXCESS BLDA CALC-SCNC: 7.7 MMOL/L
BASE EXCESS BLDA CALC-SCNC: 8.9 MMOL/L
BASE EXCESS BLDV CALC-SCNC: 1.1 MMOL/L
BASE EXCESS BLDV CALC-SCNC: 1.2 MMOL/L
BASE EXCESS BLDV CALC-SCNC: 3.6 MMOL/L
BASE EXCESS BLDV CALC-SCNC: 3.8 MMOL/L
BASE EXCESS BLDV CALC-SCNC: 7.5 MMOL/L
BASE EXCESS BLDV CALC-SCNC: 8.2 MMOL/L
BASOPHILS # BLD AUTO: 0 10E9/L (ref 0–0.2)
BASOPHILS NFR BLD AUTO: 0 %
BASOPHILS NFR BLD AUTO: 0.1 %
BASOPHILS NFR BLD AUTO: 0.2 %
BILIRUB DIRECT SERPL-MCNC: 0.2 MG/DL (ref 0–0.2)
BILIRUB SERPL-MCNC: 0.4 MG/DL (ref 0.2–1.3)
BILIRUB SERPL-MCNC: 0.6 MG/DL (ref 0.2–1.3)
BILIRUB SERPL-MCNC: 0.6 MG/DL (ref 0.2–1.3)
BILIRUB SERPL-MCNC: 0.7 MG/DL (ref 0.2–1.3)
BILIRUB SERPL-MCNC: 0.7 MG/DL (ref 0.2–1.3)
BILIRUB SERPL-MCNC: 0.8 MG/DL (ref 0.2–1.3)
BILIRUB SERPL-MCNC: 0.9 MG/DL (ref 0.2–1.3)
BILIRUB UR QL STRIP: NEGATIVE
BILIRUB UR QL STRIP: NEGATIVE
BUN SERPL-MCNC: 12 MG/DL (ref 7–30)
BUN SERPL-MCNC: 17 MG/DL (ref 7–30)
BUN SERPL-MCNC: 17 MG/DL (ref 7–30)
BUN SERPL-MCNC: 20 MG/DL (ref 7–30)
BUN SERPL-MCNC: 21 MG/DL (ref 7–30)
BUN SERPL-MCNC: 21 MG/DL (ref 7–30)
BUN SERPL-MCNC: 22 MG/DL (ref 7–30)
BUN SERPL-MCNC: 23 MG/DL (ref 7–30)
BUN SERPL-MCNC: 23 MG/DL (ref 7–30)
BUN SERPL-MCNC: 24 MG/DL (ref 7–30)
BUN SERPL-MCNC: 27 MG/DL (ref 7–30)
BUN SERPL-MCNC: 28 MG/DL (ref 7–30)
BUN SERPL-MCNC: 28 MG/DL (ref 7–30)
BUN SERPL-MCNC: 31 MG/DL (ref 7–30)
BUN SERPL-MCNC: 32 MG/DL (ref 7–30)
BUN SERPL-MCNC: 36 MG/DL (ref 7–30)
BUN SERPL-MCNC: 37 MG/DL (ref 7–30)
BUN SERPL-MCNC: 38 MG/DL (ref 7–30)
BUN SERPL-MCNC: 38 MG/DL (ref 7–30)
BUN SERPL-MCNC: 39 MG/DL (ref 7–30)
BUN SERPL-MCNC: 40 MG/DL (ref 7–30)
BUN SERPL-MCNC: 40 MG/DL (ref 7–30)
BUN SERPL-MCNC: 41 MG/DL (ref 7–30)
BUN SERPL-MCNC: 43 MG/DL (ref 7–30)
BUN SERPL-MCNC: 45 MG/DL (ref 7–30)
BUN SERPL-MCNC: 48 MG/DL (ref 7–30)
BUN SERPL-MCNC: 51 MG/DL (ref 7–30)
CA-I SERPL ISE-MCNC: 4.3 MG/DL (ref 4.4–5.2)
CALCIUM SERPL-MCNC: 7.4 MG/DL (ref 8.5–10.1)
CALCIUM SERPL-MCNC: 7.4 MG/DL (ref 8.5–10.1)
CALCIUM SERPL-MCNC: 7.5 MG/DL (ref 8.5–10.1)
CALCIUM SERPL-MCNC: 7.6 MG/DL (ref 8.5–10.1)
CALCIUM SERPL-MCNC: 7.7 MG/DL (ref 8.5–10.1)
CALCIUM SERPL-MCNC: 7.8 MG/DL (ref 8.5–10.1)
CALCIUM SERPL-MCNC: 7.9 MG/DL (ref 8.5–10.1)
CALCIUM SERPL-MCNC: 8 MG/DL (ref 8.5–10.1)
CALCIUM SERPL-MCNC: 8 MG/DL (ref 8.5–10.1)
CALCIUM SERPL-MCNC: 8.1 MG/DL (ref 8.5–10.1)
CALCIUM SERPL-MCNC: 8.2 MG/DL (ref 8.5–10.1)
CALCIUM SERPL-MCNC: 8.3 MG/DL (ref 8.5–10.1)
CALCIUM SERPL-MCNC: 8.7 MG/DL (ref 8.5–10.1)
CALCIUM SERPL-MCNC: 8.8 MG/DL (ref 8.5–10.1)
CALCIUM SERPL-MCNC: 8.8 MG/DL (ref 8.5–10.1)
CHLORIDE SERPL-SCNC: 100 MMOL/L (ref 94–109)
CHLORIDE SERPL-SCNC: 101 MMOL/L (ref 94–109)
CHLORIDE SERPL-SCNC: 102 MMOL/L (ref 94–109)
CHLORIDE SERPL-SCNC: 103 MMOL/L (ref 94–109)
CHLORIDE SERPL-SCNC: 104 MMOL/L (ref 94–109)
CHLORIDE SERPL-SCNC: 105 MMOL/L (ref 94–109)
CHLORIDE SERPL-SCNC: 106 MMOL/L (ref 94–109)
CHLORIDE SERPL-SCNC: 107 MMOL/L (ref 94–109)
CHLORIDE SERPL-SCNC: 108 MMOL/L (ref 94–109)
CHLORIDE SERPL-SCNC: 109 MMOL/L (ref 94–109)
CHLORIDE SERPL-SCNC: 110 MMOL/L (ref 94–109)
CHLORIDE SERPL-SCNC: 111 MMOL/L (ref 94–109)
CK SERPL-CCNC: 103 U/L (ref 30–300)
CK SERPL-CCNC: 13 U/L (ref 30–300)
CK SERPL-CCNC: 21 U/L (ref 30–300)
CK SERPL-CCNC: 21 U/L (ref 30–300)
CO2 SERPL-SCNC: 16 MMOL/L (ref 20–32)
CO2 SERPL-SCNC: 22 MMOL/L (ref 20–32)
CO2 SERPL-SCNC: 24 MMOL/L (ref 20–32)
CO2 SERPL-SCNC: 25 MMOL/L (ref 20–32)
CO2 SERPL-SCNC: 26 MMOL/L (ref 20–32)
CO2 SERPL-SCNC: 27 MMOL/L (ref 20–32)
CO2 SERPL-SCNC: 28 MMOL/L (ref 20–32)
CO2 SERPL-SCNC: 30 MMOL/L (ref 20–32)
CO2 SERPL-SCNC: 31 MMOL/L (ref 20–32)
CO2 SERPL-SCNC: 34 MMOL/L (ref 20–32)
CO2 SERPL-SCNC: 35 MMOL/L (ref 20–32)
CO2 SERPL-SCNC: 36 MMOL/L (ref 20–32)
CO2 SERPL-SCNC: 37 MMOL/L (ref 20–32)
CO2 SERPL-SCNC: 39 MMOL/L (ref 20–32)
CO2 SERPL-SCNC: 40 MMOL/L (ref 20–32)
CO2 SERPL-SCNC: 41 MMOL/L (ref 20–32)
CO2 SERPL-SCNC: 43 MMOL/L (ref 20–32)
CO2 SERPL-SCNC: 43 MMOL/L (ref 20–32)
COLOR UR AUTO: NORMAL
COLOR UR AUTO: YELLOW
CREAT SERPL-MCNC: 0.48 MG/DL (ref 0.66–1.25)
CREAT SERPL-MCNC: 0.49 MG/DL (ref 0.66–1.25)
CREAT SERPL-MCNC: 0.49 MG/DL (ref 0.66–1.25)
CREAT SERPL-MCNC: 0.5 MG/DL (ref 0.66–1.25)
CREAT SERPL-MCNC: 0.5 MG/DL (ref 0.66–1.25)
CREAT SERPL-MCNC: 0.51 MG/DL (ref 0.66–1.25)
CREAT SERPL-MCNC: 0.51 MG/DL (ref 0.66–1.25)
CREAT SERPL-MCNC: 0.52 MG/DL (ref 0.66–1.25)
CREAT SERPL-MCNC: 0.53 MG/DL (ref 0.66–1.25)
CREAT SERPL-MCNC: 0.54 MG/DL (ref 0.66–1.25)
CREAT SERPL-MCNC: 0.54 MG/DL (ref 0.66–1.25)
CREAT SERPL-MCNC: 0.55 MG/DL (ref 0.66–1.25)
CREAT SERPL-MCNC: 0.56 MG/DL (ref 0.66–1.25)
CREAT SERPL-MCNC: 0.57 MG/DL (ref 0.66–1.25)
CREAT SERPL-MCNC: 0.58 MG/DL (ref 0.66–1.25)
CREAT SERPL-MCNC: 0.59 MG/DL (ref 0.66–1.25)
CREAT SERPL-MCNC: 0.6 MG/DL (ref 0.66–1.25)
CREAT SERPL-MCNC: 0.61 MG/DL (ref 0.66–1.25)
CREAT SERPL-MCNC: 0.61 MG/DL (ref 0.66–1.25)
CREAT SERPL-MCNC: 0.62 MG/DL (ref 0.66–1.25)
CREAT SERPL-MCNC: 0.62 MG/DL (ref 0.66–1.25)
CREAT SERPL-MCNC: 0.63 MG/DL (ref 0.66–1.25)
CREAT SERPL-MCNC: 0.64 MG/DL (ref 0.66–1.25)
CREAT SERPL-MCNC: 0.67 MG/DL (ref 0.66–1.25)
CREAT SERPL-MCNC: 0.68 MG/DL (ref 0.66–1.25)
CREAT SERPL-MCNC: 0.69 MG/DL (ref 0.66–1.25)
CREAT SERPL-MCNC: 0.71 MG/DL (ref 0.66–1.25)
CREAT SERPL-MCNC: 0.72 MG/DL (ref 0.66–1.25)
CREAT SERPL-MCNC: 0.72 MG/DL (ref 0.66–1.25)
CREAT SERPL-MCNC: 0.74 MG/DL (ref 0.66–1.25)
CREAT SERPL-MCNC: 0.82 MG/DL (ref 0.66–1.25)
CREAT SERPL-MCNC: 0.82 MG/DL (ref 0.66–1.25)
CRP SERPL-MCNC: 11.7 MG/L (ref 0–8)
CRP SERPL-MCNC: 119 MG/L (ref 0–8)
CRP SERPL-MCNC: 124 MG/L (ref 0–8)
CRP SERPL-MCNC: 139 MG/L (ref 0–8)
CRP SERPL-MCNC: 14.5 MG/L (ref 0–8)
CRP SERPL-MCNC: 24.5 MG/L (ref 0–8)
CRP SERPL-MCNC: 39.5 MG/L (ref 0–8)
CRP SERPL-MCNC: 57.7 MG/L (ref 0–8)
CRP SERPL-MCNC: 60.2 MG/L (ref 0–8)
CRP SERPL-MCNC: 66.5 MG/L (ref 0–8)
CRP SERPL-MCNC: 67.3 MG/L (ref 0–8)
CRP SERPL-MCNC: 71.5 MG/L (ref 0–8)
CRP SERPL-MCNC: 82.6 MG/L (ref 0–8)
CRP SERPL-MCNC: 94.1 MG/L (ref 0–8)
D DIMER PPP FEU-MCNC: 0.9 UG/ML FEU (ref 0–0.5)
D DIMER PPP FEU-MCNC: 0.9 UG/ML FEU (ref 0–0.5)
D DIMER PPP FEU-MCNC: 1 UG/ML FEU (ref 0–0.5)
D DIMER PPP FEU-MCNC: 1.3 UG/ML FEU (ref 0–0.5)
D DIMER PPP FEU-MCNC: 1.4 UG/ML FEU (ref 0–0.5)
D DIMER PPP FEU-MCNC: 1.5 UG/ML FEU (ref 0–0.5)
D DIMER PPP FEU-MCNC: 2 UG/ML FEU (ref 0–0.5)
D DIMER PPP FEU-MCNC: 2.3 UG/ML FEU (ref 0–0.5)
D DIMER PPP FEU-MCNC: 2.5 UG/ML FEU (ref 0–0.5)
D DIMER PPP FEU-MCNC: 6.7 UG/ML FEU (ref 0–0.5)
D DIMER PPP FEU-MCNC: >20 UG/ML FEU (ref 0–0.5)
DIFFERENTIAL METHOD BLD: ABNORMAL
DIFFERENTIAL METHOD BLD: NORMAL
EOSINOPHIL # BLD AUTO: 0 10E9/L (ref 0–0.7)
EOSINOPHIL # BLD AUTO: 0.1 10E9/L (ref 0–0.7)
EOSINOPHIL # BLD AUTO: 0.1 10E9/L (ref 0–0.7)
EOSINOPHIL # BLD AUTO: 0.4 10E9/L (ref 0–0.7)
EOSINOPHIL # BLD AUTO: 0.4 10E9/L (ref 0–0.7)
EOSINOPHIL NFR BLD AUTO: 0 %
EOSINOPHIL NFR BLD AUTO: 0.1 %
EOSINOPHIL NFR BLD AUTO: 0.2 %
EOSINOPHIL NFR BLD AUTO: 0.4 %
EOSINOPHIL NFR BLD AUTO: 0.8 %
EOSINOPHIL NFR BLD AUTO: 1.9 %
EOSINOPHIL NFR BLD AUTO: 2 %
ERYTHROCYTE [DISTWIDTH] IN BLOOD BY AUTOMATED COUNT: 13.4 % (ref 10–15)
ERYTHROCYTE [DISTWIDTH] IN BLOOD BY AUTOMATED COUNT: 13.5 % (ref 10–15)
ERYTHROCYTE [DISTWIDTH] IN BLOOD BY AUTOMATED COUNT: 13.6 % (ref 10–15)
ERYTHROCYTE [DISTWIDTH] IN BLOOD BY AUTOMATED COUNT: 13.7 % (ref 10–15)
ERYTHROCYTE [DISTWIDTH] IN BLOOD BY AUTOMATED COUNT: 13.8 % (ref 10–15)
ERYTHROCYTE [DISTWIDTH] IN BLOOD BY AUTOMATED COUNT: 13.9 % (ref 10–15)
ERYTHROCYTE [DISTWIDTH] IN BLOOD BY AUTOMATED COUNT: 14 % (ref 10–15)
ERYTHROCYTE [DISTWIDTH] IN BLOOD BY AUTOMATED COUNT: 14.1 % (ref 10–15)
ERYTHROCYTE [DISTWIDTH] IN BLOOD BY AUTOMATED COUNT: 14.1 % (ref 10–15)
ERYTHROCYTE [DISTWIDTH] IN BLOOD BY AUTOMATED COUNT: 14.3 % (ref 10–15)
ERYTHROCYTE [DISTWIDTH] IN BLOOD BY AUTOMATED COUNT: 14.5 % (ref 10–15)
ERYTHROCYTE [DISTWIDTH] IN BLOOD BY AUTOMATED COUNT: 14.6 % (ref 10–15)
ERYTHROCYTE [DISTWIDTH] IN BLOOD BY AUTOMATED COUNT: 14.7 % (ref 10–15)
ERYTHROCYTE [DISTWIDTH] IN BLOOD BY AUTOMATED COUNT: 14.7 % (ref 10–15)
ERYTHROCYTE [DISTWIDTH] IN BLOOD BY AUTOMATED COUNT: 14.9 % (ref 10–15)
ERYTHROCYTE [DISTWIDTH] IN BLOOD BY AUTOMATED COUNT: 15 % (ref 10–15)
ERYTHROCYTE [DISTWIDTH] IN BLOOD BY AUTOMATED COUNT: 15 % (ref 10–15)
FERRITIN SERPL-MCNC: 1218 NG/ML (ref 26–388)
FERRITIN SERPL-MCNC: 1409 NG/ML (ref 26–388)
FERRITIN SERPL-MCNC: 1612 NG/ML (ref 26–388)
FERRITIN SERPL-MCNC: 1699 NG/ML (ref 26–388)
FIBRINOGEN PPP-MCNC: 452 MG/DL (ref 200–420)
FIBRINOGEN PPP-MCNC: 461 MG/DL (ref 200–420)
FIBRINOGEN PPP-MCNC: 513 MG/DL (ref 200–420)
FIBRINOGEN PPP-MCNC: 592 MG/DL (ref 200–420)
FIBRINOGEN PPP-MCNC: 592 MG/DL (ref 200–420)
FIBRINOGEN PPP-MCNC: 646 MG/DL (ref 200–420)
FIBRINOGEN PPP-MCNC: 701 MG/DL (ref 200–420)
FLUAV+FLUBV AG SPEC QL: NEGATIVE
GFR SERPL CREATININE-BSD FRML MDRD: 83 ML/MIN/{1.73_M2}
GFR SERPL CREATININE-BSD FRML MDRD: 83 ML/MIN/{1.73_M2}
GFR SERPL CREATININE-BSD FRML MDRD: 86 ML/MIN/{1.73_M2}
GFR SERPL CREATININE-BSD FRML MDRD: 87 ML/MIN/{1.73_M2}
GFR SERPL CREATININE-BSD FRML MDRD: 87 ML/MIN/{1.73_M2}
GFR SERPL CREATININE-BSD FRML MDRD: 88 ML/MIN/{1.73_M2}
GFR SERPL CREATININE-BSD FRML MDRD: 89 ML/MIN/{1.73_M2}
GFR SERPL CREATININE-BSD FRML MDRD: 89 ML/MIN/{1.73_M2}
GFR SERPL CREATININE-BSD FRML MDRD: 90 ML/MIN/{1.73_M2}
GFR SERPL CREATININE-BSD FRML MDRD: >90 ML/MIN/{1.73_M2}
GLUCOSE BLDC GLUCOMTR-MCNC: 100 MG/DL (ref 70–99)
GLUCOSE BLDC GLUCOMTR-MCNC: 100 MG/DL (ref 70–99)
GLUCOSE BLDC GLUCOMTR-MCNC: 101 MG/DL (ref 70–99)
GLUCOSE BLDC GLUCOMTR-MCNC: 104 MG/DL (ref 70–99)
GLUCOSE BLDC GLUCOMTR-MCNC: 106 MG/DL (ref 70–99)
GLUCOSE BLDC GLUCOMTR-MCNC: 106 MG/DL (ref 70–99)
GLUCOSE BLDC GLUCOMTR-MCNC: 107 MG/DL (ref 70–99)
GLUCOSE BLDC GLUCOMTR-MCNC: 108 MG/DL (ref 70–99)
GLUCOSE BLDC GLUCOMTR-MCNC: 110 MG/DL (ref 70–99)
GLUCOSE BLDC GLUCOMTR-MCNC: 111 MG/DL (ref 70–99)
GLUCOSE BLDC GLUCOMTR-MCNC: 111 MG/DL (ref 70–99)
GLUCOSE BLDC GLUCOMTR-MCNC: 112 MG/DL (ref 70–99)
GLUCOSE BLDC GLUCOMTR-MCNC: 115 MG/DL (ref 70–99)
GLUCOSE BLDC GLUCOMTR-MCNC: 116 MG/DL (ref 70–99)
GLUCOSE BLDC GLUCOMTR-MCNC: 116 MG/DL (ref 70–99)
GLUCOSE BLDC GLUCOMTR-MCNC: 117 MG/DL (ref 70–99)
GLUCOSE BLDC GLUCOMTR-MCNC: 117 MG/DL (ref 70–99)
GLUCOSE BLDC GLUCOMTR-MCNC: 118 MG/DL (ref 70–99)
GLUCOSE BLDC GLUCOMTR-MCNC: 118 MG/DL (ref 70–99)
GLUCOSE BLDC GLUCOMTR-MCNC: 119 MG/DL (ref 70–99)
GLUCOSE BLDC GLUCOMTR-MCNC: 120 MG/DL (ref 70–99)
GLUCOSE BLDC GLUCOMTR-MCNC: 120 MG/DL (ref 70–99)
GLUCOSE BLDC GLUCOMTR-MCNC: 121 MG/DL (ref 70–99)
GLUCOSE BLDC GLUCOMTR-MCNC: 122 MG/DL (ref 70–99)
GLUCOSE BLDC GLUCOMTR-MCNC: 123 MG/DL (ref 70–99)
GLUCOSE BLDC GLUCOMTR-MCNC: 123 MG/DL (ref 70–99)
GLUCOSE BLDC GLUCOMTR-MCNC: 124 MG/DL (ref 70–99)
GLUCOSE BLDC GLUCOMTR-MCNC: 125 MG/DL (ref 70–99)
GLUCOSE BLDC GLUCOMTR-MCNC: 126 MG/DL (ref 70–99)
GLUCOSE BLDC GLUCOMTR-MCNC: 126 MG/DL (ref 70–99)
GLUCOSE BLDC GLUCOMTR-MCNC: 127 MG/DL (ref 70–99)
GLUCOSE BLDC GLUCOMTR-MCNC: 127 MG/DL (ref 70–99)
GLUCOSE BLDC GLUCOMTR-MCNC: 131 MG/DL (ref 70–99)
GLUCOSE BLDC GLUCOMTR-MCNC: 134 MG/DL (ref 70–99)
GLUCOSE BLDC GLUCOMTR-MCNC: 135 MG/DL (ref 70–99)
GLUCOSE BLDC GLUCOMTR-MCNC: 135 MG/DL (ref 70–99)
GLUCOSE BLDC GLUCOMTR-MCNC: 136 MG/DL (ref 70–99)
GLUCOSE BLDC GLUCOMTR-MCNC: 137 MG/DL (ref 70–99)
GLUCOSE BLDC GLUCOMTR-MCNC: 137 MG/DL (ref 70–99)
GLUCOSE BLDC GLUCOMTR-MCNC: 140 MG/DL (ref 70–99)
GLUCOSE BLDC GLUCOMTR-MCNC: 142 MG/DL (ref 70–99)
GLUCOSE BLDC GLUCOMTR-MCNC: 143 MG/DL (ref 70–99)
GLUCOSE BLDC GLUCOMTR-MCNC: 144 MG/DL (ref 70–99)
GLUCOSE BLDC GLUCOMTR-MCNC: 144 MG/DL (ref 70–99)
GLUCOSE BLDC GLUCOMTR-MCNC: 145 MG/DL (ref 70–99)
GLUCOSE BLDC GLUCOMTR-MCNC: 146 MG/DL (ref 70–99)
GLUCOSE BLDC GLUCOMTR-MCNC: 146 MG/DL (ref 70–99)
GLUCOSE BLDC GLUCOMTR-MCNC: 147 MG/DL (ref 70–99)
GLUCOSE BLDC GLUCOMTR-MCNC: 147 MG/DL (ref 70–99)
GLUCOSE BLDC GLUCOMTR-MCNC: 148 MG/DL (ref 70–99)
GLUCOSE BLDC GLUCOMTR-MCNC: 151 MG/DL (ref 70–99)
GLUCOSE BLDC GLUCOMTR-MCNC: 152 MG/DL (ref 70–99)
GLUCOSE BLDC GLUCOMTR-MCNC: 152 MG/DL (ref 70–99)
GLUCOSE BLDC GLUCOMTR-MCNC: 153 MG/DL (ref 70–99)
GLUCOSE BLDC GLUCOMTR-MCNC: 154 MG/DL (ref 70–99)
GLUCOSE BLDC GLUCOMTR-MCNC: 154 MG/DL (ref 70–99)
GLUCOSE BLDC GLUCOMTR-MCNC: 157 MG/DL (ref 70–99)
GLUCOSE BLDC GLUCOMTR-MCNC: 158 MG/DL (ref 70–99)
GLUCOSE BLDC GLUCOMTR-MCNC: 159 MG/DL (ref 70–99)
GLUCOSE BLDC GLUCOMTR-MCNC: 162 MG/DL (ref 70–99)
GLUCOSE BLDC GLUCOMTR-MCNC: 163 MG/DL (ref 70–99)
GLUCOSE BLDC GLUCOMTR-MCNC: 163 MG/DL (ref 70–99)
GLUCOSE BLDC GLUCOMTR-MCNC: 165 MG/DL (ref 70–99)
GLUCOSE BLDC GLUCOMTR-MCNC: 166 MG/DL (ref 70–99)
GLUCOSE BLDC GLUCOMTR-MCNC: 167 MG/DL (ref 70–99)
GLUCOSE BLDC GLUCOMTR-MCNC: 169 MG/DL (ref 70–99)
GLUCOSE BLDC GLUCOMTR-MCNC: 170 MG/DL (ref 70–99)
GLUCOSE BLDC GLUCOMTR-MCNC: 173 MG/DL (ref 70–99)
GLUCOSE BLDC GLUCOMTR-MCNC: 174 MG/DL (ref 70–99)
GLUCOSE BLDC GLUCOMTR-MCNC: 175 MG/DL (ref 70–99)
GLUCOSE BLDC GLUCOMTR-MCNC: 175 MG/DL (ref 70–99)
GLUCOSE BLDC GLUCOMTR-MCNC: 180 MG/DL (ref 70–99)
GLUCOSE BLDC GLUCOMTR-MCNC: 181 MG/DL (ref 70–99)
GLUCOSE BLDC GLUCOMTR-MCNC: 181 MG/DL (ref 70–99)
GLUCOSE BLDC GLUCOMTR-MCNC: 182 MG/DL (ref 70–99)
GLUCOSE BLDC GLUCOMTR-MCNC: 182 MG/DL (ref 70–99)
GLUCOSE BLDC GLUCOMTR-MCNC: 183 MG/DL (ref 70–99)
GLUCOSE BLDC GLUCOMTR-MCNC: 187 MG/DL (ref 70–99)
GLUCOSE BLDC GLUCOMTR-MCNC: 188 MG/DL (ref 70–99)
GLUCOSE BLDC GLUCOMTR-MCNC: 190 MG/DL (ref 70–99)
GLUCOSE BLDC GLUCOMTR-MCNC: 191 MG/DL (ref 70–99)
GLUCOSE BLDC GLUCOMTR-MCNC: 192 MG/DL (ref 70–99)
GLUCOSE BLDC GLUCOMTR-MCNC: 194 MG/DL (ref 70–99)
GLUCOSE BLDC GLUCOMTR-MCNC: 197 MG/DL (ref 70–99)
GLUCOSE BLDC GLUCOMTR-MCNC: 197 MG/DL (ref 70–99)
GLUCOSE BLDC GLUCOMTR-MCNC: 200 MG/DL (ref 70–99)
GLUCOSE BLDC GLUCOMTR-MCNC: 206 MG/DL (ref 70–99)
GLUCOSE BLDC GLUCOMTR-MCNC: 208 MG/DL (ref 70–99)
GLUCOSE BLDC GLUCOMTR-MCNC: 213 MG/DL (ref 70–99)
GLUCOSE BLDC GLUCOMTR-MCNC: 223 MG/DL (ref 70–99)
GLUCOSE BLDC GLUCOMTR-MCNC: 226 MG/DL (ref 70–99)
GLUCOSE BLDC GLUCOMTR-MCNC: 89 MG/DL (ref 70–99)
GLUCOSE BLDC GLUCOMTR-MCNC: 99 MG/DL (ref 70–99)
GLUCOSE SERPL-MCNC: 103 MG/DL (ref 70–99)
GLUCOSE SERPL-MCNC: 104 MG/DL (ref 70–99)
GLUCOSE SERPL-MCNC: 105 MG/DL (ref 70–99)
GLUCOSE SERPL-MCNC: 106 MG/DL (ref 70–99)
GLUCOSE SERPL-MCNC: 106 MG/DL (ref 70–99)
GLUCOSE SERPL-MCNC: 108 MG/DL (ref 70–99)
GLUCOSE SERPL-MCNC: 112 MG/DL (ref 70–99)
GLUCOSE SERPL-MCNC: 114 MG/DL (ref 70–99)
GLUCOSE SERPL-MCNC: 123 MG/DL (ref 70–99)
GLUCOSE SERPL-MCNC: 142 MG/DL (ref 70–99)
GLUCOSE SERPL-MCNC: 144 MG/DL (ref 70–99)
GLUCOSE SERPL-MCNC: 145 MG/DL (ref 70–99)
GLUCOSE SERPL-MCNC: 146 MG/DL (ref 70–99)
GLUCOSE SERPL-MCNC: 148 MG/DL (ref 70–99)
GLUCOSE SERPL-MCNC: 149 MG/DL (ref 70–99)
GLUCOSE SERPL-MCNC: 149 MG/DL (ref 70–99)
GLUCOSE SERPL-MCNC: 151 MG/DL (ref 70–99)
GLUCOSE SERPL-MCNC: 157 MG/DL (ref 70–99)
GLUCOSE SERPL-MCNC: 160 MG/DL (ref 70–99)
GLUCOSE SERPL-MCNC: 160 MG/DL (ref 70–99)
GLUCOSE SERPL-MCNC: 166 MG/DL (ref 70–99)
GLUCOSE SERPL-MCNC: 166 MG/DL (ref 70–99)
GLUCOSE SERPL-MCNC: 168 MG/DL (ref 70–99)
GLUCOSE SERPL-MCNC: 172 MG/DL (ref 70–99)
GLUCOSE SERPL-MCNC: 173 MG/DL (ref 70–99)
GLUCOSE SERPL-MCNC: 173 MG/DL (ref 70–99)
GLUCOSE SERPL-MCNC: 175 MG/DL (ref 70–99)
GLUCOSE SERPL-MCNC: 179 MG/DL (ref 70–99)
GLUCOSE SERPL-MCNC: 183 MG/DL (ref 70–99)
GLUCOSE SERPL-MCNC: 189 MG/DL (ref 70–99)
GLUCOSE SERPL-MCNC: 191 MG/DL (ref 70–99)
GLUCOSE SERPL-MCNC: 191 MG/DL (ref 70–99)
GLUCOSE SERPL-MCNC: 192 MG/DL (ref 70–99)
GLUCOSE SERPL-MCNC: 217 MG/DL (ref 70–99)
GLUCOSE SERPL-MCNC: 232 MG/DL (ref 70–99)
GLUCOSE SERPL-MCNC: 70 MG/DL (ref 70–99)
GLUCOSE SERPL-MCNC: 93 MG/DL (ref 70–99)
GLUCOSE UR STRIP-MCNC: 70 MG/DL
GLUCOSE UR STRIP-MCNC: NEGATIVE MG/DL
GRAM STN SPEC: ABNORMAL
HBA1C MFR BLD: 6.4 % (ref 0–5.6)
HCO3 BLD-SCNC: 23 MMOL/L (ref 21–28)
HCO3 BLD-SCNC: 26 MMOL/L (ref 21–28)
HCO3 BLD-SCNC: 26 MMOL/L (ref 21–28)
HCO3 BLD-SCNC: 27 MMOL/L (ref 21–28)
HCO3 BLD-SCNC: 28 MMOL/L (ref 21–28)
HCO3 BLD-SCNC: 28 MMOL/L (ref 21–28)
HCO3 BLD-SCNC: 30 MMOL/L (ref 21–28)
HCO3 BLD-SCNC: 34 MMOL/L (ref 21–28)
HCO3 BLD-SCNC: 35 MMOL/L (ref 21–28)
HCO3 BLD-SCNC: 37 MMOL/L (ref 21–28)
HCO3 BLD-SCNC: 38 MMOL/L (ref 21–28)
HCO3 BLD-SCNC: 39 MMOL/L (ref 21–28)
HCO3 BLD-SCNC: 39 MMOL/L (ref 21–28)
HCO3 BLD-SCNC: 40 MMOL/L (ref 21–28)
HCO3 BLD-SCNC: 41 MMOL/L (ref 21–28)
HCO3 BLD-SCNC: 42 MMOL/L (ref 21–28)
HCO3 BLD-SCNC: 42 MMOL/L (ref 21–28)
HCO3 BLD-SCNC: 43 MMOL/L (ref 21–28)
HCO3 BLD-SCNC: 44 MMOL/L (ref 21–28)
HCO3 BLD-SCNC: 44 MMOL/L (ref 21–28)
HCO3 BLD-SCNC: 45 MMOL/L (ref 21–28)
HCO3 BLDV-SCNC: 24 MMOL/L (ref 21–28)
HCO3 BLDV-SCNC: 25 MMOL/L (ref 21–28)
HCO3 BLDV-SCNC: 29 MMOL/L (ref 21–28)
HCO3 BLDV-SCNC: 30 MMOL/L (ref 21–28)
HCO3 BLDV-SCNC: 33 MMOL/L (ref 21–28)
HCO3 BLDV-SCNC: 36 MMOL/L (ref 21–28)
HCT VFR BLD AUTO: 28.5 % (ref 40–53)
HCT VFR BLD AUTO: 29.1 % (ref 40–53)
HCT VFR BLD AUTO: 29.2 % (ref 40–53)
HCT VFR BLD AUTO: 29.4 % (ref 40–53)
HCT VFR BLD AUTO: 29.7 % (ref 40–53)
HCT VFR BLD AUTO: 30.3 % (ref 40–53)
HCT VFR BLD AUTO: 30.3 % (ref 40–53)
HCT VFR BLD AUTO: 30.5 % (ref 40–53)
HCT VFR BLD AUTO: 30.7 % (ref 40–53)
HCT VFR BLD AUTO: 31.6 % (ref 40–53)
HCT VFR BLD AUTO: 32 % (ref 40–53)
HCT VFR BLD AUTO: 33.1 % (ref 40–53)
HCT VFR BLD AUTO: 34.3 % (ref 40–53)
HCT VFR BLD AUTO: 36.9 % (ref 40–53)
HCT VFR BLD AUTO: 37.5 % (ref 40–53)
HCT VFR BLD AUTO: 39.6 % (ref 40–53)
HCT VFR BLD AUTO: 39.6 % (ref 40–53)
HCT VFR BLD AUTO: 42.4 % (ref 40–53)
HCT VFR BLD AUTO: 43.1 % (ref 40–53)
HCT VFR BLD AUTO: 43.2 % (ref 40–53)
HCT VFR BLD AUTO: 43.2 % (ref 40–53)
HCT VFR BLD AUTO: 43.3 % (ref 40–53)
HCT VFR BLD AUTO: 43.8 % (ref 40–53)
HCT VFR BLD AUTO: 44.7 % (ref 40–53)
HCT VFR BLD AUTO: 44.8 % (ref 40–53)
HCT VFR BLD AUTO: 45.1 % (ref 40–53)
HCT VFR BLD AUTO: 45.4 % (ref 40–53)
HCT VFR BLD AUTO: 45.6 % (ref 40–53)
HCT VFR BLD AUTO: 46.1 % (ref 40–53)
HCT VFR BLD AUTO: 46.6 % (ref 40–53)
HCT VFR BLD AUTO: 46.7 % (ref 40–53)
HGB BLD-MCNC: 10.5 G/DL (ref 13.3–17.7)
HGB BLD-MCNC: 10.6 G/DL (ref 13.3–17.7)
HGB BLD-MCNC: 11.8 G/DL (ref 13.3–17.7)
HGB BLD-MCNC: 12 G/DL (ref 13.3–17.7)
HGB BLD-MCNC: 12.9 G/DL (ref 13.3–17.7)
HGB BLD-MCNC: 12.9 G/DL (ref 13.3–17.7)
HGB BLD-MCNC: 14.3 G/DL (ref 13.3–17.7)
HGB BLD-MCNC: 14.4 G/DL (ref 13.3–17.7)
HGB BLD-MCNC: 14.5 G/DL (ref 13.3–17.7)
HGB BLD-MCNC: 14.5 G/DL (ref 13.3–17.7)
HGB BLD-MCNC: 14.6 G/DL (ref 13.3–17.7)
HGB BLD-MCNC: 14.8 G/DL (ref 13.3–17.7)
HGB BLD-MCNC: 15 G/DL (ref 13.3–17.7)
HGB BLD-MCNC: 15.2 G/DL (ref 13.3–17.7)
HGB BLD-MCNC: 15.3 G/DL (ref 13.3–17.7)
HGB BLD-MCNC: 15.4 G/DL (ref 13.3–17.7)
HGB BLD-MCNC: 16 G/DL (ref 13.3–17.7)
HGB BLD-MCNC: 16.2 G/DL (ref 13.3–17.7)
HGB BLD-MCNC: 9.1 G/DL (ref 13.3–17.7)
HGB BLD-MCNC: 9.2 G/DL (ref 13.3–17.7)
HGB BLD-MCNC: 9.2 G/DL (ref 13.3–17.7)
HGB BLD-MCNC: 9.3 G/DL (ref 13.3–17.7)
HGB BLD-MCNC: 9.4 G/DL (ref 13.3–17.7)
HGB BLD-MCNC: 9.5 G/DL (ref 13.3–17.7)
HGB BLD-MCNC: 9.5 G/DL (ref 13.3–17.7)
HGB BLD-MCNC: 9.6 G/DL (ref 13.3–17.7)
HGB BLD-MCNC: 9.9 G/DL (ref 13.3–17.7)
HGB UR QL STRIP: NEGATIVE
HGB UR QL STRIP: NEGATIVE
HYALINE CASTS #/AREA URNS LPF: 3 /LPF (ref 0–2)
IL-1BETA: 0.2 PG/ML
IL6 SERPL-MCNC: 18.6 PG/ML
IL6 SERPL-MCNC: 50.76 PG/ML
IL8 SERPL-MCNC: 23 PG/ML
IMM GRANULOCYTES # BLD: 0 10E9/L (ref 0–0.4)
IMM GRANULOCYTES # BLD: 0 10E9/L (ref 0–0.4)
IMM GRANULOCYTES # BLD: 0.1 10E9/L (ref 0–0.4)
IMM GRANULOCYTES # BLD: 0.1 10E9/L (ref 0–0.4)
IMM GRANULOCYTES # BLD: 0.2 10E9/L (ref 0–0.4)
IMM GRANULOCYTES # BLD: 0.3 10E9/L (ref 0–0.4)
IMM GRANULOCYTES # BLD: 0.4 10E9/L (ref 0–0.4)
IMM GRANULOCYTES # BLD: 0.4 10E9/L (ref 0–0.4)
IMM GRANULOCYTES NFR BLD: 0.3 %
IMM GRANULOCYTES NFR BLD: 0.4 %
IMM GRANULOCYTES NFR BLD: 0.6 %
IMM GRANULOCYTES NFR BLD: 0.8 %
IMM GRANULOCYTES NFR BLD: 1 %
IMM GRANULOCYTES NFR BLD: 1 %
IMM GRANULOCYTES NFR BLD: 1.1 %
IMM GRANULOCYTES NFR BLD: 1.4 %
IMM GRANULOCYTES NFR BLD: 2.2 %
IMM GRANULOCYTES NFR BLD: 2.3 %
INR PPP: 0.99 (ref 0.86–1.14)
INR PPP: 1.02 (ref 0.86–1.14)
INR PPP: 1.08 (ref 0.86–1.14)
INR PPP: 1.09 (ref 0.86–1.14)
INR PPP: 1.24 (ref 0.86–1.14)
INR PPP: 1.25 (ref 0.86–1.14)
INR PPP: 1.33 (ref 0.86–1.14)
INR PPP: 1.35 (ref 0.86–1.14)
INR PPP: 1.38 (ref 0.86–1.14)
INR PPP: 1.44 (ref 0.86–1.14)
INTERPRETATION ECG - MUSE: NORMAL
KETONES UR STRIP-MCNC: NEGATIVE MG/DL
KETONES UR STRIP-MCNC: NEGATIVE MG/DL
LABORATORY COMMENT REPORT: ABNORMAL
LACTATE BLD-SCNC: 1.6 MMOL/L (ref 0.7–2)
LACTATE BLD-SCNC: 1.7 MMOL/L (ref 0.7–2)
LACTATE BLD-SCNC: 2.4 MMOL/L (ref 0.7–2)
LACTATE BLD-SCNC: 2.6 MMOL/L (ref 0.7–2)
LDH SERPL L TO P-CCNC: 233 U/L (ref 85–227)
LDH SERPL L TO P-CCNC: 267 U/L (ref 85–227)
LDH SERPL L TO P-CCNC: 325 U/L (ref 85–227)
LDH SERPL L TO P-CCNC: 355 U/L (ref 85–227)
LDH SERPL L TO P-CCNC: 421 U/L (ref 85–227)
LDH SERPL L TO P-CCNC: 423 U/L (ref 85–227)
LDH SERPL L TO P-CCNC: 427 U/L (ref 85–227)
LDH SERPL L TO P-CCNC: 429 U/L (ref 85–227)
LDH SERPL L TO P-CCNC: 500 U/L (ref 85–227)
LDH SERPL L TO P-CCNC: 501 U/L (ref 85–227)
LDH SERPL L TO P-CCNC: 543 U/L (ref 85–227)
LDH SERPL L TO P-CCNC: 665 U/L (ref 85–227)
LDH SERPL L TO P-CCNC: 685 U/L (ref 85–227)
LEUKOCYTE ESTERASE UR QL STRIP: NEGATIVE
LEUKOCYTE ESTERASE UR QL STRIP: NEGATIVE
LMWH PPP CHRO-ACNC: 0.61 IU/ML
LMWH PPP CHRO-ACNC: 0.91 IU/ML
LYMPHOCYTES # BLD AUTO: 0.5 10E9/L (ref 0.8–5.3)
LYMPHOCYTES # BLD AUTO: 0.6 10E9/L (ref 0.8–5.3)
LYMPHOCYTES # BLD AUTO: 0.8 10E9/L (ref 0.8–5.3)
LYMPHOCYTES # BLD AUTO: 0.8 10E9/L (ref 0.8–5.3)
LYMPHOCYTES # BLD AUTO: 1 10E9/L (ref 0.8–5.3)
LYMPHOCYTES # BLD AUTO: 1 10E9/L (ref 0.8–5.3)
LYMPHOCYTES # BLD AUTO: 1.1 10E9/L (ref 0.8–5.3)
LYMPHOCYTES # BLD AUTO: 1.2 10E9/L (ref 0.8–5.3)
LYMPHOCYTES # BLD AUTO: 1.2 10E9/L (ref 0.8–5.3)
LYMPHOCYTES # BLD AUTO: 1.3 10E9/L (ref 0.8–5.3)
LYMPHOCYTES # BLD AUTO: 1.6 10E9/L (ref 0.8–5.3)
LYMPHOCYTES # BLD AUTO: 1.9 10E9/L (ref 0.8–5.3)
LYMPHOCYTES NFR BLD AUTO: 10 %
LYMPHOCYTES NFR BLD AUTO: 10.2 %
LYMPHOCYTES NFR BLD AUTO: 12.2 %
LYMPHOCYTES NFR BLD AUTO: 2 %
LYMPHOCYTES NFR BLD AUTO: 4.2 %
LYMPHOCYTES NFR BLD AUTO: 5 %
LYMPHOCYTES NFR BLD AUTO: 5.2 %
LYMPHOCYTES NFR BLD AUTO: 5.9 %
LYMPHOCYTES NFR BLD AUTO: 6.8 %
LYMPHOCYTES NFR BLD AUTO: 6.8 %
LYMPHOCYTES NFR BLD AUTO: 7.4 %
LYMPHOCYTES NFR BLD AUTO: 7.5 %
LYMPHOCYTES NFR BLD AUTO: 8.5 %
LYMPHOCYTES NFR BLD AUTO: 9.2 %
MAGNESIUM SERPL-MCNC: 2.2 MG/DL (ref 1.6–2.3)
MAGNESIUM SERPL-MCNC: 2.3 MG/DL (ref 1.6–2.3)
MAGNESIUM SERPL-MCNC: 2.4 MG/DL (ref 1.6–2.3)
MAGNESIUM SERPL-MCNC: 2.5 MG/DL (ref 1.6–2.3)
MAGNESIUM SERPL-MCNC: 2.7 MG/DL (ref 1.6–2.3)
MAGNESIUM SERPL-MCNC: 2.7 MG/DL (ref 1.6–2.3)
MAGNESIUM SERPL-MCNC: 2.8 MG/DL (ref 1.6–2.3)
MAGNESIUM SERPL-MCNC: 2.9 MG/DL (ref 1.6–2.3)
MAGNESIUM SERPL-MCNC: 2.9 MG/DL (ref 1.6–2.3)
MCH RBC QN AUTO: 30.6 PG (ref 26.5–33)
MCH RBC QN AUTO: 30.7 PG (ref 26.5–33)
MCH RBC QN AUTO: 30.8 PG (ref 26.5–33)
MCH RBC QN AUTO: 30.8 PG (ref 26.5–33)
MCH RBC QN AUTO: 31 PG (ref 26.5–33)
MCH RBC QN AUTO: 31 PG (ref 26.5–33)
MCH RBC QN AUTO: 31.1 PG (ref 26.5–33)
MCH RBC QN AUTO: 31.2 PG (ref 26.5–33)
MCH RBC QN AUTO: 31.4 PG (ref 26.5–33)
MCH RBC QN AUTO: 31.4 PG (ref 26.5–33)
MCH RBC QN AUTO: 31.5 PG (ref 26.5–33)
MCH RBC QN AUTO: 31.6 PG (ref 26.5–33)
MCH RBC QN AUTO: 31.7 PG (ref 26.5–33)
MCH RBC QN AUTO: 31.8 PG (ref 26.5–33)
MCH RBC QN AUTO: 31.8 PG (ref 26.5–33)
MCH RBC QN AUTO: 31.9 PG (ref 26.5–33)
MCH RBC QN AUTO: 31.9 PG (ref 26.5–33)
MCH RBC QN AUTO: 32.1 PG (ref 26.5–33)
MCH RBC QN AUTO: 32.2 PG (ref 26.5–33)
MCH RBC QN AUTO: 32.2 PG (ref 26.5–33)
MCH RBC QN AUTO: 32.3 PG (ref 26.5–33)
MCH RBC QN AUTO: 32.5 PG (ref 26.5–33)
MCHC RBC AUTO-ENTMCNC: 30.4 G/DL (ref 31.5–36.5)
MCHC RBC AUTO-ENTMCNC: 30.6 G/DL (ref 31.5–36.5)
MCHC RBC AUTO-ENTMCNC: 30.7 G/DL (ref 31.5–36.5)
MCHC RBC AUTO-ENTMCNC: 30.9 G/DL (ref 31.5–36.5)
MCHC RBC AUTO-ENTMCNC: 31.3 G/DL (ref 31.5–36.5)
MCHC RBC AUTO-ENTMCNC: 31.4 G/DL (ref 31.5–36.5)
MCHC RBC AUTO-ENTMCNC: 31.5 G/DL (ref 31.5–36.5)
MCHC RBC AUTO-ENTMCNC: 31.5 G/DL (ref 31.5–36.5)
MCHC RBC AUTO-ENTMCNC: 32 G/DL (ref 31.5–36.5)
MCHC RBC AUTO-ENTMCNC: 32 G/DL (ref 31.5–36.5)
MCHC RBC AUTO-ENTMCNC: 32.2 G/DL (ref 31.5–36.5)
MCHC RBC AUTO-ENTMCNC: 32.3 G/DL (ref 31.5–36.5)
MCHC RBC AUTO-ENTMCNC: 32.5 G/DL (ref 31.5–36.5)
MCHC RBC AUTO-ENTMCNC: 32.6 G/DL (ref 31.5–36.5)
MCHC RBC AUTO-ENTMCNC: 32.6 G/DL (ref 31.5–36.5)
MCHC RBC AUTO-ENTMCNC: 33.1 G/DL (ref 31.5–36.5)
MCHC RBC AUTO-ENTMCNC: 33.2 G/DL (ref 31.5–36.5)
MCHC RBC AUTO-ENTMCNC: 33.4 G/DL (ref 31.5–36.5)
MCHC RBC AUTO-ENTMCNC: 33.5 G/DL (ref 31.5–36.5)
MCHC RBC AUTO-ENTMCNC: 33.6 G/DL (ref 31.5–36.5)
MCHC RBC AUTO-ENTMCNC: 33.7 G/DL (ref 31.5–36.5)
MCHC RBC AUTO-ENTMCNC: 33.7 G/DL (ref 31.5–36.5)
MCHC RBC AUTO-ENTMCNC: 33.8 G/DL (ref 31.5–36.5)
MCHC RBC AUTO-ENTMCNC: 33.8 G/DL (ref 31.5–36.5)
MCHC RBC AUTO-ENTMCNC: 34.3 G/DL (ref 31.5–36.5)
MCHC RBC AUTO-ENTMCNC: 34.7 G/DL (ref 31.5–36.5)
MCHC RBC AUTO-ENTMCNC: 34.8 G/DL (ref 31.5–36.5)
MCV RBC AUTO: 100 FL (ref 78–100)
MCV RBC AUTO: 102 FL (ref 78–100)
MCV RBC AUTO: 93 FL (ref 78–100)
MCV RBC AUTO: 94 FL (ref 78–100)
MCV RBC AUTO: 95 FL (ref 78–100)
MCV RBC AUTO: 95 FL (ref 78–100)
MCV RBC AUTO: 96 FL (ref 78–100)
MCV RBC AUTO: 97 FL (ref 78–100)
MCV RBC AUTO: 98 FL (ref 78–100)
MCV RBC AUTO: 99 FL (ref 78–100)
MONOCYTES # BLD AUTO: 0.4 10E9/L (ref 0–1.3)
MONOCYTES # BLD AUTO: 0.4 10E9/L (ref 0–1.3)
MONOCYTES # BLD AUTO: 0.7 10E9/L (ref 0–1.3)
MONOCYTES # BLD AUTO: 0.7 10E9/L (ref 0–1.3)
MONOCYTES # BLD AUTO: 0.9 10E9/L (ref 0–1.3)
MONOCYTES # BLD AUTO: 0.9 10E9/L (ref 0–1.3)
MONOCYTES # BLD AUTO: 1 10E9/L (ref 0–1.3)
MONOCYTES # BLD AUTO: 1 10E9/L (ref 0–1.3)
MONOCYTES # BLD AUTO: 1.1 10E9/L (ref 0–1.3)
MONOCYTES # BLD AUTO: 1.1 10E9/L (ref 0–1.3)
MONOCYTES # BLD AUTO: 1.2 10E9/L (ref 0–1.3)
MONOCYTES # BLD AUTO: 1.3 10E9/L (ref 0–1.3)
MONOCYTES # BLD AUTO: 1.6 10E9/L (ref 0–1.3)
MONOCYTES # BLD AUTO: 2.9 10E9/L (ref 0–1.3)
MONOCYTES NFR BLD AUTO: 12 %
MONOCYTES NFR BLD AUTO: 3.3 %
MONOCYTES NFR BLD AUTO: 3.4 %
MONOCYTES NFR BLD AUTO: 4 %
MONOCYTES NFR BLD AUTO: 4.6 %
MONOCYTES NFR BLD AUTO: 4.7 %
MONOCYTES NFR BLD AUTO: 5.3 %
MONOCYTES NFR BLD AUTO: 6.4 %
MONOCYTES NFR BLD AUTO: 6.4 %
MONOCYTES NFR BLD AUTO: 7 %
MONOCYTES NFR BLD AUTO: 7 %
MONOCYTES NFR BLD AUTO: 9.2 %
MONOCYTES NFR BLD AUTO: 9.2 %
MONOCYTES NFR BLD AUTO: 9.5 %
MUCOUS THREADS #/AREA URNS LPF: PRESENT /LPF
NEUTROPHILS # BLD AUTO: 11 10E9/L (ref 1.6–8.3)
NEUTROPHILS # BLD AUTO: 12.7 10E9/L (ref 1.6–8.3)
NEUTROPHILS # BLD AUTO: 14 10E9/L (ref 1.6–8.3)
NEUTROPHILS # BLD AUTO: 14.2 10E9/L (ref 1.6–8.3)
NEUTROPHILS # BLD AUTO: 15.1 10E9/L (ref 1.6–8.3)
NEUTROPHILS # BLD AUTO: 16.8 10E9/L (ref 1.6–8.3)
NEUTROPHILS # BLD AUTO: 17.2 10E9/L (ref 1.6–8.3)
NEUTROPHILS # BLD AUTO: 17.7 10E9/L (ref 1.6–8.3)
NEUTROPHILS # BLD AUTO: 20.8 10E9/L (ref 1.6–8.3)
NEUTROPHILS # BLD AUTO: 27.1 10E9/L (ref 1.6–8.3)
NEUTROPHILS # BLD AUTO: 6.5 10E9/L (ref 1.6–8.3)
NEUTROPHILS # BLD AUTO: 6.6 10E9/L (ref 1.6–8.3)
NEUTROPHILS # BLD AUTO: 8.3 10E9/L (ref 1.6–8.3)
NEUTROPHILS # BLD AUTO: 9.9 10E9/L (ref 1.6–8.3)
NEUTROPHILS NFR BLD AUTO: 79 %
NEUTROPHILS NFR BLD AUTO: 79.9 %
NEUTROPHILS NFR BLD AUTO: 81.2 %
NEUTROPHILS NFR BLD AUTO: 82 %
NEUTROPHILS NFR BLD AUTO: 84.3 %
NEUTROPHILS NFR BLD AUTO: 84.4 %
NEUTROPHILS NFR BLD AUTO: 84.9 %
NEUTROPHILS NFR BLD AUTO: 84.9 %
NEUTROPHILS NFR BLD AUTO: 86 %
NEUTROPHILS NFR BLD AUTO: 86.3 %
NEUTROPHILS NFR BLD AUTO: 88 %
NEUTROPHILS NFR BLD AUTO: 88 %
NEUTROPHILS NFR BLD AUTO: 88.5 %
NEUTROPHILS NFR BLD AUTO: 90.9 %
NITRATE UR QL: NEGATIVE
NITRATE UR QL: NEGATIVE
NRBC # BLD AUTO: 0 10*3/UL
NRBC BLD AUTO-RTO: 0 /100
O2/TOTAL GAS SETTING VFR VENT: 100 %
O2/TOTAL GAS SETTING VFR VENT: 100 %
O2/TOTAL GAS SETTING VFR VENT: 50 %
O2/TOTAL GAS SETTING VFR VENT: 60 %
O2/TOTAL GAS SETTING VFR VENT: 70 %
O2/TOTAL GAS SETTING VFR VENT: ABNORMAL %
OXYHGB MFR BLD: 44 % (ref 92–100)
OXYHGB MFR BLD: 88 % (ref 92–100)
OXYHGB MFR BLD: 88 % (ref 92–100)
OXYHGB MFR BLD: 91 % (ref 92–100)
OXYHGB MFR BLD: 92 % (ref 92–100)
OXYHGB MFR BLD: 93 % (ref 92–100)
OXYHGB MFR BLD: 93 % (ref 92–100)
OXYHGB MFR BLD: 94 % (ref 92–100)
OXYHGB MFR BLD: 94 % (ref 92–100)
OXYHGB MFR BLD: 95 % (ref 92–100)
OXYHGB MFR BLD: 96 % (ref 92–100)
OXYHGB MFR BLD: 97 % (ref 92–100)
OXYHGB MFR BLD: 98 % (ref 92–100)
OXYHGB MFR BLDV: 41 %
OXYHGB MFR BLDV: 59 %
OXYHGB MFR BLDV: 72 %
OXYHGB MFR BLDV: 73 %
OXYHGB MFR BLDV: 77 %
OXYHGB MFR BLDV: 89 %
PCO2 BLD: 34 MM HG (ref 35–45)
PCO2 BLD: 41 MM HG (ref 35–45)
PCO2 BLD: 45 MM HG (ref 35–45)
PCO2 BLD: 47 MM HG (ref 35–45)
PCO2 BLD: 48 MM HG (ref 35–45)
PCO2 BLD: 51 MM HG (ref 35–45)
PCO2 BLD: 52 MM HG (ref 35–45)
PCO2 BLD: 52 MM HG (ref 35–45)
PCO2 BLD: 53 MM HG (ref 35–45)
PCO2 BLD: 53 MM HG (ref 35–45)
PCO2 BLD: 55 MM HG (ref 35–45)
PCO2 BLD: 56 MM HG (ref 35–45)
PCO2 BLD: 56 MM HG (ref 35–45)
PCO2 BLD: 57 MM HG (ref 35–45)
PCO2 BLD: 58 MM HG (ref 35–45)
PCO2 BLD: 58 MM HG (ref 35–45)
PCO2 BLD: 59 MM HG (ref 35–45)
PCO2 BLD: 60 MM HG (ref 35–45)
PCO2 BLD: 61 MM HG (ref 35–45)
PCO2 BLD: 62 MM HG (ref 35–45)
PCO2 BLD: 68 MM HG (ref 35–45)
PCO2 BLD: 69 MM HG (ref 35–45)
PCO2 BLD: 70 MM HG (ref 35–45)
PCO2 BLD: 71 MM HG (ref 35–45)
PCO2 BLD: 74 MM HG (ref 35–45)
PCO2 BLDV: 33 MM HG (ref 40–50)
PCO2 BLDV: 37 MM HG (ref 40–50)
PCO2 BLDV: 45 MM HG (ref 40–50)
PCO2 BLDV: 48 MM HG (ref 40–50)
PCO2 BLDV: 48 MM HG (ref 40–50)
PCO2 BLDV: 67 MM HG (ref 40–50)
PH BLD: 7.25 PH (ref 7.35–7.45)
PH BLD: 7.3 PH (ref 7.35–7.45)
PH BLD: 7.3 PH (ref 7.35–7.45)
PH BLD: 7.31 PH (ref 7.35–7.45)
PH BLD: 7.32 PH (ref 7.35–7.45)
PH BLD: 7.34 PH (ref 7.35–7.45)
PH BLD: 7.37 PH (ref 7.35–7.45)
PH BLD: 7.37 PH (ref 7.35–7.45)
PH BLD: 7.39 PH (ref 7.35–7.45)
PH BLD: 7.4 PH (ref 7.35–7.45)
PH BLD: 7.4 PH (ref 7.35–7.45)
PH BLD: 7.41 PH (ref 7.35–7.45)
PH BLD: 7.42 PH (ref 7.35–7.45)
PH BLD: 7.43 PH (ref 7.35–7.45)
PH BLD: 7.44 PH (ref 7.35–7.45)
PH BLD: 7.44 PH (ref 7.35–7.45)
PH BLD: 7.45 PH (ref 7.35–7.45)
PH BLD: 7.48 PH (ref 7.35–7.45)
PH BLD: 7.49 PH (ref 7.35–7.45)
PH BLDV: 7.33 PH (ref 7.32–7.43)
PH BLDV: 7.39 PH (ref 7.32–7.43)
PH BLDV: 7.41 PH (ref 7.32–7.43)
PH BLDV: 7.44 PH (ref 7.32–7.43)
PH BLDV: 7.47 PH (ref 7.32–7.43)
PH BLDV: 7.47 PH (ref 7.32–7.43)
PH UR STRIP: 6 PH (ref 5–7)
PH UR STRIP: 6.5 PH (ref 5–7)
PHOSPHATE SERPL-MCNC: 2.2 MG/DL (ref 2.5–4.5)
PHOSPHATE SERPL-MCNC: 2.3 MG/DL (ref 2.5–4.5)
PHOSPHATE SERPL-MCNC: 2.6 MG/DL (ref 2.5–4.5)
PHOSPHATE SERPL-MCNC: 2.7 MG/DL (ref 2.5–4.5)
PHOSPHATE SERPL-MCNC: 2.8 MG/DL (ref 2.5–4.5)
PHOSPHATE SERPL-MCNC: 2.8 MG/DL (ref 2.5–4.5)
PHOSPHATE SERPL-MCNC: 2.9 MG/DL (ref 2.5–4.5)
PHOSPHATE SERPL-MCNC: 2.9 MG/DL (ref 2.5–4.5)
PHOSPHATE SERPL-MCNC: 3 MG/DL (ref 2.5–4.5)
PHOSPHATE SERPL-MCNC: 3.1 MG/DL (ref 2.5–4.5)
PHOSPHATE SERPL-MCNC: 3.2 MG/DL (ref 2.5–4.5)
PHOSPHATE SERPL-MCNC: 3.2 MG/DL (ref 2.5–4.5)
PHOSPHATE SERPL-MCNC: 3.3 MG/DL (ref 2.5–4.5)
PHOSPHATE SERPL-MCNC: 3.4 MG/DL (ref 2.5–4.5)
PHOSPHATE SERPL-MCNC: 4 MG/DL (ref 2.5–4.5)
PHOSPHATE SERPL-MCNC: 4 MG/DL (ref 2.5–4.5)
PLATELET # BLD AUTO: 184 10E9/L (ref 150–450)
PLATELET # BLD AUTO: 200 10E9/L (ref 150–450)
PLATELET # BLD AUTO: 205 10E9/L (ref 150–450)
PLATELET # BLD AUTO: 214 10E9/L (ref 150–450)
PLATELET # BLD AUTO: 216 10E9/L (ref 150–450)
PLATELET # BLD AUTO: 222 10E9/L (ref 150–450)
PLATELET # BLD AUTO: 230 10E9/L (ref 150–450)
PLATELET # BLD AUTO: 244 10E9/L (ref 150–450)
PLATELET # BLD AUTO: 245 10E9/L (ref 150–450)
PLATELET # BLD AUTO: 246 10E9/L (ref 150–450)
PLATELET # BLD AUTO: 246 10E9/L (ref 150–450)
PLATELET # BLD AUTO: 266 10E9/L (ref 150–450)
PLATELET # BLD AUTO: 269 10E9/L (ref 150–450)
PLATELET # BLD AUTO: 280 10E9/L (ref 150–450)
PLATELET # BLD AUTO: 281 10E9/L (ref 150–450)
PLATELET # BLD AUTO: 282 10E9/L (ref 150–450)
PLATELET # BLD AUTO: 293 10E9/L (ref 150–450)
PLATELET # BLD AUTO: 311 10E9/L (ref 150–450)
PLATELET # BLD AUTO: 335 10E9/L (ref 150–450)
PLATELET # BLD AUTO: 344 10E9/L (ref 150–450)
PLATELET # BLD AUTO: 349 10E9/L (ref 150–450)
PLATELET # BLD AUTO: 358 10E9/L (ref 150–450)
PLATELET # BLD AUTO: 360 10E9/L (ref 150–450)
PLATELET # BLD AUTO: 373 10E9/L (ref 150–450)
PLATELET # BLD AUTO: 376 10E9/L (ref 150–450)
PLATELET # BLD AUTO: 387 10E9/L (ref 150–450)
PLATELET # BLD AUTO: 394 10E9/L (ref 150–450)
PLATELET # BLD AUTO: 395 10E9/L (ref 150–450)
PLATELET # BLD AUTO: 409 10E9/L (ref 150–450)
PLATELET # BLD AUTO: 442 10E9/L (ref 150–450)
PLATELET # BLD AUTO: 450 10E9/L (ref 150–450)
PLATELET # BLD AUTO: 471 10E9/L (ref 150–450)
PLATELET # BLD EST: ABNORMAL 10*3/UL
PO2 BLD: 100 MM HG (ref 80–105)
PO2 BLD: 102 MM HG (ref 80–105)
PO2 BLD: 104 MM HG (ref 80–105)
PO2 BLD: 107 MM HG (ref 80–105)
PO2 BLD: 109 MM HG (ref 80–105)
PO2 BLD: 110 MM HG (ref 80–105)
PO2 BLD: 111 MM HG (ref 80–105)
PO2 BLD: 113 MM HG (ref 80–105)
PO2 BLD: 118 MM HG (ref 80–105)
PO2 BLD: 120 MM HG (ref 80–105)
PO2 BLD: 127 MM HG (ref 80–105)
PO2 BLD: 136 MM HG (ref 80–105)
PO2 BLD: 26 MM HG (ref 80–105)
PO2 BLD: 57 MM HG (ref 80–105)
PO2 BLD: 58 MM HG (ref 80–105)
PO2 BLD: 61 MM HG (ref 80–105)
PO2 BLD: 62 MM HG (ref 80–105)
PO2 BLD: 68 MM HG (ref 80–105)
PO2 BLD: 69 MM HG (ref 80–105)
PO2 BLD: 77 MM HG (ref 80–105)
PO2 BLD: 78 MM HG (ref 80–105)
PO2 BLD: 79 MM HG (ref 80–105)
PO2 BLD: 82 MM HG (ref 80–105)
PO2 BLD: 83 MM HG (ref 80–105)
PO2 BLD: 86 MM HG (ref 80–105)
PO2 BLD: 87 MM HG (ref 80–105)
PO2 BLD: 88 MM HG (ref 80–105)
PO2 BLD: 94 MM HG (ref 80–105)
PO2 BLD: 98 MM HG (ref 80–105)
PO2 BLDV: 26 MM HG (ref 25–47)
PO2 BLDV: 31 MM HG (ref 25–47)
PO2 BLDV: 39 MM HG (ref 25–47)
PO2 BLDV: 42 MM HG (ref 25–47)
PO2 BLDV: 43 MM HG (ref 25–47)
PO2 BLDV: 56 MM HG (ref 25–47)
POTASSIUM SERPL-SCNC: 3.1 MMOL/L (ref 3.4–5.3)
POTASSIUM SERPL-SCNC: 3.2 MMOL/L (ref 3.4–5.3)
POTASSIUM SERPL-SCNC: 3.3 MMOL/L (ref 3.4–5.3)
POTASSIUM SERPL-SCNC: 3.4 MMOL/L (ref 3.4–5.3)
POTASSIUM SERPL-SCNC: 3.6 MMOL/L (ref 3.4–5.3)
POTASSIUM SERPL-SCNC: 3.7 MMOL/L (ref 3.4–5.3)
POTASSIUM SERPL-SCNC: 3.8 MMOL/L (ref 3.4–5.3)
POTASSIUM SERPL-SCNC: 3.8 MMOL/L (ref 3.4–5.3)
POTASSIUM SERPL-SCNC: 3.9 MMOL/L (ref 3.4–5.3)
POTASSIUM SERPL-SCNC: 3.9 MMOL/L (ref 3.4–5.3)
POTASSIUM SERPL-SCNC: 4 MMOL/L (ref 3.4–5.3)
POTASSIUM SERPL-SCNC: 4 MMOL/L (ref 3.4–5.3)
POTASSIUM SERPL-SCNC: 4.1 MMOL/L (ref 3.4–5.3)
POTASSIUM SERPL-SCNC: 4.2 MMOL/L (ref 3.4–5.3)
POTASSIUM SERPL-SCNC: 4.3 MMOL/L (ref 3.4–5.3)
POTASSIUM SERPL-SCNC: 4.4 MMOL/L (ref 3.4–5.3)
POTASSIUM SERPL-SCNC: 4.5 MMOL/L (ref 3.4–5.3)
POTASSIUM SERPL-SCNC: 4.6 MMOL/L (ref 3.4–5.3)
POTASSIUM SERPL-SCNC: 4.7 MMOL/L (ref 3.4–5.3)
POTASSIUM SERPL-SCNC: 4.9 MMOL/L (ref 3.4–5.3)
POTASSIUM SERPL-SCNC: 5.3 MMOL/L (ref 3.4–5.3)
POTASSIUM SERPL-SCNC: 5.6 MMOL/L (ref 3.4–5.3)
PROCALCITONIN SERPL-MCNC: 0.06 NG/ML
PROCALCITONIN SERPL-MCNC: 0.17 NG/ML
PROCALCITONIN SERPL-MCNC: <0.05 NG/ML
PROT SERPL-MCNC: 4.5 G/DL (ref 6.8–8.8)
PROT SERPL-MCNC: 4.7 G/DL (ref 6.8–8.8)
PROT SERPL-MCNC: 5 G/DL (ref 6.8–8.8)
PROT SERPL-MCNC: 5.1 G/DL (ref 6.8–8.8)
PROT SERPL-MCNC: 5.2 G/DL (ref 6.8–8.8)
PROT SERPL-MCNC: 5.2 G/DL (ref 6.8–8.8)
PROT SERPL-MCNC: 5.3 G/DL (ref 6.8–8.8)
PROT SERPL-MCNC: 5.5 G/DL (ref 6.8–8.8)
PROT SERPL-MCNC: 5.6 G/DL (ref 6.8–8.8)
PROT SERPL-MCNC: 5.8 G/DL (ref 6.8–8.8)
PSA SERPL-MCNC: <0.01 UG/L (ref 0–4)
RBC # BLD AUTO: 2.91 10E12/L (ref 4.4–5.9)
RBC # BLD AUTO: 2.93 10E12/L (ref 4.4–5.9)
RBC # BLD AUTO: 2.99 10E12/L (ref 4.4–5.9)
RBC # BLD AUTO: 2.99 10E12/L (ref 4.4–5.9)
RBC # BLD AUTO: 3.04 10E12/L (ref 4.4–5.9)
RBC # BLD AUTO: 3.05 10E12/L (ref 4.4–5.9)
RBC # BLD AUTO: 3.05 10E12/L (ref 4.4–5.9)
RBC # BLD AUTO: 3.08 10E12/L (ref 4.4–5.9)
RBC # BLD AUTO: 3.08 10E12/L (ref 4.4–5.9)
RBC # BLD AUTO: 3.09 10E12/L (ref 4.4–5.9)
RBC # BLD AUTO: 3.19 10E12/L (ref 4.4–5.9)
RBC # BLD AUTO: 3.42 10E12/L (ref 4.4–5.9)
RBC # BLD AUTO: 3.42 10E12/L (ref 4.4–5.9)
RBC # BLD AUTO: 3.79 10E12/L (ref 4.4–5.9)
RBC # BLD AUTO: 3.8 10E12/L (ref 4.4–5.9)
RBC # BLD AUTO: 4.06 10E12/L (ref 4.4–5.9)
RBC # BLD AUTO: 4.08 10E12/L (ref 4.4–5.9)
RBC # BLD AUTO: 4.48 10E12/L (ref 4.4–5.9)
RBC # BLD AUTO: 4.51 10E12/L (ref 4.4–5.9)
RBC # BLD AUTO: 4.51 10E12/L (ref 4.4–5.9)
RBC # BLD AUTO: 4.59 10E12/L (ref 4.4–5.9)
RBC # BLD AUTO: 4.62 10E12/L (ref 4.4–5.9)
RBC # BLD AUTO: 4.66 10E12/L (ref 4.4–5.9)
RBC # BLD AUTO: 4.73 10E12/L (ref 4.4–5.9)
RBC # BLD AUTO: 4.78 10E12/L (ref 4.4–5.9)
RBC # BLD AUTO: 4.8 10E12/L (ref 4.4–5.9)
RBC # BLD AUTO: 4.83 10E12/L (ref 4.4–5.9)
RBC # BLD AUTO: 4.9 10E12/L (ref 4.4–5.9)
RBC # BLD AUTO: 4.9 10E12/L (ref 4.4–5.9)
RBC # BLD AUTO: 4.92 10E12/L (ref 4.4–5.9)
RBC # BLD AUTO: 5.02 10E12/L (ref 4.4–5.9)
RBC #/AREA URNS AUTO: 1 /HPF (ref 0–2)
RBC #/AREA URNS AUTO: <1 /HPF (ref 0–2)
RBC MORPH BLD: ABNORMAL
RBC MORPH BLD: NORMAL
RETICS # AUTO: 34.3 10E9/L (ref 25–95)
RETICS # AUTO: 35.2 10E9/L (ref 25–95)
RETICS # AUTO: 36.8 10E9/L (ref 25–95)
RETICS # AUTO: 41.7 10E9/L (ref 25–95)
RETICS/RBC NFR AUTO: 0.7 % (ref 0.5–2)
RETICS/RBC NFR AUTO: 0.8 % (ref 0.5–2)
RETICS/RBC NFR AUTO: 0.8 % (ref 0.5–2)
RETICS/RBC NFR AUTO: 0.9 % (ref 0.5–2)
RSV AG SPEC QL: NEGATIVE
SARS-COV-2 RNA SPEC QL NAA+PROBE: ABNORMAL
SARS-COV-2 RNA SPEC QL NAA+PROBE: NORMAL
SARS-COV-2 RNA SPEC QL NAA+PROBE: POSITIVE
SODIUM SERPL-SCNC: 132 MMOL/L (ref 133–144)
SODIUM SERPL-SCNC: 136 MMOL/L (ref 133–144)
SODIUM SERPL-SCNC: 137 MMOL/L (ref 133–144)
SODIUM SERPL-SCNC: 137 MMOL/L (ref 133–144)
SODIUM SERPL-SCNC: 138 MMOL/L (ref 133–144)
SODIUM SERPL-SCNC: 138 MMOL/L (ref 133–144)
SODIUM SERPL-SCNC: 139 MMOL/L (ref 133–144)
SODIUM SERPL-SCNC: 140 MMOL/L (ref 133–144)
SODIUM SERPL-SCNC: 141 MMOL/L (ref 133–144)
SODIUM SERPL-SCNC: 142 MMOL/L (ref 133–144)
SODIUM SERPL-SCNC: 143 MMOL/L (ref 133–144)
SODIUM SERPL-SCNC: 144 MMOL/L (ref 133–144)
SODIUM SERPL-SCNC: 144 MMOL/L (ref 133–144)
SODIUM SERPL-SCNC: 145 MMOL/L (ref 133–144)
SODIUM SERPL-SCNC: 146 MMOL/L (ref 133–144)
SOURCE: ABNORMAL
SOURCE: NORMAL
SP GR UR STRIP: 1.01 (ref 1–1.03)
SP GR UR STRIP: 1.02 (ref 1–1.03)
SPECIMEN EXP DATE BLD: NORMAL
SPECIMEN SOURCE: ABNORMAL
SPECIMEN SOURCE: NORMAL
SQUAMOUS #/AREA URNS AUTO: 1 /HPF (ref 0–1)
TROPONIN I SERPL-MCNC: 0.03 UG/L (ref 0–0.04)
TROPONIN I SERPL-MCNC: <0.015 UG/L (ref 0–0.04)
TSH SERPL DL<=0.005 MIU/L-ACNC: 0.53 MU/L (ref 0.4–4)
UFH PPP CHRO-ACNC: 0.11 IU/ML
UFH PPP CHRO-ACNC: 0.22 IU/ML
UFH PPP CHRO-ACNC: 0.36 IU/ML
UFH PPP CHRO-ACNC: 0.43 IU/ML
UFH PPP CHRO-ACNC: 0.49 IU/ML
UFH PPP CHRO-ACNC: 0.5 IU/ML
UFH PPP CHRO-ACNC: 0.51 IU/ML
UFH PPP CHRO-ACNC: 0.52 IU/ML
UFH PPP CHRO-ACNC: 0.54 IU/ML
UFH PPP CHRO-ACNC: 0.55 IU/ML
UFH PPP CHRO-ACNC: 0.57 IU/ML
UFH PPP CHRO-ACNC: 0.58 IU/ML
UFH PPP CHRO-ACNC: 0.6 IU/ML
UFH PPP CHRO-ACNC: 0.61 IU/ML
UFH PPP CHRO-ACNC: 0.62 IU/ML
UFH PPP CHRO-ACNC: 0.62 IU/ML
UFH PPP CHRO-ACNC: 0.67 IU/ML
UFH PPP CHRO-ACNC: 0.68 IU/ML
UFH PPP CHRO-ACNC: 0.73 IU/ML
UFH PPP CHRO-ACNC: 0.74 IU/ML
UFH PPP CHRO-ACNC: 0.77 IU/ML
UFH PPP CHRO-ACNC: 0.8 IU/ML
UFH PPP CHRO-ACNC: 0.84 IU/ML
UFH PPP CHRO-ACNC: 0.89 IU/ML
UROBILINOGEN UR STRIP-MCNC: 2 MG/DL (ref 0–2)
UROBILINOGEN UR STRIP-MCNC: NORMAL MG/DL (ref 0–2)
VANCOMYCIN SERPL-MCNC: 21.4 MG/L
VANCOMYCIN SERPL-MCNC: 22.8 MG/L
WBC # BLD AUTO: 10.4 10E9/L (ref 4–11)
WBC # BLD AUTO: 12.2 10E9/L (ref 4–11)
WBC # BLD AUTO: 12.5 10E9/L (ref 4–11)
WBC # BLD AUTO: 13.1 10E9/L (ref 4–11)
WBC # BLD AUTO: 13.2 10E9/L (ref 4–11)
WBC # BLD AUTO: 14.6 10E9/L (ref 4–11)
WBC # BLD AUTO: 14.9 10E9/L (ref 4–11)
WBC # BLD AUTO: 15 10E9/L (ref 4–11)
WBC # BLD AUTO: 15.5 10E9/L (ref 4–11)
WBC # BLD AUTO: 15.6 10E9/L (ref 4–11)
WBC # BLD AUTO: 15.9 10E9/L (ref 4–11)
WBC # BLD AUTO: 16.4 10E9/L (ref 4–11)
WBC # BLD AUTO: 16.6 10E9/L (ref 4–11)
WBC # BLD AUTO: 16.7 10E9/L (ref 4–11)
WBC # BLD AUTO: 17.2 10E9/L (ref 4–11)
WBC # BLD AUTO: 17.7 10E9/L (ref 4–11)
WBC # BLD AUTO: 18.9 10E9/L (ref 4–11)
WBC # BLD AUTO: 19 10E9/L (ref 4–11)
WBC # BLD AUTO: 19 10E9/L (ref 4–11)
WBC # BLD AUTO: 20.4 10E9/L (ref 4–11)
WBC # BLD AUTO: 20.5 10E9/L (ref 4–11)
WBC # BLD AUTO: 21.9 10E9/L (ref 4–11)
WBC # BLD AUTO: 23.2 10E9/L (ref 4–11)
WBC # BLD AUTO: 24.2 10E9/L (ref 4–11)
WBC # BLD AUTO: 29.8 10E9/L (ref 4–11)
WBC # BLD AUTO: 32.2 10E9/L (ref 4–11)
WBC # BLD AUTO: 35.7 10E9/L (ref 4–11)
WBC # BLD AUTO: 43.7 10E9/L (ref 4–11)
WBC # BLD AUTO: 7.7 10E9/L (ref 4–11)
WBC # BLD AUTO: 7.9 10E9/L (ref 4–11)
WBC # BLD AUTO: 9.6 10E9/L (ref 4–11)
WBC #/AREA URNS AUTO: 2 /HPF (ref 0–5)
WBC #/AREA URNS AUTO: <1 /HPF (ref 0–5)

## 2020-01-01 PROCEDURE — 85520 HEPARIN ASSAY: CPT | Performed by: INTERNAL MEDICINE

## 2020-01-01 PROCEDURE — 85027 COMPLETE CBC AUTOMATED: CPT | Performed by: INTERNAL MEDICINE

## 2020-01-01 PROCEDURE — 82550 ASSAY OF CK (CPK): CPT | Performed by: INTERNAL MEDICINE

## 2020-01-01 PROCEDURE — 85045 AUTOMATED RETICULOCYTE COUNT: CPT | Performed by: INTERNAL MEDICINE

## 2020-01-01 PROCEDURE — 250N000011 HC RX IP 250 OP 636: Performed by: INTERNAL MEDICINE

## 2020-01-01 PROCEDURE — 83615 LACTATE (LD) (LDH) ENZYME: CPT | Performed by: INTERNAL MEDICINE

## 2020-01-01 PROCEDURE — 272N000078 HC NUTRITION PRODUCT INTERMEDIATE LITER

## 2020-01-01 PROCEDURE — 999N000185 HC STATISTIC TRANSPORT TIME EA 15 MIN

## 2020-01-01 PROCEDURE — 82805 BLOOD GASES W/O2 SATURATION: CPT | Performed by: INTERNAL MEDICINE

## 2020-01-01 PROCEDURE — 258N000003 HC RX IP 258 OP 636: Performed by: INTERNAL MEDICINE

## 2020-01-01 PROCEDURE — 84100 ASSAY OF PHOSPHORUS: CPT | Performed by: INTERNAL MEDICINE

## 2020-01-01 PROCEDURE — 250N000013 HC RX MED GY IP 250 OP 250 PS 637: Performed by: INTERNAL MEDICINE

## 2020-01-01 PROCEDURE — 999N000157 HC STATISTIC RCP TIME EA 10 MIN

## 2020-01-01 PROCEDURE — 999N000040 HC STATISTIC CONSULT NO CHARGE VASC ACCESS

## 2020-01-01 PROCEDURE — 94645 CONT INHLJ TX EACH ADDL HOUR: CPT

## 2020-01-01 PROCEDURE — 250N000009 HC RX 250: Performed by: INTERNAL MEDICINE

## 2020-01-01 PROCEDURE — 85520 HEPARIN ASSAY: CPT | Performed by: SURGERY

## 2020-01-01 PROCEDURE — 36415 COLL VENOUS BLD VENIPUNCTURE: CPT | Performed by: INTERNAL MEDICINE

## 2020-01-01 PROCEDURE — 99291 CRITICAL CARE FIRST HOUR: CPT | Performed by: INTERNAL MEDICINE

## 2020-01-01 PROCEDURE — 250N000009 HC RX 250: Performed by: EMERGENCY MEDICINE

## 2020-01-01 PROCEDURE — 85379 FIBRIN DEGRADATION QUANT: CPT | Performed by: INTERNAL MEDICINE

## 2020-01-01 PROCEDURE — 84132 ASSAY OF SERUM POTASSIUM: CPT | Performed by: INTERNAL MEDICINE

## 2020-01-01 PROCEDURE — 85610 PROTHROMBIN TIME: CPT | Performed by: INTERNAL MEDICINE

## 2020-01-01 PROCEDURE — 87070 CULTURE OTHR SPECIMN AEROBIC: CPT | Performed by: INTERNAL MEDICINE

## 2020-01-01 PROCEDURE — 250N000012 HC RX MED GY IP 250 OP 636 PS 637: Performed by: INTERNAL MEDICINE

## 2020-01-01 PROCEDURE — 87804 INFLUENZA ASSAY W/OPTIC: CPT | Performed by: INTERNAL MEDICINE

## 2020-01-01 PROCEDURE — 250N000011 HC RX IP 250 OP 636: Performed by: NURSE PRACTITIONER

## 2020-01-01 PROCEDURE — 71045 X-RAY EXAM CHEST 1 VIEW: CPT

## 2020-01-01 PROCEDURE — 250N000011 HC RX IP 250 OP 636: Performed by: ANESTHESIOLOGY

## 2020-01-01 PROCEDURE — 200N000001 HC R&B ICU

## 2020-01-01 PROCEDURE — 94003 VENT MGMT INPAT SUBQ DAY: CPT

## 2020-01-01 PROCEDURE — 80053 COMPREHEN METABOLIC PANEL: CPT | Performed by: INTERNAL MEDICINE

## 2020-01-01 PROCEDURE — 80202 ASSAY OF VANCOMYCIN: CPT | Performed by: INTERNAL MEDICINE

## 2020-01-01 PROCEDURE — 85025 COMPLETE CBC W/AUTO DIFF WBC: CPT | Performed by: INTERNAL MEDICINE

## 2020-01-01 PROCEDURE — 999N000190 HC STATISTIC VAT ROUNDS

## 2020-01-01 PROCEDURE — 83735 ASSAY OF MAGNESIUM: CPT | Performed by: INTERNAL MEDICINE

## 2020-01-01 PROCEDURE — 80048 BASIC METABOLIC PNL TOTAL CA: CPT | Performed by: INTERNAL MEDICINE

## 2020-01-01 PROCEDURE — 87040 BLOOD CULTURE FOR BACTERIA: CPT | Performed by: INTERNAL MEDICINE

## 2020-01-01 PROCEDURE — 94640 AIRWAY INHALATION TREATMENT: CPT | Mod: 76

## 2020-01-01 PROCEDURE — 999N001017 HC STATISTIC GLUCOSE BY METER IP

## 2020-01-01 PROCEDURE — XW033E5 INTRODUCTION OF REMDESIVIR ANTI-INFECTIVE INTO PERIPHERAL VEIN, PERCUTANEOUS APPROACH, NEW TECHNOLOGY GROUP 5: ICD-10-PCS | Performed by: INTERNAL MEDICINE

## 2020-01-01 PROCEDURE — 120N000004 HC R&B MS OVERFLOW

## 2020-01-01 PROCEDURE — 255N000002 HC RX 255 OP 636: Performed by: INTERNAL MEDICINE

## 2020-01-01 PROCEDURE — 82805 BLOOD GASES W/O2 SATURATION: CPT | Performed by: EMERGENCY MEDICINE

## 2020-01-01 PROCEDURE — 93308 TTE F-UP OR LMTD: CPT | Mod: 26 | Performed by: INTERNAL MEDICINE

## 2020-01-01 PROCEDURE — 99232 SBSQ HOSP IP/OBS MODERATE 35: CPT | Performed by: INTERNAL MEDICINE

## 2020-01-01 PROCEDURE — 5A1955Z RESPIRATORY VENTILATION, GREATER THAN 96 CONSECUTIVE HOURS: ICD-10-PCS | Performed by: INTERNAL MEDICINE

## 2020-01-01 PROCEDURE — 82805 BLOOD GASES W/O2 SATURATION: CPT | Performed by: ANESTHESIOLOGY

## 2020-01-01 PROCEDURE — 83520 IMMUNOASSAY QUANT NOS NONAB: CPT | Performed by: INTERNAL MEDICINE

## 2020-01-01 PROCEDURE — 71045 X-RAY EXAM CHEST 1 VIEW: CPT | Mod: 77

## 2020-01-01 PROCEDURE — 99358 PROLONG SERVICE W/O CONTACT: CPT | Performed by: NURSE PRACTITIONER

## 2020-01-01 PROCEDURE — 84484 ASSAY OF TROPONIN QUANT: CPT | Performed by: INTERNAL MEDICINE

## 2020-01-01 PROCEDURE — 94660 CPAP INITIATION&MGMT: CPT

## 2020-01-01 PROCEDURE — 81001 URINALYSIS AUTO W/SCOPE: CPT | Performed by: INTERNAL MEDICINE

## 2020-01-01 PROCEDURE — 71275 CT ANGIOGRAPHY CHEST: CPT

## 2020-01-01 PROCEDURE — 0W9B30Z DRAINAGE OF LEFT PLEURAL CAVITY WITH DRAINAGE DEVICE, PERCUTANEOUS APPROACH: ICD-10-PCS | Performed by: THORACIC SURGERY (CARDIOTHORACIC VASCULAR SURGERY)

## 2020-01-01 PROCEDURE — 99233 SBSQ HOSP IP/OBS HIGH 50: CPT | Performed by: INTERNAL MEDICINE

## 2020-01-01 PROCEDURE — 36556 INSERT NON-TUNNEL CV CATH: CPT | Performed by: INTERNAL MEDICINE

## 2020-01-01 PROCEDURE — 82805 BLOOD GASES W/O2 SATURATION: CPT | Performed by: SURGERY

## 2020-01-01 PROCEDURE — 999N000065 XR CHEST PORT 1 VW

## 2020-01-01 PROCEDURE — 86140 C-REACTIVE PROTEIN: CPT | Performed by: INTERNAL MEDICINE

## 2020-01-01 PROCEDURE — 93325 DOPPLER ECHO COLOR FLOW MAPG: CPT

## 2020-01-01 PROCEDURE — 96374 THER/PROPH/DIAG INJ IV PUSH: CPT | Mod: 59

## 2020-01-01 PROCEDURE — 93005 ELECTROCARDIOGRAM TRACING: CPT

## 2020-01-01 PROCEDURE — 999N000009 HC STATISTIC AIRWAY CARE

## 2020-01-01 PROCEDURE — 94640 AIRWAY INHALATION TREATMENT: CPT

## 2020-01-01 PROCEDURE — U0003 INFECTIOUS AGENT DETECTION BY NUCLEIC ACID (DNA OR RNA); SEVERE ACUTE RESPIRATORY SYNDROME CORONAVIRUS 2 (SARS-COV-2) (CORONAVIRUS DISEASE [COVID-19]), AMPLIFIED PROBE TECHNIQUE, MAKING USE OF HIGH THROUGHPUT TECHNOLOGIES AS DESCRIBED BY CMS-2020-01-R: HCPCS | Performed by: INTERNAL MEDICINE

## 2020-01-01 PROCEDURE — 99291 CRITICAL CARE FIRST HOUR: CPT | Mod: 25 | Performed by: INTERNAL MEDICINE

## 2020-01-01 PROCEDURE — 85384 FIBRINOGEN ACTIVITY: CPT | Performed by: INTERNAL MEDICINE

## 2020-01-01 PROCEDURE — 272N000452 HC KIT SHRLOCK 5FR POWER PICC TRIPLE LUMEN

## 2020-01-01 PROCEDURE — 99233 SBSQ HOSP IP/OBS HIGH 50: CPT | Performed by: NURSE PRACTITIONER

## 2020-01-01 PROCEDURE — 84460 ALANINE AMINO (ALT) (SGPT): CPT | Performed by: INTERNAL MEDICINE

## 2020-01-01 PROCEDURE — 3E043XZ INTRODUCTION OF VASOPRESSOR INTO CENTRAL VEIN, PERCUTANEOUS APPROACH: ICD-10-PCS | Performed by: INTERNAL MEDICINE

## 2020-01-01 PROCEDURE — 85379 FIBRIN DEGRADATION QUANT: CPT | Performed by: EMERGENCY MEDICINE

## 2020-01-01 PROCEDURE — 85049 AUTOMATED PLATELET COUNT: CPT | Performed by: INTERNAL MEDICINE

## 2020-01-01 PROCEDURE — 84450 TRANSFERASE (AST) (SGOT): CPT | Performed by: INTERNAL MEDICINE

## 2020-01-01 PROCEDURE — 36415 COLL VENOUS BLD VENIPUNCTURE: CPT | Performed by: SURGERY

## 2020-01-01 PROCEDURE — 93321 DOPPLER ECHO F-UP/LMTD STD: CPT | Mod: 26 | Performed by: INTERNAL MEDICINE

## 2020-01-01 PROCEDURE — 83605 ASSAY OF LACTIC ACID: CPT | Performed by: INTERNAL MEDICINE

## 2020-01-01 PROCEDURE — 93010 ELECTROCARDIOGRAM REPORT: CPT | Performed by: INTERNAL MEDICINE

## 2020-01-01 PROCEDURE — 36600 WITHDRAWAL OF ARTERIAL BLOOD: CPT

## 2020-01-01 PROCEDURE — 36620 INSERTION CATHETER ARTERY: CPT | Performed by: INTERNAL MEDICINE

## 2020-01-01 PROCEDURE — 250N000011 HC RX IP 250 OP 636

## 2020-01-01 PROCEDURE — 86900 BLOOD TYPING SEROLOGIC ABO: CPT | Performed by: INTERNAL MEDICINE

## 2020-01-01 PROCEDURE — 999N000215 HC STATISTIC HFNC ADULT NON-CPAP

## 2020-01-01 PROCEDURE — 84484 ASSAY OF TROPONIN QUANT: CPT | Performed by: EMERGENCY MEDICINE

## 2020-01-01 PROCEDURE — 999N000158 HC STATISTIC RCP TIME ED VENT EA 10 MIN

## 2020-01-01 PROCEDURE — 999N000065 XR ABDOMEN PORT 1 VW

## 2020-01-01 PROCEDURE — 84145 PROCALCITONIN (PCT): CPT | Performed by: INTERNAL MEDICINE

## 2020-01-01 PROCEDURE — 80076 HEPATIC FUNCTION PANEL: CPT | Performed by: INTERNAL MEDICINE

## 2020-01-01 PROCEDURE — 258N000003 HC RX IP 258 OP 636

## 2020-01-01 PROCEDURE — 82565 ASSAY OF CREATININE: CPT | Performed by: INTERNAL MEDICINE

## 2020-01-01 PROCEDURE — 250N000011 HC RX IP 250 OP 636: Performed by: EMERGENCY MEDICINE

## 2020-01-01 PROCEDURE — 87807 RSV ASSAY W/OPTIC: CPT | Performed by: INTERNAL MEDICINE

## 2020-01-01 PROCEDURE — P9047 ALBUMIN (HUMAN), 25%, 50ML: HCPCS | Performed by: INTERNAL MEDICINE

## 2020-01-01 PROCEDURE — 84443 ASSAY THYROID STIM HORMONE: CPT | Performed by: INTERNAL MEDICINE

## 2020-01-01 PROCEDURE — 250N000009 HC RX 250: Performed by: PHYSICIAN ASSISTANT

## 2020-01-01 PROCEDURE — 82728 ASSAY OF FERRITIN: CPT | Performed by: INTERNAL MEDICINE

## 2020-01-01 PROCEDURE — 87205 SMEAR GRAM STAIN: CPT | Performed by: INTERNAL MEDICINE

## 2020-01-01 PROCEDURE — 250N000009 HC RX 250

## 2020-01-01 PROCEDURE — 94002 VENT MGMT INPAT INIT DAY: CPT

## 2020-01-01 PROCEDURE — 85300 ANTITHROMBIN III ACTIVITY: CPT | Performed by: INTERNAL MEDICINE

## 2020-01-01 PROCEDURE — 83036 HEMOGLOBIN GLYCOSYLATED A1C: CPT | Performed by: INTERNAL MEDICINE

## 2020-01-01 PROCEDURE — 93970 EXTREMITY STUDY: CPT

## 2020-01-01 PROCEDURE — P9047 ALBUMIN (HUMAN), 25%, 50ML: HCPCS

## 2020-01-01 PROCEDURE — 82330 ASSAY OF CALCIUM: CPT | Performed by: INTERNAL MEDICINE

## 2020-01-01 PROCEDURE — 31500 INSERT EMERGENCY AIRWAY: CPT

## 2020-01-01 PROCEDURE — 93325 DOPPLER ECHO COLOR FLOW MAPG: CPT | Mod: 26 | Performed by: INTERNAL MEDICINE

## 2020-01-01 PROCEDURE — 99223 1ST HOSP IP/OBS HIGH 75: CPT | Performed by: NURSE PRACTITIONER

## 2020-01-01 PROCEDURE — 74176 CT ABD & PELVIS W/O CONTRAST: CPT

## 2020-01-01 PROCEDURE — 84153 ASSAY OF PSA TOTAL: CPT | Performed by: INTERNAL MEDICINE

## 2020-01-01 PROCEDURE — 85025 COMPLETE CBC W/AUTO DIFF WBC: CPT | Performed by: EMERGENCY MEDICINE

## 2020-01-01 PROCEDURE — 999N000011 HC STATISTIC ANESTHESIA CASE

## 2020-01-01 PROCEDURE — 80048 BASIC METABOLIC PNL TOTAL CA: CPT | Performed by: EMERGENCY MEDICINE

## 2020-01-01 PROCEDURE — 99223 1ST HOSP IP/OBS HIGH 75: CPT | Mod: AI | Performed by: INTERNAL MEDICINE

## 2020-01-01 PROCEDURE — 99291 CRITICAL CARE FIRST HOUR: CPT | Performed by: NURSE PRACTITIONER

## 2020-01-01 PROCEDURE — 85730 THROMBOPLASTIN TIME PARTIAL: CPT | Performed by: INTERNAL MEDICINE

## 2020-01-01 PROCEDURE — G0463 HOSPITAL OUTPT CLINIC VISIT: HCPCS

## 2020-01-01 PROCEDURE — 99207 PR NO CHARGE LOS: CPT | Performed by: STUDENT IN AN ORGANIZED HEALTH CARE EDUCATION/TRAINING PROGRAM

## 2020-01-01 PROCEDURE — 250N000009 HC RX 250: Performed by: NURSE PRACTITIONER

## 2020-01-01 PROCEDURE — 99397 PER PM REEVAL EST PAT 65+ YR: CPT | Performed by: INTERNAL MEDICINE

## 2020-01-01 PROCEDURE — 99238 HOSP IP/OBS DSCHRG MGMT 30/<: CPT | Performed by: INTERNAL MEDICINE

## 2020-01-01 PROCEDURE — 94644 CONT INHLJ TX 1ST HOUR: CPT

## 2020-01-01 PROCEDURE — 80048 BASIC METABOLIC PNL TOTAL CA: CPT | Performed by: NURSE PRACTITIONER

## 2020-01-01 PROCEDURE — 86901 BLOOD TYPING SEROLOGIC RH(D): CPT | Performed by: INTERNAL MEDICINE

## 2020-01-01 PROCEDURE — 99213 OFFICE O/P EST LOW 20 MIN: CPT | Performed by: FAMILY MEDICINE

## 2020-01-01 PROCEDURE — 82805 BLOOD GASES W/O2 SATURATION: CPT | Performed by: NURSE PRACTITIONER

## 2020-01-01 PROCEDURE — 250N000011 HC RX IP 250 OP 636: Performed by: NURSE ANESTHETIST, CERTIFIED REGISTERED

## 2020-01-01 PROCEDURE — 31500 INSERT EMERGENCY AIRWAY: CPT | Performed by: NURSE ANESTHETIST, CERTIFIED REGISTERED

## 2020-01-01 PROCEDURE — 36569 INSJ PICC 5 YR+ W/O IMAGING: CPT

## 2020-01-01 PROCEDURE — 99285 EMERGENCY DEPT VISIT HI MDM: CPT | Mod: 25

## 2020-01-01 RX ORDER — FENTANYL CITRATE 50 UG/ML
INJECTION, SOLUTION INTRAMUSCULAR; INTRAVENOUS
Status: COMPLETED
Start: 2020-01-01 | End: 2020-01-01

## 2020-01-01 RX ORDER — MIDAZOLAM (PF) 1 MG/ML IN 0.9 % SODIUM CHLORIDE INTRAVENOUS SOLUTION
1-8 CONTINUOUS
Status: DISCONTINUED | OUTPATIENT
Start: 2020-01-01 | End: 2020-01-01

## 2020-01-01 RX ORDER — DEXAMETHASONE SODIUM PHOSPHATE 4 MG/ML
6 INJECTION, SOLUTION INTRA-ARTICULAR; INTRALESIONAL; INTRAMUSCULAR; INTRAVENOUS; SOFT TISSUE ONCE
Status: COMPLETED | OUTPATIENT
Start: 2020-01-01 | End: 2020-01-01

## 2020-01-01 RX ORDER — FENTANYL CITRATE 50 UG/ML
100 INJECTION, SOLUTION INTRAMUSCULAR; INTRAVENOUS ONCE
Status: COMPLETED | OUTPATIENT
Start: 2020-01-01 | End: 2020-01-01

## 2020-01-01 RX ORDER — NALOXONE HYDROCHLORIDE 0.4 MG/ML
0.4 INJECTION, SOLUTION INTRAMUSCULAR; INTRAVENOUS; SUBCUTANEOUS
Status: DISCONTINUED | OUTPATIENT
Start: 2020-01-01 | End: 2020-01-01

## 2020-01-01 RX ORDER — GLYCOPYRROLATE 0.2 MG/ML
.1-.2 INJECTION, SOLUTION INTRAMUSCULAR; INTRAVENOUS EVERY 4 HOURS PRN
Status: DISCONTINUED | OUTPATIENT
Start: 2020-01-01 | End: 2020-01-01 | Stop reason: HOSPADM

## 2020-01-01 RX ORDER — MEROPENEM 1 G/1
1000 INJECTION, POWDER, FOR SOLUTION INTRAVENOUS EVERY 8 HOURS
Status: DISCONTINUED | OUTPATIENT
Start: 2020-01-01 | End: 2020-01-01

## 2020-01-01 RX ORDER — POTASSIUM CHLORIDE 20MEQ/15ML
20 LIQUID (ML) ORAL ONCE
Status: COMPLETED | OUTPATIENT
Start: 2020-01-01 | End: 2020-01-01

## 2020-01-01 RX ORDER — NICOTINE POLACRILEX 4 MG
15-30 LOZENGE BUCCAL
Status: DISCONTINUED | OUTPATIENT
Start: 2020-01-01 | End: 2020-01-01

## 2020-01-01 RX ORDER — GINSENG 100 MG
CAPSULE ORAL 2 TIMES DAILY PRN
Status: DISCONTINUED | OUTPATIENT
Start: 2020-01-01 | End: 2020-01-01 | Stop reason: HOSPADM

## 2020-01-01 RX ORDER — NOREPINEPHRINE BITARTRATE 0.06 MG/ML
INJECTION, SOLUTION INTRAVENOUS
Status: COMPLETED
Start: 2020-01-01 | End: 2020-01-01

## 2020-01-01 RX ORDER — HYDROMORPHONE HYDROCHLORIDE 1 MG/ML
0.5 INJECTION, SOLUTION INTRAMUSCULAR; INTRAVENOUS; SUBCUTANEOUS ONCE
Status: COMPLETED | OUTPATIENT
Start: 2020-01-01 | End: 2020-01-01

## 2020-01-01 RX ORDER — POTASSIUM CHLORIDE 1500 MG/1
40 TABLET, EXTENDED RELEASE ORAL ONCE
Status: COMPLETED | OUTPATIENT
Start: 2020-01-01 | End: 2020-01-01

## 2020-01-01 RX ORDER — PROPOFOL 10 MG/ML
5-75 INJECTION, EMULSION INTRAVENOUS CONTINUOUS
Status: DISCONTINUED | OUTPATIENT
Start: 2020-01-01 | End: 2020-01-01

## 2020-01-01 RX ORDER — POTASSIUM CHLORIDE 1.5 G/1.58G
20 POWDER, FOR SOLUTION ORAL ONCE
Status: COMPLETED | OUTPATIENT
Start: 2020-01-01 | End: 2020-01-01

## 2020-01-01 RX ORDER — ONDANSETRON 4 MG/1
4 TABLET, ORALLY DISINTEGRATING ORAL EVERY 6 HOURS PRN
Status: DISCONTINUED | OUTPATIENT
Start: 2020-01-01 | End: 2020-01-01

## 2020-01-01 RX ORDER — ONDANSETRON 2 MG/ML
4 INJECTION INTRAMUSCULAR; INTRAVENOUS EVERY 6 HOURS PRN
Status: DISCONTINUED | OUTPATIENT
Start: 2020-01-01 | End: 2020-01-01 | Stop reason: HOSPADM

## 2020-01-01 RX ORDER — LIDOCAINE 40 MG/G
CREAM TOPICAL
Status: DISCONTINUED | OUTPATIENT
Start: 2020-01-01 | End: 2020-01-01 | Stop reason: HOSPADM

## 2020-01-01 RX ORDER — ATROPINE SULFATE 10 MG/ML
1-2 SOLUTION/ DROPS OPHTHALMIC
Status: DISCONTINUED | OUTPATIENT
Start: 2020-01-01 | End: 2020-01-01 | Stop reason: HOSPADM

## 2020-01-01 RX ORDER — HEPARIN SODIUM 10000 [USP'U]/100ML
0-5000 INJECTION, SOLUTION INTRAVENOUS CONTINUOUS
Status: DISCONTINUED | OUTPATIENT
Start: 2020-01-01 | End: 2020-01-01 | Stop reason: HOSPADM

## 2020-01-01 RX ORDER — LEVALBUTEROL 1.25 MG/.5ML
1.25 SOLUTION, CONCENTRATE RESPIRATORY (INHALATION) EVERY 6 HOURS PRN
Status: DISCONTINUED | OUTPATIENT
Start: 2020-01-01 | End: 2020-01-01 | Stop reason: HOSPADM

## 2020-01-01 RX ORDER — FAMOTIDINE 20 MG/1
20 TABLET, FILM COATED ORAL 2 TIMES DAILY
Status: DISCONTINUED | OUTPATIENT
Start: 2020-01-01 | End: 2020-01-01

## 2020-01-01 RX ORDER — GLYCOPYRROLATE 0.2 MG/ML
0.2 INJECTION, SOLUTION INTRAMUSCULAR; INTRAVENOUS ONCE
Status: COMPLETED | OUTPATIENT
Start: 2020-01-01 | End: 2020-01-01

## 2020-01-01 RX ORDER — FENTANYL CITRATE 50 UG/ML
25-50 INJECTION, SOLUTION INTRAMUSCULAR; INTRAVENOUS EVERY 30 MIN PRN
Status: DISCONTINUED | OUTPATIENT
Start: 2020-01-01 | End: 2020-01-01

## 2020-01-01 RX ORDER — DEXMEDETOMIDINE HYDROCHLORIDE 4 UG/ML
0.2-0.7 INJECTION, SOLUTION INTRAVENOUS CONTINUOUS
Status: DISCONTINUED | OUTPATIENT
Start: 2020-01-01 | End: 2020-01-01

## 2020-01-01 RX ORDER — POLYETHYLENE GLYCOL 3350 17 G/17G
17 POWDER, FOR SOLUTION ORAL DAILY
Status: DISCONTINUED | OUTPATIENT
Start: 2020-01-01 | End: 2020-01-01

## 2020-01-01 RX ORDER — CARBOXYMETHYLCELLULOSE SODIUM 5 MG/ML
1 SOLUTION/ DROPS OPHTHALMIC
Status: DISCONTINUED | OUTPATIENT
Start: 2020-01-01 | End: 2020-01-01 | Stop reason: HOSPADM

## 2020-01-01 RX ORDER — ALBUMIN (HUMAN) 12.5 G/50ML
SOLUTION INTRAVENOUS
Status: COMPLETED
Start: 2020-01-01 | End: 2020-01-01

## 2020-01-01 RX ORDER — METOCLOPRAMIDE HYDROCHLORIDE 5 MG/ML
10 INJECTION INTRAMUSCULAR; INTRAVENOUS ONCE
Status: COMPLETED | OUTPATIENT
Start: 2020-01-01 | End: 2020-01-01

## 2020-01-01 RX ORDER — HYDROMORPHONE HYDROCHLORIDE 1 MG/ML
INJECTION, SOLUTION INTRAMUSCULAR; INTRAVENOUS; SUBCUTANEOUS
Status: DISCONTINUED
Start: 2020-01-01 | End: 2020-01-01 | Stop reason: HOSPADM

## 2020-01-01 RX ORDER — FUROSEMIDE 10 MG/ML
20 INJECTION INTRAMUSCULAR; INTRAVENOUS ONCE
Status: COMPLETED | OUTPATIENT
Start: 2020-01-01 | End: 2020-01-01

## 2020-01-01 RX ORDER — CHLORHEXIDINE GLUCONATE ORAL RINSE 1.2 MG/ML
15 SOLUTION DENTAL EVERY 12 HOURS
Status: DISCONTINUED | OUTPATIENT
Start: 2020-01-01 | End: 2020-01-01

## 2020-01-01 RX ORDER — FUROSEMIDE 10 MG/ML
40 INJECTION INTRAMUSCULAR; INTRAVENOUS ONCE
Status: COMPLETED | OUTPATIENT
Start: 2020-01-01 | End: 2020-01-01

## 2020-01-01 RX ORDER — NALOXONE HYDROCHLORIDE 0.4 MG/ML
0.2 INJECTION, SOLUTION INTRAMUSCULAR; INTRAVENOUS; SUBCUTANEOUS
Status: DISCONTINUED | OUTPATIENT
Start: 2020-01-01 | End: 2020-01-01

## 2020-01-01 RX ORDER — IOPAMIDOL 755 MG/ML
68 INJECTION, SOLUTION INTRAVASCULAR ONCE
Status: COMPLETED | OUTPATIENT
Start: 2020-01-01 | End: 2020-01-01

## 2020-01-01 RX ORDER — IOPAMIDOL 755 MG/ML
69 INJECTION, SOLUTION INTRAVASCULAR ONCE
Status: COMPLETED | OUTPATIENT
Start: 2020-01-01 | End: 2020-01-01

## 2020-01-01 RX ORDER — VECURONIUM BROMIDE 1 MG/ML
10 INJECTION, POWDER, LYOPHILIZED, FOR SOLUTION INTRAVENOUS ONCE
Status: COMPLETED | OUTPATIENT
Start: 2020-01-01 | End: 2020-01-01

## 2020-01-01 RX ORDER — METHYLPREDNISOLONE SODIUM SUCCINATE 125 MG/2ML
60 INJECTION, POWDER, LYOPHILIZED, FOR SOLUTION INTRAMUSCULAR; INTRAVENOUS EVERY 8 HOURS
Status: COMPLETED | OUTPATIENT
Start: 2020-01-01 | End: 2020-01-01

## 2020-01-01 RX ORDER — PROPOFOL 10 MG/ML
5-75 INJECTION, EMULSION INTRAVENOUS CONTINUOUS
Status: DISCONTINUED | OUTPATIENT
Start: 2020-01-01 | End: 2020-01-01 | Stop reason: HOSPADM

## 2020-01-01 RX ORDER — WATER 10 ML/10ML
INJECTION INTRAMUSCULAR; INTRAVENOUS; SUBCUTANEOUS
Status: COMPLETED
Start: 2020-01-01 | End: 2020-01-01

## 2020-01-01 RX ORDER — POTASSIUM CHLORIDE 1.5 G/1.58G
40 POWDER, FOR SOLUTION ORAL ONCE
Status: COMPLETED | OUTPATIENT
Start: 2020-01-01 | End: 2020-01-01

## 2020-01-01 RX ORDER — HEPARIN SODIUM 10000 [USP'U]/100ML
INJECTION, SOLUTION INTRAVENOUS CONTINUOUS
Status: DISCONTINUED | OUTPATIENT
Start: 2020-01-01 | End: 2020-01-01

## 2020-01-01 RX ORDER — FENTANYL CITRATE 50 UG/ML
150-200 INJECTION, SOLUTION INTRAMUSCULAR; INTRAVENOUS EVERY 10 MIN PRN
Status: DISCONTINUED | OUTPATIENT
Start: 2020-01-01 | End: 2020-01-01 | Stop reason: HOSPADM

## 2020-01-01 RX ORDER — ALBUTEROL SULFATE 0.83 MG/ML
2.5 SOLUTION RESPIRATORY (INHALATION)
Status: DISCONTINUED | OUTPATIENT
Start: 2020-01-01 | End: 2020-01-01

## 2020-01-01 RX ORDER — ACETAZOLAMIDE 250 MG/1
500 TABLET ORAL ONCE
Status: COMPLETED | OUTPATIENT
Start: 2020-01-01 | End: 2020-01-01

## 2020-01-01 RX ORDER — DEXTROSE MONOHYDRATE 100 MG/ML
INJECTION, SOLUTION INTRAVENOUS CONTINUOUS PRN
Status: DISCONTINUED | OUTPATIENT
Start: 2020-01-01 | End: 2020-01-01

## 2020-01-01 RX ORDER — ALBUMIN (HUMAN) 12.5 G/50ML
50 SOLUTION INTRAVENOUS ONCE
Status: COMPLETED | OUTPATIENT
Start: 2020-01-01 | End: 2020-01-01

## 2020-01-01 RX ORDER — FUROSEMIDE 10 MG/ML
40 INJECTION INTRAMUSCULAR; INTRAVENOUS EVERY 12 HOURS
Status: DISCONTINUED | OUTPATIENT
Start: 2020-01-01 | End: 2020-01-01

## 2020-01-01 RX ORDER — OXYCODONE HYDROCHLORIDE 5 MG/1
5-10 TABLET ORAL
Status: DISCONTINUED | OUTPATIENT
Start: 2020-01-01 | End: 2020-01-01

## 2020-01-01 RX ORDER — LIDOCAINE HYDROCHLORIDE 20 MG/ML
JELLY TOPICAL ONCE
Status: COMPLETED | OUTPATIENT
Start: 2020-01-01 | End: 2020-01-01

## 2020-01-01 RX ORDER — AMOXICILLIN 250 MG
1 CAPSULE ORAL 2 TIMES DAILY
Status: DISCONTINUED | OUTPATIENT
Start: 2020-01-01 | End: 2020-01-01

## 2020-01-01 RX ORDER — DEXTROSE MONOHYDRATE 25 G/50ML
25-50 INJECTION, SOLUTION INTRAVENOUS
Status: DISCONTINUED | OUTPATIENT
Start: 2020-01-01 | End: 2020-01-01

## 2020-01-01 RX ORDER — AMOXICILLIN 250 MG
2 CAPSULE ORAL 2 TIMES DAILY
Status: DISCONTINUED | OUTPATIENT
Start: 2020-01-01 | End: 2020-01-01

## 2020-01-01 RX ORDER — ACETAMINOPHEN 325 MG/1
650 TABLET ORAL EVERY 4 HOURS PRN
Status: DISCONTINUED | OUTPATIENT
Start: 2020-01-01 | End: 2020-01-01 | Stop reason: HOSPADM

## 2020-01-01 RX ORDER — NOREPINEPHRINE BITARTRATE 0.06 MG/ML
0.03-0.4 INJECTION, SOLUTION INTRAVENOUS CONTINUOUS
Status: DISCONTINUED | OUTPATIENT
Start: 2020-01-01 | End: 2020-01-01 | Stop reason: HOSPADM

## 2020-01-01 RX ORDER — SODIUM CHLORIDE AND POTASSIUM CHLORIDE 150; 450 MG/100ML; MG/100ML
INJECTION, SOLUTION INTRAVENOUS CONTINUOUS
Status: DISCONTINUED | OUTPATIENT
Start: 2020-01-01 | End: 2020-01-01

## 2020-01-01 RX ORDER — NALOXONE HYDROCHLORIDE 0.4 MG/ML
.1-.4 INJECTION, SOLUTION INTRAMUSCULAR; INTRAVENOUS; SUBCUTANEOUS
Status: DISCONTINUED | OUTPATIENT
Start: 2020-01-01 | End: 2020-01-01

## 2020-01-01 RX ORDER — NOREPINEPHRINE BITARTRATE 0.06 MG/ML
0.03-0.4 INJECTION, SOLUTION INTRAVENOUS CONTINUOUS
Status: DISCONTINUED | OUTPATIENT
Start: 2020-01-01 | End: 2020-01-01

## 2020-01-01 RX ORDER — FUROSEMIDE 10 MG/ML
20 INJECTION INTRAMUSCULAR; INTRAVENOUS EVERY 8 HOURS
Status: DISPENSED | OUTPATIENT
Start: 2020-01-01 | End: 2020-01-01

## 2020-01-01 RX ORDER — IPRATROPIUM BROMIDE AND ALBUTEROL SULFATE 2.5; .5 MG/3ML; MG/3ML
3 SOLUTION RESPIRATORY (INHALATION) EVERY 4 HOURS PRN
Status: DISCONTINUED | OUTPATIENT
Start: 2020-01-01 | End: 2020-01-01

## 2020-01-01 RX ORDER — ALBUMIN (HUMAN) 12.5 G/50ML
SOLUTION INTRAVENOUS
Status: DISCONTINUED
Start: 2020-01-01 | End: 2020-01-01 | Stop reason: HOSPADM

## 2020-01-01 RX ORDER — SODIUM CHLORIDE 9 MG/ML
INJECTION, SOLUTION INTRAVENOUS CONTINUOUS
Status: DISCONTINUED | OUTPATIENT
Start: 2020-01-01 | End: 2020-01-01 | Stop reason: HOSPADM

## 2020-01-01 RX ORDER — PROPOFOL 10 MG/ML
INJECTION, EMULSION INTRAVENOUS
Status: COMPLETED
Start: 2020-01-01 | End: 2020-01-01

## 2020-01-01 RX ORDER — ALBUMIN (HUMAN) 12.5 G/50ML
25 SOLUTION INTRAVENOUS EVERY 8 HOURS
Status: DISPENSED | OUTPATIENT
Start: 2020-01-01 | End: 2020-01-01

## 2020-01-01 RX ORDER — LIDOCAINE 40 MG/G
CREAM TOPICAL
Status: DISCONTINUED | OUTPATIENT
Start: 2020-01-01 | End: 2020-01-01

## 2020-01-01 RX ORDER — IBUPROFEN 400 MG/1
400 TABLET, FILM COATED ORAL EVERY 6 HOURS PRN
Status: DISCONTINUED | OUTPATIENT
Start: 2020-01-01 | End: 2020-01-01

## 2020-01-01 RX ORDER — FENTANYL CITRATE 50 UG/ML
150 INJECTION, SOLUTION INTRAMUSCULAR; INTRAVENOUS ONCE
Status: COMPLETED | OUTPATIENT
Start: 2020-01-01 | End: 2020-01-01

## 2020-01-01 RX ORDER — ACETAMINOPHEN 325 MG/1
650 TABLET ORAL EVERY 4 HOURS PRN
Status: DISCONTINUED | OUTPATIENT
Start: 2020-01-01 | End: 2020-01-01

## 2020-01-01 RX ORDER — POTASSIUM CHLORIDE 20MEQ/15ML
40 LIQUID (ML) ORAL ONCE
Status: COMPLETED | OUTPATIENT
Start: 2020-01-01 | End: 2020-01-01

## 2020-01-01 RX ORDER — ACETAMINOPHEN 650 MG/1
650 SUPPOSITORY RECTAL EVERY 4 HOURS PRN
Status: DISCONTINUED | OUTPATIENT
Start: 2020-01-01 | End: 2020-01-01 | Stop reason: HOSPADM

## 2020-01-01 RX ORDER — PROPOFOL 10 MG/ML
INJECTION, EMULSION INTRAVENOUS
Status: DISCONTINUED | OUTPATIENT
Start: 2020-01-01 | End: 2020-01-01

## 2020-01-01 RX ORDER — MIDAZOLAM (PF) 1 MG/ML IN 0.9 % SODIUM CHLORIDE INTRAVENOUS SOLUTION
1-8 CONTINUOUS
Status: DISCONTINUED | OUTPATIENT
Start: 2020-01-01 | End: 2020-01-01 | Stop reason: HOSPADM

## 2020-01-01 RX ADMIN — MIDAZOLAM HYDROCHLORIDE 6 MG/HR: 5 INJECTION, SOLUTION INTRAMUSCULAR; INTRAVENOUS at 00:09

## 2020-01-01 RX ADMIN — MEROPENEM 1000 MG: 1 INJECTION, POWDER, FOR SOLUTION INTRAVENOUS at 17:33

## 2020-01-01 RX ADMIN — EPOPROSTENOL 20 NG/KG/MIN: 1.5 INJECTION, POWDER, LYOPHILIZED, FOR SOLUTION INTRAVENOUS at 06:18

## 2020-01-01 RX ADMIN — FAMOTIDINE 20 MG: 10 INJECTION INTRAVENOUS at 20:32

## 2020-01-01 RX ADMIN — FENTANYL CITRATE 50 MCG: 50 INJECTION, SOLUTION INTRAMUSCULAR; INTRAVENOUS at 17:43

## 2020-01-01 RX ADMIN — IOPAMIDOL 68 ML: 755 INJECTION, SOLUTION INTRAVENOUS at 13:17

## 2020-01-01 RX ADMIN — MIDAZOLAM (PF) 1 MG/ML IN 0.9 % SODIUM CHLORIDE INTRAVENOUS SOLUTION 8 MG/HR: at 00:36

## 2020-01-01 RX ADMIN — VANCOMYCIN HYDROCHLORIDE 1500 MG: 5 INJECTION, POWDER, LYOPHILIZED, FOR SOLUTION INTRAVENOUS at 02:00

## 2020-01-01 RX ADMIN — EPOPROSTENOL 20 NG/KG/MIN: 1.5 INJECTION, POWDER, LYOPHILIZED, FOR SOLUTION INTRAVENOUS at 21:03

## 2020-01-01 RX ADMIN — ALBUTEROL SULFATE 2.5 MG: 2.5 SOLUTION RESPIRATORY (INHALATION) at 00:48

## 2020-01-01 RX ADMIN — FAMOTIDINE 20 MG: 10 INJECTION INTRAVENOUS at 20:38

## 2020-01-01 RX ADMIN — MINERAL OIL AND PETROLATUM: 150; 830 OINTMENT OPHTHALMIC at 16:28

## 2020-01-01 RX ADMIN — VECURONIUM BROMIDE 1 MCG/KG/MIN: 1 INJECTION, POWDER, LYOPHILIZED, FOR SOLUTION INTRAVENOUS at 08:47

## 2020-01-01 RX ADMIN — POTASSIUM CHLORIDE 20 MEQ: 20 SOLUTION ORAL at 06:28

## 2020-01-01 RX ADMIN — MIDAZOLAM (PF) 1 MG/ML IN 0.9 % SODIUM CHLORIDE INTRAVENOUS SOLUTION 8 MG/HR: at 20:22

## 2020-01-01 RX ADMIN — REMDESIVIR 100 MG: 100 INJECTION, POWDER, LYOPHILIZED, FOR SOLUTION INTRAVENOUS at 22:29

## 2020-01-01 RX ADMIN — FAMOTIDINE 20 MG: 10 INJECTION INTRAVENOUS at 21:20

## 2020-01-01 RX ADMIN — Medication 0.03 MCG/KG/MIN: at 01:24

## 2020-01-01 RX ADMIN — MEROPENEM 1000 MG: 1 INJECTION, POWDER, FOR SOLUTION INTRAVENOUS at 02:39

## 2020-01-01 RX ADMIN — EPOPROSTENOL 20 NG/KG/MIN: 1.5 INJECTION, POWDER, LYOPHILIZED, FOR SOLUTION INTRAVENOUS at 02:30

## 2020-01-01 RX ADMIN — FUROSEMIDE 40 MG: 10 INJECTION, SOLUTION INTRAVENOUS at 20:10

## 2020-01-01 RX ADMIN — CHLORHEXIDINE GLUCONATE 0.12% ORAL RINSE 15 ML: 1.2 LIQUID ORAL at 08:40

## 2020-01-01 RX ADMIN — SENNOSIDES 5 ML: 8.8 SYRUP ORAL at 00:23

## 2020-01-01 RX ADMIN — FUROSEMIDE 40 MG: 10 INJECTION, SOLUTION INTRAVENOUS at 20:23

## 2020-01-01 RX ADMIN — EPOPROSTENOL 20 NG/KG/MIN: 1.5 INJECTION, POWDER, LYOPHILIZED, FOR SOLUTION INTRAVENOUS at 22:13

## 2020-01-01 RX ADMIN — MULTIVITAMIN 15 ML: LIQUID ORAL at 08:47

## 2020-01-01 RX ADMIN — HEPARIN SODIUM 1850 UNITS/HR: 10000 INJECTION, SOLUTION INTRAVENOUS at 18:53

## 2020-01-01 RX ADMIN — FAMOTIDINE 20 MG: 20 TABLET ORAL at 20:25

## 2020-01-01 RX ADMIN — IPRATROPIUM BROMIDE AND ALBUTEROL 1 PUFF: 20; 100 SPRAY, METERED RESPIRATORY (INHALATION) at 15:58

## 2020-01-01 RX ADMIN — MINERAL OIL AND PETROLATUM: 150; 830 OINTMENT OPHTHALMIC at 00:40

## 2020-01-01 RX ADMIN — FUROSEMIDE 40 MG: 10 INJECTION, SOLUTION INTRAVENOUS at 20:00

## 2020-01-01 RX ADMIN — CHLORHEXIDINE GLUCONATE 0.12% ORAL RINSE 15 ML: 1.2 LIQUID ORAL at 08:47

## 2020-01-01 RX ADMIN — HYDROCORTISONE SODIUM SUCCINATE 50 MG: 100 INJECTION, POWDER, FOR SOLUTION INTRAMUSCULAR; INTRAVENOUS at 08:33

## 2020-01-01 RX ADMIN — WATER 10 ML: 1 INJECTION INTRAMUSCULAR; INTRAVENOUS; SUBCUTANEOUS at 19:55

## 2020-01-01 RX ADMIN — VANCOMYCIN HYDROCHLORIDE 1750 MG: 1 INJECTION, POWDER, LYOPHILIZED, FOR SOLUTION INTRAVENOUS at 18:56

## 2020-01-01 RX ADMIN — CHLORHEXIDINE GLUCONATE 0.12% ORAL RINSE 15 ML: 1.2 LIQUID ORAL at 20:03

## 2020-01-01 RX ADMIN — EPOPROSTENOL 20 NG/KG/MIN: 1.5 INJECTION, POWDER, LYOPHILIZED, FOR SOLUTION INTRAVENOUS at 06:52

## 2020-01-01 RX ADMIN — FAMOTIDINE 20 MG: 10 INJECTION INTRAVENOUS at 21:18

## 2020-01-01 RX ADMIN — FAMOTIDINE 20 MG: 10 INJECTION INTRAVENOUS at 08:12

## 2020-01-01 RX ADMIN — SODIUM CHLORIDE: 9 INJECTION, SOLUTION INTRAVENOUS at 15:37

## 2020-01-01 RX ADMIN — IPRATROPIUM BROMIDE AND ALBUTEROL 1 PUFF: 20; 100 SPRAY, METERED RESPIRATORY (INHALATION) at 20:14

## 2020-01-01 RX ADMIN — ENOXAPARIN SODIUM 80 MG: 80 INJECTION SUBCUTANEOUS at 03:14

## 2020-01-01 RX ADMIN — MIDAZOLAM HYDROCHLORIDE 6 MG/HR: 5 INJECTION, SOLUTION INTRAMUSCULAR; INTRAVENOUS at 17:09

## 2020-01-01 RX ADMIN — EPOPROSTENOL 20 NG/KG/MIN: 1.5 INJECTION, POWDER, LYOPHILIZED, FOR SOLUTION INTRAVENOUS at 09:01

## 2020-01-01 RX ADMIN — EPOPROSTENOL 20 NG/KG/MIN: 1.5 INJECTION, POWDER, LYOPHILIZED, FOR SOLUTION INTRAVENOUS at 18:25

## 2020-01-01 RX ADMIN — DEXAMETHASONE 6 MG: 2 TABLET ORAL at 08:50

## 2020-01-01 RX ADMIN — SUCCINYLCHOLINE CHLORIDE 100 MG: 20 INJECTION, SOLUTION INTRAMUSCULAR; INTRAVENOUS; PARENTERAL at 16:00

## 2020-01-01 RX ADMIN — EPOPROSTENOL 20 NG/KG/MIN: 1.5 INJECTION, POWDER, LYOPHILIZED, FOR SOLUTION INTRAVENOUS at 14:59

## 2020-01-01 RX ADMIN — MEROPENEM 1000 MG: 1 INJECTION, POWDER, FOR SOLUTION INTRAVENOUS at 12:30

## 2020-01-01 RX ADMIN — EPOPROSTENOL 20 NG/KG/MIN: 1.5 INJECTION, POWDER, LYOPHILIZED, FOR SOLUTION INTRAVENOUS at 21:41

## 2020-01-01 RX ADMIN — FUROSEMIDE 40 MG: 10 INJECTION, SOLUTION INTRAVENOUS at 16:30

## 2020-01-01 RX ADMIN — FUROSEMIDE 40 MG: 10 INJECTION, SOLUTION INTRAVENOUS at 10:09

## 2020-01-01 RX ADMIN — EPOPROSTENOL 20 NG/KG/MIN: 1.5 INJECTION, POWDER, LYOPHILIZED, FOR SOLUTION INTRAVENOUS at 03:50

## 2020-01-01 RX ADMIN — REMDESIVIR 200 MG: 100 INJECTION, POWDER, LYOPHILIZED, FOR SOLUTION INTRAVENOUS at 22:36

## 2020-01-01 RX ADMIN — VANCOMYCIN HYDROCHLORIDE 1750 MG: 1 INJECTION, POWDER, LYOPHILIZED, FOR SOLUTION INTRAVENOUS at 05:55

## 2020-01-01 RX ADMIN — SODIUM CHLORIDE: 9 INJECTION, SOLUTION INTRAVENOUS at 15:33

## 2020-01-01 RX ADMIN — CHLORHEXIDINE GLUCONATE 0.12% ORAL RINSE 15 ML: 1.2 LIQUID ORAL at 19:35

## 2020-01-01 RX ADMIN — Medication 150 MCG/HR: at 23:32

## 2020-01-01 RX ADMIN — MULTIVITAMIN 15 ML: LIQUID ORAL at 08:48

## 2020-01-01 RX ADMIN — FENTANYL CITRATE 50 MCG: 50 INJECTION, SOLUTION INTRAMUSCULAR; INTRAVENOUS at 23:54

## 2020-01-01 RX ADMIN — Medication 150 MCG/HR: at 06:50

## 2020-01-01 RX ADMIN — REMDESIVIR 100 MG: 100 INJECTION, POWDER, LYOPHILIZED, FOR SOLUTION INTRAVENOUS at 22:26

## 2020-01-01 RX ADMIN — FENTANYL CITRATE 50 MCG: 50 INJECTION, SOLUTION INTRAMUSCULAR; INTRAVENOUS at 18:53

## 2020-01-01 RX ADMIN — CARBOXYMETHYLCELLULOSE SODIUM 1 DROP: 5 SOLUTION/ DROPS OPHTHALMIC at 16:34

## 2020-01-01 RX ADMIN — Medication 0.03 MCG/KG/MIN: at 16:46

## 2020-01-01 RX ADMIN — FUROSEMIDE 40 MG: 10 INJECTION, SOLUTION INTRAVENOUS at 11:08

## 2020-01-01 RX ADMIN — CHLORHEXIDINE GLUCONATE 0.12% ORAL RINSE 15 ML: 1.2 LIQUID ORAL at 07:31

## 2020-01-01 RX ADMIN — IPRATROPIUM BROMIDE AND ALBUTEROL 1 PUFF: 20; 100 SPRAY, METERED RESPIRATORY (INHALATION) at 19:59

## 2020-01-01 RX ADMIN — MIDAZOLAM HYDROCHLORIDE 8 MG: 1 INJECTION, SOLUTION INTRAMUSCULAR; INTRAVENOUS at 14:01

## 2020-01-01 RX ADMIN — ENOXAPARIN SODIUM 40 MG: 40 INJECTION SUBCUTANEOUS at 22:37

## 2020-01-01 RX ADMIN — Medication 150 MCG/HR: at 22:28

## 2020-01-01 RX ADMIN — HEPARIN SODIUM 1400 UNITS/HR: 10000 INJECTION, SOLUTION INTRAVENOUS at 10:52

## 2020-01-01 RX ADMIN — ENOXAPARIN SODIUM 40 MG: 40 INJECTION SUBCUTANEOUS at 11:48

## 2020-01-01 RX ADMIN — INSULIN ASPART 1 UNITS: 100 INJECTION, SOLUTION INTRAVENOUS; SUBCUTANEOUS at 07:57

## 2020-01-01 RX ADMIN — ACETAMINOPHEN 650 MG: 325 TABLET, FILM COATED ORAL at 16:30

## 2020-01-01 RX ADMIN — CHLORHEXIDINE GLUCONATE 0.12% ORAL RINSE 15 ML: 1.2 LIQUID ORAL at 08:12

## 2020-01-01 RX ADMIN — POTASSIUM CHLORIDE 20 MEQ: 1.5 POWDER, FOR SOLUTION ORAL at 19:33

## 2020-01-01 RX ADMIN — EPOPROSTENOL 20 NG/KG/MIN: 1.5 INJECTION, POWDER, LYOPHILIZED, FOR SOLUTION INTRAVENOUS at 15:00

## 2020-01-01 RX ADMIN — HYDROCORTISONE SODIUM SUCCINATE 50 MG: 100 INJECTION, POWDER, FOR SOLUTION INTRAMUSCULAR; INTRAVENOUS at 23:57

## 2020-01-01 RX ADMIN — Medication 100 MCG/HR: at 17:39

## 2020-01-01 RX ADMIN — REMDESIVIR 100 MG: 100 INJECTION, POWDER, LYOPHILIZED, FOR SOLUTION INTRAVENOUS at 13:47

## 2020-01-01 RX ADMIN — MIDAZOLAM (PF) 1 MG/ML IN 0.9 % SODIUM CHLORIDE INTRAVENOUS SOLUTION 5 MG/HR: at 11:27

## 2020-01-01 RX ADMIN — FENTANYL CITRATE 50 MCG: 50 INJECTION, SOLUTION INTRAMUSCULAR; INTRAVENOUS at 03:25

## 2020-01-01 RX ADMIN — MEROPENEM 1000 MG: 1 INJECTION, POWDER, FOR SOLUTION INTRAVENOUS at 19:53

## 2020-01-01 RX ADMIN — FAMOTIDINE 20 MG: 10 INJECTION INTRAVENOUS at 20:07

## 2020-01-01 RX ADMIN — FAMOTIDINE 20 MG: 20 TABLET ORAL at 08:41

## 2020-01-01 RX ADMIN — FAMOTIDINE 20 MG: 10 INJECTION INTRAVENOUS at 08:03

## 2020-01-01 RX ADMIN — POTASSIUM CHLORIDE 20 MEQ: 20 SOLUTION ORAL at 15:33

## 2020-01-01 RX ADMIN — ALBUTEROL SULFATE 2.5 MG: 2.5 SOLUTION RESPIRATORY (INHALATION) at 07:08

## 2020-01-01 RX ADMIN — EPOPROSTENOL 20 NG/KG/MIN: 1.5 INJECTION, POWDER, LYOPHILIZED, FOR SOLUTION INTRAVENOUS at 16:10

## 2020-01-01 RX ADMIN — EPOPROSTENOL 20 NG/KG/MIN: 1.5 INJECTION, POWDER, LYOPHILIZED, FOR SOLUTION INTRAVENOUS at 10:27

## 2020-01-01 RX ADMIN — EPOPROSTENOL 20 NG/KG/MIN: 1.5 INJECTION, POWDER, LYOPHILIZED, FOR SOLUTION INTRAVENOUS at 13:31

## 2020-01-01 RX ADMIN — CARBOXYMETHYLCELLULOSE SODIUM 1 DROP: 5 SOLUTION/ DROPS OPHTHALMIC at 16:22

## 2020-01-01 RX ADMIN — FUROSEMIDE 40 MG: 10 INJECTION, SOLUTION INTRAVENOUS at 22:15

## 2020-01-01 RX ADMIN — CHLORHEXIDINE GLUCONATE 0.12% ORAL RINSE 15 ML: 1.2 LIQUID ORAL at 08:33

## 2020-01-01 RX ADMIN — FAMOTIDINE 20 MG: 20 TABLET ORAL at 21:15

## 2020-01-01 RX ADMIN — MIDAZOLAM (PF) 1 MG/ML IN 0.9 % SODIUM CHLORIDE INTRAVENOUS SOLUTION 8 MG/HR: at 08:50

## 2020-01-01 RX ADMIN — CHLORHEXIDINE GLUCONATE 0.12% ORAL RINSE 15 ML: 1.2 LIQUID ORAL at 07:54

## 2020-01-01 RX ADMIN — HYDROCORTISONE SODIUM SUCCINATE 50 MG: 100 INJECTION, POWDER, FOR SOLUTION INTRAMUSCULAR; INTRAVENOUS at 08:26

## 2020-01-01 RX ADMIN — VECURONIUM BROMIDE 10 MG: 1 INJECTION, POWDER, LYOPHILIZED, FOR SOLUTION INTRAVENOUS at 19:55

## 2020-01-01 RX ADMIN — DOCUSATE SODIUM 50 MG AND SENNOSIDES 8.6 MG 1 TABLET: 8.6; 5 TABLET, FILM COATED ORAL at 20:14

## 2020-01-01 RX ADMIN — MINERAL OIL AND PETROLATUM: 150; 830 OINTMENT OPHTHALMIC at 23:56

## 2020-01-01 RX ADMIN — ENOXAPARIN SODIUM 80 MG: 80 INJECTION SUBCUTANEOUS at 01:56

## 2020-01-01 RX ADMIN — FUROSEMIDE 40 MG: 10 INJECTION, SOLUTION INTRAVENOUS at 14:29

## 2020-01-01 RX ADMIN — REMDESIVIR 100 MG: 100 INJECTION, POWDER, LYOPHILIZED, FOR SOLUTION INTRAVENOUS at 23:10

## 2020-01-01 RX ADMIN — IOPAMIDOL 69 ML: 755 INJECTION, SOLUTION INTRAVENOUS at 17:48

## 2020-01-01 RX ADMIN — INSULIN ASPART 1 UNITS: 100 INJECTION, SOLUTION INTRAVENOUS; SUBCUTANEOUS at 12:14

## 2020-01-01 RX ADMIN — MIDAZOLAM 2 MG: 1 INJECTION INTRAMUSCULAR; INTRAVENOUS at 13:35

## 2020-01-01 RX ADMIN — ENOXAPARIN SODIUM 80 MG: 80 INJECTION SUBCUTANEOUS at 02:11

## 2020-01-01 RX ADMIN — EPOPROSTENOL 20 NG/KG/MIN: 1.5 INJECTION, POWDER, LYOPHILIZED, FOR SOLUTION INTRAVENOUS at 06:30

## 2020-01-01 RX ADMIN — POLYETHYLENE GLYCOL 3350 17 G: 17 POWDER, FOR SOLUTION ORAL at 07:31

## 2020-01-01 RX ADMIN — MINERAL OIL AND PETROLATUM: 150; 830 OINTMENT OPHTHALMIC at 08:40

## 2020-01-01 RX ADMIN — ONDANSETRON 4 MG: 2 INJECTION INTRAMUSCULAR; INTRAVENOUS at 01:36

## 2020-01-01 RX ADMIN — MIDAZOLAM HYDROCHLORIDE 2 MG: 1 INJECTION, SOLUTION INTRAMUSCULAR; INTRAVENOUS at 16:57

## 2020-01-01 RX ADMIN — MIDAZOLAM 2 MG: 1 INJECTION INTRAMUSCULAR; INTRAVENOUS at 03:30

## 2020-01-01 RX ADMIN — CARBOXYMETHYLCELLULOSE SODIUM 1 DROP: 5 SOLUTION/ DROPS OPHTHALMIC at 09:41

## 2020-01-01 RX ADMIN — SODIUM CHLORIDE, POTASSIUM CHLORIDE, SODIUM LACTATE AND CALCIUM CHLORIDE 1000 ML: 600; 310; 30; 20 INJECTION, SOLUTION INTRAVENOUS at 21:07

## 2020-01-01 RX ADMIN — EPOPROSTENOL 20 NG/KG/MIN: 1.5 INJECTION, POWDER, LYOPHILIZED, FOR SOLUTION INTRAVENOUS at 04:33

## 2020-01-01 RX ADMIN — MEROPENEM 1000 MG: 1 INJECTION, POWDER, FOR SOLUTION INTRAVENOUS at 10:03

## 2020-01-01 RX ADMIN — FAMOTIDINE 20 MG: 10 INJECTION INTRAVENOUS at 20:28

## 2020-01-01 RX ADMIN — INSULIN ASPART 1 UNITS: 100 INJECTION, SOLUTION INTRAVENOUS; SUBCUTANEOUS at 11:30

## 2020-01-01 RX ADMIN — EPOPROSTENOL 20 NG/KG/MIN: 1.5 INJECTION, POWDER, LYOPHILIZED, FOR SOLUTION INTRAVENOUS at 20:11

## 2020-01-01 RX ADMIN — Medication 150 MCG/HR: at 03:57

## 2020-01-01 RX ADMIN — HEPARIN SODIUM 1850 UNITS/HR: 10000 INJECTION, SOLUTION INTRAVENOUS at 23:59

## 2020-01-01 RX ADMIN — FUROSEMIDE 40 MG: 10 INJECTION, SOLUTION INTRAVENOUS at 22:29

## 2020-01-01 RX ADMIN — ALBUTEROL SULFATE 2.5 MG: 2.5 SOLUTION RESPIRATORY (INHALATION) at 19:56

## 2020-01-01 RX ADMIN — Medication 150 MCG/HR: at 16:16

## 2020-01-01 RX ADMIN — ALBUTEROL SULFATE 2.5 MG: 2.5 SOLUTION RESPIRATORY (INHALATION) at 12:07

## 2020-01-01 RX ADMIN — EPOPROSTENOL 20 NG/KG/MIN: 1.5 INJECTION, POWDER, LYOPHILIZED, FOR SOLUTION INTRAVENOUS at 00:07

## 2020-01-01 RX ADMIN — SODIUM CHLORIDE 2 UNITS/HR: 9 INJECTION, SOLUTION INTRAVENOUS at 03:04

## 2020-01-01 RX ADMIN — MIDAZOLAM 2 MG: 1 INJECTION INTRAMUSCULAR; INTRAVENOUS at 12:56

## 2020-01-01 RX ADMIN — EPOPROSTENOL 20 NG/KG/MIN: 1.5 INJECTION, POWDER, LYOPHILIZED, FOR SOLUTION INTRAVENOUS at 14:22

## 2020-01-01 RX ADMIN — CHLORHEXIDINE GLUCONATE 0.12% ORAL RINSE 15 ML: 1.2 LIQUID ORAL at 12:33

## 2020-01-01 RX ADMIN — MIDAZOLAM (PF) 1 MG/ML IN 0.9 % SODIUM CHLORIDE INTRAVENOUS SOLUTION 1 MG/HR: at 19:08

## 2020-01-01 RX ADMIN — MINERAL OIL AND PETROLATUM: 150; 830 OINTMENT OPHTHALMIC at 15:52

## 2020-01-01 RX ADMIN — Medication 150 MCG/HR: at 04:04

## 2020-01-01 RX ADMIN — MIDAZOLAM (PF) 1 MG/ML IN 0.9 % SODIUM CHLORIDE INTRAVENOUS SOLUTION 8 MG/HR: at 01:45

## 2020-01-01 RX ADMIN — HEPARIN SODIUM 1400 UNITS/HR: 10000 INJECTION, SOLUTION INTRAVENOUS at 04:54

## 2020-01-01 RX ADMIN — MULTIVITAMIN 15 ML: LIQUID ORAL at 08:44

## 2020-01-01 RX ADMIN — ENOXAPARIN SODIUM 80 MG: 80 INJECTION SUBCUTANEOUS at 14:18

## 2020-01-01 RX ADMIN — FAMOTIDINE 20 MG: 10 INJECTION INTRAVENOUS at 08:44

## 2020-01-01 RX ADMIN — EPOPROSTENOL 20 NG/KG/MIN: 1.5 INJECTION, POWDER, LYOPHILIZED, FOR SOLUTION INTRAVENOUS at 21:20

## 2020-01-01 RX ADMIN — HEPARIN SODIUM 1450 UNITS/HR: 10000 INJECTION, SOLUTION INTRAVENOUS at 19:34

## 2020-01-01 RX ADMIN — PROPOFOL 55 MCG/KG/MIN: 10 INJECTION, EMULSION INTRAVENOUS at 09:34

## 2020-01-01 RX ADMIN — HEPARIN SODIUM 1450 UNITS/HR: 10000 INJECTION, SOLUTION INTRAVENOUS at 20:09

## 2020-01-01 RX ADMIN — ALBUTEROL SULFATE 2.5 MG: 2.5 SOLUTION RESPIRATORY (INHALATION) at 13:00

## 2020-01-01 RX ADMIN — HEPARIN SODIUM 1650 UNITS/HR: 10000 INJECTION, SOLUTION INTRAVENOUS at 17:02

## 2020-01-01 RX ADMIN — EPOPROSTENOL 20 NG/KG/MIN: 1.5 INJECTION, POWDER, LYOPHILIZED, FOR SOLUTION INTRAVENOUS at 10:50

## 2020-01-01 RX ADMIN — DOCUSATE SODIUM 50 MG AND SENNOSIDES 8.6 MG 1 TABLET: 8.6; 5 TABLET, FILM COATED ORAL at 08:13

## 2020-01-01 RX ADMIN — HEPARIN SODIUM 1550 UNITS/HR: 10000 INJECTION, SOLUTION INTRAVENOUS at 09:01

## 2020-01-01 RX ADMIN — PROPOFOL 45 MCG/KG/MIN: 10 INJECTION, EMULSION INTRAVENOUS at 06:10

## 2020-01-01 RX ADMIN — AMIODARONE HYDROCHLORIDE 0.5 MG/MIN: 50 INJECTION, SOLUTION INTRAVENOUS at 05:23

## 2020-01-01 RX ADMIN — EPOPROSTENOL 20 NG/KG/MIN: 1.5 INJECTION, POWDER, LYOPHILIZED, FOR SOLUTION INTRAVENOUS at 09:44

## 2020-01-01 RX ADMIN — FAMOTIDINE 20 MG: 10 INJECTION INTRAVENOUS at 20:10

## 2020-01-01 RX ADMIN — MIDAZOLAM (PF) 1 MG/ML IN 0.9 % SODIUM CHLORIDE INTRAVENOUS SOLUTION 8 MG/HR: at 19:33

## 2020-01-01 RX ADMIN — METHYLPREDNISOLONE SODIUM SUCCINATE 62.5 MG: 125 INJECTION, POWDER, FOR SOLUTION INTRAMUSCULAR; INTRAVENOUS at 21:18

## 2020-01-01 RX ADMIN — EPOPROSTENOL 20 NG/KG/MIN: 1.5 INJECTION, POWDER, LYOPHILIZED, FOR SOLUTION INTRAVENOUS at 08:50

## 2020-01-01 RX ADMIN — INSULIN ASPART 1 UNITS: 100 INJECTION, SOLUTION INTRAVENOUS; SUBCUTANEOUS at 08:23

## 2020-01-01 RX ADMIN — ALBUMIN HUMAN 50 G: 0.25 SOLUTION INTRAVENOUS at 11:40

## 2020-01-01 RX ADMIN — MULTIVITAMIN 15 ML: LIQUID ORAL at 16:25

## 2020-01-01 RX ADMIN — ENOXAPARIN SODIUM 80 MG: 80 INJECTION SUBCUTANEOUS at 01:16

## 2020-01-01 RX ADMIN — MIDAZOLAM 2 MG: 1 INJECTION INTRAMUSCULAR; INTRAVENOUS at 16:59

## 2020-01-01 RX ADMIN — VECURONIUM BROMIDE 0.9 MCG/KG/MIN: 1 INJECTION, POWDER, LYOPHILIZED, FOR SOLUTION INTRAVENOUS at 11:37

## 2020-01-01 RX ADMIN — POTASSIUM CHLORIDE 40 MEQ: 1500 TABLET, EXTENDED RELEASE ORAL at 22:37

## 2020-01-01 RX ADMIN — FAMOTIDINE 20 MG: 10 INJECTION INTRAVENOUS at 08:33

## 2020-01-01 RX ADMIN — DOCUSATE SODIUM 50 MG AND SENNOSIDES 8.6 MG 1 TABLET: 8.6; 5 TABLET, FILM COATED ORAL at 20:02

## 2020-01-01 RX ADMIN — FAMOTIDINE 20 MG: 10 INJECTION INTRAVENOUS at 20:25

## 2020-01-01 RX ADMIN — PROPOFOL 55 MCG/KG/MIN: 10 INJECTION, EMULSION INTRAVENOUS at 12:32

## 2020-01-01 RX ADMIN — MINERAL OIL AND PETROLATUM: 150; 830 OINTMENT OPHTHALMIC at 16:26

## 2020-01-01 RX ADMIN — ALBUMIN (HUMAN) 50 G: 12.5 SOLUTION INTRAVENOUS at 11:40

## 2020-01-01 RX ADMIN — Medication 150 MCG/HR: at 08:16

## 2020-01-01 RX ADMIN — SODIUM CHLORIDE 92 ML: 9 INJECTION, SOLUTION INTRAVENOUS at 13:17

## 2020-01-01 RX ADMIN — PROPOFOL 75 MCG/KG/MIN: 10 INJECTION, EMULSION INTRAVENOUS at 21:07

## 2020-01-01 RX ADMIN — ENOXAPARIN SODIUM 80 MG: 80 INJECTION SUBCUTANEOUS at 14:48

## 2020-01-01 RX ADMIN — METHYLPREDNISOLONE SODIUM SUCCINATE 62.5 MG: 125 INJECTION, POWDER, FOR SOLUTION INTRAMUSCULAR; INTRAVENOUS at 06:00

## 2020-01-01 RX ADMIN — DEXAMETHASONE 6 MG: 2 TABLET ORAL at 09:16

## 2020-01-01 RX ADMIN — ENOXAPARIN SODIUM 80 MG: 80 INJECTION SUBCUTANEOUS at 02:07

## 2020-01-01 RX ADMIN — FUROSEMIDE 40 MG: 10 INJECTION, SOLUTION INTRAVENOUS at 09:54

## 2020-01-01 RX ADMIN — MIDAZOLAM (PF) 1 MG/ML IN 0.9 % SODIUM CHLORIDE INTRAVENOUS SOLUTION 7 MG/HR: at 04:34

## 2020-01-01 RX ADMIN — FENTANYL CITRATE 50 MCG: 50 INJECTION, SOLUTION INTRAMUSCULAR; INTRAVENOUS at 16:37

## 2020-01-01 RX ADMIN — Medication 100 MCG/HR: at 18:04

## 2020-01-01 RX ADMIN — CHLORHEXIDINE GLUCONATE 0.12% ORAL RINSE 15 ML: 1.2 LIQUID ORAL at 20:22

## 2020-01-01 RX ADMIN — FUROSEMIDE 40 MG: 10 INJECTION, SOLUTION INTRAVENOUS at 21:17

## 2020-01-01 RX ADMIN — SODIUM CHLORIDE 1 UNITS/HR: 9 INJECTION, SOLUTION INTRAVENOUS at 21:18

## 2020-01-01 RX ADMIN — MEROPENEM 1000 MG: 1 INJECTION, POWDER, FOR SOLUTION INTRAVENOUS at 01:56

## 2020-01-01 RX ADMIN — MEROPENEM 1000 MG: 1 INJECTION, POWDER, FOR SOLUTION INTRAVENOUS at 18:47

## 2020-01-01 RX ADMIN — FENTANYL CITRATE 50 MCG: 50 INJECTION, SOLUTION INTRAMUSCULAR; INTRAVENOUS at 13:45

## 2020-01-01 RX ADMIN — EPOPROSTENOL 20 NG/KG/MIN: 1.5 INJECTION, POWDER, LYOPHILIZED, FOR SOLUTION INTRAVENOUS at 04:02

## 2020-01-01 RX ADMIN — DEXAMETHASONE 6 MG: 2 TABLET ORAL at 08:39

## 2020-01-01 RX ADMIN — CHLORHEXIDINE GLUCONATE 0.12% ORAL RINSE 15 ML: 1.2 LIQUID ORAL at 07:45

## 2020-01-01 RX ADMIN — DOCUSATE SODIUM 50 MG AND SENNOSIDES 8.6 MG 1 TABLET: 8.6; 5 TABLET, FILM COATED ORAL at 19:42

## 2020-01-01 RX ADMIN — MIDAZOLAM (PF) 1 MG/ML IN 0.9 % SODIUM CHLORIDE INTRAVENOUS SOLUTION 1 MG/HR: at 06:32

## 2020-01-01 RX ADMIN — MEROPENEM 1000 MG: 1 INJECTION, POWDER, FOR SOLUTION INTRAVENOUS at 10:00

## 2020-01-01 RX ADMIN — INSULIN ASPART 1 UNITS: 100 INJECTION, SOLUTION INTRAVENOUS; SUBCUTANEOUS at 23:39

## 2020-01-01 RX ADMIN — FAMOTIDINE 20 MG: 10 INJECTION INTRAVENOUS at 08:50

## 2020-01-01 RX ADMIN — EPOPROSTENOL 20 NG/KG/MIN: 1.5 INJECTION, POWDER, LYOPHILIZED, FOR SOLUTION INTRAVENOUS at 12:03

## 2020-01-01 RX ADMIN — EPOPROSTENOL 20 NG/KG/MIN: 1.5 INJECTION, POWDER, LYOPHILIZED, FOR SOLUTION INTRAVENOUS at 12:15

## 2020-01-01 RX ADMIN — MINERAL OIL AND PETROLATUM: 150; 830 OINTMENT OPHTHALMIC at 00:12

## 2020-01-01 RX ADMIN — EPOPROSTENOL 20 NG/KG/MIN: 1.5 INJECTION, POWDER, LYOPHILIZED, FOR SOLUTION INTRAVENOUS at 04:41

## 2020-01-01 RX ADMIN — Medication 150 MCG/HR: at 01:18

## 2020-01-01 RX ADMIN — AMIODARONE HYDROCHLORIDE 0.5 MG/MIN: 50 INJECTION, SOLUTION INTRAVENOUS at 14:48

## 2020-01-01 RX ADMIN — ACETAZOLAMIDE 500 MG: 250 TABLET ORAL at 10:07

## 2020-01-01 RX ADMIN — METHYLPREDNISOLONE SODIUM SUCCINATE 62.5 MG: 125 INJECTION, POWDER, FOR SOLUTION INTRAMUSCULAR; INTRAVENOUS at 13:47

## 2020-01-01 RX ADMIN — CHLORHEXIDINE GLUCONATE 0.12% ORAL RINSE 15 ML: 1.2 LIQUID ORAL at 20:00

## 2020-01-01 RX ADMIN — FAMOTIDINE 20 MG: 10 INJECTION INTRAVENOUS at 20:00

## 2020-01-01 RX ADMIN — POTASSIUM CHLORIDE 20 MEQ: 20 SOLUTION ORAL at 06:33

## 2020-01-01 RX ADMIN — MEROPENEM 1000 MG: 1 INJECTION, POWDER, FOR SOLUTION INTRAVENOUS at 10:56

## 2020-01-01 RX ADMIN — FENTANYL CITRATE 100 MCG: 50 INJECTION, SOLUTION INTRAMUSCULAR; INTRAVENOUS at 16:58

## 2020-01-01 RX ADMIN — DEXMEDETOMIDINE 0.4 MCG/KG/HR: 100 INJECTION, SOLUTION, CONCENTRATE INTRAVENOUS at 06:38

## 2020-01-01 RX ADMIN — INSULIN ASPART 2 UNITS: 100 INJECTION, SOLUTION INTRAVENOUS; SUBCUTANEOUS at 21:48

## 2020-01-01 RX ADMIN — REMDESIVIR 100 MG: 100 INJECTION, POWDER, LYOPHILIZED, FOR SOLUTION INTRAVENOUS at 14:55

## 2020-01-01 RX ADMIN — MEROPENEM 1000 MG: 1 INJECTION, POWDER, FOR SOLUTION INTRAVENOUS at 10:27

## 2020-01-01 RX ADMIN — Medication 150 MCG/HR: at 08:28

## 2020-01-01 RX ADMIN — EPOPROSTENOL 20 NG/KG/MIN: 1.5 INJECTION, POWDER, LYOPHILIZED, FOR SOLUTION INTRAVENOUS at 16:02

## 2020-01-01 RX ADMIN — MIDAZOLAM (PF) 1 MG/ML IN 0.9 % SODIUM CHLORIDE INTRAVENOUS SOLUTION 7 MG/HR: at 18:06

## 2020-01-01 RX ADMIN — ALBUTEROL SULFATE 2.5 MG: 2.5 SOLUTION RESPIRATORY (INHALATION) at 20:27

## 2020-01-01 RX ADMIN — DOCUSATE SODIUM 50 MG AND SENNOSIDES 8.6 MG 1 TABLET: 8.6; 5 TABLET, FILM COATED ORAL at 20:04

## 2020-01-01 RX ADMIN — EPOPROSTENOL 20 NG/KG/MIN: 1.5 INJECTION, POWDER, LYOPHILIZED, FOR SOLUTION INTRAVENOUS at 21:14

## 2020-01-01 RX ADMIN — IPRATROPIUM BROMIDE AND ALBUTEROL 1 PUFF: 20; 100 SPRAY, METERED RESPIRATORY (INHALATION) at 14:49

## 2020-01-01 RX ADMIN — MIDAZOLAM HYDROCHLORIDE 1 MG/HR: 5 INJECTION, SOLUTION INTRAMUSCULAR; INTRAVENOUS at 12:08

## 2020-01-01 RX ADMIN — FUROSEMIDE 40 MG: 10 INJECTION, SOLUTION INTRAVENOUS at 09:06

## 2020-01-01 RX ADMIN — POLYETHYLENE GLYCOL 3350 17 G: 17 POWDER, FOR SOLUTION ORAL at 07:50

## 2020-01-01 RX ADMIN — ALBUTEROL SULFATE 2.5 MG: 2.5 SOLUTION RESPIRATORY (INHALATION) at 18:16

## 2020-01-01 RX ADMIN — FAMOTIDINE 20 MG: 20 TABLET ORAL at 08:25

## 2020-01-01 RX ADMIN — CARBOXYMETHYLCELLULOSE SODIUM 1 DROP: 5 SOLUTION/ DROPS OPHTHALMIC at 19:47

## 2020-01-01 RX ADMIN — HEPARIN SODIUM 1450 UNITS/HR: 10000 INJECTION, SOLUTION INTRAVENOUS at 03:00

## 2020-01-01 RX ADMIN — FUROSEMIDE 40 MG: 10 INJECTION, SOLUTION INTRAVENOUS at 10:30

## 2020-01-01 RX ADMIN — MIDAZOLAM (PF) 1 MG/ML IN 0.9 % SODIUM CHLORIDE INTRAVENOUS SOLUTION 1.5 MG/HR: at 16:23

## 2020-01-01 RX ADMIN — MIDAZOLAM (PF) 1 MG/ML IN 0.9 % SODIUM CHLORIDE INTRAVENOUS SOLUTION 8 MG/HR: at 03:00

## 2020-01-01 RX ADMIN — FENTANYL CITRATE 200 MCG: 50 INJECTION, SOLUTION INTRAMUSCULAR; INTRAVENOUS at 14:00

## 2020-01-01 RX ADMIN — MIDAZOLAM HYDROCHLORIDE 7 MG/HR: 5 INJECTION, SOLUTION INTRAMUSCULAR; INTRAVENOUS at 12:29

## 2020-01-01 RX ADMIN — METHYLPREDNISOLONE SODIUM SUCCINATE 62.5 MG: 125 INJECTION, POWDER, FOR SOLUTION INTRAMUSCULAR; INTRAVENOUS at 14:49

## 2020-01-01 RX ADMIN — EPOPROSTENOL 20 NG/KG/MIN: 1.5 INJECTION, POWDER, LYOPHILIZED, FOR SOLUTION INTRAVENOUS at 04:29

## 2020-01-01 RX ADMIN — Medication 150 MCG/HR: at 09:06

## 2020-01-01 RX ADMIN — FAMOTIDINE 20 MG: 10 INJECTION INTRAVENOUS at 08:52

## 2020-01-01 RX ADMIN — MEROPENEM 1000 MG: 1 INJECTION, POWDER, FOR SOLUTION INTRAVENOUS at 01:58

## 2020-01-01 RX ADMIN — VANCOMYCIN HYDROCHLORIDE 1500 MG: 5 INJECTION, POWDER, LYOPHILIZED, FOR SOLUTION INTRAVENOUS at 09:02

## 2020-01-01 RX ADMIN — DEXMEDETOMIDINE 0.2 MCG/KG/HR: 100 INJECTION, SOLUTION, CONCENTRATE INTRAVENOUS at 16:24

## 2020-01-01 RX ADMIN — HYDROCORTISONE SODIUM SUCCINATE 50 MG: 100 INJECTION, POWDER, FOR SOLUTION INTRAMUSCULAR; INTRAVENOUS at 17:07

## 2020-01-01 RX ADMIN — FENTANYL CITRATE 50 MCG: 50 INJECTION, SOLUTION INTRAMUSCULAR; INTRAVENOUS at 17:29

## 2020-01-01 RX ADMIN — MIDAZOLAM 2 MG: 1 INJECTION INTRAMUSCULAR; INTRAVENOUS at 13:31

## 2020-01-01 RX ADMIN — MEROPENEM 1000 MG: 1 INJECTION, POWDER, FOR SOLUTION INTRAVENOUS at 03:06

## 2020-01-01 RX ADMIN — HUMAN ALBUMIN MICROSPHERES AND PERFLUTREN 9 ML: 10; .22 INJECTION, SOLUTION INTRAVENOUS at 15:20

## 2020-01-01 RX ADMIN — FAMOTIDINE 20 MG: 10 INJECTION INTRAVENOUS at 20:54

## 2020-01-01 RX ADMIN — HYDROCORTISONE SODIUM SUCCINATE 50 MG: 100 INJECTION, POWDER, FOR SOLUTION INTRAMUSCULAR; INTRAVENOUS at 00:09

## 2020-01-01 RX ADMIN — EPOPROSTENOL 20 NG/KG/MIN: 1.5 INJECTION, POWDER, LYOPHILIZED, FOR SOLUTION INTRAVENOUS at 16:00

## 2020-01-01 RX ADMIN — FUROSEMIDE 40 MG: 10 INJECTION, SOLUTION INTRAVENOUS at 21:15

## 2020-01-01 RX ADMIN — MULTIVITAMIN 15 ML: LIQUID ORAL at 07:37

## 2020-01-01 RX ADMIN — FAMOTIDINE 20 MG: 10 INJECTION INTRAVENOUS at 07:38

## 2020-01-01 RX ADMIN — FENTANYL CITRATE 150 MCG: 50 INJECTION, SOLUTION INTRAMUSCULAR; INTRAVENOUS at 13:31

## 2020-01-01 RX ADMIN — HEPARIN SODIUM 1450 UNITS/HR: 10000 INJECTION, SOLUTION INTRAVENOUS at 13:18

## 2020-01-01 RX ADMIN — PROPOFOL 20 MCG/KG/MIN: 10 INJECTION, EMULSION INTRAVENOUS at 13:19

## 2020-01-01 RX ADMIN — ALBUTEROL SULFATE 2.5 MG: 2.5 SOLUTION RESPIRATORY (INHALATION) at 18:31

## 2020-01-01 RX ADMIN — MULTIVITAMIN 15 ML: LIQUID ORAL at 08:38

## 2020-01-01 RX ADMIN — EPOPROSTENOL 20 NG/KG/MIN: 1.5 INJECTION, POWDER, LYOPHILIZED, FOR SOLUTION INTRAVENOUS at 03:38

## 2020-01-01 RX ADMIN — ENOXAPARIN SODIUM 40 MG: 40 INJECTION SUBCUTANEOUS at 22:56

## 2020-01-01 RX ADMIN — PROPOFOL 50 MCG/KG/MIN: 10 INJECTION, EMULSION INTRAVENOUS at 03:06

## 2020-01-01 RX ADMIN — POTASSIUM & SODIUM PHOSPHATES POWDER PACK 280-160-250 MG 1 PACKET: 280-160-250 PACK at 22:20

## 2020-01-01 RX ADMIN — INSULIN ASPART 2 UNITS: 100 INJECTION, SOLUTION INTRAVENOUS; SUBCUTANEOUS at 02:13

## 2020-01-01 RX ADMIN — ALBUTEROL SULFATE 2.5 MG: 2.5 SOLUTION RESPIRATORY (INHALATION) at 03:45

## 2020-01-01 RX ADMIN — AMIODARONE HYDROCHLORIDE 0.5 MG/MIN: 50 INJECTION, SOLUTION INTRAVENOUS at 21:39

## 2020-01-01 RX ADMIN — DEXAMETHASONE 6 MG: 2 TABLET ORAL at 07:52

## 2020-01-01 RX ADMIN — HYDROCORTISONE SODIUM SUCCINATE 50 MG: 100 INJECTION, POWDER, FOR SOLUTION INTRAMUSCULAR; INTRAVENOUS at 12:30

## 2020-01-01 RX ADMIN — EPOPROSTENOL 20 NG/KG/MIN: 1.5 INJECTION, POWDER, LYOPHILIZED, FOR SOLUTION INTRAVENOUS at 21:30

## 2020-01-01 RX ADMIN — EPOPROSTENOL 20 NG/KG/MIN: 1.5 INJECTION, POWDER, LYOPHILIZED, FOR SOLUTION INTRAVENOUS at 01:21

## 2020-01-01 RX ADMIN — PROPOFOL 50 MCG/KG/MIN: 10 INJECTION, EMULSION INTRAVENOUS at 07:52

## 2020-01-01 RX ADMIN — EPOPROSTENOL 20 NG/KG/MIN: 1.5 INJECTION, POWDER, LYOPHILIZED, FOR SOLUTION INTRAVENOUS at 03:20

## 2020-01-01 RX ADMIN — HYDROCORTISONE SODIUM SUCCINATE 50 MG: 100 INJECTION, POWDER, FOR SOLUTION INTRAMUSCULAR; INTRAVENOUS at 05:00

## 2020-01-01 RX ADMIN — ALBUTEROL SULFATE 2.5 MG: 2.5 SOLUTION RESPIRATORY (INHALATION) at 08:05

## 2020-01-01 RX ADMIN — FUROSEMIDE 20 MG: 10 INJECTION, SOLUTION INTRAVENOUS at 10:30

## 2020-01-01 RX ADMIN — MULTIVITAMIN 15 ML: LIQUID ORAL at 08:05

## 2020-01-01 RX ADMIN — MEROPENEM 1000 MG: 1 INJECTION, POWDER, FOR SOLUTION INTRAVENOUS at 02:07

## 2020-01-01 RX ADMIN — ENOXAPARIN SODIUM 40 MG: 40 INJECTION SUBCUTANEOUS at 11:28

## 2020-01-01 RX ADMIN — LEVALBUTEROL HYDROCHLORIDE 1.25 MG: 1.25 SOLUTION, CONCENTRATE RESPIRATORY (INHALATION) at 01:16

## 2020-01-01 RX ADMIN — GLYCOPYRROLATE 0.2 MG: 0.2 INJECTION, SOLUTION INTRAMUSCULAR; INTRAVENOUS at 12:08

## 2020-01-01 RX ADMIN — MIDAZOLAM 2 MG: 1 INJECTION INTRAMUSCULAR; INTRAVENOUS at 19:46

## 2020-01-01 RX ADMIN — CHLORHEXIDINE GLUCONATE 0.12% ORAL RINSE 15 ML: 1.2 LIQUID ORAL at 20:09

## 2020-01-01 RX ADMIN — DEXAMETHASONE 6 MG: 2 TABLET ORAL at 07:45

## 2020-01-01 RX ADMIN — FAMOTIDINE 20 MG: 10 INJECTION INTRAVENOUS at 08:47

## 2020-01-01 RX ADMIN — POTASSIUM CHLORIDE AND SODIUM CHLORIDE: 450; 150 INJECTION, SOLUTION INTRAVENOUS at 04:44

## 2020-01-01 RX ADMIN — ALBUMIN HUMAN 25 G: 0.25 SOLUTION INTRAVENOUS at 12:29

## 2020-01-01 RX ADMIN — SENNOSIDES 5 ML: 8.8 SYRUP ORAL at 22:15

## 2020-01-01 RX ADMIN — ENOXAPARIN SODIUM 40 MG: 40 INJECTION SUBCUTANEOUS at 10:38

## 2020-01-01 RX ADMIN — IPRATROPIUM BROMIDE AND ALBUTEROL 1 PUFF: 20; 100 SPRAY, METERED RESPIRATORY (INHALATION) at 12:05

## 2020-01-01 RX ADMIN — CHLORHEXIDINE GLUCONATE 0.12% ORAL RINSE 15 ML: 1.2 LIQUID ORAL at 08:24

## 2020-01-01 RX ADMIN — EPOPROSTENOL 20 NG/KG/MIN: 1.5 INJECTION, POWDER, LYOPHILIZED, FOR SOLUTION INTRAVENOUS at 20:36

## 2020-01-01 RX ADMIN — Medication 100 MCG/HR: at 14:17

## 2020-01-01 RX ADMIN — EPOPROSTENOL 20 NG/KG/MIN: 1.5 INJECTION, POWDER, LYOPHILIZED, FOR SOLUTION INTRAVENOUS at 10:02

## 2020-01-01 RX ADMIN — MIDAZOLAM 2 MG: 1 INJECTION INTRAMUSCULAR; INTRAVENOUS at 16:37

## 2020-01-01 RX ADMIN — ENOXAPARIN SODIUM 80 MG: 80 INJECTION SUBCUTANEOUS at 01:58

## 2020-01-01 RX ADMIN — Medication 150 MCG/HR: at 23:52

## 2020-01-01 RX ADMIN — EPOPROSTENOL 20 NG/KG/MIN: 1.5 INJECTION, POWDER, LYOPHILIZED, FOR SOLUTION INTRAVENOUS at 09:58

## 2020-01-01 RX ADMIN — HYDROCORTISONE SODIUM SUCCINATE 50 MG: 100 INJECTION, POWDER, FOR SOLUTION INTRAMUSCULAR; INTRAVENOUS at 04:32

## 2020-01-01 RX ADMIN — POTASSIUM CHLORIDE 20 MEQ: 20 SOLUTION ORAL at 06:49

## 2020-01-01 RX ADMIN — Medication 100 MCG/HR: at 12:16

## 2020-01-01 RX ADMIN — MIDAZOLAM HYDROCHLORIDE 5 MG/HR: 5 INJECTION, SOLUTION INTRAMUSCULAR; INTRAVENOUS at 06:22

## 2020-01-01 RX ADMIN — Medication 0.03 MCG/KG/MIN: at 18:58

## 2020-01-01 RX ADMIN — EPOPROSTENOL 20 NG/KG/MIN: 1.5 INJECTION, POWDER, LYOPHILIZED, FOR SOLUTION INTRAVENOUS at 03:30

## 2020-01-01 RX ADMIN — POTASSIUM & SODIUM PHOSPHATES POWDER PACK 280-160-250 MG 1 PACKET: 280-160-250 PACK at 15:53

## 2020-01-01 RX ADMIN — EPOPROSTENOL 20 NG/KG/MIN: 1.5 INJECTION, POWDER, LYOPHILIZED, FOR SOLUTION INTRAVENOUS at 10:21

## 2020-01-01 RX ADMIN — ENOXAPARIN SODIUM 40 MG: 40 INJECTION SUBCUTANEOUS at 22:58

## 2020-01-01 RX ADMIN — Medication 100 MCG/HR: at 14:38

## 2020-01-01 RX ADMIN — MIDAZOLAM (PF) 1 MG/ML IN 0.9 % SODIUM CHLORIDE INTRAVENOUS SOLUTION 8 MG/HR: at 11:50

## 2020-01-01 RX ADMIN — IPRATROPIUM BROMIDE AND ALBUTEROL 1 PUFF: 20; 100 SPRAY, METERED RESPIRATORY (INHALATION) at 09:05

## 2020-01-01 RX ADMIN — ENOXAPARIN SODIUM 40 MG: 40 INJECTION SUBCUTANEOUS at 22:39

## 2020-01-01 RX ADMIN — VECURONIUM BROMIDE 0.7 MCG/KG/MIN: 1 INJECTION, POWDER, LYOPHILIZED, FOR SOLUTION INTRAVENOUS at 01:18

## 2020-01-01 RX ADMIN — ALBUTEROL SULFATE 2.5 MG: 2.5 SOLUTION RESPIRATORY (INHALATION) at 19:28

## 2020-01-01 RX ADMIN — POTASSIUM & SODIUM PHOSPHATES POWDER PACK 280-160-250 MG 1 PACKET: 280-160-250 PACK at 08:45

## 2020-01-01 RX ADMIN — LIDOCAINE HYDROCHLORIDE 3 ML: 10 INJECTION, SOLUTION INFILTRATION; PERINEURAL at 12:57

## 2020-01-01 RX ADMIN — IPRATROPIUM BROMIDE AND ALBUTEROL 1 PUFF: 20; 100 SPRAY, METERED RESPIRATORY (INHALATION) at 09:15

## 2020-01-01 RX ADMIN — REMDESIVIR 100 MG: 100 INJECTION, POWDER, LYOPHILIZED, FOR SOLUTION INTRAVENOUS at 22:04

## 2020-01-01 RX ADMIN — EPOPROSTENOL 20 NG/KG/MIN: 1.5 INJECTION, POWDER, LYOPHILIZED, FOR SOLUTION INTRAVENOUS at 10:54

## 2020-01-01 RX ADMIN — EPOPROSTENOL 20 NG/KG/MIN: 1.5 INJECTION, POWDER, LYOPHILIZED, FOR SOLUTION INTRAVENOUS at 22:28

## 2020-01-01 RX ADMIN — ENOXAPARIN SODIUM 40 MG: 40 INJECTION SUBCUTANEOUS at 22:26

## 2020-01-01 RX ADMIN — MEROPENEM 1000 MG: 1 INJECTION, POWDER, FOR SOLUTION INTRAVENOUS at 04:01

## 2020-01-01 RX ADMIN — Medication 0.03 MCG/KG/MIN: at 17:39

## 2020-01-01 RX ADMIN — HYDROCORTISONE SODIUM SUCCINATE 50 MG: 100 INJECTION, POWDER, FOR SOLUTION INTRAMUSCULAR; INTRAVENOUS at 09:11

## 2020-01-01 RX ADMIN — POLYETHYLENE GLYCOL 3350 17 G: 17 POWDER, FOR SOLUTION ORAL at 08:50

## 2020-01-01 RX ADMIN — Medication 150 MCG/HR: at 15:49

## 2020-01-01 RX ADMIN — CHLORHEXIDINE GLUCONATE 0.12% ORAL RINSE 15 ML: 1.2 LIQUID ORAL at 19:55

## 2020-01-01 RX ADMIN — ENOXAPARIN SODIUM 80 MG: 80 INJECTION SUBCUTANEOUS at 13:22

## 2020-01-01 RX ADMIN — MEROPENEM 1000 MG: 1 INJECTION, POWDER, FOR SOLUTION INTRAVENOUS at 10:31

## 2020-01-01 RX ADMIN — POTASSIUM CHLORIDE AND SODIUM CHLORIDE: 450; 150 INJECTION, SOLUTION INTRAVENOUS at 21:19

## 2020-01-01 RX ADMIN — CHLORHEXIDINE GLUCONATE 0.12% ORAL RINSE 15 ML: 1.2 LIQUID ORAL at 20:34

## 2020-01-01 RX ADMIN — FAMOTIDINE 20 MG: 10 INJECTION INTRAVENOUS at 21:34

## 2020-01-01 RX ADMIN — MIDAZOLAM (PF) 1 MG/ML IN 0.9 % SODIUM CHLORIDE INTRAVENOUS SOLUTION 8 MG/HR: at 06:26

## 2020-01-01 RX ADMIN — AMIODARONE HYDROCHLORIDE 150 MG: 1.5 INJECTION, SOLUTION INTRAVENOUS at 21:24

## 2020-01-01 RX ADMIN — METHYLPREDNISOLONE SODIUM SUCCINATE 62.5 MG: 125 INJECTION, POWDER, FOR SOLUTION INTRAMUSCULAR; INTRAVENOUS at 21:51

## 2020-01-01 RX ADMIN — POLYETHYLENE GLYCOL 3350 17 G: 17 POWDER, FOR SOLUTION ORAL at 09:11

## 2020-01-01 RX ADMIN — DEXAMETHASONE 6 MG: 2 TABLET ORAL at 07:43

## 2020-01-01 RX ADMIN — POTASSIUM CHLORIDE 40 MEQ: 20 SOLUTION ORAL at 08:38

## 2020-01-01 RX ADMIN — BACITRACIN: 500 OINTMENT TOPICAL at 14:04

## 2020-01-01 RX ADMIN — ENOXAPARIN SODIUM 40 MG: 40 INJECTION SUBCUTANEOUS at 12:31

## 2020-01-01 RX ADMIN — CHLORHEXIDINE GLUCONATE 0.12% ORAL RINSE 15 ML: 1.2 LIQUID ORAL at 20:28

## 2020-01-01 RX ADMIN — ENOXAPARIN SODIUM 40 MG: 40 INJECTION SUBCUTANEOUS at 14:05

## 2020-01-01 RX ADMIN — IPRATROPIUM BROMIDE AND ALBUTEROL 1 PUFF: 20; 100 SPRAY, METERED RESPIRATORY (INHALATION) at 17:05

## 2020-01-01 RX ADMIN — POLYETHYLENE GLYCOL 3350 17 G: 17 POWDER, FOR SOLUTION ORAL at 08:47

## 2020-01-01 RX ADMIN — CHLORHEXIDINE GLUCONATE 0.12% ORAL RINSE 15 ML: 1.2 LIQUID ORAL at 08:26

## 2020-01-01 RX ADMIN — MIDAZOLAM (PF) 1 MG/ML IN 0.9 % SODIUM CHLORIDE INTRAVENOUS SOLUTION 8 MG/HR: at 13:01

## 2020-01-01 RX ADMIN — SODIUM CHLORIDE 93 ML: 9 INJECTION, SOLUTION INTRAVENOUS at 17:48

## 2020-01-01 RX ADMIN — DEXAMETHASONE 6 MG: 2 TABLET ORAL at 07:56

## 2020-01-01 RX ADMIN — MIDAZOLAM (PF) 1 MG/ML IN 0.9 % SODIUM CHLORIDE INTRAVENOUS SOLUTION 8 MG/HR: at 21:01

## 2020-01-01 RX ADMIN — DOCUSATE SODIUM 50 MG AND SENNOSIDES 8.6 MG 2 TABLET: 8.6; 5 TABLET, FILM COATED ORAL at 09:16

## 2020-01-01 RX ADMIN — ALBUTEROL SULFATE 2.5 MG: 2.5 SOLUTION RESPIRATORY (INHALATION) at 02:33

## 2020-01-01 RX ADMIN — ENOXAPARIN SODIUM 80 MG: 80 INJECTION SUBCUTANEOUS at 01:15

## 2020-01-01 RX ADMIN — CHLORHEXIDINE GLUCONATE 0.12% ORAL RINSE 15 ML: 1.2 LIQUID ORAL at 19:46

## 2020-01-01 RX ADMIN — EPOPROSTENOL 20 NG/KG/MIN: 1.5 INJECTION, POWDER, LYOPHILIZED, FOR SOLUTION INTRAVENOUS at 07:13

## 2020-01-01 RX ADMIN — EPOPROSTENOL 20 NG/KG/MIN: 1.5 INJECTION, POWDER, LYOPHILIZED, FOR SOLUTION INTRAVENOUS at 23:15

## 2020-01-01 RX ADMIN — FAMOTIDINE 20 MG: 10 INJECTION INTRAVENOUS at 20:23

## 2020-01-01 RX ADMIN — POLYETHYLENE GLYCOL 3350 17 G: 17 POWDER, FOR SOLUTION ORAL at 08:44

## 2020-01-01 RX ADMIN — Medication 100 MCG/HR: at 07:46

## 2020-01-01 RX ADMIN — EPOPROSTENOL 20 NG/KG/MIN: 1.5 INJECTION, POWDER, LYOPHILIZED, FOR SOLUTION INTRAVENOUS at 15:51

## 2020-01-01 RX ADMIN — HYDROCORTISONE SODIUM SUCCINATE 50 MG: 100 INJECTION, POWDER, FOR SOLUTION INTRAMUSCULAR; INTRAVENOUS at 05:55

## 2020-01-01 RX ADMIN — SODIUM CHLORIDE: 9 INJECTION, SOLUTION INTRAVENOUS at 05:51

## 2020-01-01 RX ADMIN — LIDOCAINE HYDROCHLORIDE: 20 JELLY TOPICAL at 12:20

## 2020-01-01 RX ADMIN — PROPOFOL 35 MCG/KG/MIN: 10 INJECTION, EMULSION INTRAVENOUS at 11:11

## 2020-01-01 RX ADMIN — EPOPROSTENOL 20 NG/KG/MIN: 1.5 INJECTION, POWDER, LYOPHILIZED, FOR SOLUTION INTRAVENOUS at 04:27

## 2020-01-01 RX ADMIN — EPOPROSTENOL 20 NG/KG/MIN: 1.5 INJECTION, POWDER, LYOPHILIZED, FOR SOLUTION INTRAVENOUS at 09:00

## 2020-01-01 RX ADMIN — MIDAZOLAM 2 MG: 1 INJECTION INTRAMUSCULAR; INTRAVENOUS at 21:42

## 2020-01-01 RX ADMIN — REMDESIVIR 100 MG: 100 INJECTION, POWDER, LYOPHILIZED, FOR SOLUTION INTRAVENOUS at 13:18

## 2020-01-01 RX ADMIN — POTASSIUM & SODIUM PHOSPHATES POWDER PACK 280-160-250 MG 1 PACKET: 280-160-250 PACK at 22:15

## 2020-01-01 RX ADMIN — IPRATROPIUM BROMIDE AND ALBUTEROL 1 PUFF: 20; 100 SPRAY, METERED RESPIRATORY (INHALATION) at 07:43

## 2020-01-01 RX ADMIN — POLYETHYLENE GLYCOL 3350 17 G: 17 POWDER, FOR SOLUTION ORAL at 08:38

## 2020-01-01 RX ADMIN — FENTANYL CITRATE 50 MCG: 50 INJECTION, SOLUTION INTRAMUSCULAR; INTRAVENOUS at 13:31

## 2020-01-01 RX ADMIN — MEROPENEM 1000 MG: 1 INJECTION, POWDER, FOR SOLUTION INTRAVENOUS at 20:28

## 2020-01-01 RX ADMIN — MINERAL OIL AND PETROLATUM: 150; 830 OINTMENT OPHTHALMIC at 07:31

## 2020-01-01 RX ADMIN — PROPOFOL 55 MCG/KG/MIN: 10 INJECTION, EMULSION INTRAVENOUS at 19:31

## 2020-01-01 RX ADMIN — MINERAL OIL AND PETROLATUM: 150; 830 OINTMENT OPHTHALMIC at 23:37

## 2020-01-01 RX ADMIN — HYDROCORTISONE SODIUM SUCCINATE 50 MG: 100 INJECTION, POWDER, FOR SOLUTION INTRAMUSCULAR; INTRAVENOUS at 18:14

## 2020-01-01 RX ADMIN — Medication 100 MCG/HR: at 16:49

## 2020-01-01 RX ADMIN — HEPARIN SODIUM 1450 UNITS/HR: 10000 INJECTION, SOLUTION INTRAVENOUS at 14:00

## 2020-01-01 RX ADMIN — ENOXAPARIN SODIUM 80 MG: 80 INJECTION SUBCUTANEOUS at 01:25

## 2020-01-01 RX ADMIN — ENOXAPARIN SODIUM 40 MG: 40 INJECTION SUBCUTANEOUS at 11:13

## 2020-01-01 RX ADMIN — FUROSEMIDE 20 MG: 10 INJECTION, SOLUTION INTRAVENOUS at 12:40

## 2020-01-01 RX ADMIN — POLYETHYLENE GLYCOL 3350 17 G: 17 POWDER, FOR SOLUTION ORAL at 08:05

## 2020-01-01 RX ADMIN — INSULIN ASPART 1 UNITS: 100 INJECTION, SOLUTION INTRAVENOUS; SUBCUTANEOUS at 13:01

## 2020-01-01 RX ADMIN — FUROSEMIDE 20 MG: 10 INJECTION, SOLUTION INTRAVENOUS at 06:37

## 2020-01-01 RX ADMIN — ALBUMIN HUMAN 25 G: 0.25 SOLUTION INTRAVENOUS at 11:32

## 2020-01-01 RX ADMIN — POTASSIUM & SODIUM PHOSPHATES POWDER PACK 280-160-250 MG 1 PACKET: 280-160-250 PACK at 08:52

## 2020-01-01 RX ADMIN — FENTANYL CITRATE 50 MCG: 50 INJECTION, SOLUTION INTRAMUSCULAR; INTRAVENOUS at 12:57

## 2020-01-01 RX ADMIN — SODIUM CHLORIDE 1 UNITS/HR: 9 INJECTION, SOLUTION INTRAVENOUS at 11:42

## 2020-01-01 RX ADMIN — DOCUSATE SODIUM 50 MG AND SENNOSIDES 8.6 MG 1 TABLET: 8.6; 5 TABLET, FILM COATED ORAL at 08:40

## 2020-01-01 RX ADMIN — HEPARIN SODIUM 1400 UNITS/HR: 10000 INJECTION, SOLUTION INTRAVENOUS at 09:14

## 2020-01-01 RX ADMIN — IPRATROPIUM BROMIDE AND ALBUTEROL 1 PUFF: 20; 100 SPRAY, METERED RESPIRATORY (INHALATION) at 19:42

## 2020-01-01 RX ADMIN — FENTANYL CITRATE 50 MCG: 50 INJECTION, SOLUTION INTRAMUSCULAR; INTRAVENOUS at 20:00

## 2020-01-01 RX ADMIN — PROPOFOL 50 MCG/KG/MIN: 10 INJECTION, EMULSION INTRAVENOUS at 00:21

## 2020-01-01 RX ADMIN — Medication 150 MCG/HR: at 13:26

## 2020-01-01 RX ADMIN — DOCUSATE SODIUM 50 MG AND SENNOSIDES 8.6 MG 1 TABLET: 8.6; 5 TABLET, FILM COATED ORAL at 19:59

## 2020-01-01 RX ADMIN — ENOXAPARIN SODIUM 80 MG: 80 INJECTION SUBCUTANEOUS at 14:34

## 2020-01-01 RX ADMIN — METOCLOPRAMIDE 10 MG: 5 INJECTION, SOLUTION INTRAMUSCULAR; INTRAVENOUS at 12:17

## 2020-01-01 RX ADMIN — CALCIUM CHLORIDE 1 G: 100 INJECTION, SOLUTION INTRAVENOUS at 10:12

## 2020-01-01 RX ADMIN — POTASSIUM CHLORIDE 20 MEQ: 20 SOLUTION ORAL at 06:32

## 2020-01-01 RX ADMIN — EPOPROSTENOL 20 NG/KG/MIN: 1.5 INJECTION, POWDER, LYOPHILIZED, FOR SOLUTION INTRAVENOUS at 16:50

## 2020-01-01 RX ADMIN — HYDROCORTISONE SODIUM SUCCINATE 50 MG: 100 INJECTION, POWDER, FOR SOLUTION INTRAMUSCULAR; INTRAVENOUS at 08:04

## 2020-01-01 RX ADMIN — EPOPROSTENOL 20 NG/KG/MIN: 1.5 INJECTION, POWDER, LYOPHILIZED, FOR SOLUTION INTRAVENOUS at 22:39

## 2020-01-01 RX ADMIN — Medication 150 MCG/HR: at 08:33

## 2020-01-01 RX ADMIN — CHLORHEXIDINE GLUCONATE 0.12% ORAL RINSE 15 ML: 1.2 LIQUID ORAL at 08:45

## 2020-01-01 RX ADMIN — MINERAL OIL AND PETROLATUM: 150; 830 OINTMENT OPHTHALMIC at 00:23

## 2020-01-01 RX ADMIN — IPRATROPIUM BROMIDE AND ALBUTEROL 1 PUFF: 20; 100 SPRAY, METERED RESPIRATORY (INHALATION) at 14:04

## 2020-01-01 RX ADMIN — PROPOFOL 50 MCG/KG/MIN: 10 INJECTION, EMULSION INTRAVENOUS at 23:58

## 2020-01-01 RX ADMIN — HYDROCORTISONE SODIUM SUCCINATE 50 MG: 100 INJECTION, POWDER, FOR SOLUTION INTRAMUSCULAR; INTRAVENOUS at 08:25

## 2020-01-01 RX ADMIN — FUROSEMIDE 40 MG: 10 INJECTION, SOLUTION INTRAVENOUS at 10:32

## 2020-01-01 RX ADMIN — INSULIN ASPART 1 UNITS: 100 INJECTION, SOLUTION INTRAVENOUS; SUBCUTANEOUS at 08:47

## 2020-01-01 RX ADMIN — ENOXAPARIN SODIUM 40 MG: 40 INJECTION SUBCUTANEOUS at 11:06

## 2020-01-01 RX ADMIN — FAMOTIDINE 20 MG: 20 TABLET ORAL at 20:18

## 2020-01-01 RX ADMIN — EPOPROSTENOL 20 NG/KG/MIN: 1.5 INJECTION, POWDER, LYOPHILIZED, FOR SOLUTION INTRAVENOUS at 02:16

## 2020-01-01 RX ADMIN — ACETAMINOPHEN 650 MG: 325 TABLET, FILM COATED ORAL at 09:51

## 2020-01-01 RX ADMIN — IPRATROPIUM BROMIDE AND ALBUTEROL 1 PUFF: 20; 100 SPRAY, METERED RESPIRATORY (INHALATION) at 20:04

## 2020-01-01 RX ADMIN — EPOPROSTENOL 20 NG/KG/MIN: 1.5 INJECTION, POWDER, LYOPHILIZED, FOR SOLUTION INTRAVENOUS at 10:26

## 2020-01-01 RX ADMIN — FUROSEMIDE 20 MG: 10 INJECTION, SOLUTION INTRAVENOUS at 20:54

## 2020-01-01 RX ADMIN — CHLORHEXIDINE GLUCONATE 0.12% ORAL RINSE 15 ML: 1.2 LIQUID ORAL at 08:39

## 2020-01-01 RX ADMIN — ENOXAPARIN SODIUM 40 MG: 40 INJECTION SUBCUTANEOUS at 23:11

## 2020-01-01 RX ADMIN — MINERAL OIL AND PETROLATUM: 150; 830 OINTMENT OPHTHALMIC at 07:55

## 2020-01-01 RX ADMIN — FAMOTIDINE 20 MG: 20 TABLET ORAL at 19:35

## 2020-01-01 RX ADMIN — HEPARIN SODIUM 1850 UNITS/HR: 10000 INJECTION, SOLUTION INTRAVENOUS at 10:15

## 2020-01-01 RX ADMIN — DEXAMETHASONE 6 MG: 2 TABLET ORAL at 10:40

## 2020-01-01 RX ADMIN — METHYLPREDNISOLONE SODIUM SUCCINATE 62.5 MG: 125 INJECTION, POWDER, FOR SOLUTION INTRAMUSCULAR; INTRAVENOUS at 05:55

## 2020-01-01 RX ADMIN — Medication 1 MG: at 19:46

## 2020-01-01 RX ADMIN — ALBUTEROL SULFATE 2.5 MG: 2.5 SOLUTION RESPIRATORY (INHALATION) at 07:05

## 2020-01-01 RX ADMIN — EPOPROSTENOL 20 NG/KG/MIN: 1.5 INJECTION, POWDER, LYOPHILIZED, FOR SOLUTION INTRAVENOUS at 10:12

## 2020-01-01 RX ADMIN — MIDAZOLAM 2 MG: 1 INJECTION INTRAMUSCULAR; INTRAVENOUS at 16:57

## 2020-01-01 RX ADMIN — VECURONIUM BROMIDE 0.9 MCG/KG/MIN: 1 INJECTION, POWDER, LYOPHILIZED, FOR SOLUTION INTRAVENOUS at 23:57

## 2020-01-01 RX ADMIN — MEROPENEM 1000 MG: 1 INJECTION, POWDER, FOR SOLUTION INTRAVENOUS at 18:18

## 2020-01-01 RX ADMIN — FAMOTIDINE 20 MG: 10 INJECTION INTRAVENOUS at 10:27

## 2020-01-01 RX ADMIN — ENOXAPARIN SODIUM 40 MG: 40 INJECTION SUBCUTANEOUS at 10:49

## 2020-01-01 RX ADMIN — Medication 150 MCG/HR: at 11:11

## 2020-01-01 RX ADMIN — DOCUSATE SODIUM 50 MG AND SENNOSIDES 8.6 MG 1 TABLET: 8.6; 5 TABLET, FILM COATED ORAL at 08:50

## 2020-01-01 RX ADMIN — IPRATROPIUM BROMIDE AND ALBUTEROL 1 PUFF: 20; 100 SPRAY, METERED RESPIRATORY (INHALATION) at 12:06

## 2020-01-01 RX ADMIN — POLYETHYLENE GLYCOL 3350 17 G: 17 POWDER, FOR SOLUTION ORAL at 08:45

## 2020-01-01 RX ADMIN — DEXAMETHASONE SODIUM PHOSPHATE 6 MG: 4 INJECTION, SOLUTION INTRAMUSCULAR; INTRAVENOUS at 16:39

## 2020-01-01 RX ADMIN — HEPARIN SODIUM 1450 UNITS/HR: 10000 INJECTION, SOLUTION INTRAVENOUS at 03:14

## 2020-01-01 RX ADMIN — EPOPROSTENOL 20 NG/KG/MIN: 1.5 INJECTION, POWDER, LYOPHILIZED, FOR SOLUTION INTRAVENOUS at 22:16

## 2020-01-01 RX ADMIN — MEROPENEM 1000 MG: 1 INJECTION, POWDER, FOR SOLUTION INTRAVENOUS at 18:55

## 2020-01-01 RX ADMIN — ENOXAPARIN SODIUM 40 MG: 40 INJECTION SUBCUTANEOUS at 22:04

## 2020-01-01 RX ADMIN — Medication 150 MCG/HR: at 15:33

## 2020-01-01 RX ADMIN — MIDAZOLAM (PF) 1 MG/ML IN 0.9 % SODIUM CHLORIDE INTRAVENOUS SOLUTION 8 MG/HR: at 07:47

## 2020-01-01 RX ADMIN — IPRATROPIUM BROMIDE AND ALBUTEROL 1 PUFF: 20; 100 SPRAY, METERED RESPIRATORY (INHALATION) at 07:45

## 2020-01-01 RX ADMIN — EPOPROSTENOL 20 NG/KG/MIN: 1.5 INJECTION, POWDER, LYOPHILIZED, FOR SOLUTION INTRAVENOUS at 16:07

## 2020-01-01 RX ADMIN — ENOXAPARIN SODIUM 40 MG: 40 INJECTION SUBCUTANEOUS at 10:26

## 2020-01-01 RX ADMIN — EPOPROSTENOL 20 NG/KG/MIN: 1.5 INJECTION, POWDER, LYOPHILIZED, FOR SOLUTION INTRAVENOUS at 11:26

## 2020-01-01 RX ADMIN — SODIUM CHLORIDE: 9 INJECTION, SOLUTION INTRAVENOUS at 20:41

## 2020-01-01 RX ADMIN — POTASSIUM CHLORIDE AND SODIUM CHLORIDE: 450; 150 INJECTION, SOLUTION INTRAVENOUS at 13:39

## 2020-01-01 RX ADMIN — Medication 150 MCG/HR: at 15:24

## 2020-01-01 RX ADMIN — EPOPROSTENOL 20 NG/KG/MIN: 1.5 INJECTION, POWDER, LYOPHILIZED, FOR SOLUTION INTRAVENOUS at 16:19

## 2020-01-01 RX ADMIN — POTASSIUM CHLORIDE 40 MEQ: 1.5 POWDER, FOR SOLUTION ORAL at 05:27

## 2020-01-01 RX ADMIN — ALBUMIN HUMAN 25 G: 0.25 SOLUTION INTRAVENOUS at 20:20

## 2020-01-01 RX ADMIN — PROPOFOL 100 MG: 10 INJECTION, EMULSION INTRAVENOUS at 16:00

## 2020-01-01 RX ADMIN — EPOPROSTENOL 20 NG/KG/MIN: 1.5 INJECTION, POWDER, LYOPHILIZED, FOR SOLUTION INTRAVENOUS at 00:19

## 2020-01-01 RX ADMIN — EPOPROSTENOL 20 NG/KG/MIN: 1.5 INJECTION, POWDER, LYOPHILIZED, FOR SOLUTION INTRAVENOUS at 17:04

## 2020-01-01 RX ADMIN — DEXAMETHASONE 6 MG: 2 TABLET ORAL at 09:04

## 2020-01-01 RX ADMIN — POTASSIUM CHLORIDE 40 MEQ: 20 SOLUTION ORAL at 08:46

## 2020-01-01 RX ADMIN — CARBOXYMETHYLCELLULOSE SODIUM 1 DROP: 5 SOLUTION/ DROPS OPHTHALMIC at 10:00

## 2020-01-01 RX ADMIN — REMDESIVIR 100 MG: 100 INJECTION, POWDER, LYOPHILIZED, FOR SOLUTION INTRAVENOUS at 14:20

## 2020-01-01 RX ADMIN — HYDROCORTISONE SODIUM SUCCINATE 50 MG: 100 INJECTION, POWDER, FOR SOLUTION INTRAMUSCULAR; INTRAVENOUS at 16:46

## 2020-01-01 RX ADMIN — CHLORHEXIDINE GLUCONATE 0.12% ORAL RINSE 15 ML: 1.2 LIQUID ORAL at 20:54

## 2020-01-01 RX ADMIN — GLYCOPYRROLATE 0.2 MG: 0.2 INJECTION, SOLUTION INTRAMUSCULAR; INTRAVENOUS at 13:00

## 2020-01-01 RX ADMIN — MEROPENEM 1000 MG: 1 INJECTION, POWDER, FOR SOLUTION INTRAVENOUS at 02:10

## 2020-01-01 RX ADMIN — HYDROCORTISONE SODIUM SUCCINATE 50 MG: 100 INJECTION, POWDER, FOR SOLUTION INTRAMUSCULAR; INTRAVENOUS at 08:55

## 2020-01-01 RX ADMIN — POTASSIUM CHLORIDE AND SODIUM CHLORIDE: 450; 150 INJECTION, SOLUTION INTRAVENOUS at 21:07

## 2020-01-01 RX ADMIN — EPOPROSTENOL 20 NG/KG/MIN: 1.5 INJECTION, POWDER, LYOPHILIZED, FOR SOLUTION INTRAVENOUS at 16:27

## 2020-01-01 RX ADMIN — VANCOMYCIN HYDROCHLORIDE 1500 MG: 5 INJECTION, POWDER, LYOPHILIZED, FOR SOLUTION INTRAVENOUS at 21:19

## 2020-01-01 RX ADMIN — FAMOTIDINE 20 MG: 10 INJECTION INTRAVENOUS at 08:27

## 2020-01-01 RX ADMIN — FUROSEMIDE 40 MG: 10 INJECTION, SOLUTION INTRAVENOUS at 10:10

## 2020-01-01 RX ADMIN — SODIUM CHLORIDE: 9 INJECTION, SOLUTION INTRAVENOUS at 22:01

## 2020-01-01 RX ADMIN — MINERAL OIL AND PETROLATUM: 150; 830 OINTMENT OPHTHALMIC at 00:38

## 2020-01-01 RX ADMIN — PROPOFOL 55 MCG/KG/MIN: 10 INJECTION, EMULSION INTRAVENOUS at 07:41

## 2020-01-01 RX ADMIN — ENOXAPARIN SODIUM 80 MG: 80 INJECTION SUBCUTANEOUS at 14:44

## 2020-01-01 RX ADMIN — FUROSEMIDE 20 MG: 10 INJECTION, SOLUTION INTRAVENOUS at 12:29

## 2020-01-01 RX ADMIN — CHLORHEXIDINE GLUCONATE 0.12% ORAL RINSE 15 ML: 1.2 LIQUID ORAL at 07:50

## 2020-01-01 RX ADMIN — EPOPROSTENOL 20 NG/KG/MIN: 1.5 INJECTION, POWDER, LYOPHILIZED, FOR SOLUTION INTRAVENOUS at 22:08

## 2020-01-01 RX ADMIN — MEROPENEM 1000 MG: 1 INJECTION, POWDER, FOR SOLUTION INTRAVENOUS at 18:17

## 2020-01-01 RX ADMIN — MINERAL OIL AND PETROLATUM 1 G: 150; 830 OINTMENT OPHTHALMIC at 08:45

## 2020-01-01 RX ADMIN — IBUPROFEN 400 MG: 400 TABLET, FILM COATED ORAL at 19:47

## 2020-01-01 RX ADMIN — HEPARIN SODIUM 1400 UNITS/HR: 10000 INJECTION, SOLUTION INTRAVENOUS at 23:45

## 2020-01-01 RX ADMIN — IPRATROPIUM BROMIDE AND ALBUTEROL 1 PUFF: 20; 100 SPRAY, METERED RESPIRATORY (INHALATION) at 17:30

## 2020-01-01 RX ADMIN — FAMOTIDINE 20 MG: 10 INJECTION INTRAVENOUS at 09:09

## 2020-01-01 RX ADMIN — EPOPROSTENOL 20 NG/KG/MIN: 1.5 INJECTION, POWDER, LYOPHILIZED, FOR SOLUTION INTRAVENOUS at 06:40

## 2020-01-01 RX ADMIN — IPRATROPIUM BROMIDE AND ALBUTEROL 1 PUFF: 20; 100 SPRAY, METERED RESPIRATORY (INHALATION) at 20:22

## 2020-01-01 RX ADMIN — Medication 150 MCG/HR: at 08:09

## 2020-01-01 RX ADMIN — ALBUTEROL SULFATE 2.5 MG: 2.5 SOLUTION RESPIRATORY (INHALATION) at 13:21

## 2020-01-01 RX ADMIN — ENOXAPARIN SODIUM 80 MG: 80 INJECTION SUBCUTANEOUS at 13:47

## 2020-01-01 RX ADMIN — FAMOTIDINE 20 MG: 10 INJECTION INTRAVENOUS at 21:51

## 2020-01-01 RX ADMIN — POTASSIUM & SODIUM PHOSPHATES POWDER PACK 280-160-250 MG 1 PACKET: 280-160-250 PACK at 16:18

## 2020-01-01 RX ADMIN — VANCOMYCIN HYDROCHLORIDE 1500 MG: 5 INJECTION, POWDER, LYOPHILIZED, FOR SOLUTION INTRAVENOUS at 09:41

## 2020-01-01 RX ADMIN — MIDAZOLAM 2 MG: 1 INJECTION INTRAMUSCULAR; INTRAVENOUS at 03:33

## 2020-01-01 RX ADMIN — Medication 0.03 MCG/KG/MIN: at 16:54

## 2020-01-01 RX ADMIN — FUROSEMIDE 40 MG: 10 INJECTION, SOLUTION INTRAVENOUS at 10:22

## 2020-01-01 RX ADMIN — SODIUM CHLORIDE: 9 INJECTION, SOLUTION INTRAVENOUS at 08:11

## 2020-01-01 RX ADMIN — ACETAMINOPHEN 650 MG: 325 TABLET, FILM COATED ORAL at 18:01

## 2020-01-01 RX ADMIN — MIDAZOLAM HYDROCHLORIDE 8 MG: 1 INJECTION, SOLUTION INTRAMUSCULAR; INTRAVENOUS at 13:32

## 2020-01-01 RX ADMIN — VECURONIUM BROMIDE 0.4 MCG/KG/MIN: 1 INJECTION, POWDER, LYOPHILIZED, FOR SOLUTION INTRAVENOUS at 00:39

## 2020-01-01 RX ADMIN — HYDROMORPHONE HYDROCHLORIDE 0.5 MG: 1 INJECTION, SOLUTION INTRAMUSCULAR; INTRAVENOUS; SUBCUTANEOUS at 15:29

## 2020-01-01 RX ADMIN — MEROPENEM 1000 MG: 1 INJECTION, POWDER, FOR SOLUTION INTRAVENOUS at 04:57

## 2020-01-01 RX ADMIN — INSULIN ASPART 2 UNITS: 100 INJECTION, SOLUTION INTRAVENOUS; SUBCUTANEOUS at 05:10

## 2020-01-01 RX ADMIN — FAMOTIDINE 20 MG: 10 INJECTION INTRAVENOUS at 21:19

## 2020-01-01 RX ADMIN — INSULIN ASPART 1 UNITS: 100 INJECTION, SOLUTION INTRAVENOUS; SUBCUTANEOUS at 04:33

## 2020-01-01 RX ADMIN — SODIUM CHLORIDE: 9 INJECTION, SOLUTION INTRAVENOUS at 04:47

## 2020-01-01 RX ADMIN — HYDROCORTISONE SODIUM SUCCINATE 50 MG: 100 INJECTION, POWDER, FOR SOLUTION INTRAMUSCULAR; INTRAVENOUS at 08:52

## 2020-01-01 RX ADMIN — VECURONIUM BROMIDE 0.9 MCG/KG/MIN: 1 INJECTION, POWDER, LYOPHILIZED, FOR SOLUTION INTRAVENOUS at 12:49

## 2020-01-01 RX ADMIN — CHLORHEXIDINE GLUCONATE 0.12% ORAL RINSE 15 ML: 1.2 LIQUID ORAL at 19:33

## 2020-01-01 RX ADMIN — HEPARIN SODIUM 1850 UNITS/HR: 10000 INJECTION, SOLUTION INTRAVENOUS at 18:58

## 2020-01-01 RX ADMIN — INSULIN ASPART 1 UNITS: 100 INJECTION, SOLUTION INTRAVENOUS; SUBCUTANEOUS at 16:55

## 2020-01-01 RX ADMIN — EPOPROSTENOL 20 NG/KG/MIN: 1.5 INJECTION, POWDER, LYOPHILIZED, FOR SOLUTION INTRAVENOUS at 20:37

## 2020-01-01 RX ADMIN — ALBUTEROL SULFATE 2.5 MG: 2.5 SOLUTION RESPIRATORY (INHALATION) at 00:05

## 2020-01-01 RX ADMIN — FAMOTIDINE 20 MG: 10 INJECTION INTRAVENOUS at 09:15

## 2020-01-01 RX ADMIN — MEROPENEM 1000 MG: 1 INJECTION, POWDER, FOR SOLUTION INTRAVENOUS at 10:28

## 2020-01-01 RX ADMIN — HEPARIN SODIUM 1400 UNITS/HR: 10000 INJECTION, SOLUTION INTRAVENOUS at 18:02

## 2020-01-01 RX ADMIN — ALBUTEROL SULFATE 2.5 MG: 2.5 SOLUTION RESPIRATORY (INHALATION) at 07:10

## 2020-01-01 RX ADMIN — MINERAL OIL AND PETROLATUM: 150; 830 OINTMENT OPHTHALMIC at 15:40

## 2020-01-01 RX ADMIN — VECURONIUM BROMIDE 0.9 MCG/KG/MIN: 1 INJECTION, POWDER, LYOPHILIZED, FOR SOLUTION INTRAVENOUS at 23:36

## 2020-01-01 RX ADMIN — MEROPENEM 1000 MG: 1 INJECTION, POWDER, FOR SOLUTION INTRAVENOUS at 18:30

## 2020-01-01 RX ADMIN — EPOPROSTENOL 20 NG/KG/MIN: 1.5 INJECTION, POWDER, LYOPHILIZED, FOR SOLUTION INTRAVENOUS at 23:32

## 2020-01-01 RX ADMIN — IPRATROPIUM BROMIDE AND ALBUTEROL 1 PUFF: 20; 100 SPRAY, METERED RESPIRATORY (INHALATION) at 08:15

## 2020-01-01 RX ADMIN — ALBUTEROL SULFATE 2.5 MG: 2.5 SOLUTION RESPIRATORY (INHALATION) at 07:27

## 2020-01-01 RX ADMIN — Medication 150 MCG/HR: at 00:30

## 2020-01-01 RX ADMIN — ACETAMINOPHEN 650 MG: 325 TABLET, FILM COATED ORAL at 19:46

## 2020-01-01 RX ADMIN — PROPOFOL 75 MCG/KG/MIN: 10 INJECTION, EMULSION INTRAVENOUS at 18:24

## 2020-01-01 RX ADMIN — IPRATROPIUM BROMIDE AND ALBUTEROL 1 PUFF: 20; 100 SPRAY, METERED RESPIRATORY (INHALATION) at 08:51

## 2020-01-01 RX ADMIN — INSULIN ASPART 1 UNITS: 100 INJECTION, SOLUTION INTRAVENOUS; SUBCUTANEOUS at 21:04

## 2020-01-01 RX ADMIN — FUROSEMIDE 40 MG: 10 INJECTION, SOLUTION INTRAVENOUS at 09:09

## 2020-01-01 RX ADMIN — FENTANYL CITRATE 50 MCG: 50 INJECTION, SOLUTION INTRAMUSCULAR; INTRAVENOUS at 16:34

## 2020-01-01 RX ADMIN — MIDAZOLAM (PF) 1 MG/ML IN 0.9 % SODIUM CHLORIDE INTRAVENOUS SOLUTION 8 MG/HR: at 10:48

## 2020-01-01 RX ADMIN — CHLORHEXIDINE GLUCONATE 0.12% ORAL RINSE 15 ML: 1.2 LIQUID ORAL at 22:15

## 2020-01-01 RX ADMIN — Medication 100 MCG/HR: at 14:32

## 2020-01-01 RX ADMIN — ALBUTEROL SULFATE 2.5 MG: 2.5 SOLUTION RESPIRATORY (INHALATION) at 12:08

## 2020-01-01 RX ADMIN — FAMOTIDINE 20 MG: 20 TABLET ORAL at 08:46

## 2020-01-01 RX ADMIN — MEROPENEM 1000 MG: 1 INJECTION, POWDER, FOR SOLUTION INTRAVENOUS at 03:15

## 2020-01-01 RX ADMIN — FAMOTIDINE 20 MG: 10 INJECTION INTRAVENOUS at 08:35

## 2020-01-01 RX ADMIN — CARBOXYMETHYLCELLULOSE SODIUM 1 DROP: 5 SOLUTION/ DROPS OPHTHALMIC at 12:33

## 2020-01-01 RX ADMIN — IPRATROPIUM BROMIDE AND ALBUTEROL 1 PUFF: 20; 100 SPRAY, METERED RESPIRATORY (INHALATION) at 16:40

## 2020-01-01 RX ADMIN — CARBOXYMETHYLCELLULOSE SODIUM 1 DROP: 5 SOLUTION/ DROPS OPHTHALMIC at 16:41

## 2020-01-01 RX ADMIN — SENNOSIDES 5 ML: 8.8 SYRUP ORAL at 22:20

## 2020-01-01 RX ADMIN — MEROPENEM 1000 MG: 1 INJECTION, POWDER, FOR SOLUTION INTRAVENOUS at 18:14

## 2020-01-01 RX ADMIN — MIDAZOLAM (PF) 1 MG/ML IN 0.9 % SODIUM CHLORIDE INTRAVENOUS SOLUTION 8 MG/HR: at 22:00

## 2020-01-01 RX ADMIN — ALBUTEROL SULFATE 2.5 MG: 2.5 SOLUTION RESPIRATORY (INHALATION) at 14:21

## 2020-01-01 RX ADMIN — IPRATROPIUM BROMIDE AND ALBUTEROL 1 PUFF: 20; 100 SPRAY, METERED RESPIRATORY (INHALATION) at 13:17

## 2020-01-01 RX ADMIN — IPRATROPIUM BROMIDE AND ALBUTEROL 1 PUFF: 20; 100 SPRAY, METERED RESPIRATORY (INHALATION) at 16:14

## 2020-01-01 RX ADMIN — HEPARIN SODIUM 1400 UNITS/HR: 10000 INJECTION, SOLUTION INTRAVENOUS at 17:49

## 2020-01-01 RX ADMIN — ENOXAPARIN SODIUM 80 MG: 80 INJECTION SUBCUTANEOUS at 13:04

## 2020-01-01 RX ADMIN — REMDESIVIR 100 MG: 100 INJECTION, POWDER, LYOPHILIZED, FOR SOLUTION INTRAVENOUS at 14:04

## 2020-01-01 RX ADMIN — EPOPROSTENOL 20 NG/KG/MIN: 1.5 INJECTION, POWDER, LYOPHILIZED, FOR SOLUTION INTRAVENOUS at 08:12

## 2020-01-01 RX ADMIN — CHLORHEXIDINE GLUCONATE 0.12% ORAL RINSE 15 ML: 1.2 LIQUID ORAL at 20:25

## 2020-01-01 RX ADMIN — ENOXAPARIN SODIUM 40 MG: 40 INJECTION SUBCUTANEOUS at 22:36

## 2020-01-01 RX ADMIN — CHLORHEXIDINE GLUCONATE 0.12% ORAL RINSE 15 ML: 1.2 LIQUID ORAL at 21:15

## 2020-01-01 RX ADMIN — PROPOFOL 50 MCG/KG/MIN: 10 INJECTION, EMULSION INTRAVENOUS at 23:03

## 2020-01-01 RX ADMIN — FAMOTIDINE 20 MG: 10 INJECTION INTRAVENOUS at 08:05

## 2020-01-01 RX ADMIN — PROPOFOL 50 MCG/KG/MIN: 10 INJECTION, EMULSION INTRAVENOUS at 03:03

## 2020-01-01 RX ADMIN — PROPOFOL 55 MCG/KG/MIN: 10 INJECTION, EMULSION INTRAVENOUS at 16:19

## 2020-01-01 RX ADMIN — SODIUM CHLORIDE 500 ML: 9 INJECTION, SOLUTION INTRAVENOUS at 11:47

## 2020-01-01 RX ADMIN — CHLORHEXIDINE GLUCONATE 0.12% ORAL RINSE 15 ML: 1.2 LIQUID ORAL at 08:28

## 2020-01-01 RX ADMIN — ALBUTEROL SULFATE 2.5 MG: 2.5 SOLUTION RESPIRATORY (INHALATION) at 04:08

## 2020-01-01 RX ADMIN — ENOXAPARIN SODIUM 40 MG: 40 INJECTION SUBCUTANEOUS at 22:05

## 2020-01-01 RX ADMIN — EPOPROSTENOL 20 NG/KG/MIN: 1.5 INJECTION, POWDER, LYOPHILIZED, FOR SOLUTION INTRAVENOUS at 03:15

## 2020-01-01 RX ADMIN — MEROPENEM 1000 MG: 1 INJECTION, POWDER, FOR SOLUTION INTRAVENOUS at 09:42

## 2020-01-01 RX ADMIN — HEPARIN SODIUM 1450 UNITS/HR: 10000 INJECTION, SOLUTION INTRAVENOUS at 08:32

## 2020-01-01 RX ADMIN — CHLORHEXIDINE GLUCONATE 0.12% ORAL RINSE 15 ML: 1.2 LIQUID ORAL at 09:09

## 2020-01-01 RX ADMIN — PROPOFOL 40 MCG/KG/MIN: 10 INJECTION, EMULSION INTRAVENOUS at 18:35

## 2020-01-01 RX ADMIN — MULTIVITAMIN 15 ML: LIQUID ORAL at 08:32

## 2020-01-01 RX ADMIN — SENNOSIDES 5 ML: 8.8 SYRUP ORAL at 22:32

## 2020-01-01 RX ADMIN — FUROSEMIDE 40 MG: 10 INJECTION, SOLUTION INTRAVENOUS at 10:24

## 2020-01-01 RX ADMIN — POTASSIUM CHLORIDE 40 MEQ: 1.5 POWDER, FOR SOLUTION ORAL at 18:28

## 2020-01-01 RX ADMIN — FUROSEMIDE 40 MG: 10 INJECTION, SOLUTION INTRAVENOUS at 21:50

## 2020-01-01 RX ADMIN — MIDAZOLAM (PF) 1 MG/ML IN 0.9 % SODIUM CHLORIDE INTRAVENOUS SOLUTION 8 MG/HR: at 14:08

## 2020-01-01 RX ADMIN — PROPOFOL 55 MCG/KG/MIN: 10 INJECTION, EMULSION INTRAVENOUS at 04:30

## 2020-01-01 RX ADMIN — VANCOMYCIN HYDROCHLORIDE 1750 MG: 1 INJECTION, POWDER, LYOPHILIZED, FOR SOLUTION INTRAVENOUS at 20:05

## 2020-01-01 RX ADMIN — AMIODARONE HYDROCHLORIDE 0.5 MG/MIN: 50 INJECTION, SOLUTION INTRAVENOUS at 22:18

## 2020-01-01 ASSESSMENT — ACTIVITIES OF DAILY LIVING (ADL)
FALL_HISTORY_WITHIN_LAST_SIX_MONTHS: NO
ADLS_ACUITY_SCORE: 15
ADLS_ACUITY_SCORE: 16
ADLS_ACUITY_SCORE: 15
ADLS_ACUITY_SCORE: 20
ADLS_ACUITY_SCORE: 15
ADLS_ACUITY_SCORE: 15
ADLS_ACUITY_SCORE: 21
ADLS_ACUITY_SCORE: 20
ADLS_ACUITY_SCORE: 15
ADLS_ACUITY_SCORE: 20
ADLS_ACUITY_SCORE: 20
ADLS_ACUITY_SCORE: 15
ADLS_ACUITY_SCORE: 16
ADLS_ACUITY_SCORE: 20
WALKING_OR_CLIMBING_STAIRS_DIFFICULTY: NO
ADLS_ACUITY_SCORE: 18
ADLS_ACUITY_SCORE: 20
ADLS_ACUITY_SCORE: 16
ADLS_ACUITY_SCORE: 20
ADLS_ACUITY_SCORE: 22
ADLS_ACUITY_SCORE: 20
ADLS_ACUITY_SCORE: 15
ADLS_ACUITY_SCORE: 20
ADLS_ACUITY_SCORE: 13
ADLS_ACUITY_SCORE: 15
DOING_ERRANDS_INDEPENDENTLY_DIFFICULTY: NO
ADLS_ACUITY_SCORE: 15
ADLS_ACUITY_SCORE: 15
ADLS_ACUITY_SCORE: 20
ADLS_ACUITY_SCORE: 15
ADLS_ACUITY_SCORE: 20
ADLS_ACUITY_SCORE: 20
ADLS_ACUITY_SCORE: 16
ADLS_ACUITY_SCORE: 15
ADLS_ACUITY_SCORE: 15
ADLS_ACUITY_SCORE: 20
ADLS_ACUITY_SCORE: 22
ADLS_ACUITY_SCORE: 20
ADLS_ACUITY_SCORE: 15
ADLS_ACUITY_SCORE: 18
ADLS_ACUITY_SCORE: 15
ADLS_ACUITY_SCORE: 20
ADLS_ACUITY_SCORE: 20
ADLS_ACUITY_SCORE: 15
DRESSING/BATHING_DIFFICULTY: NO
ADLS_ACUITY_SCORE: 15
ADLS_ACUITY_SCORE: 15
ADLS_ACUITY_SCORE: 20
ADLS_ACUITY_SCORE: 20
ADLS_ACUITY_SCORE: 15
CONCENTRATING,_REMEMBERING_OR_MAKING_DECISIONS_DIFFICULTY: NO
ADLS_ACUITY_SCORE: 16
ADLS_ACUITY_SCORE: 20
ADLS_ACUITY_SCORE: 13
ADLS_ACUITY_SCORE: 15
ADLS_ACUITY_SCORE: 15
ADLS_ACUITY_SCORE: 20
ADLS_ACUITY_SCORE: 13
ADLS_ACUITY_SCORE: 15
ADLS_ACUITY_SCORE: 15
ADLS_ACUITY_SCORE: 20
ADLS_ACUITY_SCORE: 18
ADLS_ACUITY_SCORE: 20
HEARING_DIFFICULTY_OR_DEAF: NO
ADLS_ACUITY_SCORE: 15
ADLS_ACUITY_SCORE: 15
ADLS_ACUITY_SCORE: 16
DIFFICULTY_COMMUNICATING: NO
ADLS_ACUITY_SCORE: 13
ADLS_ACUITY_SCORE: 20
ADLS_ACUITY_SCORE: 22
ADLS_ACUITY_SCORE: 20
ADLS_ACUITY_SCORE: 18
ADLS_ACUITY_SCORE: 15
ADLS_ACUITY_SCORE: 20
ADLS_ACUITY_SCORE: 20
ADLS_ACUITY_SCORE: 15
ADLS_ACUITY_SCORE: 20
ADLS_ACUITY_SCORE: 13
ADLS_ACUITY_SCORE: 20
ADLS_ACUITY_SCORE: 15
ADLS_ACUITY_SCORE: 20
ADLS_ACUITY_SCORE: 15
ADLS_ACUITY_SCORE: 16
ADLS_ACUITY_SCORE: 20
ADLS_ACUITY_SCORE: 15
ADLS_ACUITY_SCORE: 20
ADLS_ACUITY_SCORE: 16
ADLS_ACUITY_SCORE: 20
ADLS_ACUITY_SCORE: 15
ADLS_ACUITY_SCORE: 20
ADLS_ACUITY_SCORE: 17
ADLS_ACUITY_SCORE: 20
ADLS_ACUITY_SCORE: 16
ADLS_ACUITY_SCORE: 20
ADLS_ACUITY_SCORE: 20
ADLS_ACUITY_SCORE: 13
ADLS_ACUITY_SCORE: 20
ADLS_ACUITY_SCORE: 15
ADLS_ACUITY_SCORE: 20
ADLS_ACUITY_SCORE: 20
ADLS_ACUITY_SCORE: 18
ADLS_ACUITY_SCORE: 20
ADLS_ACUITY_SCORE: 16
ADLS_ACUITY_SCORE: 20
CURRENT_FUNCTION: NO ASSISTANCE NEEDED
ADLS_ACUITY_SCORE: 15
VISION_MANAGEMENT: GLASSES
ADLS_ACUITY_SCORE: 20
ADLS_ACUITY_SCORE: 20
WEAR_GLASSES_OR_BLIND: YES
ADLS_ACUITY_SCORE: 15
ADLS_ACUITY_SCORE: 20
ADLS_ACUITY_SCORE: 15
ADLS_ACUITY_SCORE: 20
ADLS_ACUITY_SCORE: 20
ADLS_ACUITY_SCORE: 15
ADLS_ACUITY_SCORE: 20
ADLS_ACUITY_SCORE: 21
ADLS_ACUITY_SCORE: 15
ADLS_ACUITY_SCORE: 15
ADLS_ACUITY_SCORE: 20
ADLS_ACUITY_SCORE: 20
ADLS_ACUITY_SCORE: 15
ADLS_ACUITY_SCORE: 20
ADLS_ACUITY_SCORE: 15
ADLS_ACUITY_SCORE: 18
ADLS_ACUITY_SCORE: 20
ADLS_ACUITY_SCORE: 20
ADLS_ACUITY_SCORE: 15
ADLS_ACUITY_SCORE: 20
ADLS_ACUITY_SCORE: 15
ADLS_ACUITY_SCORE: 13
ADLS_ACUITY_SCORE: 16
ADLS_ACUITY_SCORE: 18
ADLS_ACUITY_SCORE: 20
ADLS_ACUITY_SCORE: 20
ADLS_ACUITY_SCORE: 16
ADLS_ACUITY_SCORE: 20
ADLS_ACUITY_SCORE: 18
ADLS_ACUITY_SCORE: 20
TOILETING_ISSUES: NO
ADLS_ACUITY_SCORE: 20
ADLS_ACUITY_SCORE: 20
DIFFICULTY_EATING/SWALLOWING: NO
ADLS_ACUITY_SCORE: 20
ADLS_ACUITY_SCORE: 15
ADLS_ACUITY_SCORE: 20

## 2020-01-01 ASSESSMENT — MIFFLIN-ST. JEOR
SCORE: 1437.63
SCORE: 1431.63
SCORE: 1443.63
SCORE: 1444.63
SCORE: 1379.63
SCORE: 1441.63
SCORE: 1469.45
SCORE: 1410.63
SCORE: 1449.63
SCORE: 1474.63
SCORE: 1455.63
SCORE: 1393.63
SCORE: 1466.63
SCORE: 1445.63
SCORE: 1483.63
SCORE: 1434.63
SCORE: 1431.63
SCORE: 1404.63
SCORE: 1431.63
SCORE: 1460.6
SCORE: 1467.63
SCORE: 1406.63

## 2020-01-01 ASSESSMENT — ENCOUNTER SYMPTOMS
MYALGIAS: 1
SHORTNESS OF BREATH: 0
CHEST TIGHTNESS: 1
COUGH: 1

## 2020-08-20 NOTE — LETTER
August 21, 2020      Aly Sorensen  [address]   [facility] [address]        Dear ,    The following letter pertains to your most recent diagnostic tests:     -Your prostate specific antigen (PSA) test result returned normal.     -TSH (thyroid stimulating hormone) level is normal which indicates normal circulating thyroid hormone levels.       -Kidney function is normal for you (Creatinine, GFR), Sodium is normal for you, Potassium is normal for you, Calcium is normal for you, Glucose (blood sugar) is normal for you.     -Your complete blood counts including your hemoglobin returned normal for you.       Bottom line:  Your lab results are healthy and stable.         Follow up:  Schedule an appointment for a physical examination with fasting blood tests in one year's time, or return sooner if new questions, symptoms or problems arise.       Resulted Orders   PSA tumor marker   Result Value Ref Range    PSA <0.01 0 - 4 ug/L      Comment:      Assay Method:  Chemiluminescence using Siemens Vista analyzer   Basic metabolic panel   Result Value Ref Range    Sodium 138 133 - 144 mmol/L    Potassium 4.1 3.4 - 5.3 mmol/L    Chloride 107 94 - 109 mmol/L    Carbon Dioxide 26 20 - 32 mmol/L    Anion Gap 5 3 - 14 mmol/L    Glucose 93 70 - 99 mg/dL    Urea Nitrogen 12 7 - 30 mg/dL    Creatinine 0.82 0.66 - 1.25 mg/dL    GFR Estimate 83 >60 mL/min/[1.73_m2]      Comment:      Non  GFR Calc  Starting 12/18/2018, serum creatinine based estimated GFR (eGFR) will be   calculated using the Chronic Kidney Disease Epidemiology Collaboration   (CKD-EPI) equation.      GFR Estimate If Black >90 >60 mL/min/[1.73_m2]      Comment:       GFR Calc  Starting 12/18/2018, serum creatinine based estimated GFR (eGFR) will be   calculated using the Chronic Kidney Disease Epidemiology Collaboration   (CKD-EPI) equation.      Calcium 8.8 8.5 - 10.1 mg/dL   CBC with platelets   Result Value Ref Range     WBC 9.6 4.0 - 11.0 10e9/L    RBC Count 4.92 4.4 - 5.9 10e12/L    Hemoglobin 16.0 13.3 - 17.7 g/dL    Hematocrit 46.7 40.0 - 53.0 %    MCV 95 78 - 100 fl    MCH 32.5 26.5 - 33.0 pg    MCHC 34.3 31.5 - 36.5 g/dL    RDW 13.7 10.0 - 15.0 %    Platelet Count 269 150 - 450 10e9/L   TSH with free T4 reflex   Result Value Ref Range    TSH 0.53 0.40 - 4.00 mU/L       If you have any questions or concerns, please call the clinic at the number listed above.       Sincerely,     Dr. Austin

## 2020-08-20 NOTE — PROGRESS NOTES
"SUBJECTIVE:   Aly Sorensen is a 80 year old male who presents for Preventive Visit.  Are you in the first 12 months of your Medicare coverage?  No    Healthy Habits:     In general, how would you rate your overall health?  Very good    Frequency of exercise:  2-3 days/week (walks only)    Duration of exercise:  15-30 minutes    Do you usually eat at least 4 servings of fruit and vegetables a day, include whole grains    & fiber and avoid regularly eating high fat or \"junk\" foods?  No    Taking medications regularly:  Not Applicable    Barriers to taking medications:  Not applicable    Medication side effects:  Not applicable    Ability to successfully perform activities of daily living:  No assistance needed    Home Safety:  Lack of grab bars in the bathroom    Hearing Impairment:  Difficulty following a conversation in a noisy restaurant or crowded room, feel that people are mumbling or not speaking clearly, difficult to understand a speaker at a public meeting or [employer] service, need to ask people to speak up or repeat themselves, find that men's voices are easier to understand than woman's and difficulty understanding soft or whispered speech    In the past 6 months, have you been bothered by leaking of urine? Yes    In general, how would you rate your overall mental or emotional health?  Excellent      PHQ-2 Total Score: 0    Additional concerns today:  Yes      Cough improved, he is not using inhalers  He has not had  Skin check in years, but no concerning lesions  Benign thyroid nodule last year  He leaks urine rarely  History of prostate surgery   Do you feel safe in your environment? Yes    Have you ever done Advance Care Planning? (For example, a Health Directive, POLST, or a discussion with a medical provider or your loved ones about your wishes): No, advance care planning information given to patient to review.  Advanced care planning was discussed at today's visit.      Fall risk  Fallen 2 or more " times in the past year?: No  Any fall with injury in the past year?: No    Cognitive Screening   1) Repeat 3 items (Leader, Season, Table)    2) Clock draw: NORMAL  3) 3 item recall: Recalls 2 objects   Results: 3 items recalled: COGNITIVE IMPAIRMENT LESS LIKELY    Mini-CogTM Copyright GISELE Ta. Licensed by the author for use in [facility]; reprinted with permission (andra@[facility]). All rights reserved.      Do you have sleep apnea, excessive snoring or daytime drowsiness?: yes daytime drowsiness.    Reviewed and updated as needed this visit by clinical staff  Tobacco  Allergies  Meds  Problems  Med Hx  Surg Hx  Fam Hx  Soc Hx          Reviewed and updated as needed this visit by Provider  Tobacco  Med Hx  Surg Hx  Fam Hx  Soc Hx       Social History     Tobacco Use     Smoking status: Former Smoker     Types: Cigars     Smokeless tobacco: Never Used     Tobacco comment: quit smoking cigars 2008   Substance Use Topics     Alcohol use: No     Alcohol/week: 3.0 - 4.0 standard drinks     Types: 3 - 4 Standard drinks or equivalent per week     Frequency: Never     Comment: none for last yr     If you drink alcohol do you typically have >3 drinks per day or >7 drinks per week? Not applicable    Alcohol Use 8/20/2020   Prescreen: >3 drinks/day or >7 drinks/week? Not Applicable         Current providers sharing in care for this patient include:   Patient Care Team:  Zev Austin MD as PCP - General (Internal Medicine)  Zev Austin MD as Assigned PCP    The following health maintenance items are reviewed in Epic and correct as of today:  Health Maintenance   Topic Date Due     ZOSTER IMMUNIZATION (2 of 3) 12/07/2014     INFLUENZA VACCINE (1) 09/01/2020     FALL RISK ASSESSMENT  10/28/2020     MEDICARE ANNUAL WELLNESS VISIT  08/20/2021     ADVANCE CARE PLANNING  12/28/2023     DTAP/TDAP/TD IMMUNIZATION (3 - Td) 01/26/2025     PHQ-2  Completed     PNEUMOCOCCAL IMMUNIZATION 65+ LOW/MEDIUM RISK   Completed     IPV IMMUNIZATION  Aged Out     MENINGITIS IMMUNIZATION  Aged Out     HEPATITIS B IMMUNIZATION  Aged Out     Patient Active Problem List   Diagnosis     Malignant neoplasm of prostate (H)     Melanoma of skin (H)     Spondylosis of lumbar region without myelopathy or radiculopathy     History of total right knee replacement     Thyroid nodule     Past Surgical History:   Procedure Laterality Date     ARTHROPLASTY KNEE  12/8/2011    Procedure:ARTHROPLASTY KNEE; Left Total Knee Arthroplasty (JILLIAN)^; Surgeon:YASMIN PELAEZ; Location: OR     ARTHROPLASTY KNEE Right 12/28/2018    Procedure: RIGHT TOTAL KNEE ARTHROPLASTY;  Surgeon: Yasmin Pelaez MD;  Location:  OR     BACK SURGERY       COLONOSCOPY       GENITOURINARY SURGERY  history of prostatectomy     HERNIA REPAIR  inguinal hernia repair as a teenager     ORTHOPEDIC SURGERY  back fusion 2002     PHACOEMULSIFICATION CLEAR CORNEA WITH STANDARD INTRAOCULAR LENS IMPLANT Right 11/10/2015    Procedure: PHACOEMULSIFICATION CLEAR CORNEA WITH STANDARD INTRAOCULAR LENS IMPLANT;  Surgeon: Criss Pulliam MD;  Location:  [facility]     PHACOEMULSIFICATION CLEAR CORNEA WITH STANDARD INTRAOCULAR LENS IMPLANT Left 11/17/2015    Procedure: PHACOEMULSIFICATION CLEAR CORNEA WITH STANDARD INTRAOCULAR LENS IMPLANT;  Surgeon: Criss Pulliam MD;  Location:  [facility]     removal of melanoma[  approx. 20 years ago       Social History     Tobacco Use     Smoking status: Former Smoker     Types: Cigars     Smokeless tobacco: Never Used     Tobacco comment: quit smoking cigars 2008   Substance Use Topics     Alcohol use: No     Alcohol/week: 3.0 - 4.0 standard drinks     Types: 3 - 4 Standard drinks or equivalent per week     Frequency: Never     Comment: none for last yr     Family History   Problem Relation Age of Onset     Uterine Cancer Mother      Colon Cancer Father      Diabetes Brother      Heart Failure Brother          Current Outpatient Medications   Medication Sig  "Dispense Refill     Acetaminophen (TYLENOL 8 HOUR PO)        IBUPROFEN PO        albuterol (PROAIR HFA/PROVENTIL HFA/VENTOLIN HFA) 108 (90 Base) MCG/ACT inhaler Inhale 2 puffs into the lungs every 6 hours as needed for shortness of breath / dyspnea or wheezing (Patient not taking: Reported on 12/11/2019) 1 Inhaler 11     budesonide-formoterol (SYMBICORT) 80-4.5 MCG/ACT Inhaler Inhale 1-2 puffs into the lungs 2 times daily as needed (wheezing, shorntess of breath, and cough) (Patient not taking: Reported on 8/20/2020) 1 Inhaler 11     No Known Allergies      Review of Systems  Constitutional, HEENT, cardiovascular, pulmonary, gi and gu systems are negative, except as otherwise noted.    OBJECTIVE:   /81 (BP Location: Left arm, Cuff Size: Adult Regular)   Pulse 59   Temp 97.1  F (36.2  C) (Temporal)   Ht 1.663 m (5' 5.47\")   Wt 83 kg (183 lb)   SpO2 97%   BMI 30.02 kg/m   Estimated body mass index is 30.02 kg/m  as calculated from the following:    Height as of this encounter: 1.663 m (5' 5.47\").    Weight as of this encounter: 83 kg (183 lb).  Physical Exam  GENERAL: healthy, alert and no distress  EYES: Eyes grossly normal to inspection, PERRL and conjunctivae and sclerae normal  HENT: ear canals and TM's normal, nose and mouth without ulcers or lesions  NECK: no adenopathy, no asymmetry, masses, or scars and thyroid normal to palpation  RESP: lungs clear to auscultation - no rales, rhonchi or wheezes  CV: regular rate and rhythm, normal S1 S2, no S3 or S4, no murmur, click or rub, no peripheral edema and peripheral pulses strong  ABDOMEN: soft, nontender, no hepatosplenomegaly, no masses and bowel sounds normal  MS: no gross musculoskeletal defects noted, no edema  SKIN: no suspicious lesions or rashes  NEURO: Normal strength and tone, mentation intact and speech normal  PSYCH: mentation appears normal, affect normal/bright    Labs pending     ASSESSMENT / PLAN:   1. Routine general medical examination " "at a health care facility    - Basic metabolic panel  - CBC with platelets    2. Malignant neoplasm of prostate (H)    - PSA tumor marker    3. Melanoma of skin (H)  Referred to skin care clinic for annual checks     4. Thyroid nodule  Repeat imaging in 2022 (3 year interval)  - TSH with free T4 reflex    5. Urinary incontinence, unspecified type  Discussed options, he would prefer scheduled voiding and incontinence pads, to drug therapy      COUNSELING:  Reviewed preventive health counseling, as reflected in patient instructions  Special attention given to:       Regular exercise       Healthy diet/nutrition       Immunizations    Reminded to get shingrix           Estimated body mass index is 30.02 kg/m  as calculated from the following:    Height as of this encounter: 1.663 m (5' 5.47\").    Weight as of this encounter: 83 kg (183 lb).    Weight management plan: Discussed healthy diet and exercise guidelines     reports that he has quit smoking. His smoking use included cigars. He has never used smokeless tobacco.      Appropriate preventive services were discussed with this patient, including applicable screening as appropriate for cardiovascular disease, diabetes, osteopenia/osteoporosis, and glaucoma.  As appropriate for age/gender, discussed screening for colorectal cancer, prostate cancer, breast cancer, and cervical cancer. Checklist reviewing preventive services available has been given to the patient.    Reviewed patients plan of care and provided an AVS. The Basic Care Plan (routine screening as documented in Health Maintenance) for Aly meets the Care Plan requirement. This Care Plan has been established and reviewed with the Patient.    Counseling Resources:  ATP IV Guidelines  Pooled Cohorts Equation Calculator  Breast Cancer Risk Calculator  FRAX Risk Assessment  ICSI Preventive Guidelines  Dietary Guidelines for Americans, 2010  USDA's MyPlate  ASA Prophylaxis  Lung CA Screening    Zev Austin, " MD  [facility]    Identified Health Risks:

## 2020-08-21 NOTE — RESULT ENCOUNTER NOTE
The following letter pertains to your most recent diagnostic tests:    -Your prostate specific antigen (PSA) test result returned normal.     -TSH (thyroid stimulating hormone) level is normal which indicates normal circulating thyroid hormone levels.      -Kidney function is normal for you (Creatinine, GFR), Sodium is normal for you, Potassium is normal for you, Calcium is normal for you, Glucose (blood sugar) is normal for you.     -Your complete blood counts including your hemoglobin returned normal for you.       Bottom line:  Your lab results are healthy and stable.        Follow up:  Schedule an appointment for a physical examination with fasting blood tests in one year's time, or return sooner if new questions, symptoms or problems arise.       Sincerely,    Dr. Austin

## 2020-08-27 NOTE — LETTER
8/27/2020         RE: Aly Sorensen  [address]  [place] [facility] [address]        Dear Colleague,    Thank you [facility] your patient, Aly [facility]orensen[facility]. Please see a copy of my visit note below.    [facility] - PRIMARY CARE SKIN    CC: skin cancer screening (full-body)  SUBJECTIVE:   Aly Sorensen is a(n) 80 year old male who presents to clinic today for a full-body skin exam. Has not had a skin exam for a few years.    Bothersome lesions noticed by the patient or other skin concerns :  Issue One:  No particular issues    Personal Medical History  Skin cancer: Melanoma on the back- 32 years ago  Eczema Psoriasis Lupus   NO NO NO     Family Medical History  Skin cancer: NO  Eczema Psoriasis Lupus   NO NO NO       Sunscreen usage: no use of sunscreen    Occupation: retired ().    Refer to electronic medical record (EMR) for past medical history and medications.      ROS: 14 point review of systems was negative except the symptoms listed above in the HPI.        OBJECTIVE:   GENERAL: healthy, alert and no distress.  HEENT: PERRL. Conjunctiva, sclera clear.  LYMPH NODES- no cervical , axillary, supraclavicular , groin nodes  SKIN: Leger Skin Type - II.  This patient was examined from the top of the head to the bottom of the feet  including scalp, face, neck, trunk, buttocks, both arms, both legs, both hands, both feet, and all fingers and toes. The dermatoscope was used to help evaluate pigmented lesions.  Skin Pertinent Findings:  Face: actinic damage  Left mid back- old surgical scar  Trunk, arms, legs,:            Brown, macule(s) most consistent with benign solar lentigo          Raised, coarse textured, stuck appearing lesion consistent with seborrheic keratosis .          Slightly raised, red lesion(s) consistent with capillary hemangioma          Brown macules of various sizes and shapes most consistent with (benign) melanocytic nevi           "        ASSESSMENT:     Encounter Diagnoses   Name Primary?     Melanoma of skin (H) Yes     Seborrheic keratosis      Solar lentiginosis      Capillary hemangioma          PLAN:   Patient Instructions   Skin exam one year    SUN PROTECTION INSTRUCTIONS  Sun damage can lead to skin cancer and premature aging of the skin.      The best way to protect from sun damage to your skin is to avoid the sun during peak hours (10 am - 2 pm) even on overcast days.    Never use tanning beds. Tanning beds are associated with much higher risks of skin cancer.    All tanning damages the skin. Aim for ivory skin year round and you will have less trouble with your skin in years to come. There is no merit in getting \"a base tan\" before a warm weather vacation, as any tanning indicates your body's response to sun damage.    Stop smoking. Smokers have higher rates of skin cancer and also have premature skin wrinkling.    Use UPF sun-protective clothing, which while more expensive initially provides longer lasting coverage without having to worry about remembering to re-apply.  1. Wear a wide-brimmed hat and sunglasses.   2. Wear sun-protective clothing.  [employer] and other [employer] make sun protective clothing that are stylish, comfortable and cool.   [employer] and other [employer] make UV arm sleeves suitable for golfing, gardening and other activities.    Sunscreen instructions:  1. Use sunscreens with Zinc Oxide, Titanium Dioxide or Avobenzone to protect from UVA rays.  2. Use SPF 30-50+ to protect from UVB rays.  3. Re-apply every 2 hours even if water resistant.  4. Apply on your face every day even when cloudy and even in the winter. UVA \"aging rays\" penetrate window glass and are just as strong in the winter as in the summer.    FYI  You should use about 3 tablespoons of sunscreen to protect your whole body. Thus a typical eight ounce bottle of sunscreen should last 4 applications. Remember, that the SPF rating is " compromised if you don t apply enough. Most people only apply 1/2 - 1/3 of the amount they need. Also don t forget areas such as your ears, feet, upper back and harder to reach places. Keep in mind that these amounts should be increased for larger body sizes.    Sunscreens with titanium dioxide and/or zinc oxide in the active ingredients are physical blockers as opposed to chemical blockers. Chemical-free sunscreens should not irritate the skin.    Spray-on sunscreens may be used for touch-up application only, not as a base layer. Also, use with caution around small children due to inhalation risk.    SPF means sun protection factor, which is just the degree to which the sunscreen can protect against UVB rays. There is no rating system for UVA rays. SPF is calculated as the time skin will burn when sunscreen is applied vs. skin without sunscreen.    Water resistant sunscreens should be re-applied every 1-2 hours.    Product Recommendations:    Consider use of sunscreen sticks with Zinc Oxide and Titanium Dioxide active ingredients such as Neutrogena Pure&Free Baby Sunscreen Stick.    Good examples include: Blue Lizard, EltaMD, Solbar    Good daily moisturizers with SPF: Vanicream, CeraVe.    For sensitive skin, consider : SkinMedica Essential Defense Mineral Shield Broad Spectrum SPF 35    Men: consider use of Neutrogena Triple Protect Facial Lotion    Avoid retinyl palmitate products.  Avoid combination products that include both sunscreen and insect repellant, as sunscreen should be applied every 2 hours, but insect repellant should not be applied as frequently.    For more information:  https://www.skincancer.org/prevention/sun-protection/sunscreen/sunscreens-safe-and-effective        The patient was counseled about sunscreens and sun avoidance. The patient was counseled to check the skin regularly and report any lesion that is new, changing, itching, scabbing, bleeding or otherwise bothersome. The patient was  discharged ambulatory and in stable condition.        TT: 25 minutes.  CT: 15 minutes.        Again, thank you for allowing me to participate in the care of your patient.        Sincerely,        Alba Laughlin MD

## 2020-08-27 NOTE — PROGRESS NOTES
[facility] - PRIMARY CARE SKIN    CC: skin cancer screening (full-body)  SUBJECTIVE:   Aly Sorensen is a(n) 80 year old male who presents to clinic today for a full-body skin exam. Has not had a skin exam for a few years.    Bothersome lesions noticed by the patient or other skin concerns :  Issue One:  No particular issues    Personal Medical History  Skin cancer: Melanoma on the back- 32 years ago  Eczema Psoriasis Lupus   NO NO NO     Family Medical History  Skin cancer: NO  Eczema Psoriasis Lupus   NO NO NO       Sunscreen usage: no use of sunscreen    Occupation: retired ().    Refer to electronic medical record (EMR) for past medical history and medications.      ROS: 14 point review of systems was negative except the symptoms listed above in the HPI.        OBJECTIVE:   GENERAL: healthy, alert and no distress.  HEENT: PERRL. Conjunctiva, sclera clear.  LYMPH NODES- no cervical , axillary, supraclavicular , groin nodes  SKIN: Leger Skin Type - II.  This patient was examined from the top of the head to the bottom of the feet  including scalp, face, neck, trunk, buttocks, both arms, both legs, both hands, both feet, and all fingers and toes. The dermatoscope was used to help evaluate pigmented lesions.  Skin Pertinent Findings:  Face: actinic damage  Left mid back- old surgical scar  Trunk, arms, legs,:            Brown, macule(s) most consistent with benign solar lentigo          Raised, coarse textured, stuck appearing lesion consistent with seborrheic keratosis .          Slightly raised, red lesion(s) consistent with capillary hemangioma          Brown macules of various sizes and shapes most consistent with (benign) melanocytic nevi                  ASSESSMENT:     Encounter Diagnoses   Name Primary?     Melanoma of skin (H) Yes     Seborrheic keratosis      Solar lentiginosis      Capillary hemangioma          PLAN:   Patient Instructions   Skin exam one year    SUN PROTECTION INSTRUCTIONS  Sun  "damage can lead to skin cancer and premature aging of the skin.      The best way to protect from sun damage to your skin is to avoid the sun during peak hours (10 am - 2 pm) even on overcast days.    Never use tanning beds. Tanning beds are associated with much higher risks of skin cancer.    All tanning damages the skin. Aim for ivory skin year round and you will have less trouble with your skin in years to come. There is no merit in getting \"a base tan\" before a warm weather vacation, as any tanning indicates your body's response to sun damage.    Stop smoking. Smokers have higher rates of skin cancer and also have premature skin wrinkling.    Use UPF sun-protective clothing, which while more expensive initially provides longer lasting coverage without having to worry about remembering to re-apply.  1. Wear a wide-brimmed hat and sunglasses.   2. Wear sun-protective clothing.  [employer] and other [employer] make sun protective clothing that are stylish, comfortable and cool.   [employer] and other [employer] make UV arm sleeves suitable for golfing, gardening and other activities.    Sunscreen instructions:  1. Use sunscreens with Zinc Oxide, Titanium Dioxide or Avobenzone to protect from UVA rays.  2. Use SPF 30-50+ to protect from UVB rays.  3. Re-apply every 2 hours even if water resistant.  4. Apply on your face every day even when cloudy and even in the winter. UVA \"aging rays\" penetrate window glass and are just as strong in the winter as in the summer.    FYI  You should use about 3 tablespoons of sunscreen to protect your whole body. Thus a typical eight ounce bottle of sunscreen should last 4 applications. Remember, that the SPF rating is compromised if you don t apply enough. Most people only apply 1/2 - 1/3 of the amount they need. Also don t forget areas such as your ears, feet, upper back and harder to reach places. Keep in mind that these amounts should be increased for larger body " sizes.    Sunscreens with titanium dioxide and/or zinc oxide in the active ingredients are physical blockers as opposed to chemical blockers. Chemical-free sunscreens should not irritate the skin.    Spray-on sunscreens may be used for touch-up application only, not as a base layer. Also, use with caution around small children due to inhalation risk.    SPF means sun protection factor, which is just the degree to which the sunscreen can protect against UVB rays. There is no rating system for UVA rays. SPF is calculated as the time skin will burn when sunscreen is applied vs. skin without sunscreen.    Water resistant sunscreens should be re-applied every 1-2 hours.    Product Recommendations:    Consider use of sunscreen sticks with Zinc Oxide and Titanium Dioxide active ingredients such as Neutrogena Pure&Free Baby Sunscreen Stick.    Good examples include: Blue Lizard, EltaMD, Solbar    Good daily moisturizers with SPF: Vanicream, CeraVe.    For sensitive skin, consider : SkinMedica Essential Defense Mineral Shield Broad Spectrum SPF 35    Men: consider use of Neutrogena Triple Protect Facial Lotion    Avoid retinyl palmitate products.  Avoid combination products that include both sunscreen and insect repellant, as sunscreen should be applied every 2 hours, but insect repellant should not be applied as frequently.    For more information:  https://www.skincancer.org/prevention/sun-protection/sunscreen/sunscreens-safe-and-effective        The patient was counseled about sunscreens and sun avoidance. The patient was counseled to check the skin regularly and report any lesion that is new, changing, itching, scabbing, bleeding or otherwise bothersome. The patient was discharged ambulatory and in stable condition.        TT: 25 minutes.  CT: 15 minutes.

## 2020-08-27 NOTE — PATIENT INSTRUCTIONS
"Skin exam one year    SUN PROTECTION INSTRUCTIONS  Sun damage can lead to skin cancer and premature aging of the skin.      The best way to protect from sun damage to your skin is to avoid the sun during peak hours (10 am - 2 pm) even on overcast days.    Never use tanning beds. Tanning beds are associated with much higher risks of skin cancer.    All tanning damages the skin. Aim for ivory skin year round and you will have less trouble with your skin in years to come. There is no merit in getting \"a base tan\" before a warm weather vacation, as any tanning indicates your body's response to sun damage.    Stop smoking. Smokers have higher rates of skin cancer and also have premature skin wrinkling.    Use UPF sun-protective clothing, which while more expensive initially provides longer lasting coverage without having to worry about remembering to re-apply.  1. Wear a wide-brimmed hat and sunglasses.   2. Wear sun-protective clothing.  "[employer]" and other [employer] make sun protective clothing that are stylish, comfortable and cool.   [employer] and other [employer] make UV arm sleeves suitable for golfing, gardening and other activities.    Sunscreen instructions:  1. Use sunscreens with Zinc Oxide, Titanium Dioxide or Avobenzone to protect from UVA rays.  2. Use SPF 30-50+ to protect from UVB rays.  3. Re-apply every 2 hours even if water resistant.  4. Apply on your face every day even when cloudy and even in the winter. UVA \"aging rays\" penetrate window glass and are just as strong in the winter as in the summer.    FYI  You should use about 3 tablespoons of sunscreen to protect your whole body. Thus a typical eight ounce bottle of sunscreen should last 4 applications. Remember, that the SPF rating is compromised if you don t apply enough. Most people only apply 1/2 - 1/3 of the amount they need. Also don t forget areas such as your ears, feet, upper back and harder to reach places. Keep in mind that " these amounts should be increased for larger body sizes.    Sunscreens with titanium dioxide and/or zinc oxide in the active ingredients are physical blockers as opposed to chemical blockers. Chemical-free sunscreens should not irritate the skin.    Spray-on sunscreens may be used for touch-up application only, not as a base layer. Also, use with caution around small children due to inhalation risk.    SPF means sun protection factor, which is just the degree to which the sunscreen can protect against UVB rays. There is no rating system for UVA rays. SPF is calculated as the time skin will burn when sunscreen is applied vs. skin without sunscreen.    Water resistant sunscreens should be re-applied every 1-2 hours.    Product Recommendations:    Consider use of sunscreen sticks with Zinc Oxide and Titanium Dioxide active ingredients such as Neutrogena Pure&Free Baby Sunscreen Stick.    Good examples include: Blue Lizard, EltaMD, Solbar    Good daily moisturizers with SPF: Vanicream, CeraVe.    For sensitive skin, consider : SkinMedica Essential Defense Mineral Shield Broad Spectrum SPF 35    Men: consider use of Neutrogena Triple Protect Facial Lotion    Avoid retinyl palmitate products.  Avoid combination products that include both sunscreen and insect repellant, as sunscreen should be applied every 2 hours, but insect repellant should not be applied as frequently.    For more information:  https://www.skincancer.org/prevention/sun-protection/sunscreen/sunscreens-safe-and-effective

## 2020-11-04 NOTE — TELEPHONE ENCOUNTER
Reason for Call: Request for an order or referral:    Order or referral being requested: Covid test   Both  and wife were exposed at [employer] by a deacon that has tested positive for Covid  They both have had a fever and have cough, Chest congestion and body aches all over    [facility] will call and arrange the testing    Date needed: Today please    Has the patient been seen by the PCP for this problem? NO    Additional comments: Dr Benedict Mahmood called from [facility] to help  facilitate getting these 2 patients scheduled ASAP due to their age and being exposed    Phone number Patient can be reached at:  Home number on file [phone] (home)    Best Time:  anytime    Can we leave a detailed message on this number?  YES    Call taken on 11/4/2020 at 9:34 AM by Iadlmis Prieto

## 2020-11-07 NOTE — LETTER
November 9, 2020      Aly Sorensen  [address]   [place] [address]        Dear ,    The following letter pertains to your most recent diagnostic tests:     Hello,     As we discussed by phone, your COVID test is positive.  Please quarantine/isolate yourself until you symptom have resolved for 14 days.  Seek medical attention if you have severe shortness of breath or symptoms that cannot be managed at home.       Resulted Orders   Symptomatic COVID-19 Virus (Coronavirus) by PCR   Result Value Ref Range    COVID-19 Virus PCR to [facility] - Source Nasopharyngeal     COVID-19 Virus PCR to [facility] - Result Detected, Abnormal Result (AA)       Comment:      Positive for 2019-nCoV.  Patient sample was heat inactivated and amplified using the HDPCR SARS-CoV-2   assay ([employer] The HDPCRTM SARS-CoV-2 assay is a reverse   transcription real-time polymerase chain reaction (qRT-PCR) test intended for   the qualitative detection of nucleic acid  from SARS-CoV-2 in human nasopharyngeal swabs, oropharyngeal swabs, anterior   nasal swabs, mid-turbinate nasal swabs as well as nasal aspirate, nasal wash,   and bronchoalveolar lavage (BAL) specimens from individuals who are suspected   of COVID-19 by their healthcare provider.  Positive results should also be reported in accordance with local, state, and   federal regulations.  Nasopharyngeal specimen is the preferred choice for swab-based SARS CoV2   testing. When collection of a nasopharyngeal swab is not possible the   following are acceptable alternatives:  an oropharyngeal (OP) specimen collected by a healthcare professional, or a   nasal mid-turbinate (NMT) swab collected by a healthcar e professional or by   onsite self-collection (using a flocked tapered swab), or an anterior nares   specimen collected by a healthcare professional or by onsite self-collection   (using a round foam swab). (Centers for Disease Control)  Testing performed by [place]  [facility] ([facility]) [address] [place] [address]  The test performance characteristics were determined by [facility]. It has not been   cleared or approved by the FDA.  The laboratory is regulated under the Clinical Laboratory Improvement   Amendments of 1988 (CLIA-88) as qualified to perform high-complexity testing.   This test is used for clinical purposes. It should not be regarded as   investigational or for research.         If you have any questions or concerns, please call the clinic at the number listed above.       Sincerely,        Zev Austin MD  norma

## 2020-11-08 NOTE — TELEPHONE ENCOUNTER
"Coronavirus (COVID-19) Notification    Caller Name (Patient, parent, daughter/son, grandparent, etc)  Patient     Reason for call  Notify of Positive Coronavirus (COVID-19) lab results, assess symptoms,  review [facility] recommendations    Lab Result    Lab test:  2019-nCoV rRt-PCR or SARS-CoV-2 PCR    Oropharyngeal AND/OR nasopharyngeal swabs is POSITIVE for 2019-nCoV RNA/SARS-COV-2 PCR (COVID-19 virus)    RN Recommendations/Instructions per [facility] Coronavirus COVID-19 recommendations    Brief introduction script  Introduce self then review script:  \"I am calling on behalf of [employer].  We were notified that your Coronavirus test (COVID-19) for was POSITIVE for the virus.  I have some information to relay to you but first I wanted to mention that the [place] Dept of Health will be contacting you shortly [it's possible MD already called Patient] to talk to you more about how you are feeling and other people you have had contact with who might now also have the virus.  Also, [facility] is Partnering with the [facility] for Covid-19 research, you may be contacted directly by research staff.\"    Assessment (Inquire about Patient's current symptoms)   Assessment   Current Symptoms at time of phone call: (if no symptoms, document No symptoms]  cough, tired    Symptoms onset (if applicable)  11/3/20     If at time of call, Patients symptoms hare worsened, the Patient should contact 911 or have someone drive them to Emergency Dept promptly:      If Patient calling 911, inform 911 personal that you have tested positive for the Coronavirus (COVID-19).  Place mask on and await 911 to arrive.    If Emergency Dept, If possible, please have another adult drive you to the Emergency Dept but you need to wear mask when in contact with other people.      Review information with Patient    Your result was positive. This means you have COVID-19 (coronavirus).  We have sent you a letter that reviews " the information that I'll be reviewing with you now.    How can I protect others?    If you have symptoms: stay home and away from others (self-isolate) until:    You've had no fever--and no medicine that reduces fever--for 1 full day (24 hours). And       Your other symptoms have gotten better. For example, your cough or breathing has improved. And     At least 10 days have passed since your symptoms started. (If you've been told by a doctor that you have a weak immune system, wait 20 days.)     If you don't have symptoms: Stay home and away from others (self-isolate) until at least 10 days have passed since your first positive COVID-19 test. (Date test collected)    During this time:    Stay in your own room, including for meals. Use your own bathroom if you can.    Stay away from others in your home. No hugging, kissing or shaking hands. No visitors.     Don't go to work, school or anywhere else.     Clean  high touch  surfaces often (doorknobs, counters, handles, etc.). Use a household cleaning spray or wipes. You'll find a full list on the EPA website at www.epa.gov/pesticide-registration/list-n-disinfectants-use-against-sars-cov-2.     Cover your mouth and nose with a mask, tissue or other face covering to avoid spreading germs.    Wash your hands and face often with soap and water.    Caregivers in these groups are at risk for severe illness due to COVID-19:  o People 65 years and older  o People who live in a nursing home or long-term care facility  o People with chronic disease (lung, heart, cancer, diabetes, kidney, liver, immunologic)  o People who have a weakened immune system, including those who:  - Are in cancer treatment  - Take medicine that weakens the immune system, such as corticosteroids  - Had a bone marrow or organ transplant  - Have an immune deficiency  - Have poorly controlled HIV or AIDS  - Are obese (body mass index of 40 or higher)  - Smoke regularly    Caregivers should wear gloves  while washing dishes, handling laundry and cleaning bedrooms and bathrooms.    Wash and dry laundry with special caution. Don't shake dirty laundry, and use the warmest water setting you can.    If you have a weakened immune system, ask your doctor about other actions you should take.    For more tips, go to www.cdc.gov/coronavirus/2019-ncov/downloads/10Things.pdf.    You should not go back to work until you meet the guidelines above for ending your home isolation. You don't need to be retested for COVID-19 before going back to work--studies show that you won't spread the virus if it's been at least 10 days since your symptoms started (or 20 days, if you have a weak immune system).    Employers: This document serves as formal notice of your employee's medical guidelines for going back to work. They must meet the above guidelines before going back to work in person.    How can I take care of myself?    1. Get lots of rest. Drink extra fluids (unless a doctor has told you not to).    2. Take Tylenol (acetaminophen) for fever or pain. If you have liver or kidney problems, ask your family doctor if it's okay to take Tylenol.     Take either:     650 mg (two 325 mg pills) every 4 to 6 hours, or     1,000 mg (two 500 mg pills) every 8 hours as needed.     Note: Don't take more than 3,000 mg in one day. Acetaminophen is found in many medicines (both prescribed and over-the-counter medicines). Read all labels to be sure you don't take too much.    For children, check the Tylenol bottle for the right dose (based on their age or weight).    3. If you have other health problems (like cancer, heart failure, an organ transplant or severe kidney disease): Call your specialty clinic if you don't feel better in the next 2 days.    4. Know when to call 911: Emergency warning signs include:    Trouble breathing or shortness of breath    Pain or pressure in the chest that doesn't go away    Feeling confused like you haven't felt  before, or not being able to wake up    Bluish-colored lips or face    5. Sign up for [employer]. We know it's scary to hear that you have COVID-19. We want to track your symptoms to make sure you're okay over the next 2 weeks. Please look for an email from [employer]--this is a free, online program that we'll use to keep in touch. To sign up, follow the link in the email. Learn more at [email or web address].    Where can I get more information?    [facility]: [email or web address].am.[email or web address] [email or web address] [facility] St[email or web address] [email or web address]/    Coronavirus Basics: www.health.[facility].[place]./diseases/coronavirus/basics.html    What to Do If You're Sick: www.cdc.gov/coronavirus/2019-ncov/about/steps-when-sick.html    Ending Home Isolation: www.cdc.gov/coronavirus/2019-ncov/hcp/disposition-in-home-patients.html     Caring for Someone with COVID-19: www.cdc.gov/coronavirus/2019-ncov/if-you-are-sick/care-for-someone.html     [facility] clinical trials (COVID-19 research studies): clinicalaffairs.[facility]-clinical-trials     A Positive COVID-19 letter will be sent via [employer] or the mail. (Exception, no letters sent to Presurgerical/Preprocedure Patients)    [Name]  Saeid Lorenzana RN  [employer] [facility]  COVID19 Results Team RN  Ph# [phone]

## 2020-11-08 NOTE — RESULT ENCOUNTER NOTE
The following letter pertains to your most recent diagnostic tests:    Hello,    As we discussed by phone, your COVID test is positive.  Please quarantine/isolate yourself until you symptom have resolved for 14 days.  Seek medical attention if you have severe shortness of breath or symptoms that cannot be managed at home.      Sincerely,    Zev Austin MD

## 2020-11-10 PROBLEM — J96.01 ACUTE RESPIRATORY FAILURE WITH HYPOXIA (H): Status: ACTIVE | Noted: 2020-01-01

## 2020-11-10 PROBLEM — U07.1 PNEUMONIA DUE TO 2019 NOVEL CORONAVIRUS: Status: ACTIVE | Noted: 2020-01-01

## 2020-11-10 PROBLEM — J12.82 PNEUMONIA DUE TO 2019 NOVEL CORONAVIRUS: Status: ACTIVE | Noted: 2020-01-01

## 2020-11-10 PROBLEM — U07.1 CLINICAL DIAGNOSIS OF COVID-19: Status: ACTIVE | Noted: 2020-01-01

## 2020-11-10 PROBLEM — E87.6 HYPOKALEMIA: Status: ACTIVE | Noted: 2020-01-01

## 2020-11-10 NOTE — ED PROVIDER NOTES
"  History     Chief Complaint:  Cough     HPI   Aly Sorensen is a 80 year old male who tested positive for COVID-19 on 11/7/2020 who presents for evaluation of a cough. Approximately a week ago the patient started to develop a cough and generalized body aches. He tested positive for COVID-19 on Saturday. Over the last 48 hours he has had a worsening cough with some chest tightness. He has been monitoring his room air oxygen saturations at home and today they fell into the upper 80s. He then called a triage line and was advised to seek evaluation in the ER. Here in the ED the patient denies feeling short of breath or any chest pain. Notably, the patient's wife was sick with COVID-19 symptoms before him and her symptoms have since improved.     Allergies:  NKDA      Medications:    Tylenol   Ibuprofen     Past Medical History:    Arthritis  Prostate cancer   Melanoma   Spondylosis of lumbar region without myelopathy or radiculopathy     Past Surgical History:    Left total knee arthroplasty  Right total knee arthroplasty  Back surgery  Prostatectomy  Colonoscopy  Hernia repair  Back fusion  Phacoemulsification clear cornea with standard intraocular lens implant, right  Phacoemulsification clear cornea with standard intraocular lens implant, left   Removal of melanoma     Family History:    Uterine cancer - Mother   Colon cancer - Father   Diabetes - Brother  Heart failure - Brother     Social History:  Tobacco use:    Former smoke, quit 2008  Alcohol use:    Negative   Drug use:    Negative   Marital status:            Review of Systems   Respiratory: Positive for cough and chest tightness. Negative for shortness of breath.    Cardiovascular: Negative for chest pain.   Musculoskeletal: Positive for myalgias.   All other systems reviewed and are negative.      Physical Exam   First Vitals:  BP: (!) 143/71  Pulse: 91  Temp: 98.5  F (36.9  C)  Resp: 22  Height: 170.2 cm (5' 7\")  Weight: 79.7 kg (175 lb 11.2 " oz)  SpO2: (!) 85 %      Physical Exam  Vitals: reviewed by me  General: Pt seen on [facility], pleasant, cooperative, and alert to conversation, sick appearing, nontoxic.  Eyes: Tracking well, clear conjunctiva BL  ENT: MMM, midline trachea.   Lungs: Mild respiratory distress noted, speaking in full sentences, mildly tachypneic.  No accessory muscle use.  CV: Rate as above, regular rhythm.    MSK: no peripheral edema or joint effusion.  No evidence of trauma  Skin: No rash, normal turgor and temperature  Neuro: Clear speech and no facial droop.  Psych: Not RIS, no e/o AH/VH      Emergency Department Course     Imaging:  Radiographic findings were communicated with the patient who voiced understanding of the findings.    CT Chest Pulmonary Embolism w Contrast:  IMPRESSION:   1.  No evidence of pulmonary embolism. Thoracic aorta is unremarkable.   2.  Patchy groundglass opacities within both lungs concerning for viral pneumonitis. ARDS could appear similar.   Per radiology.     Laboratory:  CBC: WNL (WBC 7.7, HGB 15.2, )   BMP: Potassium 3.3 low, Glucose 149 high, Calcium 8.2 low, o/w WNL (Creatinine 0.74)   Blood gas venous and oxyhgb: pH 7.44 high, pCO2 37 low, pO2 31, Bicarbonate 25, FIO2 4 liters, Oxyhemoglobin 59, Base excess 1.2    Troponin I 1641: <0.015   D dimer quantitative: 1.5 high      Interventions:  1639 Decadron 6 mg IV      Emergency Department Course:  Nursing notes and vitals reviewed.  1626: I performed an exam of the patient as documented above.     I spoke with Dr. Maldonado of the hospitalist service regarding patient's presentation, findings, and plan of care.     Findings and plan explained to the Patient who consents to admission. Discussed the patient with Dr. Maldonado, who will admit the patient to a [facility] bed for further monitoring, evaluation, and treatment.      Impression & Plan       Medical Decision Making:  Aly Sorensen is a very pleasant 80 year old male who presents to the  emergency room with what appears to be advancing coronavirus infection. His wife got sick before he did but she has just been told that she is officially clear now ten days after her initial positive test. The patient himself however has worsening shortness of breath and has required 4 L nasal cannula here to keep saturations in the high 90s. He is however in good spirits, denies any pain, is able to get up and use the restroom independently, although I do think that he is too sick to go home. I did give him Decadron given his age and hypoxia, and I do think he needs to be admitted. I spoke with Dr. Maldonado of the hospitalist team who has kindly agreed to accept care of the patient. I did do a CT scan to formally rule out a pulmonary embolism and thankfully he has no such diagnosis. Will plan for admission as above.     Covid-19  Aly Sorensen was evaluated during a global COVID-19 pandemic, which necessitated consideration that the patient might be at risk for infection with the SARS-CoV-2 virus that causes COVID-19.   Applicable protocols for evaluation were followed during the patient's care.   COVID-19 was considered as part of the patient's evaluation. The plan for testing is:   a test was obtained at a previous visit and reviewed & considered today.     Diagnosis:    ICD-10-CM   1. Clinical diagnosis of COVID-19  U07.1      Disposition:  Admitted to Dr. Maldonado.          I, Omi Edward, am serving as a scribe at 4:26 PM on 11/10/2020 to document services personally performed by Dr. Hdz, based on my observations and the provider's statements to me.     [facility] EMERGENCY DEPT       Shayne Hdz MD  11/10/20 2152       Shayne Hdz MD  11/10/20 2153

## 2020-11-10 NOTE — ED NOTES
[facility]  ED Nurse Handoff Report    ED Chief complaint: Covid Concern      ED Diagnosis:   Final diagnoses:   None       Code Status: See H&P    Allergies: No Known Allergies    Patient Story: Patient states he tested positive for COVID-19. He checked his oxygen saturation at home and it was less than 88% so his doctor told him to come to the ED.   Focused Assessment:  AOx3. Oxygen saturation in mid to low 80s on RA, now sating in mid to upper 90s on 4 liters NC. Persistent cough with shortness of breath and body aches. Generalized weakness.     Treatments and/or interventions provided: IV decadron  Patient's response to treatments and/or interventions: continue to monitor    To be done/followed up on inpatient unit:  continue to monitor    Does this patient have any cognitive concerns?: none    Activity level - Baseline/Home:  Independent  Activity Level - Current:   Stand with Assist    Patient's Preferred language: English   Needed?: No    Isolation: None and COVID r/o and special precautions  Infection: Not Applicable  COVID r/o and special precautions  Patient tested for COVID 19 prior to admission: NO, patient already had a positive test  Bariatric?: No    Vital Signs:   Vitals:    11/10/20 1621 11/10/20 1623 11/10/20 1700 11/10/20 1730   BP:   121/80 117/74   Pulse:   79 72   SpO2: 95% 95% 96% 95%       Cardiac Rhythm:     Was the PSS-3 completed:   Yes  What interventions are required if any?               Family Comments: patient updated family via phone, he lives at home with his wife who also has COVID  OBS brochure/video discussed/provided to patient/family: N/A              Name of person given brochure if not patient: n/a              Relationship to patient: n/a    For the majority of the shift this patient's behavior was Green.   Behavioral interventions performed were frequent rounding.    ED NURSE PHONE NUMBER: *[phone]

## 2020-11-10 NOTE — TELEPHONE ENCOUNTER
"Routing Per Protocol for PCP review/advise. I would recommend ER for evaluation of progressively decreasing Sats (throughout the week). Today 89-90%, SOB after exertion, OK at rest.     Returned call to Pt/Spouse:     S: 89%-90% O2 on Oximeter    B: Recent COVID Positive Diagnosis 11/7, ill x 1 week     A:   Sats <90%, running 89-90%   Onset: Just today   Description: denies troubles breathing, subjectively (Pt) and objectively (1st spoke with spouse) \"does not look like he is struggling\"     Lost his sense taste and smell     When talking with Pt: he does sound winded. Takes a breath every 4-5 words    Oximeter reads 89-90% while on the phone with writer    Today is the first day it has been below 90%     Denies chest pain     Pt states he has done a lot of coughing  Occasionally coughing up phlegm- \"sometimes quite a bit\"     Pt states the best his O2 reading was was 97%, then progressively dropped to mid-90's%      HR- 77 bpm     Had a fever previously- yesterday was last- 99.3 Max   A few days ago (101 Max)     Denies SOB with rest   (right now, O2 is 90%)     R: If O2 drops below 89%, proceed to ER. If fever returns, proceed to ER. Concerns for  Pneumonia       Reason for Disposition    MILD difficulty breathing (e.g., minimal/no SOB at rest, SOB with walking, pulse <100)    Protocols used: CORONAVIRUS (COVID-19) DIAGNOSED OR [id number] 8.4.20      "

## 2020-11-10 NOTE — TELEPHONE ENCOUNTER
If Aly feels OK, I would tolerate oxygen sats > 88 % before referring to ER  If he has oxygen sats less than 88 at rest, then he should go to ER  It sounds like currently he is right on the edge  Hopefully he does not get worse

## 2020-11-10 NOTE — TELEPHONE ENCOUNTER
Reason for call:  Patient reporting a symptom    Symptom or request: Patient Oxygen Sensor is reading 89% on RA, the patient does not feel SOB  The patient has been sick from the Corona Virus but no other symptoms related to breathing, Just a cough    Duration (how long have symptoms been present): Just today     Have you been treated for this before? NA    Additional comments: The patients wife Daija called in and she is very concerned    Phone Number patient can be reached at:  Cell number on file:    Telephone Information:   Mobile [phone]       Best Time:  ASAP    Can we leave a detailed message on this number:  YES    Call taken on 11/10/2020 at 12:22 PM by Idalmis Prieto

## 2020-11-10 NOTE — TELEPHONE ENCOUNTER
"Spoke with Pt:    Relayed this message from PCP and discussed through with Pt.     If O2 sats drop below 87% proceed to ER    If breathing worsens, yet O2 remains above 88%, still OK to proceed to ER     To PCP: want to confirm with you due to varying recommendations/info on ibuprofen and COVID-- OK to take or stick with Tylenol? He also states that he was taking Aspirin 325mg \"a few times a day\"     I have advised to stick with Tylenol, but can call him back if not necessary to avoid ibuprofen.     Thank you,   Kerri FRIEND RN      "

## 2020-11-11 NOTE — PHARMACY-ADMISSION MEDICATION HISTORY
Pharmacy Medication History  Admission medication history interview status for the 11/10/2020  admission is complete. See EPIC admission navigator for prior to admission medications       Medication history sources: Patient  Medication history source reliability: Good  Adherence assessment: Good    Significant changes made to the medication list:  Per patient he is only on prn otc       Additional medication history information:       Medication reconciliation completed by provider prior to medication history? No    Time spent in this activity: 10min       Prior to Admission medications    Medication Sig Last Dose Taking? Auth Provider   Acetaminophen 325 MG CAPS Take 650 mg by mouth every 4 hours as needed 11/10/2020 at Unknown time Yes Unknown, Entered By History   aspirin (ASA) 325 MG EC tablet Take 325 mg by mouth every 6 hours as needed for moderate pain 11/10/2020 at Unknown time Yes Unknown, Entered By History   ibuprofen (ADVIL/MOTRIN) 200 MG capsule Take 200-800 mg by mouth every 6 hours as needed for moderate pain  11/9/2020 at Unknown time Yes Reported, Patient     Genie Bowen PharmD

## 2020-11-11 NOTE — PLAN OF CARE
A&O x4. SBA. VSS on 8 LPM. Lungs dim. Tolerating diet. Voiding without difficulty. Plan is to maintain NC/Oxymask. Wean if able. Continue dexamethasone and remdesivir. Discharge when O2 sats stable.     16:55 EDIT: Pt still maintaining sats in low 90's on 8 LPM oxymask. Tolerating diet. Voiding without difficulty.

## 2020-11-11 NOTE — PLAN OF CARE
COVID positive on special precaution. A&Ox4. VSS on 6L oxymask, desats w/ activity-now using urinal. Productive cough. Up with SBA. Regular diet. Denied nausia and pain.Continue to monitor.

## 2020-11-11 NOTE — PROGRESS NOTES
"SPIRITUAL HEALTH SERVICES: Tele-Encounter  Patient Location: OBS  Spoke with: Ptnt    Referral Source: Spiritual Health Consult    DATA:  Ptnt shared he is \"very Religion\" and wishes his  could come to bless and annoint him , but he understands no one can since he is Covid +. He inquired after my own spiritual tradition; it seemed important to him that I have worked in [employer] churches, so I was a little familiar with his tradition. He welcomes  support and asked if I would connect again in another day or so, as he was \"about to eat lunch.\"     I offered emotional support and sang a blessing. Ptnt sounded tearful at the end of the call.    PLAN:   will follow.    Talia [place] Resident      ______________________________    Type of service:  Telephone Visit     has received verbal consent for a TelephoneVisit from the patient? Yes    Distance Provider Location: designated [place] office or home office (secure setting)    Mode of Communication: telephone (via [employer] phone or [employer] tele-call-number ([phone]))      "

## 2020-11-11 NOTE — H&P
"[facility]    History and Physical - Hospitalist Service       Date of Admission:  11/10/2020    Assessment & Plan   Aly Sorensen is a 80 year old male admitted on 11/10/2020. He has a remote history of prostate cancer, S/P prostatectomy 10/2005, PSA recently normal, recent diagnosis of COVID infection, who presents with ED with worsening shortness of breath and declining oxygen saturations.  He is admitted for further evaluation and treatment.    Principal Problem:    Acute respiratory failure with hypoxia (H)    Pneumonia due to 2019 novel coronavirus    Admit as inpatient    Treat according to COVID order set: supplemental oxygen, corticosteroids, Remdesivir, anticoagulants  Active Problems:    Hypokalemia    Replace potassium per protocol     Diet:  Regular  DVT Prophylaxis: Enoxaparin (Lovenox) SQ  Jane Catheter: not present  Code Status:  Full         Disposition Plan   Expected discharge: 2 - 3 days, recommended to prior living arrangement once Covid infection has been treated.  Entered: Lorrie Maldonado MD 11/10/2020, 7:20 PM     The patient's care was discussed with the Patient and ED MD, Dr. Hdz.    Lorrie Maldonado MD  [facility]  Contact information available via [facility] Paging/Directory      ______________________________________________________________________    Chief Complaint   \"Today, both my lungs hurt.\"    History is obtained from the patient    History of Present Illness   Aly Sorensen is a 80 year old male with remote history of prostate cancer, status post radical retropubic prostatectomy on 10/11/2005 with recently normal PSA, also recent diagnosis of Covid infection presents to the emergency department complaining of shortness of breath and declining oxygen saturations.      Patient's wife was diagnosed with Covid on 11/1.  His Covid PCR was positive on 11/7.  He says that both of them have had typical Covid symptoms including fever " and chills, fatigue, body aches.  They were advised to regularly monitor oxygen saturations.  A couple of days ago, his oxygen saturation was as high as 95% on room air.  Yesterday, oxygen saturations were sometimes as low as 88 to 90%.  Today, when he checked his oxygen saturation it was 84%.      He came to the emergency department for evaluation where chest CT shows bilateral pneumonitis in a pattern typical for Covid infection.  He says he has also had 2 loose stools today.  He is admitted for further evaluation and treatment.  Review of Systems    The 10 point Review of Systems is negative other than noted in the HPI or here.     Past Medical History    I have reviewed this patient's medical history and updated it with pertinent information if needed.   Past Medical History:   Diagnosis Date     Arthritis degenerative joint disease     Malignant neoplasm of prostate (H) 12/29/2016     Melanoma (H)      Melanoma of skin (H) 12/29/2016    Back     Prostate cancer (H)      Spondylosis of lumbar region without myelopathy or radiculopathy 12/29/2016       Past Surgical History   I have reviewed this patient's surgical history and updated it with pertinent information if needed.  Past Surgical History:   Procedure Laterality Date     ARTHROPLASTY KNEE  12/8/2011    Procedure:ARTHROPLASTY KNEE; Left Total Knee Arthroplasty (JILLIAN)^; Surgeon:YASMIN PELAEZ; Location: OR     ARTHROPLASTY KNEE Right 12/28/2018    Procedure: RIGHT TOTAL KNEE ARTHROPLASTY;  Surgeon: Yasmin Pelaez MD;  Location:  OR     BACK SURGERY       COLONOSCOPY       GENITOURINARY SURGERY  history of prostatectomy     HERNIA REPAIR  inguinal hernia repair as a teenager     ORTHOPEDIC SURGERY  back fusion 2002     PHACOEMULSIFICATION CLEAR CORNEA WITH STANDARD INTRAOCULAR LENS IMPLANT Right 11/10/2015    Procedure: PHACOEMULSIFICATION CLEAR CORNEA WITH STANDARD INTRAOCULAR LENS IMPLANT;  Surgeon: Criss Pulliam MD;  Location: [facility]      PHACOEMULSIFICATION CLEAR CORNEA WITH STANDARD INTRAOCULAR LENS IMPLANT Left 11/17/2015    Procedure: PHACOEMULSIFICATION CLEAR CORNEA WITH STANDARD INTRAOCULAR LENS IMPLANT;  Surgeon: Criss Pulliam MD;  Location: [facility]     removal of melanoma[  approx. 20 years ago       Social History   I have reviewed this patient's social history and updated it with pertinent information if needed.  Social History     Tobacco Use     Smoking status: Former Smoker     Types: Cigars     Smokeless tobacco: Never Used     Tobacco comment: quit smoking cigars 2008   Substance Use Topics     Alcohol use: No     Alcohol/week: 3.0 - 4.0 standard drinks     Types: 3 - 4 Standard drinks or equivalent per week     Frequency: Never     Comment: none for last yr     Drug use: No       Family History   I have reviewed this patient's family history and updated it with pertinent information if needed.  Family History   Problem Relation Age of Onset     Uterine Cancer Mother      Colon Cancer Father      Diabetes Brother      Heart Failure Brother        Prior to Admission Medications   Prior to Admission Medications   Prescriptions Last Dose Informant Patient Reported? Taking?   Acetaminophen 325 MG CAPS 11/10/2020 at Unknown time Self Yes Yes   Sig: Take 650 mg by mouth every 4 hours as needed   aspirin (ASA) 325 MG EC tablet 11/10/2020 at Unknown time Self Yes Yes   Sig: Take 325 mg by mouth every 6 hours as needed for moderate pain   ibuprofen (ADVIL/MOTRIN) 200 MG capsule 11/9/2020 at Unknown time Self Yes Yes   Sig: Take 200-800 mg by mouth every 6 hours as needed for moderate pain       Facility-Administered Medications: None     Allergies   No Known Allergies    Physical Exam   Vital Signs:     BP: 123/73 Pulse: 75   Resp: 22 SpO2: 92 % O2 Device: Nasal cannula Oxygen Delivery: 4 LPM  Weight: 0 lbs 0 oz    Constitutional: Awake, alert, cooperative, no apparent distress.  Eyes: Conjunctiva and pupils examined and normal.  HEENT: Moist  mucous membranes, normal dentition.  Respiratory: Bibasilar crackles  Cardiovascular: Regular rate and rhythm, normal S1 and S2, and no murmur noted.  GI: Soft, non-distended, non-tender, normal bowel sounds.  Lymph/Hematologic: No anterior cervical or supraclavicular adenopathy.  Skin: No rashes, no cyanosis, no edema.  Musculoskeletal: No joint swelling, erythema or tenderness.  Neurologic: Moves both arms and both legs, the neurologic examination is nonfocal  Psychiatric: Alert, oriented to person, place and time, no obvious anxiety or depression.  Mood is pleasant      Data   Data reviewed today: I reviewed all medications, new labs and imaging results over the last 24 hours. I personally reviewed the chest CT image(s) showing Patchy groundglass opacities within both lungs.    Recent Labs   Lab 11/10/20  1641   WBC 7.7   HGB 15.2   MCV 93         POTASSIUM 3.3*   CHLORIDE 106   CO2 25   BUN 17   CR 0.74   ANIONGAP 8   ELIA 8.2*   *   TROPI <0.015     Recent Results (from the past 24 hour(s))   CT Chest Pulmonary Embolism w Contrast    Narrative    CT CHEST PULMONARY EMBOLISM WITH CONTRAST 11/10/2020 6:04 PM    CLINICAL HISTORY: Shortness of breath.     TECHNIQUE: CT angiogram chest during arterial phase injection IV  contrast. 2D and 3D MIP reconstructions were performed by the CT  technologist. Dose reduction techniques were used.     CONTRAST: 69mL Isovue-370    COMPARISON: CT chest 10/30/2019.    FINDINGS:  ANGIOGRAM CHEST: Pulmonary arteries are normal caliber and negative  for pulmonary emboli. Thoracic aorta is negative for dissection. No CT  evidence of right heart strain.    LUNGS AND PLEURA: Patchy bilateral airspace consolidation is present  concerning for viral pneumonitis. Trace amount of right pleural fluid  is noted. No left pleural fluid.    MEDIASTINUM/AXILLAE: Heart is normal in size. No pericardial fluid.  Esophagus is unremarkable. Thoracic aorta is unremarkable with  minimal  calcified plaque.    UPPER ABDOMEN: Adrenal gland thickening noted bilaterally. Upper  abdomen is otherwise unremarkable.    MUSCULOSKELETAL: Normal.      Impression    IMPRESSION:  1.  No evidence of pulmonary embolism. Thoracic aorta is unremarkable.    2.  Patchy groundglass opacities within both lungs concerning for  viral pneumonitis. ARDS could appear similar.    NICHOLAS DE LEON MD

## 2020-11-11 NOTE — PLAN OF CARE
Pt arrived from ED @ 2100. COVID positive. A&Ox4. VSS on 6L oxymask, desats w/ activity-now using urinal. Productive cough. Crackles to bilateral LL. Initial IS teaching provided. First dose of Remdesevir given. On Dexamethasone. Troponin negative x2. UP SBA. Continue to monitor.

## 2020-11-11 NOTE — PROGRESS NOTES
RECEIVING UNIT ED HANDOFF REVIEW    ED Nurse Handoff Report was reviewed by: Lakshmi Castro RN on November 10, 2020 at 7:59 PM

## 2020-11-11 NOTE — PROGRESS NOTES
[facility]    Medicine Progress Note - Hospitalist Service       Date of Admission:  11/10/2020  Assessment & Plan   Aly Sorensen is an 80 year-old male with remote history of prostate cancer s/p prostatectomy 10/2005, PSA recently normal, melanoma, recent diagnosis of COVID infection, who presents with ED with worsening shortness of breath and declining oxygen saturations.  He is admitted 11/10/2020.     Severe COVID19 pneumonia  Acute respiratory failure with hypoxia   Diagnosed with COVID19 11/7. Presented with worsening shortness of breath and noted to by hypoxic.   - Continue dexamethasone  - Continue remdesivir to complete 5 day course  - Continue oxygen, wean as able  - Follow COVID19 specific labs  - Enoxaparin subcutaneous    Hypokalemia  - Monitor and replace per protocol     Diet: Combination Diet Regular Diet Adult    DVT Prophylaxis: Enoxaparin (Lovenox) SQ  Jane Catheter: not present  Code Status: Full Code      Disposition Plan   Expected discharge:  Unclear, pending weaning of oxygen, likely 2+ days  Entered: Isaac Mae MD 11/11/2020, 8:58 AM       The patient's care was discussed with the Bedside Nurse and Patient.    Isaac Mae MD  Hospitalist Service  [facility]    ______________________________________________________________________    Interval History   No acute events overnight. Patient reports shortness of breath with activity, generalized weakness, loss of smell and taste.     Data reviewed today: I reviewed all medications, new labs and imaging results over the last 24 hours. I personally reviewed no images or EKG's today.    Physical Exam   Vital Signs: Temp: 96.6  F (35.9  C) Temp src: Oral BP: 133/74 Pulse: 75   Resp: 18 SpO2: 90 % O2 Device: Oxymask Oxygen Delivery: 8 LPM  Weight: 175 lbs 11.2 oz    Constitutional:  NAD  Respiratory: Normal respiratory effort at rest, non-labored breathing   Cardiovascular: RRR. No  peripheral edema.  GI: Soft, non-tender, non-distended   Skin/Integumen: Warm, dry  Other:      Data   Recent Labs   Lab 11/11/20  0323 11/10/20  2146 11/10/20  1641   WBC  --   --  7.7   HGB  --   --  15.2   MCV  --   --  93   PLT  --   --  246   INR  --  1.02  --    NA  --   --  139   POTASSIUM 3.9  --  3.3*   CHLORIDE  --   --  106   CO2  --   --  25   BUN  --   --  17   CR  --  0.71 0.74   ANIONGAP  --   --  8   ELIA  --   --  8.2*   GLC  --   --  149*   TROPI  --  <0.015 <0.015       Recent Results (from the past 24 hour(s))   CT Chest Pulmonary Embolism w Contrast    Narrative    CT CHEST PULMONARY EMBOLISM WITH CONTRAST 11/10/2020 6:04 PM    CLINICAL HISTORY: Shortness of breath.     TECHNIQUE: CT angiogram chest during arterial phase injection IV  contrast. 2D and 3D MIP reconstructions were performed by the CT  technologist. Dose reduction techniques were used.     CONTRAST: 69mL Isovue-370    COMPARISON: CT chest 10/30/2019.    FINDINGS:  ANGIOGRAM CHEST: Pulmonary arteries are normal caliber and negative  for pulmonary emboli. Thoracic aorta is negative for dissection. No CT  evidence of right heart strain.    LUNGS AND PLEURA: Patchy bilateral airspace consolidation is present  concerning for viral pneumonitis. Trace amount of right pleural fluid  is noted. No left pleural fluid.    MEDIASTINUM/AXILLAE: Heart is normal in size. No pericardial fluid.  Esophagus is unremarkable. Thoracic aorta is unremarkable with minimal  calcified plaque.    UPPER ABDOMEN: Adrenal gland thickening noted bilaterally. Upper  abdomen is otherwise unremarkable.    MUSCULOSKELETAL: Normal.      Impression    IMPRESSION:  1.  No evidence of pulmonary embolism. Thoracic aorta is unremarkable.    2.  Patchy groundglass opacities within both lungs concerning for  viral pneumonitis. ARDS could appear similar.    NICHOLAS DE LEON MD     Medications     - MEDICATION INSTRUCTIONS -         dexamethasone  6 mg Oral Daily     enoxaparin  ANTICOAGULANT  40 mg Subcutaneous Q24H     polyethylene glycol  17 g Oral Daily     remdesivir  100 mg Intravenous Q24H     senna-docusate  1 tablet Oral BID    Or     senna-docusate  2 tablet Oral BID     sodium chloride (PF)  3 mL Intracatheter Q8H     sodium chloride (PF)  3 mL Intracatheter Q8H

## 2020-11-12 NOTE — CONSULTS
Critical Care  Note      11/12/2020    Name: Aly Sorensen MRN#: [id number]   Age: 80 year old YOB: 1940     Hsptl Day# 2  ICU DAY # 1    MV DAY # NA             Problem List:     Acute hypoxic respiratory failure 2/2 COVID 19         Summary/Hospital Course:     Mr. Aly Sorensen is an 80 year old male with a remote history of prostate cancer s/p prostatectomy in 2005 who was admitted to [facility] on 11/10/2020 due to acute hypoxic respiratory failure due to COVID 19 infection.  The patient's positive test was 11/7.  Exposure was likely from his wife who tested positive on 11/1. Upon admission, the patient was satting 84% on RA.  He has been on dexamethasone, remdesivir, and prophylactic lovenox.  His O2 requirements have escalated from 8L NC to 100% FiO2 via HFNC in the past 24 hours. He is being transferred to the ICU for initiation of continuous BiPAP.       Assessment and plan :     Aly Sorensen IS a 80 year old male admitted on 11/10/2020 for acute hypoxic respiratory failure due to COVID 19 infection.     I have personally reviewed the daily labs, imaging studies, cultures and discussed the case with referring physician and consulting physicians.     My assessment and plan by system for this patient is as follows:    Neurology/Psychiatry:   Does not require sedation at this point.  Continue tylenol as needed for pain.     Cardiovascular:   No acute issues at this time    Pulmonary/Ventilator Management:   Acute hypoxic respiratory failure 2/2 COVID 19: rapid escalation of O2 requirements from 8L NC -> 100% FiO2 via HFNC with tachypnea.  Start continuous BiPAP with low threshold for intubation.      GI and Nutrition :   NPO while wearing BiPAP, can try using HFNC for breaks to eat    Renal/Fluids/Electrolytes:   1. COLT Y/N- N  2. Electrolyte abnormality- N  3. Acid/base status- normal  4. Volume status- euvolemic  Plan  - monitor function and electrolytes as needed with replacement per ICU protocols.  - generally avoid nephrotoxic agents such as NSAID, IV contrast unless specifically required  - adjust medications as needed for renal clearance  - follow I/O's as appropriate.    Infectious Disease:   COVID 19 infection, with increased inflammatory markers; , ferritin 1200  - continue dexamethasone to complete a 10 day course  - continue remdesivir to complete a 5 day course    Endocrine:   - ICU insulin protocol, goal sugar <180      Hematology/Oncology:   - no acute issues at this time     IV/Access:   1. Venous access - PIV  2. Arterial access - NA        ICU Prophylaxis:   1. DVT: lovenox BID  2. VAP: NA, on BiPAP  3. Stress Ulcer: NA  4. Restraints: NA  5. Wound care  Per unit protocol   6. Feeding - NPO pending ability to use HFNC for breaks/food  7. Family Update: family updated by RRT NP  8. Disposition - ICU    Pt is a full code, I confirmed this with him.     Key goals for next 24 hours:   1. Start continuous BiPAP for acute hypoxic respiratory failure, low threshold for intubation  2. Continue dexamethasone, remdesivir         Medical History:     Past Medical History:   Diagnosis Date     Arthritis degenerative joint disease     Malignant neoplasm of prostate (H) 12/29/2016     Melanoma (H)      Melanoma of skin (H) 12/29/2016    Back     Prostate cancer (H)      Spondylosis of lumbar region without myelopathy or radiculopathy 12/29/2016     Past Surgical History:   Procedure Laterality Date     ARTHROPLASTY KNEE  12/8/2011    Procedure:ARTHROPLASTY KNEE; Left Total Knee Arthroplasty (JILLIAN)^; Surgeon:YASMIN PELAEZ; Location: OR     ARTHROPLASTY KNEE Right 12/28/2018    Procedure: RIGHT TOTAL KNEE ARTHROPLASTY;  Surgeon: Yasmin Pelaez MD;  Location:  OR     BACK SURGERY       COLONOSCOPY       GENITOURINARY SURGERY  history of prostatectomy     HERNIA REPAIR  inguinal hernia repair as a teenager     ORTHOPEDIC SURGERY  back fusion 2002     PHACOEMULSIFICATION CLEAR CORNEA WITH STANDARD  INTRAOCULAR LENS IMPLANT Right 11/10/2015    Procedure: PHACOEMULSIFICATION CLEAR CORNEA WITH STANDARD INTRAOCULAR LENS IMPLANT;  Surgeon: Criss Pulliam MD;  Location:  [facility]     PHACOEMULSIFICATION CLEAR CORNEA WITH STANDARD INTRAOCULAR LENS IMPLANT Left 11/17/2015    Procedure: PHACOEMULSIFICATION CLEAR CORNEA WITH STANDARD INTRAOCULAR LENS IMPLANT;  Surgeon: Criss Pulliam MD;  Location:  [facility]     removal of melanoma[  approx. 20 years ago     Social History     Socioeconomic History     Marital status:      Spouse name: Not on file     Number of children: Not on file     Years of education: Not on file     Highest education level: Not on file   Occupational History     Not on file   Social Needs     Financial resource strain: Not on file     Food insecurity     Worry: Not on file     Inability: Not on file     Transportation needs     Medical: Not on file     Non-medical: Not on file   Tobacco Use     Smoking status: Former Smoker     Types: Cigars     Smokeless tobacco: Never Used     Tobacco comment: quit smoking cigars 2008   Substance and Sexual Activity     Alcohol use: No     Alcohol/week: 3.0 - 4.0 standard drinks     Types: 3 - 4 Standard drinks or equivalent per week     Frequency: Never     Comment: none for last yr     Drug use: No     Sexual activity: Not Currently   Lifestyle     Physical activity     Days per week: Not on file     Minutes per session: Not on file     Stress: Not on file   Relationships     Social connections     Talks on phone: Not on file     Gets together: Not on file     Attends Taoist service: Not on file     Active member of club or organization: Not on file     Attends meetings of clubs or organizations: Not on file     Relationship status: Not on file     Intimate partner violence     Fear of current or ex partner: Not on file     Emotionally abused: Not on file     Physically abused: Not on file     Forced sexual activity: Not on file   Other Topics Concern      Parent/sibling w/ CABG, MI or angioplasty before 65F 55M? Not Asked   Social History Narrative     Not on file      No Known Allergies           Key Medications:       dexamethasone  6 mg Oral Daily     enoxaparin ANTICOAGULANT  40 mg Subcutaneous Q24H     polyethylene glycol  17 g Oral Daily     remdesivir  100 mg Intravenous Q24H     senna-docusate  1 tablet Oral BID    Or     senna-docusate  2 tablet Oral BID     sodium chloride (PF)  3 mL Intracatheter Q8H     sodium chloride (PF)  3 mL Intracatheter Q8H       - MEDICATION INSTRUCTIONS -          Home Meds  No current facility-administered medications on file prior to encounter.        Acetaminophen 325 MG CAPS, Take 650 mg by mouth every 4 hours as needed       aspirin (ASA) 325 MG EC tablet, Take 325 mg by mouth every 6 hours as needed for moderate pain       ibuprofen (ADVIL/MOTRIN) 200 MG capsule, Take 200-800 mg by mouth every 6 hours as needed for moderate pain                Physical Examination:   Temp:  [96.6  F (35.9  C)-98  F (36.7  C)] 98  F (36.7  C)  Pulse:  [64-87] 80  Resp:  [17-40] 39  BP: (117-142)/(74-85) 129/82  FiO2 (%):  [70 %-100 %] 100 %  SpO2:  [84 %-94 %] 93 %    Intake/Output Summary (Last 24 hours) at 11/12/2020 0213  Last data filed at 11/11/2020 2230  Gross per 24 hour   Intake --   Output 400 ml   Net -400 ml     Wt Readings from Last 4 Encounters:   11/10/20 79.7 kg (175 lb 11.2 oz)   08/20/20 83 kg (183 lb)   12/11/19 81.9 kg (180 lb 8 oz)   10/28/19 82.4 kg (181 lb 11.2 oz)     BP - Mean:  [103-108] 108  FiO2 (%): 100 %  Resp: (!) 39    Recent Labs   Lab 11/10/20  1641   O2PER  4 LITERS       GEN: no acute distress, alert, oriented  HEENT: head ncat, sclera anicteric, OP patent, trachea midline   PULM: slightly tachypneic, WOB OK. BiPAP in place.  Able to answer questions.  Breath sounds diminished bilaterally.    CV/COR: RRR S1S2 no gallop,  No rub, no murmur  ABD: soft nontender, hypoactive bowel sounds, no mass  EXT:  No  edema.  Warm and well perfused.   NEURO: grossly intact  SKIN: no obvious rash on limited exam  LINES: clean, dry intact         Data:   All data and imaging reviewed     ROUTINE ICU LABS (Last four results)  CMP  Recent Labs   Lab 11/11/20  0836 11/11/20  0323 11/10/20  2146 11/10/20  1641     --   --  139   POTASSIUM 4.1 3.9  --  3.3*   CHLORIDE 106  --   --  106   CO2 27  --   --  25   ANIONGAP 6  --   --  8   *  --   --  149*   BUN 17  --   --  17   CR 0.68  --  0.71 0.74   GFRESTIMATED 89  --  88 86   GFRESTBLACK >90  --  >90 >90   ELIA 8.3*  --   --  8.2*   AST 54*  --   --   --    ALT 26  --   --   --      CBC  Recent Labs   Lab 11/11/20  0836 11/10/20  1641   WBC 7.9 7.7   RBC 4.83 4.73   HGB 15.2 15.2   HCT 45.4 43.8   MCV 94 93   MCH 31.5 32.1   MCHC 33.5 34.7   RDW 13.7 13.6    246     INR  Recent Labs   Lab 11/11/20  0836 11/10/20  2146   INR 0.99 1.02     Arterial Blood Gas  Recent Labs   Lab 11/10/20  1641   O2PER  4 LITERS       All cultures:  No results for input(s): CULT in the last 168 hours.  No results found for this or any previous visit (from the past 24 hour(s)).      Billing: This patient is critically ill: Yes. Total critical care time today 45 min.            Talia Lubin MD   of Medicine  Division of Pulmonary, Critical Care, Allergy, and Sleep Medicine  Pager [phone]

## 2020-11-12 NOTE — CODE/RAPID RESPONSE
[facility]    RRT Note  11/12/2020   Time Called: 120pm    RRT called for: tachypnea, hypoxia    Assessment & Plan   IMPRESSION & PLAN:    Aly Sorensen is a 80 year old male w/ PMH of prostate cancer status post prostatectomy, melanoma who was admitted on 11/10/2020 for Worsening dyspnea and hypoxia secondary to Covid for which he tested positive on 11/7/2020.    RN reports that throughout the day he was on 8 L oxygen mask.  Over the course of the night his FiO2 requirements increased to 10 L and then ultimately 15 L.  Patient called RN because his monitor was beeping.  On evaluation RN found patient to have SPO2 of 84% while on 10L oxymask  At that time ox mask was increased to 15 L and rapid response was called.    On my arrival SPO2 is 91% on 15 L oxygen mask.  Heart rate in the 70s, systolic BP 140s, respiratory rate 35-40.  I asked RT to start patient on high flow nasal cannula.  He ultimately was requiring 100% FiO2 and maintaining SPO2 in the low 90s..    Impression progressive acute hypoxic respiratory failure  Differential diagnosis:  -Likely secondary to progressive viral/Covid pneumonia.  -Patient ruled out for PE on 11/10/2020    INTERVENTIONS:  -start HFNC  -Tried incentive spirometry without significant improvement  -stat abg if hypercarbic will need NIPPV instead  -considered conscious proning but RR generally >35, at times 40 so will hold off fully proning, can turn all way to R and/or L  -After reviewing patient's CT scan from 11/10/2020, in light of his age, severity of lung disease and rapid progression after speaking with the intensivist I opted to transfer patient to ICU preemptively and start noninvasive positive pressure ventilation there.  He was able to be transferred from the observation unit via NC and oxygen mask simultaneous  -I spoke with patient about intubation if necessary and he is open to that if needed  -Consult intensivist to assume primary care of  patient.  Greatly appreciate their involvement as it would not be surprising if patient requires intubation & mechanical ventilatory support in the near future.  Hospitalist service will sign off.  -I spoke with patient's daughter Lakshmi by phone [phone] and updated her on patient's condition.  I did share with her that patient is ill enough that it is possible he could die.  -Check influenza PCR  -Increase enoxaparin to twice daily dosing    Working diagnosis: ARDS 2/2 Progressive sequela of Covid viral pneumonia    At the end of the RRT diagnostics have been obtained and patient has been transferred to ICU    Disposition: Transfer to intensive care    Discussed with and defer further cares to ICU attending physician     Code Status: Full Code    Allergies   No Known Allergies    Physical Exam   Vital Signs with Ranges:  Temp:  [96.6  F (35.9  C)-98.2  F (36.8  C)] 98.2  F (36.8  C)  Pulse:  [64-87] 75  Resp:  [17-40] 28  BP: (117-142)/(74-87) 142/87  FiO2 (%):  [70 %-100 %] 100 %  SpO2:  [84 %-95 %] 94 %  I/O last 3 completed shifts:  In: -   Out: 400 [Urine:400]    Constitutional:moderate acute distress  Neuro: +follows commands wiggle toes and show 2 fingers bilat, face symmetric, tongue midline, speech fluent  HEENT: Defer  Neck: supple  Heart: S1S2, no murmurs, normal sinus rhythm, normotensive  Lungs: Tachypneic with respiratory approximately 35-40  Abd:normoactive bowel sounds, soft, nontender, nondisteded  Ext: no edema    Data     ABG:  -  Recent Labs   Lab 11/12/20  0150 11/10/20  1641   PH 7.48*  --    PCO2 34*  --    PO2 62*  --    HCO3 26  --    O2PER  --   4 LITERS       CBC with Diff:  Recent Labs   Lab Test 11/11/20  0836   WBC 7.9   HGB 15.2   MCV 94      INR 0.99      Comprehensive Metabolic Panel:  Recent Labs   Lab 11/11/20  0836      POTASSIUM 4.1   CHLORIDE 106   CO2 27   ANIONGAP 6   *   BUN 17   CR 0.68   GFRESTIMATED 89   GFRESTBLACK >90   ELIA 8.3*   AST 54*   ALT 26        Time Spent on this Encounter   I spent 60 minutes (130-215 and 245-3am) of critical care time on the unit/floor managing the care of Aly Sorensen. Upon evaluation, this patient had a high probability of imminent or life-threatening deterioration due to acute hypoxic respiratory failure/ARDS and coordinating with intensivist and updating patient's family, which required my direct attention, intervention, and personal management. 100% of my time was spent at the bedside counseling the patient and/or coordinating care regarding services listed in this note.    Griselda Martínez, [facility]  Hospitalist [place] [facility]  [phone]

## 2020-11-12 NOTE — PROGRESS NOTES
Responded to Rapid Response call.    RRT was called due to respiratory distress.    Patient was on oxymask 8 L, RR 33/min, SpO2 84%.     Respiratory interventions included: HFNC 100% 35 L    Patient Outcome: 92% RR between 25-30      Ryley Moore, RT    11/12/2020 2:43 AM

## 2020-11-12 NOTE — PLAN OF CARE
Pt is COVID positive. A&Ox4. Dyspneic with exertion. IS encouraged. Pt gets up with standby assist. Regular diet. Around midnight, O2 sats dropped to as low as 84% on 8L oxymask. O2 turned up to 15L, pt still sating in the high 80s. RR was consistently betwenn 30s-40s. RRT called. ABGs drawn and pt placed on Hi Flow NC @ 100%, sats still in the low 90s. Report given to ICU nurse prior to transfer.

## 2020-11-12 NOTE — PLAN OF CARE
Neuro: intact  Resp: LS clear in upper lobes, diminished in lower lobes. Titrated off from Bipap to oxymizer 9 L. Tachypnic in upper 20s low 30s  GI: tolerating regular diet without issue. BS active.  : voiding without issue  Cardio: Tele NSR  Skin: WDL  Pain: denies  Access: 1 PIV SL  Events: Sat in chair for 6 hours this shift. Able to pivot transfer without issue.

## 2020-11-12 NOTE — PROGRESS NOTES
Received from floor on HFNC & NC. Transitioned to BiPAP, 100% FiO2. Increased WOB and slightly tachypneic, but patient reports relief with bipap & SpO2 100%, HR- NSR, BP WDL.      Daughter, Lakshmi, called and updated on plan of care, was able to talk with patient.  Son, Raoul, updated at length about patient condition and plan of care. Questions answered.    END OF SHIFT:  Patient currently on Bipap, 100% FiO2, 12/6. SPO2 100%, VSS. WOB decreased since arrival to ICU. Mentation appropriate. IV saline locked.

## 2020-11-13 NOTE — PROGRESS NOTES
Patient AOx4. On 15L  oxymizer canula at beginning of shift. Switched to Bipap around 1am for bedtime, 80% 12/6. Tolerated with sats mid 90's. Around 0630, tried to switch back to oxymizer but sats stayed 85-90 so placed back on Bipap @ 80%. Able to tolerated mouth cares and position changes.    VSS. No complaints of pain.   Gave son, Raoul, lengthy update and he is to update the family.

## 2020-11-13 NOTE — PLAN OF CARE
Neuro: intact  Resp: Not able to tolerate oxymizer cannula this shift. Went between bipap and HFNC. LS diminished in bases  Cardio: NSR  GI: Passing gas. Small BM. Tolerating diet  : Voiding without issue.  Pain: denies  Events: sat in chair for 2.5 hours.   Skin: intact but flushed.

## 2020-11-13 NOTE — PROGRESS NOTES
SP[facility] Progress Note  [facility] ICU    Follow-Up attempt per Ptnt request.    I called bedside nurse who inquired of Ptnt:   Ptnt said he is too tired, and he's also been in touch with his own , so has no need of a call at this time.    SH continues to be available.    Talia Mo  Chaplain Resident

## 2020-11-13 NOTE — PROGRESS NOTES
Critical Care Progress Note                          11/13/2020    Name: Aly Sorensen MRN#: [id number]   Age: 80 year old YOB: 1940     Hsptl Day# 3  ICU DAY #3    MV DAY #0             Problem List:   Principal Problem:    Acute respiratory failure with hypoxia (H)  Active Problems:    Pneumonia due to 2019 novel coronavirus    Hypokalemia           Summary/Hospital Course:     80 year old male with a remote history of prostate cancer s/p prostatectomy in 2005 who was admitted to [facility] on 11/10/2020 due to acute hypoxic respiratory failure due to COVID 19 infection.  The patient's positive test was 11/7.  Exposure was likely from his wife who tested positive on 11/1. Upon admission, the patient was satting 84% on RA.  He has been on dexamethasone, remdesivir, and prophylactic lovenox.  His O2 requirements have escalated from 8L NC to 100% FiO2 via HFNC in the past 24 hours. He is being transferred to the ICU for initiation of continuous BiPAP.     11/13: Alternating between HFNC and oxymask 15L      Assessment and plan :     Aly Sorensen IS a 80 year old male admitted on 11/10/2020 for hypoxemic respiratory failure 2/2 COVID-19.     I have personally reviewed the daily labs, imaging studies, cultures and discussed the case with referring physician and consulting physicians.     My assessment and plan by system for this patient is as follows:     Neurology/Psychiatry:   Alert and oriented this morning and quite conversant. Continue tylenol as needed for pain.      Cardiovascular:   No acute issues at this time     Pulmonary/Ventilator Management:   Acute hypoxic respiratory failure 2/2 COVID 19: rapid escalation of O2 requirements from 8L NC -> 100% FiO2 via HFNC with tachypnea on admission.    Appears relatively comfortable alternating between high flow nasal cannula and 15 L oxygen mask.  Is able to talk in full sentences still maintain sats around 90%.  He is agreeable to intubation if needed but  so far has stabilized.      GI and Nutrition :   Diet advanced was able to eat for the first time in days yesterday     Renal/Fluids/Electrolytes:   1. COLT Y/N- N  2. Electrolyte abnormality- N  3. Acid/base status- normal  4. Volume status- euvolemic  Plan  - monitor function and electrolytes as needed with replacement per ICU protocols. - generally avoid nephrotoxic agents such as NSAID, IV contrast unless specifically required  - adjust medications as needed for renal clearance  - follow I/O's as appropriate.     Infectious Disease:   COVID 19 infection: with increased inflammatory markers; , ferritin 1200  - continue dexamethasone to complete a 10 day course  - continue remdesivir to complete a 5 day course  - Hold off on antibiotics for superimposed bacterial infection     Endocrine:   - ICU insulin protocol, goal sugar <180     Hematology/Oncology:   - no acute issues at this time    IV/Access:   1. Venous access - PIV  2. Arterial access -   3.  Plan  - no central access required     ICU Prophylaxis:   1. DVT: LMWH  2. VAP: HOB 30 degrees, chlorhexidine rinse  3. Stress Ulcer: n/a  4. Restraints: None  5. Wound care - per unit routine   6. Feeding - Regular diet  7. Family Update: No  8. Disposition - ICU    Key goals for next 24 hours:     1. Wean O2 as able, may need breaks with BiPAP  2. Continue Dexamethasone and Remdesivir        Interim History/Overnight Events:     No major events overnight.  Did require initiation of BiPAP again given some increased work of breathing.  This a.m. has been transitioned to 15 L nasal cannula.  Feeling better.         Key Medications:       dexamethasone  6 mg Oral Daily     enoxaparin ANTICOAGULANT  40 mg Subcutaneous Q12H     polyethylene glycol  17 g Oral Daily     remdesivir  100 mg Intravenous Q24H     senna-docusate  1 tablet Oral BID    Or     senna-docusate  2 tablet Oral BID     sodium chloride (PF)  3 mL Intracatheter Q8H       - MEDICATION INSTRUCTIONS -                  Physical Examination:   Temp:  [98  F (36.7  C)-98.9  F (37.2  C)] 98.6  F (37  C)  Pulse:  [50-82] 53  Resp:  [20-39] 25  BP: ()/(58-97) 98/65  FiO2 (%):  [60 %-100 %] 90 %  SpO2:  [88 %-100 %] 92 %    Intake/Output Summary (Last 24 hours) at 11/13/2020 1229  Last data filed at 11/13/2020 1000  Gross per 24 hour   Intake 800 ml   Output 850 ml   Net -50 ml     Wt Readings from Last 4 Encounters:   11/13/20 81.1 kg (178 lb 12.7 oz)   08/20/20 83 kg (183 lb)   12/11/19 81.9 kg (180 lb 8 oz)   10/28/19 82.4 kg (181 lb 11.2 oz)     BP - Mean:  [] 79  FiO2 (%): 90 %  Resp: 25    Recent Labs   Lab 11/12/20  1204 11/12/20  0150 11/10/20  1641   PH  --  7.48*  --    PCO2  --  34*  --    PO2  --  62*  --    HCO3  --  26  --    O2PER .5L nasal cannula  --   4 LITERS       General:   Comfortable, no pain or respiratory distress   Neurologic:   Alert and oriented x 3   HEENT:   Head is atraumatic  No neck mass or lymphadenopathy      Lungs:   Symmetrical and normal breath sounds, no wheeze, ronchi, crackles, rub or bronchial breath sounds   Cardiovascular:   Regular rate and rhythm  Normal S1,S2, and no gallop, rub or murmur   Abdomen:   Distended:  No.   Bowel sound present and normal: Yes .   Soft: Yes . Tender: No.   Rebound:tendeness or guarding present No   Extremities:   Clubbing present: No,   Edema present: No,   Acrocyanosis present: No,   Mottling present: No,   Normal capillary refill: Yes    Skin:   Warm, dry.  No rash on limited exam.    Lines/Drain:    Central line present: No  Arterial line present: No  External ventricular Drain present: No  Chest tube present: No  JORDAN present: No  PEG present: No            Data:   All data and imaging reviewed.     ROUTINE ICU LABS (Last four results)  CMP  Recent Labs   Lab 11/13/20  0614 11/12/20  0640 11/11/20  0836 11/11/20  0323 11/10/20  2146 11/10/20  1641    139 139  --   --  139   POTASSIUM 4.6 4.5 4.1 3.9  --  3.3*   CHLORIDE 110*  107 106  --   --  106   CO2 28 30 27  --   --  25   ANIONGAP 2* 2* 6  --   --  8   * 145* 149*  --   --  149*   BUN 28 20 17  --   --  17   CR 0.72 0.62* 0.68  --  0.71 0.74   GFRESTIMATED 87 >90 89  --  88 86   GFRESTBLACK >90 >90 >90  --  >90 >90   ELIA 8.2* 8.2* 8.3*  --   --  8.2*   AST  --   --  54*  --   --   --    ALT  --   --  26  --   --   --      CBC  Recent Labs   Lab 11/13/20  0614 11/12/20  0640 11/11/20  0836 11/10/20  1641   WBC 12.2* 10.4 7.9 7.7   RBC 4.66 4.90 4.83 4.73   HGB 15.0 15.3 15.2 15.2   HCT 44.8 46.1 45.4 43.8   MCV 96 94 94 93   MCH 32.2 31.2 31.5 32.1   MCHC 33.5 33.2 33.5 34.7   RDW 14.1 13.8 13.7 13.6    344 281 246     INR  Recent Labs   Lab 11/13/20  0614 11/12/20  0640 11/11/20  0836 11/10/20  2146   INR 1.09 1.08 0.99 1.02     Arterial Blood Gas  Recent Labs   Lab 11/12/20  1204 11/12/20  0150 11/10/20  1641   PH  --  7.48*  --    PCO2  --  34*  --    PO2  --  62*  --    HCO3  --  26  --    O2PER .5L nasal cannula  --   4 LITERS       All cultures:  No results for input(s): CULT in the last 168 hours.  No results found for this or any previous visit (from the past 24 hour(s)).              Billing: This patient is critically ill: Yes. Total critical care time today 35 min. This does not include time spent on procedures or teaching.     Ken Joyner MD  Pulmonary & Critical Care Medicine  [facility]   Pager: [phone]

## 2020-11-13 NOTE — PROGRESS NOTES
Patient off Bipap to HFNC for most of the afternoon. HFNC 40lpm 75-80%. Tolerating well.     Plan for Bipap overnight and PRN during day. HFNC during day as tolerated.     Josselyn Hess, RT

## 2020-11-13 NOTE — PROGRESS NOTES
Patient wore Bipap this morning. Switched to 15L oxymizer cannula around 1400. Tolerating well. Bipap in room on standby.     Josselyn Hess, RT

## 2020-11-14 NOTE — PROGRESS NOTES
Critical Care Progress Note                          11/14/2020    Name: Aly Sorensen MRN#: [id number]   Age: 80 year old YOB: 1940     [facility]    [facility] #0             Problem List:   Principal Problem:    Acute respiratory failure with hypoxia (H)  Active Problems:    Pneumonia due to 2019 novel coronavirus    Hypokalemia           Summary/Hospital Course:     80 year old male with a remote history of prostate cancer s/p prostatectomy in 2005 who was admitted to [facility] on 11/10/2020 due to acute hypoxic respiratory failure due to COVID 19 infection.  The patient's positive test was 11/7.  Exposure was likely from his wife who tested positive on 11/1. Upon admission, the patient was satting 84% on RA.  He has been on dexamethasone, remdesivir, and prophylactic lovenox.  His O2 requirements have escalated from 8L NC to 100% FiO2 via HFNC in the past 24 hours. He is being transferred to the ICU for initiation of continuous BiPAP.     11/13: Alternating between HFNC and oxymask 15L          Interim History/Overnight Events:     No major events overnight.  Did require initiation of BiPAP again given some increased work of breathing.      Weaned of mask this AM, feels breathing improved.       Assessment and plan :     Aly Sorensen IS a 80 year old male admitted on 11/10/2020 for hypoxemic respiratory failure 2/2 COVID-19.     I have personally reviewed the daily labs, imaging studies, cultures and discussed the case with referring physician and consulting physicians.     My assessment and plan by system for this patient is as follows:     Neurology/Psychiatry:   Alert and oriented this morning and quite conversant. Continue tylenol as needed for pain.      Cardiovascular:   No acute issues at this time     Pulmonary/Ventilator Management:   Acute hypoxic respiratory failure 2/2 COVID 19: rapid escalation of O2 requirements from 8L NC -> 100% FiO2 via HFNC with tachypnea on admission.     Appears relatively comfortable alternating between high flow nasal cannula and 15 L oxygen mask.  Is able to talk in full sentences still maintain sats around 90%.    He is agreeable to intubation if needed but so far has stabilized.      GI and Nutrition :   Diet advanced was able to eat for the first time in days yesterday     Renal/Fluids/Electrolytes:   1. COLT Y/N- N  2. Electrolyte abnormality- N  3. Acid/base status- normal  4. Volume status- euvolemic  Plan  - monitor function and electrolytes as needed with replacement per ICU protocols. - generally avoid nephrotoxic agents such as NSAID, IV contrast unless specifically required  - adjust medications as needed for renal clearance  - follow I/O's as appropriate.     Infectious Disease:   COVID 19 infection: with increased inflammatory markers; , ferritin 1200, downtrending   - continue dexamethasone to complete a 10 day course  - continue remdesivir to complete a 5 day course  - Hold off on antibiotics for superimposed bacterial infection, low threshold to start     Endocrine:   - ICU insulin protocol, goal sugar <180     Hematology/Oncology:   - no acute issues at this time    IV/Access:   1. Venous access - PIV  2. Arterial access -   3.  Plan  - no central access required     ICU Prophylaxis:   1. DVT: LMWH  2. VAP: HOB 30 degrees, chlorhexidine rinse  3. Stress Ulcer: n/a  4. Restraints: None  5. Wound care - per unit routine   6. Feeding - Regular diet  7. Family Update: No  8. Disposition - ICU    Key goals for next 24 hours:     1. Wean O2 as able, trial facemask, goal sats >90%  2. Continue Dexamethasone and Remdesivir  3. Ok to transfer          Key Medications:       dexamethasone  6 mg Oral Daily     enoxaparin ANTICOAGULANT  40 mg Subcutaneous Q12H     polyethylene glycol  17 g Oral Daily     remdesivir  100 mg Intravenous Q24H     senna-docusate  1 tablet Oral BID    Or     senna-docusate  2 tablet Oral BID     sodium chloride (PF)  3 mL  Intracatheter Q8H       - MEDICATION INSTRUCTIONS -                 Physical Examination:   Temp:  [97.9  F (36.6  C)-98.6  F (37  C)] 98.2  F (36.8  C)  Pulse:  [48-80] 50  Resp:  [12-46] 22  BP: ()/(58-86) 128/63  FiO2 (%):  [75 %-90 %] 85 %  SpO2:  [88 %-99 %] 89 %      Intake/Output Summary (Last 24 hours) at 11/14/2020 1220  Last data filed at 11/14/2020 1000  Gross per 24 hour   Intake 720 ml   Output 850 ml   Net -130 ml       Wt Readings from Last 4 Encounters:   11/14/20 82 kg (180 lb 12.4 oz)   08/20/20 83 kg (183 lb)   12/11/19 81.9 kg (180 lb 8 oz)   10/28/19 82.4 kg (181 lb 11.2 oz)     BP - Mean:  [] 93  FiO2 (%): 85 %  Resp: 22    Recent Labs   Lab 11/12/20  1204 11/12/20  0150 11/10/20  1641   PH  --  7.48*  --    PCO2  --  34*  --    PO2  --  62*  --    HCO3  --  26  --    O2PER .5L nasal cannula  --   4 LITERS       General:   Comfortable, no pain or respiratory distress   Neurologic:   Alert and oriented x 3   HEENT:   Head is atraumatic  No neck mass or lymphadenopathy      Lungs:   Symmetrical and normal breath sounds, no wheeze, ronchi, crackles, rub or bronchial breath sounds   Cardiovascular:   Regular rate and rhythm  Normal S1,S2, and no gallop, rub or murmur   Abdomen:   Distended:  No.   Bowel sound present and normal: Yes .   Soft: Yes . Tender: No.   Rebound:tendeness or guarding present No   Extremities:   Clubbing present: No,   Edema present: No,   Acrocyanosis present: No,   Mottling present: No,   Normal capillary refill: Yes    Skin:   Warm, dry.  No rash on limited exam.    Lines/Drain:    Central line present: No  Arterial line present: No  External ventricular Drain present: No  Chest tube present: No  JORDAN present: No  PEG present: No            Data:   All data and imaging reviewed.     ROUTINE ICU LABS (Last four results)  CMP  Recent Labs   Lab 11/14/20  0616 11/13/20  0614 11/12/20  0640 11/11/20  0836    140 139 139   POTASSIUM 4.2 4.6 4.5 4.1   CHLORIDE  110* 110* 107 106   CO2 26 28 30 27   ANIONGAP 5 2* 2* 6   * 157* 145* 149*   BUN 27 28 20 17   CR 0.61* 0.72 0.62* 0.68   GFRESTIMATED >90 87 >90 89   GFRESTBLACK >90 >90 >90 >90   ELIA 8.0* 8.2* 8.2* 8.3*   AST  --   --   --  54*   ALT  --   --   --  26     CBC  Recent Labs   Lab 11/14/20  0616 11/13/20  0614 11/12/20  0640 11/11/20  0836   WBC 12.5* 12.2* 10.4 7.9   RBC 4.51 4.66 4.90 4.83   HGB 14.4 15.0 15.3 15.2   HCT 43.1 44.8 46.1 45.4   MCV 96 96 94 94   MCH 31.9 32.2 31.2 31.5   MCHC 33.4 33.5 33.2 33.5   RDW 13.7 14.1 13.8 13.7    395 344 281     INR  Recent Labs   Lab 11/14/20  0616 11/13/20  0614 11/12/20  0640 11/11/20  0836   INR 1.25* 1.09 1.08 0.99     Arterial Blood Gas  Recent Labs   Lab 11/12/20  1204 11/12/20  0150 11/10/20  1641   PH  --  7.48*  --    PCO2  --  34*  --    PO2  --  62*  --    HCO3  --  26  --    O2PER .5L nasal cannula  --   4 LITERS       All cultures:  No results for input(s): CULT in the last 168 hours.  No results found for this or any previous visit (from the past 24 hour(s)).              Billing: This patient is critically ill: No L3

## 2020-11-14 NOTE — PLAN OF CARE
Neuro: A&O x 4, intact  Resp: Sat's on upper 80's on HFNC, switch to BiPAP overnight  Good sat's 94- 95 %, LS coarse, diminished in bases  Cardio: NSR to [facility].  GI/: Voiding adequately using urinal. Passing gas, no BM this shift.  Pain: denies pain  Skin: intact but flushed.   Pt slept most of the shift.

## 2020-11-15 NOTE — CONSULTS
ID consult dictated IMP 1 79 yo male acute COVID 19, severe hpypoxia    REc extend remdesivir, continue other tx, no obvious bacterial

## 2020-11-15 NOTE — PLAN OF CARE
Pt transferred from ICU @ 2015 on HFNC. Difficulty maintaining sats: RT paged: FiO2 increased to 80. Recommendation to page if pt desats overnight for potential need for bipap. COVID pos. AO. VSS on HFNC FiO2 80, 50L O2. Tachypneic. Freq productive cough. Denies pain, N/V/D. GUERRA. R PIV: int Remdesivir. Continue to monitor.

## 2020-11-15 NOTE — PROGRESS NOTES
[facility]    Medicine Progress Note - Hospitalist Service       Date of Admission:  11/10/2020  Assessment & Plan       Aly Sorensen is an 80 year-old male with remote history of prostate cancer s/p prostatectomy 10/2005, PSA recently normal, melanoma, recent diagnosis of COVID infection (+ 11/7), who was admitted on 11/10/2020 with worsening shortness of breath and declining oxygen saturations. He was transferred to the ICU 2 days ago for worsening respiratory status and required continuous BiPAP but never intubated. Transferred to Hospitalist service on 11/14/20 off BiPAP and stable on high flow nasal cannula with 16 L of supplemental oxygen. Since transfer O2 has been increased to 50 LPM HFNC    Acute hypoxic respiratory failure   Covid 19 Pneumonia   * procal undetectable on 11/14.     - On 11/15/20, continues with severe hypoxia on 50 LPM but improving CRP, LDH. D-dimer is noted to be up to 2.  - On enoxaparin 40 mg subcutaneous BID per ICU. This can likely be decreased to once daily once up and more mobile.   - Continues on dexamethasone 6 mg daily to complete 10 day course on 11/20.   - ID consult appreciated and agree we are extending Remdesivir to complete 10 day course.        Diet: Combination Diet Regular Diet Adult    DVT Prophylaxis: Enoxaparin (Lovenox) SQ  Jane Catheter: not present  Code Status: Full Code           Disposition Plan   Expected discharge: 4 - 7 days, recommended to TBD once oxygenation has improved. .  Entered: Sydney Rodriguez MD 11/15/2020, 3:12 PM       The patient's care was discussed with the Bedside Nurse and Patient.    Sydney Rodriguez MD  Hospitalist Service  [facility]  Contact information available via [facility] Paging/Directory    ______________________________________________________________________    Interval History    Events over last 24 hours as outlined above. Patient denies any SOB, CP, abdominal pain, N/V/D.    Patient states he feels well. Breathing comfortable on HFNC. He denies any pain or concerns. His wife was just admitted to [facility] with Covid and he is glad that someone will now be watching over her as she was alone at home before.     Data reviewed today: I reviewed all medications, new labs and imaging results over the last 24 hours. I personally reviewed no images or EKG's today.    Physical Exam   Vital Signs: Temp: 96  F (35.6  C) Temp src: Oral BP: 119/63 Pulse: 65   Resp: 20 SpO2: 91 % O2 Device: High Flow Nasal Cannula (HFNC) Oxygen Delivery: 50 LPM  Weight: 180 lbs 12.44 oz  Constitutional:  NAD,   Neuropsyche: alert and oriented, answers questions appropriately. Speech normal, face symmetric and moving all 4 extremities without gross focal neurological deficit.   Respiratory:  Breathing comfortably with HFNC in place  Cardiovascular: Regular rate and rhythm, no edema.  GI:  soft, NT/ND, BS normal  Skin/Integumen: No acute rash or sign of bleeding.         Data   Recent Labs   Lab 11/15/20  0748 11/14/20  0616 11/13/20  0614 11/12/20  0640 11/11/20  0836 11/10/20  2146 11/10/20  2146   WBC 15.0* 12.5* 12.2* 10.4 7.9  --   --    HGB 14.5 14.4 15.0 15.3 15.2  --   --    MCV 94 96 96 94 94  --   --     394 395 344 281  --   --    INR  --  1.25* 1.09 1.08 0.99  --  1.02    141 140 139 139  --   --    POTASSIUM 4.3 4.2 4.6 4.5 4.1   < >  --    CHLORIDE 108 110* 110* 107 106  --   --    CO2 24 26 28 30 27  --   --    BUN 22 27 28 20 17  --   --    CR 0.63* 0.61* 0.72 0.62* 0.68  --  0.71   ANIONGAP 7 5 2* 2* 6  --   --    ELIA 8.0* 8.0* 8.2* 8.2* 8.3*  --   --    * 144* 157* 145* 149*  --   --    ALBUMIN 2.3*  --   --   --   --   --   --    PROTTOTAL 5.6*  --   --   --   --   --   --    BILITOTAL 0.4  --   --   --   --   --   --    ALKPHOS 75  --   --   --   --   --   --    ALT 40  --   --   --  26  --   --    AST 29  --   --   --  54*  --   --    TROPI  --   --  <0.015  --  <0.015  --   <0.015    < > = values in this interval not displayed.     No results found for this or any previous visit (from the past 24 hour(s)).  Medications     - MEDICATION INSTRUCTIONS -         dexamethasone  6 mg Oral Daily     enoxaparin ANTICOAGULANT  40 mg Subcutaneous Q12H     polyethylene glycol  17 g Oral Daily     remdesivir  100 mg Intravenous Q24H     senna-docusate  1 tablet Oral BID    Or     senna-docusate  2 tablet Oral BID     sodium chloride (PF)  3 mL Intracatheter Q8H

## 2020-11-15 NOTE — PLAN OF CARE
COVID positive. A&Ox4.  Dyspneic with exertion. IS encouraged, 1500. Up with SBA. Regular diet. VSS on HFNC FiO2 91.2, 50L O2. Tachypneic. Freq productive cough. Denies pain/CP/dizziness/nausea.  R PIV SL.  Receiving intermittent doses of IV Remdesivir.  Seen by ID.  Continue to monitor.

## 2020-11-15 NOTE — PROGRESS NOTES
"BRIEF IM PROGRESS NOTE    Date of Service: 11/14/2020    Received sign out from Dr. Deleon of Critical Care team.  In short this is a 80-year-old male with history of prostate cancer who was admitted on 11/10/2020 with acute hypoxic respiratory failure due to confirmed COVID-19 infection.  He was transferred to the ICU 2 days ago for worsening respiratory status as needed initiation of continuous BiPAP.  At this time he is off BiPAP, and this morning was able to be maintained on high flow nasal cannula with 16 L of supplemental oxygen.  However he is currently still needing 50 L of high flow nasal cannula, thus we will continue to keep in the ICU overnight for continued close observation given his markedly tenuous respiratory status.    /67   Pulse 69   Temp 98.3  F (36.8  C) (Oral)   Resp 21   Ht 1.702 m (5' 7\")   Wt 82 kg (180 lb 12.4 oz)   SpO2 95%   BMI 28.31 kg/m      Recent Labs   Lab 11/12/20  1204 11/10/20  1641   PHV 7.41 7.44*   PO2V 26 31   PCO2V 48 37*   HCO3V 30* 25     Recent Labs   Lab 11/14/20  0616 11/13/20  0614 11/12/20  0640   WBC 12.5* 12.2* 10.4   HGB 14.4 15.0 15.3   HCT 43.1 44.8 46.1   MCV 96 96 94    395 344     Recent Labs   Lab 11/14/20  0616 11/13/20  0614 11/12/20  0640    140 139   POTASSIUM 4.2 4.6 4.5   CHLORIDE 110* 110* 107   CO2 26 28 30   ANIONGAP 5 2* 2*   * 157* 145*   BUN 27 28 20   CR 0.61* 0.72 0.62*   GFRESTIMATED >90 87 >90   GFRESTBLACK >90 >90 >90   ELIA 8.0* 8.2* 8.2*       .Douglas Weldon MD on 11/14/2020 at 7:21 PM    "

## 2020-11-15 NOTE — PLAN OF CARE
Pt placed on 15 L O2 via oxymask for transport to OBS room 5 at 2000. Pt denied shortness of breath. Alert and pleasant. Belongings sent with patient including cell phone, , and glasses.

## 2020-11-15 NOTE — PLAN OF CARE
Pt A&O x 4. Denies pain. Sat's in mid-low 90s on both HFNC and 15L per oxymask. Breathing appears labored. LS coarse/very diminished throughout. Fair appetite on regular diet. Voiding adequately per urinal. One BM this shift. Ambulated with SBA and upright in chair for 2 hours. Wife updated on POC by phone. Plan for txfr to floor once bed is available.

## 2020-11-15 NOTE — PLAN OF CARE
Pt is COVID positive. A&Ox4.  Dyspneic with exertion. IS encouraged, 2000. Pt gets up with standby assist. Regular diet. VSS on HFNC FiO2 80, 50L O2. Tachypneic. Freq productive cough. Denies pain,  R PIV: int Remdesivir. Pt was sleeping in between cares. Continue to monitor.

## 2020-11-16 NOTE — CONSULTS
Consult Date:  11/15/2020      INFECTIOUS DISEASE CONSULTATION      OBSERVATION UNIT:  5      REFERRING PHYSICIAN: Dr. Douglas Weldon      IMPRESSION:   1.  An 80-year-old male with acute COVID-19 pneumonia, fluctuating course, but major hypoxia, ongoing.   2.  Prior history of prostate cancer.      RECOMMENDATIONS:  Agree with current management ongoing oxygen as needed, anticoagulation, dexamethasone, and remdesivir.  Extend remdesivir out given the tenuous respiratory status.      HISTORY OF PRESENT ILLNESS:  This 80-year-old male is seen in consultation due to COVID-19 pneumonia.  The patient has a history of prior prostate cancer, but otherwise has not had much in the way of major problems.  He presented 11/10 with acute hypoxia.  His test was positive on 11/07.  Initially got considerably worse requiring high flow oxygen, then improved and was sent back to observation unit, but has had to go back on high-flow oxygen.  Again, he is at day #5 of remdesivir ongoing steroids, and other treatment.  He has had no signs of bacterial infection and no antibiotics.  Notably, his wife was positive on 11/01.      PAST MEDICAL HISTORY:  No major infection problems, no history of chronic lung disease, no prior infection problems of any significance in general.  The prior prostate cancer.      ALLERGIES:  NO ANTIBIOTIC ALLERGIES.      MEDICATIONS:  As listed.      SOCIAL AND FAMILY HISTORY:  Wife positive as noted and apparently she has gotten more ill, but has done better.      PHYSICAL EXAMINATION:   GENERAL:  The patient appears his stated age, does not look that toxic or ill, but is quite hypoxic.  He is an excellent historian.  Cognition:  Fully intact.   VITAL SIGNS:  Tachycardic at 120.   HEENT:  No visible lesions.   LUNGS:  Crackles and rhonchi bilaterally, limited exam due to COVID-19.      LABORATORY DATA:  COVID-19 positive.  No positive cultures or micro done.  CRP has been falling down to 11.7 currently.  Procal 0.       Thank you very much for consultation.  I will follow the patient with you.         YASMIN CASTORENA MD             D: 11/15/2020   T: 11/15/2020   MT: TIMMY      Name:     IRIS ALCALA   MRN:      [id number]        Account:       [id number]   :      1940           Consult Date:  11/15/2020      Document: [id number]

## 2020-11-16 NOTE — PLAN OF CARE
COVID POSITIVE.  Pt A&OX4. Afebrile, VSS on High flow NC 50L O2, FiO2 95.3. Denies Chest pain/N/V/D. Dyspneic on exertion. Up SBA to commode once. Reg Diet. IV SL. IS level at 1500. D-Dimer 2.0. Discharge plan pending 4-7 days once oxygen improves per MD note. Continue to monitor.

## 2020-11-16 NOTE — PLAN OF CARE
Pt is COVID positive. A&Ox4.  Dyspneic with exertion.  Pt gets up with standby assist. Regular diet. VSS on HFNC FiO2 80, 50L O2. Tachypneic. Freq productive cough. Denies pain,  R PIV: int Remdesivir. Pt was sleeping in between cares. Continue to monitor.

## 2020-11-16 NOTE — CONSULTS
Pulmonary Medicine Consultation        Date of Admission: 11/10/2020  Primary Attending:  Lorrie Maldonado MD  Consulting Physician: Baldemar Mejia MD      History:    Aly is an 80-year-old male patient with past medical history of prostate cancer, melanoma, spondylosis of lumbar region, he is status post radical retropubic prostatectomy on 10/11/2005.  Admitted on November 12, 2020 with COVID-19 infection and Covid pneumonia.  Respiratory failure.  Covid diagnosis was November 1 + PCR on November 7.  Started with symptoms of fevers, chills, fatigue, body ache he noted significant worsening of his oxygenation 2 days prior to admission. Exposure was likely from his wife who tested positive on 11/1. Upon admission, the patient was satting 84% on RA.  He has been on dexamethasone, remdesivir, and prophylactic lovenox.  His O2 requirements had escalated from 8L NC to 100% FiO2 via HFNC. Denies chest pain, no fevers, no chills, no sweats, denies palpitations or near syncopal events. Patient denies nausea, vomiting, diarrhea, or constipation. + cough, no hemoptysis, + sputum production,On November 14 he was transferred out of the ICU and to the hospitalist service off BiPAP and stable on high flow nasal cannula with 16 L of supplemental oxygen. Since transfer O2 has been increased to 50 LPM HFNC.  Remains tachypneic but does feel that his dyspnea is slightly improved. Were consulted for further recommendations whether BiPAP versus trial of prone      Review of system:    Review of Systems:   - A 10-system ROS is negative except for items mentioned above and in HPI.           Prior medical history:  Past Medical History:   Diagnosis Date     Arthritis degenerative joint disease     Malignant neoplasm of prostate (H) 12/29/2016     Melanoma (H)      Melanoma of skin (H) 12/29/2016    Back     Prostate cancer (H)      Spondylosis of lumbar region without myelopathy or radiculopathy 12/29/2016       Past Surgical  History:   Procedure Laterality Date     ARTHROPLASTY KNEE  12/8/2011    Procedure:ARTHROPLASTY KNEE; Left Total Knee Arthroplasty (JILLIAN)^; Surgeon:YASMIN PELAEZ; Location: OR     ARTHROPLASTY KNEE Right 12/28/2018    Procedure: RIGHT TOTAL KNEE ARTHROPLASTY;  Surgeon: Yasmin Pelaez MD;  Location:  OR     BACK SURGERY       COLONOSCOPY       GENITOURINARY SURGERY  history of prostatectomy     HERNIA REPAIR  inguinal hernia repair as a teenager     ORTHOPEDIC SURGERY  back fusion 2002     PHACOEMULSIFICATION CLEAR CORNEA WITH STANDARD INTRAOCULAR LENS IMPLANT Right 11/10/2015    Procedure: PHACOEMULSIFICATION CLEAR CORNEA WITH STANDARD INTRAOCULAR LENS IMPLANT;  Surgeon: Criss Pulliam MD;  Location:  [facility]     PHACOEMULSIFICATION CLEAR CORNEA WITH STANDARD INTRAOCULAR LENS IMPLANT Left 11/17/2015    Procedure: PHACOEMULSIFICATION CLEAR CORNEA WITH STANDARD INTRAOCULAR LENS IMPLANT;  Surgeon: Criss Pulliam MD;  Location:  [facility]     removal of melanoma[  approx. 20 years ago       Patient Active Problem List   Diagnosis     Malignant neoplasm of prostate (H)     Melanoma of skin (H)     Spondylosis of lumbar region without myelopathy or radiculopathy     History of total right knee replacement     Thyroid nodule     Acute respiratory failure with hypoxia (H)     Pneumonia due to 2019 novel coronavirus     Hypokalemia     Clinical diagnosis of COVID-19       Social History     Social History     Socioeconomic History     Marital status:      Spouse name: Not on file     Number of children: Not on file     Years of education: Not on file     Highest education level: Not on file   Occupational History     Not on file   Social Needs     Financial resource strain: Not on file     Food insecurity     Worry: Not on file     Inability: Not on file     Transportation needs     Medical: Not on file     Non-medical: Not on file   Tobacco Use     Smoking status: Former Smoker     Types: Cigars     Smokeless  tobacco: Never Used     Tobacco comment: quit smoking cigars 2008   Substance and Sexual Activity     Alcohol use: No     Alcohol/week: 3.0 - 4.0 standard drinks     Types: 3 - 4 Standard drinks or equivalent per week     Frequency: Never     Comment: none for last yr     Drug use: No     Sexual activity: Not Currently   Lifestyle     Physical activity     Days per week: Not on file     Minutes per session: Not on file     Stress: Not on file   Relationships     Social connections     Talks on phone: Not on file     Gets together: Not on file     Attends Yazidi service: Not on file     Active member of club or organization: Not on file     Attends meetings of clubs or organizations: Not on file     Relationship status: Not on file     Intimate partner violence     Fear of current or ex partner: Not on file     Emotionally abused: Not on file     Physically abused: Not on file     Forced sexual activity: Not on file   Other Topics Concern     Parent/sibling w/ CABG, MI or angioplasty before 65F 55M? Not Asked   Social History Narrative     Not on file         Family History  Family History   Problem Relation Age of Onset     Uterine Cancer Mother      Colon Cancer Father      Diabetes Brother      Heart Failure Brother            Medications  No current outpatient medications on file.     Current Facility-Administered Medications Ordered in Epic   Medication Dose Route Frequency Last Rate Last Dose     acetaminophen (TYLENOL) tablet 650 mg  650 mg Oral Q4H PRN        Or     acetaminophen (TYLENOL) Suppository 650 mg  650 mg Rectal Q4H PRN         dexamethasone (DECADRON) tablet 6 mg  6 mg Oral Daily   6 mg at 11/16/20 0752     enoxaparin ANTICOAGULANT (LOVENOX) injection 40 mg  40 mg Subcutaneous Q12H   40 mg at 11/15/20 8396     ibuprofen (ADVIL/MOTRIN) tablet 400 mg  400 mg Oral Q6H PRN         lidocaine (LMX4) cream   Topical Q1H PRN         lidocaine 1 % 0.1-1 mL  0.1-1 mL Other Q1H PRN         Medication  instructions: Do NOT use nebulized medications   Does not apply Continuous PRN         melatonin tablet 1 mg  1 mg Oral At Bedtime PRN         naloxone (NARCAN) injection 0.1-0.4 mg  0.1-0.4 mg Intravenous Q2 Min PRN         ondansetron (ZOFRAN-ODT) ODT tab 4 mg  4 mg Oral Q6H PRN        Or     ondansetron (ZOFRAN) injection 4 mg  4 mg Intravenous Q6H PRN         polyethylene glycol (MIRALAX) Packet 17 g  17 g Oral Daily         remdesivir 100 mg in sodium chloride 0.9 % 250 mL intermittent infusion  100 mg Intravenous Q24H 125 mL/hr at 11/15/20 1420 100 mg at 11/15/20 1420     senna-docusate (SENOKOT-S/PERICOLACE) 8.6-50 MG per tablet 1 tablet  1 tablet Oral BID   1 tablet at 11/15/20 2002    Or     senna-docusate (SENOKOT-S/PERICOLACE) 8.6-50 MG per tablet 2 tablet  2 tablet Oral BID         sodium chloride (PF) 0.9% PF flush 3 mL  3 mL Intracatheter q1 min prn         sodium chloride (PF) 0.9% PF flush 3 mL  3 mL Intracatheter q1 min prn         sodium chloride (PF) 0.9% PF flush 3 mL  3 mL Intracatheter Q8H   3 mL at 11/15/20 2002     No current [facility]-ordered outpatient medications on file.       No Known Allergies            Physical Examination:   Vitals:    20 0300 20 0346 20 0749 20 1000   BP:   113/67    BP Location:       Pulse:       Resp:   20    Temp:   96.7  F (35.9  C)    TempSrc:   Oral    SpO2: 98% 97% 90% 94%   Weight:       Height:         Body mass index is 28.31 kg/m .  Temp (24hrs), Av.7  F (36.5  C), Min:96.7  F (35.9  C), Max:98.6  F (37  C)        *Due to the current COVID-19 pandemic, with need to conserve PPE and minimize non-essential exposure to patients diagnosed or under investigation, a limited examination was performed on this patient. Interview was done via patient's hospital room telephone, and patient was visualized through hospital door window. Attending provider's physical exam findings were noted*      CMP  Recent Labs   Lab 20  0816  11/15/20  0748 11/14/20  0616 11/13/20  0614 11/11/20  0836 11/11/20  0836    139 141 140   < > 139   POTASSIUM 4.2 4.3 4.2 4.6   < > 4.1   CHLORIDE 107 108 110* 110*   < > 106   CO2 26 24 26 28   < > 27   ANIONGAP 5 7 5 2*   < > 6   * 112* 144* 157*   < > 149*   BUN 23 22 27 28   < > 17   CR 0.67 0.63* 0.61* 0.72   < > 0.68   GFRESTIMATED 90 >90 >90 87   < > 89   GFRESTBLACK >90 >90 >90 >90   < > >90   ELIA 8.1* 8.0* 8.0* 8.2*   < > 8.3*   PROTTOTAL 5.8* 5.6*  --   --   --   --    ALBUMIN 2.2* 2.3*  --   --   --   --    BILITOTAL 0.7 0.4  --   --   --   --    ALKPHOS 82 75  --   --   --   --    AST 22 29  --   --   --  54*   ALT 34 40  --   --   --  26    < > = values in this interval not displayed.     CBC  Recent Labs   Lab 11/16/20  0724 11/15/20  0748 11/14/20  0616 11/13/20  0614   WBC 17.2* 15.0* 12.5* 12.2*   RBC 4.78 4.59 4.51 4.66   HGB 15.2 14.5 14.4 15.0   HCT 45.1 43.2 43.1 44.8   MCV 94 94 96 96   MCH 31.8 31.6 31.9 32.2   MCHC 33.7 33.6 33.4 33.5   RDW 13.6 13.6 13.7 14.1    442 394 395     INR  Recent Labs   Lab 11/16/20  0724 11/14/20  0616 11/13/20  0614 11/12/20  0640   INR 1.24* 1.25* 1.09 1.08     Arterial Blood Gas  Recent Labs   Lab 11/16/20  1002 11/12/20  1204 11/12/20  0150 11/10/20  1641   PH  --   --  7.48*  --    PCO2  --   --  34*  --    PO2  --   --  62*  --    HCO3  --   --  26  --    O2PER 30% .5L nasal cannula  --   4 LITERS     No results for input(s): CULT in the last 168 hours.    Diagnostic Studies:  CT CHEST PULMONARY EMBOLISM WITH CONTRAST 11/10/2020 6:04 PM     CLINICAL HISTORY: Shortness of breath.     TECHNIQUE: CT angiogram chest during arterial phase injection IV  contrast. 2D and 3D MIP reconstructions were performed by the CT  technologist. Dose reduction techniques were used.      CONTRAST: 69mL Isovue-370     COMPARISON: CT chest 10/30/2019.     FINDINGS:  ANGIOGRAM CHEST: Pulmonary arteries are normal caliber and negative  for pulmonary emboli.  Thoracic aorta is negative for dissection. No CT  evidence of right heart strain.     LUNGS AND PLEURA: Patchy bilateral airspace consolidation is present  concerning for viral pneumonitis. Trace amount of right pleural fluid  is noted. No left pleural fluid.     MEDIASTINUM/AXILLAE: Heart is normal in size. No pericardial fluid.  Esophagus is unremarkable. Thoracic aorta is unremarkable with minimal  calcified plaque.     UPPER ABDOMEN: Adrenal gland thickening noted bilaterally. Upper  abdomen is otherwise unremarkable.     MUSCULOSKELETAL: Normal.                                                                      IMPRESSION:  1.  No evidence of pulmonary embolism. Thoracic aorta is unremarkable.     2.  Patchy groundglass opacities within both lungs concerning for  viral pneumonitis. ARDS could appear similar.           Assessment:     80-year-old male patient with past medical history of prostate cancer, melanoma, spondylosis of lumbar region,   Admitted on November 12, 2020 with COVID-19 infection and Covid pneumonia.  Respiratory failure.  Covid diagnosis was November 1 + PCR on November 7. Exposure was likely from his wife who tested positive on 11/1. Upon admission. Persistent respiratory failure although improved from when he was in the intensive care unit. Although proning awake patients was not routinely used in the beginning of the coronavirus pandemic, recent data suggests potential improvement with this non-invasive, low-risk practice. While the benefit may not be sustained    Pulmonary Diagnoses: Abnl CT/CXR R91.8, Cough R05, GUERRA R06.09, Hpoxemia R09.02, Pnemonia unspec J18.9 and Resp fail acute J96.00   .covi  Recommendations      BiPAP with every sleep opportunity, And as needed  Duonebs every four hours as needed   Supplemental oxygen target pulse oxymetry >89%  Agree with proning trials up to 4 times a day while awake as much as the patient can tolerated.    Ideally over 30 minutes Per  trial  Continue corticosteroids, dexamethasone,  Physical activity as tolerated  Continue enoxaparin  Remdesivir per ID, use has been extended due to poor respiratory status  We will continue to follow      Baldemar Campbell M.D.  Pulmonary, Critical Care and Sleep Medicine  [facility]   Pager: [phone]  Office:[phone]

## 2020-11-16 NOTE — PLAN OF CARE
Assumed care this am at 0700. Pt is a/o X4. Vss except oxygen sats in low 88-90s on 55 L HFNC. Able to wean down to 40 L. Sats now hanging around 92-94%. Prone position attempted with the help of RT. Pt tolerated well. IV saline locked. Reposition q2h. Good appetite. Voids on urinal. Mepilex on coccyx.  NSR on tele. BIPAP per RT at bedtime. Pt will need abg drawn after an hour on BIPAP per MD order.      addendum 1800: pt up to BSC. Had BM. Oxygen desat to low 80 on exertion. oxygen bumped back to 50 L HFNC

## 2020-11-16 NOTE — PROGRESS NOTES
[facility]    Infectious Disease Progress Note    Date of Service (when I saw the patient): 11/16/2020     Assessment & Plan   Aly Sorensen is a 80 year old male who was admitted on 11/10/2020.     IMPRESSION:   1.  An 80-year-old male with acute COVID-19 pneumonia, fluctuating course, but major hypoxia, ongoing.   2.  Prior history of prostate cancer.      RECOMMENDATIONS:  Agree with current management ongoing oxygen as needed, anticoagulation, dexamethasone, and remdesivir.  Extend remdesivir out given the tenuous respiratory status.   Trend CBC, creat, LFT, ferritin, LDH, CRP and Sed rate daily.              Mirian Jay MD    Interval History   Afebrile   On 55 L of oxygen     Physical Exam   Temp: 96.7  F (35.9  C) Temp src: Oral BP: 113/67 Pulse: 60   Resp: 20 SpO2: 90 % O2 Device: High Flow Nasal Cannula (HFNC) Oxygen Delivery: 55 LPM  Vitals:    11/12/20 0256 11/13/20 0600 11/14/20 0600   Weight: 80.4 kg (177 lb 4 oz) 81.1 kg (178 lb 12.7 oz) 82 kg (180 lb 12.4 oz)     Vital Signs with Ranges  Temp:  [96.7  F (35.9  C)-98.6  F (37  C)] 96.7  F (35.9  C)  Pulse:  [60-81] 60  Resp:  [18-22] 20  BP: (113-127)/(67-75) 113/67  FiO2 (%):  [70 %-95.5 %] 91 %  SpO2:  [87 %-98 %] 90 %    Constitutional: HFNC   Lungs: Coarse   Cardiovascular: Regular rate and rhythm, normal S1 and S2, and no murmur noted  Abdomen: Normal bowel sounds, soft, non-distended, non-tender  Skin: No rashes, no cyanosis, no edema  Other:    Medications     - MEDICATION INSTRUCTIONS -         dexamethasone  6 mg Oral Daily     enoxaparin ANTICOAGULANT  40 mg Subcutaneous Q12H     polyethylene glycol  17 g Oral Daily     remdesivir  100 mg Intravenous Q24H     senna-docusate  1 tablet Oral BID    Or     senna-docusate  2 tablet Oral BID     sodium chloride (PF)  3 mL Intracatheter Q8H       Data   All microbiology laboratory data reviewed.  Recent Labs   Lab Test 11/16/20  0724 11/15/20  0748 11/14/20  0616   WBC  17.2* 15.0* 12.5*   HGB 15.2 14.5 14.4   HCT 45.1 43.2 43.1   MCV 94 94 96    442 394     Recent Labs   Lab Test 11/16/20  0724 11/15/20  0748 11/14/20  0616   CR 0.67 0.63* 0.61*     No lab results found.  No lab results found.    Invalid input(s): UC    Attestation:  Total time on the floor involved in the patient's care: 35 minutes. Total time spent in counseling/care coordination: >50%

## 2020-11-16 NOTE — PROGRESS NOTES
[facility]    Hospitalist Progress Note    Assessment & Plan     Aly Sorensen is an 80 year-old male with remote history of prostate cancer s/p prostatectomy 10/2005, PSA recently normal, melanoma, recent diagnosis of COVID infection (+ 11/7), who was admitted on 11/10/2020 with worsening shortness of breath and declining oxygen saturations. He was transferred to the ICU 2 days ago for worsening respiratory status and required continuous BiPAP but never intubated. Transferred to Hospitalist service on 11/14/20 off BiPAP and stable on high flow nasal cannula with 16 L of supplemental oxygen. Since transfer O2 has been increased to 50 LPM HFNC     Acute hypoxic respiratory failure   Covid 19 Pneumonia   * procal undetectable on 11/14.      - On 11/15/20, continues with severe hypoxia on 50 LPM but improving CRP, LDH. D-dimer is noted to be up to 2.  -afebrile. Nl vitals. Remains on high flow oxygen  -satting 89-90% on FiO2 91%, 68-80% Fio2 11/14-11/15.     Plan;   -high flow oxygen-RT contacted to increase oxygen to 60L  - pulmonary consult regarding if laying on abdomen/positional changes would improve oxygenation  -PCXR to follow up infultrates  -vbg  - ID following  - On enoxaparin 40 mg subcutaneous BID per ICU. This can likely be decreased to once daily once up and more mobile.   - Continues on dexamethasone 6 mg daily to complete 10 day course on 11/20.   - ID consult appreciated and agree we are extending Remdesivir to complete 10 day course.       addendum; 1800  Lactate not yet drawn. Will call to on call.   Pt did well with proning this afternoon. Now down on FiO2 to 60-70% and down to 40 LPM. Pt told nurse he feels fine. Had 500cc bolus earlier for elevated lactate  bipap with sleeping per pulmonary        Diet: Combination Diet Regular Diet Adult    DVT Prophylaxis: Enoxaparin (Lovenox) SQ  Jane Catheter: not present  Code Status: Full Code               Disposition Plan      Expected discharge: 4 - 7 days, recommended to TBD once oxygenation has improved. .    Chato James MD  Text Page  (7am to 6pm)  Interval History   Feels breathing a little better  Sob. Cough but no change  No n/v/d/abd pain. No HA  Taking po    -Data reviewed today: I reviewed all new labs and imaging results over the last 24 hours. I personally reviewed     Physical Exam   Temp: 96.7  F (35.9  C) Temp src: Oral BP: 113/67 Pulse: 60   Resp: 20 SpO2: 90 % O2 Device: High Flow Nasal Cannula (HFNC) Oxygen Delivery: 55 LPM  Vitals:    11/12/20 0256 11/13/20 0600 11/14/20 0600   Weight: 80.4 kg (177 lb 4 oz) 81.1 kg (178 lb 12.7 oz) 82 kg (180 lb 12.4 oz)     Vital Signs with Ranges  Temp:  [96.7  F (35.9  C)-98.6  F (37  C)] 96.7  F (35.9  C)  Pulse:  [60-81] 60  Resp:  [18-22] 20  BP: (113-127)/(67-75) 113/67  FiO2 (%):  [70 %-95.5 %] 91 %  SpO2:  [87 %-98 %] 90 %  I/O last 3 completed shifts:  In: 240 [P.O.:240]  Out: 465 [Urine:465]    Constitutional: Alert,   Respiratory: Slight tachypnea, dry cough at times. Bilateral crackles, no accessory muscle use. No increased work of breathing  Cardiovascular: RRR no r/g/m  GI: soft, nt  Skin/Integumen: no cyanosis, no edema  Neuro/psych: nl speech and mentation, moves all 4 extrem    Medications     - MEDICATION INSTRUCTIONS -         dexamethasone  6 mg Oral Daily     enoxaparin ANTICOAGULANT  40 mg Subcutaneous Q12H     polyethylene glycol  17 g Oral Daily     remdesivir  100 mg Intravenous Q24H     senna-docusate  1 tablet Oral BID    Or     senna-docusate  2 tablet Oral BID     sodium chloride (PF)  3 mL Intracatheter Q8H       Data   Recent Labs   Lab 11/16/20  0724 11/15/20  0748 11/14/20  0616 11/13/20  0614 11/11/20  0836 11/11/20  0836 11/10/20  2146 11/10/20  2146   WBC 17.2* 15.0* 12.5* 12.2*   < > 7.9  --   --    HGB 15.2 14.5 14.4 15.0   < > 15.2  --   --    MCV 94 94 96 96   < > 94  --   --     442 394 395   < > 281  --   --    INR 1.24*  --   1.25* 1.09   < > 0.99  --  1.02    139 141 140   < > 139  --   --    POTASSIUM 4.2 4.3 4.2 4.6   < > 4.1   < >  --    CHLORIDE 107 108 110* 110*   < > 106  --   --    CO2 26 24 26 28   < > 27  --   --    BUN 23 22 27 28   < > 17  --   --    CR 0.67 0.63* 0.61* 0.72   < > 0.68  --  0.71   ANIONGAP 5 7 5 2*   < > 6  --   --    ELIA 8.1* 8.0* 8.0* 8.2*   < > 8.3*  --   --    * 112* 144* 157*   < > 149*  --   --    ALBUMIN 2.2* 2.3*  --   --   --   --   --   --    PROTTOTAL 5.8* 5.6*  --   --   --   --   --   --    BILITOTAL 0.7 0.4  --   --   --   --   --   --    ALKPHOS 82 75  --   --   --   --   --   --    ALT 34 40  --   --   --  26   < >  --    AST 22 29  --   --   --  54*   < >  --    TROPI  --   --   --  <0.015  --  <0.015  --  <0.015    < > = values in this interval not displayed.       Imaging:   No results found for this or any previous visit (from the past 24 hour(s)).

## 2020-11-16 NOTE — PROVIDER NOTIFICATION
MD Notification    Notified Person: MD    Notified Person Name:Dr. James     Notification Date/Time:1130 11/16/2020    Notification Interaction: web paged     Purpose of Notification: D dimer increased from 2.0 to 6.7         Comments: Waiting for orders or call back.

## 2020-11-17 NOTE — PROGRESS NOTES
[facility]    Infectious Disease Progress Note    Date of Service (when I saw the patient): 11/17/2020     Assessment & Plan   Aly Sorensen is a 80 year old male who was admitted on 11/10/2020.     IMPRESSION:   1.  An 80-year-old male with acute COVID-19 pneumonia, fluctuating course, but major hypoxia, ongoing.   2.  Prior history of prostate cancer.      RECOMMENDATIONS:  Agree with current management ongoing oxygen as needed, anticoagulation, dexamethasone, and remdesivir.  Extend remdesivir out given the tenuous respiratory status.   Trend CBC, creat, LFT, ferritin, LDH, CRP and Sed rate daily.              Mirian Jay MD    Interval History   Afebrile   On 45 L of oxygen     Physical Exam   Temp: 97.2  F (36.2  C) Temp src: Oral BP: 111/65 Pulse: 72   Resp: 24 SpO2: 92 % O2 Device: High Flow Nasal Cannula (HFNC) Oxygen Delivery: 45 LPM  Vitals:    11/12/20 0256 11/13/20 0600 11/14/20 0600   Weight: 80.4 kg (177 lb 4 oz) 81.1 kg (178 lb 12.7 oz) 82 kg (180 lb 12.4 oz)     Vital Signs with Ranges  Temp:  [95.8  F (35.4  C)-98.6  F (37  C)] 97.2  F (36.2  C)  Pulse:  [62-79] 72  Resp:  [20-24] 24  BP: (111-133)/(65-75) 111/65  FiO2 (%):  [63 %-100 %] 89 %  SpO2:  [84 %-95 %] 92 %    Constitutional: HFNC   Lungs: Coarse   Cardiovascular: Regular rate and rhythm, normal S1 and S2, and no murmur noted  Abdomen: Normal bowel sounds, soft, non-distended, non-tender  Skin: No rashes, no cyanosis, no edema  Other:    Medications     - MEDICATION INSTRUCTIONS -       - MEDICATION INSTRUCTIONS -       - MEDICATION INSTRUCTIONS -         dexamethasone  6 mg Oral Daily     enoxaparin ANTICOAGULANT  40 mg Subcutaneous Q12H     ipratropium-albuterol  1 puff Inhalation 4x daily     polyethylene glycol  17 g Oral Daily     remdesivir  100 mg Intravenous Q24H     senna-docusate  1 tablet Oral BID    Or     senna-docusate  2 tablet Oral BID     sodium chloride (PF)  3 mL Intracatheter Q8H       Data    All microbiology laboratory data reviewed.  Recent Labs   Lab Test 11/17/20 0728 11/16/20 0724 11/15/20  0748   WBC 18.9* 17.2* 15.0*   HGB 14.6 15.2 14.5   HCT 43.2 45.1 43.2   MCV 94 94 94    387 442     Recent Labs   Lab Test 11/17/20  0728 11/16/20  0724 11/15/20  0748   CR 0.64* 0.67 0.63*     No lab results found.  No lab results found.    Invalid input(s): UC    Attestation:  Total time on the floor involved in the patient's care: 35 minutes. Total time spent in counseling/care coordination: >50%

## 2020-11-17 NOTE — PROGRESS NOTES
MD Notification    Notified Person: MD    Notified Person Name: Dr larose    Notification Date/Time:11/17/2020    Notification Interaction: via text    Purpose of Notification: d dimer at >20    Orders Received:    Comments:

## 2020-11-17 NOTE — PROGRESS NOTES
Hospitalist Progress Note    Assessment & Plan     Aly Sorensen is an 80 year-old male with remote history of prostate cancer s/p prostatectomy 10/2005, PSA recently normal, melanoma, recent diagnosis of COVID infection (+ 11/7), who was admitted on 11/10/2020 with worsening shortness of breath and declining oxygen saturations. He was transferred to the ICU 2 days ago for worsening respiratory status and required continuous BiPAP but never intubated. Transferred to Hospitalist service on 11/14/20 off BiPAP and stable on high flow nasal cannula with 16 L of supplemental oxygen. Since transfer O2 has been increased to 50 LPM HFNC     Acute hypoxic respiratory failure   Covid 19 Pneumonia   * procal undetectable on 11/14.      - On 11/15/20, continues with severe hypoxia on 50 LPM but improving CRP, LDH. D-dimer is noted to be up to 2.  -afebrile. Nl vitals. Remains on high flow oxygen  -satting 89-90% on FiO2 91%, 68-80% Fio2 11/14-11/15.   -increasing ddimer and crp since admission. ddimer 20.  Discussed with pulmonary and no clear need for continue monitoring.   - pt clinically improving but very slowly,   - tolerate bipap at bedtime restarted 11/16 per pulmonarh  - started and tolerating prone positioning at least 30min qid. Goal of 3 hour per day.   --pCXR 11/16:                                             Peripheral ground-glass infiltrates compatible with  history of COVID-19 pneumonia.  -vbg-no resp failure 11/16.     -pt feels breathing is stable   - no limb pain. No signs dvt.   - decreased FiO2 80% this am with sats low 90s.   -afebrile. Some abd pain yesterday but benign abd exam and resolved.   -lacttate wnl now. Given 500cc fluid bolus 11/16.   - nl jvp  -desats with minimal movement    Plan;   -CT PE protocol and LE dopplers IF clinical decompensation given elevated ddimer per discussion with pulmonary. Appreciate pulmonary input.   -high flow oxygen-RT - per  pulmonary goal oxygen sats 88-94%.   - pulmonary consult regarding if laying on abdomen/positional changes would improve oxygenation    - ID following  - On enoxaparin 40 mg subcutaneous BID per ICU. This can likely be decreased to once daily once up and more mobile.   - Continues on dexamethasone 6 mg daily to complete 10 day course on 11/20.   - ID consult appreciated and agree we are extending Remdesivir to complete 10 day course.     Updated dtr today           Diet: Combination Diet Regular Diet Adult    DVT Prophylaxis: Enoxaparin (Lovenox) SQ  Jane Catheter: not present  Code Status: Full Code               Disposition Plan     Expected discharge: 4 - 7 days, recommended to TBD once oxygenation has improved. .    Chato James MD  Text Page  (7am to 6pm)  Interval History   Feels breathing is ok on oxygen and slowly getting better. Some abd pain yesterday but resolved.   No n/v/d.   Taking po  Tolerating prone positioning and bipap      -Data reviewed today: I reviewed all new labs and imaging results over the last 24 hours. I personally reviewed     Physical Exam   Temp: 97.2  F (36.2  C) Temp src: Oral BP: 111/65 Pulse: 72   Resp: 24 SpO2: 92 % O2 Device: High Flow Nasal Cannula (HFNC) Oxygen Delivery: 45 LPM  Vitals:    11/12/20 0256 11/13/20 0600 11/14/20 0600   Weight: 80.4 kg (177 lb 4 oz) 81.1 kg (178 lb 12.7 oz) 82 kg (180 lb 12.4 oz)     Vital Signs with Ranges  Temp:  [95.8  F (35.4  C)-98.6  F (37  C)] 97.2  F (36.2  C)  Pulse:  [62-79] 72  Resp:  [20-24] 24  BP: (111-133)/(65-75) 111/65  FiO2 (%):  [63 %-100 %] 78 %  SpO2:  [84 %-95 %] 95 %  I/O last 3 completed shifts:  In: 360 [P.O.:360]  Out: 1250 [Urine:1250]    Constitutional: Alert, up in bed  Respiratory: Slight tachypnea, Bilateral crackles, no accessory muscle use. No increased work of breathing  Cardiovascular: RRR no r/g/m,limb swelling or calf tenderness  GI: soft, nt  Skin/Integumen: no cyanosis, no edema  Neuro/psych: nl speech  and mentation, moves all 4 extrem    Medications     - MEDICATION INSTRUCTIONS -       - MEDICATION INSTRUCTIONS -       - MEDICATION INSTRUCTIONS -         dexamethasone  6 mg Oral Daily     enoxaparin ANTICOAGULANT  40 mg Subcutaneous Q12H     ipratropium-albuterol  1 puff Inhalation 4x daily     polyethylene glycol  17 g Oral Daily     remdesivir  100 mg Intravenous Q24H     senna-docusate  1 tablet Oral BID    Or     senna-docusate  2 tablet Oral BID     sodium chloride (PF)  3 mL Intracatheter Q8H       Data   Recent Labs   Lab 11/17/20  0728 11/16/20  0724 11/15/20  0748 11/14/20  0616 11/13/20  0614 11/11/20  0836 11/11/20  0836 11/10/20  2146 11/10/20  2146   WBC 18.9* 17.2* 15.0* 12.5* 12.2*   < > 7.9  --   --    HGB 14.6 15.2 14.5 14.4 15.0   < > 15.2  --   --    MCV 94 94 94 96 96   < > 94  --   --     387 442 394 395   < > 281  --   --    INR 1.44* 1.24*  --  1.25* 1.09   < > 0.99  --  1.02    138 139 141 140   < > 139  --   --    POTASSIUM 4.3 4.2 4.3 4.2 4.6   < > 4.1   < >  --    CHLORIDE 110* 107 108 110* 110*   < > 106  --   --    CO2 24 26 24 26 28   < > 27  --   --    BUN 23 23 22 27 28   < > 17  --   --    CR 0.64* 0.67 0.63* 0.61* 0.72   < > 0.68  --  0.71   ANIONGAP 6 5 7 5 2*   < > 6  --   --    ELIA 8.1* 8.1* 8.0* 8.0* 8.2*   < > 8.3*  --   --    * 103* 112* 144* 157*   < > 149*  --   --    ALBUMIN  --  2.2* 2.3*  --   --   --   --   --   --    PROTTOTAL  --  5.8* 5.6*  --   --   --   --   --   --    BILITOTAL  --  0.7 0.4  --   --   --   --   --   --    ALKPHOS  --  82 75  --   --   --   --   --   --    ALT  --  34 40  --   --   --  26   < >  --    AST  --  22 29  --   --   --  54*   < >  --    TROPI  --   --   --   --  <0.015  --  <0.015  --  <0.015    < > = values in this interval not displayed.       Imaging:   No results found for this or any previous visit (from the past 24 hour(s)).

## 2020-11-17 NOTE — PROGRESS NOTES
"  Pulmonary Medicine Progress Note        Date of Admission: 11/10/2020  Primary Attending:  Lorrie Maldonado MD  Consulting Physician: Baldemar Mejia MD      History:      SUBJECTIVE: Feel breathing has improved, trying proning as recommended. On 5 lpm of o2. No new events overnight,  Afebrile. No worsening respiratory complaints at this time.      OBJECTIVE:     , Blood pressure 111/65, pulse 72, temperature 97.2  F (36.2  C), temperature source Oral, resp. rate 24, height 1.702 m (5' 7\"), weight 82 kg (180 lb 12.4 oz), SpO2 92 %.  Body mass index is 28.31 kg/m .  Temp (24hrs), Av.4  F (36.3  C), Min:95.8  F (35.4  C), Max:98.6  F (37  C)        *Due to the current COVID-19 pandemic, with need to conserve PPE and minimize non-essential exposure to patients diagnosed or under investigation, a limited examination was performed on this patient. Interview was done via patient's hospital room telephone, and patient was visualized through hospital door window. Attending provider's physical exam findings were noted*      Medications  Current Facility-Administered Medications Ordered in Epic   Medication Dose Route Frequency Last Rate Last Dose     acetaminophen (TYLENOL) tablet 650 mg  650 mg Oral Q4H PRN        Or     acetaminophen (TYLENOL) Suppository 650 mg  650 mg Rectal Q4H PRN         carboxymethylcellulose PF (REFRESH PLUS) 0.5 % ophthalmic solution 1 drop  1 drop Both Eyes Q1H PRN         dexamethasone (DECADRON) tablet 6 mg  6 mg Oral Daily   6 mg at 20 0743     enoxaparin ANTICOAGULANT (LOVENOX) injection 40 mg  40 mg Subcutaneous Q12H   40 mg at 20 2239     ibuprofen (ADVIL/MOTRIN) tablet 400 mg  400 mg Oral Q6H PRN         ipratropium-albuterol (COMBIVENT RESPIMAT) inhaler 1 puff  1 puff Inhalation 4x daily   1 puff at 20 0743     lidocaine (LMX4) cream   Topical Q1H PRN         lidocaine 1 % 0.1-1 mL  0.1-1 mL Other Q1H PRN         Medication instructions: Do NOT use nebulized " medications   Does not apply Continuous PRN         melatonin tablet 1 mg  1 mg Oral At Bedtime PRN         naloxone (NARCAN) injection 0.1-0.4 mg  0.1-0.4 mg Intravenous Q2 Min PRN         No lozenges or gum should be given while patient on BIPAP/AVAPS/AVAPS AE   Does not apply Continuous PRN         ondansetron (ZOFRAN-ODT) ODT tab 4 mg  4 mg Oral Q6H PRN        Or     ondansetron (ZOFRAN) injection 4 mg  4 mg Intravenous Q6H PRN   4 mg at 11/17/20 0136     Patient may continue current oral medications   Does not apply Continuous PRN         polyethylene glycol (MIRALAX) Packet 17 g  17 g Oral Daily         remdesivir 100 mg in sodium chloride 0.9 % 250 mL intermittent infusion  100 mg Intravenous Q24H 250 mL/hr at 11/16/20 1347 100 mg at 11/16/20 1347     senna-docusate (SENOKOT-S/PERICOLACE) 8.6-50 MG per tablet 1 tablet  1 tablet Oral BID   1 tablet at 11/16/20 1942    Or     senna-docusate (SENOKOT-S/PERICOLACE) 8.6-50 MG per tablet 2 tablet  2 tablet Oral BID         sodium chloride (PF) 0.9% PF flush 3 mL  3 mL Intracatheter q1 min prn         sodium chloride (PF) 0.9% PF flush 3 mL  3 mL Intracatheter q1 min prn         sodium chloride (PF) 0.9% PF flush 3 mL  3 mL Intracatheter Q8H   3 mL at 11/16/20 1942     No current [facility]-ordered outpatient medications on file.           CMP  Recent Labs   Lab 11/17/20  0728 11/16/20  0724 11/15/20  0748 11/14/20  0616 11/11/20  0836 11/11/20  0836    138 139 141   < > 139   POTASSIUM 4.3 4.2 4.3 4.2   < > 4.1   CHLORIDE 110* 107 108 110*   < > 106   CO2 24 26 24 26   < > 27   ANIONGAP 6 5 7 5   < > 6   * 103* 112* 144*   < > 149*   BUN 23 23 22 27   < > 17   CR 0.64* 0.67 0.63* 0.61*   < > 0.68   GFRESTIMATED >90 90 >90 >90   < > 89   GFRESTBLACK >90 >90 >90 >90   < > >90   ELIA 8.1* 8.1* 8.0* 8.0*   < > 8.3*   PROTTOTAL  --  5.8* 5.6*  --   --   --    ALBUMIN  --  2.2* 2.3*  --   --   --    BILITOTAL  --  0.7 0.4  --   --   --    ALKPHOS  --  82 75  --    --   --    AST  --  22 29  --   --  54*   ALT  --  34 40  --   --  26    < > = values in this interval not displayed.     CBC  Recent Labs   Lab 11/17/20  0728 11/16/20  0724 11/15/20  0748 11/14/20  0616   WBC 18.9* 17.2* 15.0* 12.5*   RBC 4.62 4.78 4.59 4.51   HGB 14.6 15.2 14.5 14.4   HCT 43.2 45.1 43.2 43.1   MCV 94 94 94 96   MCH 31.6 31.8 31.6 31.9   MCHC 33.8 33.7 33.6 33.4   RDW 13.5 13.6 13.6 13.7    387 442 394     INR  Recent Labs   Lab 11/17/20  0728 11/16/20  0724 11/14/20  0616 11/13/20  0614   INR 1.44* 1.24* 1.25* 1.09     Arterial Blood Gas  Recent Labs   Lab 11/16/20  1002 11/12/20  1204 11/12/20  0150 11/10/20  1641   PH  --   --  7.48*  --    PCO2  --   --  34*  --    PO2  --   --  62*  --    HCO3  --   --  26  --    O2PER 30% .5L nasal cannula  --   4 LITERS     No results for input(s): CULT in the last 168 hours.          Diagnostic Studies:  Chest Radiology: CT CHEST PULMONARY EMBOLISM WITH CONTRAST 11/10/2020 6:04 PM     CLINICAL HISTORY: Shortness of breath.     TECHNIQUE: CT angiogram chest during arterial phase injection IV  contrast. 2D and 3D MIP reconstructions were performed by the CT  technologist. Dose reduction techniques were used.      CONTRAST: 69mL Isovue-370     COMPARISON: CT chest 10/30/2019.     FINDINGS:  ANGIOGRAM CHEST: Pulmonary arteries are normal caliber and negative  for pulmonary emboli. Thoracic aorta is negative for dissection. No CT  evidence of right heart strain.     LUNGS AND PLEURA: Patchy bilateral airspace consolidation is present  concerning for viral pneumonitis. Trace amount of right pleural fluid  is noted. No left pleural fluid.     MEDIASTINUM/AXILLAE: Heart is normal in size. No pericardial fluid.  Esophagus is unremarkable. Thoracic aorta is unremarkable with minimal  calcified plaque.     UPPER ABDOMEN: Adrenal gland thickening noted bilaterally. Upper  abdomen is otherwise unremarkable.     MUSCULOSKELETAL:  Normal.                                                                      IMPRESSION:  1.  No evidence of pulmonary embolism. Thoracic aorta is unremarkable.     2.  Patchy groundglass opacities within both lungs concerning for  viral pneumonitis. ARDS could appear similar.             Assessment:      80-year-old male patient with past medical history of prostate cancer, melanoma, spondylosis of lumbar region,   Admitted on November 12, 2020 with COVID-19 infection and Covid pneumonia.  Respiratory failure.  Covid diagnosis was November 1 + PCR on November 7. Exposure was likely from his wife who tested positive on 11/1. Upon admission. Persistent respiratory failure although improved from when he was in the intensive care unit. Although proning awake patients was not routinely used in the beginning of the coronavirus pandemic, recent data suggests potential improvement with this non-invasive, low-risk practice. While the benefit may not be sustained     Pulmonary Diagnoses: Abnl CT/CXR R91.8, Cough R05, GUERRA R06.09, Hpoxemia R09.02, Pnemonia unspec J18.9 and Resp fail acute J96.00   .covi  Recommendations       BiPAP with every sleep opportunity, And as needed  Duonebs every four hours as needed   Supplemental oxygen target pulse oxymetry >89%  Agree with proning trials up to 4 times a day while awake as much as the patient can tolerated.    Ideally over 30 minutes Per trial  Continue corticosteroids, dexamethasone,  Physical activity as tolerated  Continue enoxaparin  Remdesivir per ID, use has been extended due to poor respiratory status  We will continue to follow              Baldemar Campbell M.D.  Pulmonary, Critical Care and Sleep Medicine  [facility]  Pager: [phone]  Office:[phone]

## 2020-11-17 NOTE — PROGRESS NOTES
abg  not drawn despite multiple attempts made to RT by night RN. Lactate not drawn last night either. Will add lactate to morning labs

## 2020-11-17 NOTE — PLAN OF CARE
Covid positive. A/o X4. VSS. Able to wean oxygen to 35L HFNC at 72% Fi02.  Sats between 90-92%. Prone position attempted X3 today with the minimum of 30 mins per session. Pt tolerated that very well. Uses urinal. Up with 1 to BSC.  Bipap at night. NSR on tele. Continue to monitor.

## 2020-11-17 NOTE — PLAN OF CARE
VSS ex on HFNC FiO2 95% @ 55LPM, wearing BiPAP at night per pulmonologist. Proning 4x daily. Ax1 gb. Using urinal & pivot to bsc, BMx1. PIV SL w/ intermittent Remdesivir. Mepilex to coccyx, weight shifting promoted. Tele; NSR. C/o upset stomach overnight given zofran IV & hot pack. Tolerating regular diet.       0700- RT placed pt back on HFNC 45LPM FiO2 90%, ABG was unable to be drawn an hour after being placed on BiPAP last night, RT was asked to draw one this morning but refused to draw ABG due to  continuous switching of BiPAP and HFNC

## 2020-11-18 NOTE — PLAN OF CARE
Covid positive. A/Ox4. VSS on bipap at night. 35L HFNC during the day. Ax1 to BSC. Denies pain, HA, dizziness, has some SOB w/ activity. Lay pt prone 4x a day, 30 min at a time. Mepilex on bottom, preventative, Tele reads NSR, no chest pain. Continue to  monitor.

## 2020-11-18 NOTE — PLAN OF CARE
Assumed care at 0700. Pt is a/o. VSS except on 40-45L HFNC, Fio2 at 80%. Tolerating diet. Up with 1 to BSC. Prone position X3 completed this shift with 30 mins per session as recommended. Tolerating well. Mepilex on coccyx for skin breakdown. IV saline locked. Boost ensure given. Void on urinal.

## 2020-11-18 NOTE — PROGRESS NOTES
[facility]    Infectious Disease Progress Note    Date of Service (when I saw the patient): 11/18/2020     Assessment & Plan   Aly Sorensen is a 80 year old male who was admitted on 11/10/2020.     IMPRESSION:   1.  An 80-year-old male with acute COVID-19 pneumonia, fluctuating course, but major hypoxia, ongoing.   2.  Prior history of prostate cancer.      RECOMMENDATIONS:    Agree with current management ongoing oxygen as needed, anticoagulation, dexamethasone, and remdesivir.  Extend remdesivir out given the tenuous respiratory status. Day 9/ 10  Trend CBC, creat, LFT, ferritin, LDH, CRP and Sed rate daily.              Mirian Jay MD    Interval History   Afebrile   On 45 L of oxygen     Physical Exam   Temp: 96.4  F (35.8  C) Temp src: Axillary BP: 124/60 Pulse: 74   Resp: 20 SpO2: 91 % O2 Device: High Flow Nasal Cannula (HFNC) Oxygen Delivery: 45 LPM  Vitals:    11/12/20 0256 11/13/20 0600 11/14/20 0600   Weight: 80.4 kg (177 lb 4 oz) 81.1 kg (178 lb 12.7 oz) 82 kg (180 lb 12.4 oz)     Vital Signs with Ranges  Temp:  [95.6  F (35.3  C)-96.6  F (35.9  C)] 96.4  F (35.8  C)  Pulse:  [74-77] 74  Resp:  [20-22] 20  BP: (120-124)/(60-68) 124/60  FiO2 (%):  [60 %-80 %] 80 %  SpO2:  [89 %-97 %] 91 %    Constitutional: HFNC   Lungs: Coarse   Cardiovascular: Regular rate and rhythm, normal S1 and S2, and no murmur noted  Abdomen: Normal bowel sounds, soft, non-distended, non-tender  Skin: No rashes, no cyanosis, no edema  Other:    Medications     - MEDICATION INSTRUCTIONS -       - MEDICATION INSTRUCTIONS -       - MEDICATION INSTRUCTIONS -         dexamethasone  6 mg Oral Daily     enoxaparin ANTICOAGULANT  40 mg Subcutaneous Q12H     ipratropium-albuterol  1 puff Inhalation 4x daily     polyethylene glycol  17 g Oral Daily     remdesivir  100 mg Intravenous Q24H     senna-docusate  1 tablet Oral BID    Or     senna-docusate  2 tablet Oral BID     sodium chloride (PF)  3 mL Intracatheter  Q8H       Data   All microbiology laboratory data reviewed.  Recent Labs   Lab Test 11/18/20  0510 11/17/20  0728 11/16/20 0724   WBC 23.2* 18.9* 17.2*   HGB 15.4 14.6 15.2   HCT 45.6 43.2 45.1   MCV 93 94 94    245 387     Recent Labs   Lab Test 11/18/20  0510 11/17/20 0728 11/16/20 0724   CR 0.52* 0.64* 0.67     No lab results found.  No lab results found.    Invalid input(s):     Attestation:  Total time on the floor involved in the patient's care: 35 minutes. Total time spent in counseling/care coordination: >50%

## 2020-11-18 NOTE — PROGRESS NOTES
"CLINICAL NUTRITION SERVICES  -  ASSESSMENT NOTE      Recommendations Ordered by Registered Dietitian (RD):   Add Vanilla Boost Plus Shakes btw meals BID   Malnutrition:   % Weight Loss:  > 2% in 1 week (severe malnutrition)  % Intake:  <75% for > 7 days (non-severe malnutrition)  Subcutaneous Fat Loss:  Unable to determine  Muscle Loss:  Unable to determine  Fluid Retention:  None noted    Malnutrition Diagnosis: Non-Severe malnutrition  In Context of:  Acute illness or injury        REASON FOR ASSESSMENT  Aly Sorensen is a 80 year old male seen by Registered Dietitian for LOS      NUTRITION HISTORY  - Information obtained from patient and Epic records.  - Patient is on a Regular diet at home.  - Typical food/fluid intake is good.  - Diet history:  Pt usually has good appetite with regular mealtimes and good intake.     - Supplements:  None   - No food allergies/intolerances.  Pt has become weak and deconditioned.      CURRENT NUTRITION ORDERS  Diet Order:     Regular     Current Intake/Tolerance:  Variable oral intake since admission.  Nursing flow sheets indicated pt eating % since 11/13, but he has been selecting only few items per tray.  He reports his appetite comes and goes.  He has no complaints about the food, but his sense of taste is poor.  One item that's been going well is vanilla pudding.  He is intrigued by the offer of oral liquid nutritional supplements and wishes to try vanilla flavor.    NUTRITION FOCUSED PHYSICAL ASSESSMENT FOR DIAGNOSING MALNUTRITION)  No:  Unable due to distancing precautions with COVID-19 pandemic               Observed:    N/A    Obtained from Chart/Interdisciplinary Team:  None noted    ANTHROPOMETRICS  Height: 5' 7\"  Admit Wt:  79.7 kg  Last Recorded Wt:  82 kg  Body mass index is 27.5 kg/m .  Weight Status:  Overweight BMI 25-29.9  IBW:  67.3 kg  % IBW:  118%  Weight History:   - Pt reports Usual Body Weight = 185 lbs (84 kg)  - Thus recent 5% weight loss.    Wt " Readings from Last 10 Encounters:   11/14/20 82 kg (180 lb 12.4 oz)   08/20/20 83 kg (183 lb)   12/11/19 81.9 kg (180 lb 8 oz)   10/28/19 82.4 kg (181 lb 11.2 oz)   08/16/19 85.3 kg (188 lb)   01/07/19 85.7 kg (189 lb)   01/01/19 85.7 kg (189 lb)   12/28/18 85.3 kg (188 lb)   12/13/18 87.2 kg (192 lb 3.2 oz)   03/02/18 87.5 kg (193 lb)       LABS  Labs reviewed  CRP 66.5 (H) on 11/17    MEDICATIONS  Medications reviewed    ASSESSED NUTRITION NEEDS PER APPROVED PRACTICE GUIDELINES:    Dosing Weight:  79.7 kg (admit wt)   Estimated Energy Needs:  9388-7997 kcals (25-30 Kcal/Kg)  Justification: maintenance  Estimated Protein Needs:   grams protein (1.2-1.5 g pro/Kg)  Justification: hypercatabolism with acute illness  Estimated Fluid Needs:  0538-6068 mL (1 mL/Kcal)  Justification: maintenance    MALNUTRITION:  % Weight Loss:  > 2% in 1 week (severe malnutrition)  % Intake:  <75% for > 7 days (non-severe malnutrition)  Subcutaneous Fat Loss:  Unable to determine  Muscle Loss:  Unable to determine  Fluid Retention:  None noted    Malnutrition Diagnosis: Non-Severe malnutrition  In Context of:  Acute illness or injury    NUTRITION DIAGNOSIS:  Inadequate oral intake related to decreased appetite and taste changes as evidenced by variable oral intake with resultant 5% weight loss over the past 1-2 weeks.    NUTRITION INTERVENTIONS  Recommendations / Nutrition Prescription  Add Vanilla Boost Plus Shakes btw meals BID    Implementation  Nutrition education: Provided education on the use of supplements to optimize intake.  Medical Food Supplement - Ordered above in Epic.    Nutrition Goals  Pt will consume > 75% meals and > 50% supplements in 3-5 days.      MONITORING AND EVALUATION:  Progress towards goals will be monitored and evaluated per protocol and Practice Guidelines    Marilou Garcia, RD, LD, CNSC

## 2020-11-18 NOTE — PROGRESS NOTES
"  Pulmonary Medicine Progress Note        Date of Admission: 11/10/2020  Primary Attending:  Lorrie Maldonado MD  Consulting Physician: Baldemar Mejia MD      History:      SUBJECTIVE:   Feels this dyspnea has improved although he is coughing Slightly out more phlegm today.  Has been doing proning as requested.  did it yesterday 4 times.  Afebrile. No worsening respiratory complaints at this time.      OBJECTIVE:     , Blood pressure 124/60, pulse 74, temperature 96.4  F (35.8  C), temperature source Axillary, resp. rate 20, height 1.702 m (5' 7\"), weight 82 kg (180 lb 12.4 oz), SpO2 91 %.  Body mass index is 28.31 kg/m .  Temp (24hrs), Av.4  F (36.3  C), Min:95.8  F (35.4  C), Max:98.6  F (37  C)        *Due to the current COVID-19 pandemic, with need to conserve PPE and minimize non-essential exposure to patients diagnosed or under investigation, a limited examination was performed on this patient. Interview was done via patient's hospital room telephone, and patient was visualized through hospital door window. Attending provider's physical exam findings were noted*      Medications  Current Facility-Administered Medications Ordered in Epic   Medication Dose Route Frequency Last Rate Last Admin     acetaminophen (TYLENOL) tablet 650 mg  650 mg Oral Q4H PRN        Or     acetaminophen (TYLENOL) Suppository 650 mg  650 mg Rectal Q4H PRN         carboxymethylcellulose PF (REFRESH PLUS) 0.5 % ophthalmic solution 1 drop  1 drop Both Eyes Q1H PRN         dexamethasone (DECADRON) tablet 6 mg  6 mg Oral Daily   6 mg at 20 0745     enoxaparin ANTICOAGULANT (LOVENOX) injection 40 mg  40 mg Subcutaneous Q12H   40 mg at 20 5492     ibuprofen (ADVIL/MOTRIN) tablet 400 mg  400 mg Oral Q6H PRN         ipratropium-albuterol (COMBIVENT RESPIMAT) inhaler 1 puff  1 puff Inhalation 4x daily   1 puff at 20 0745     lidocaine (LMX4) cream   Topical Q1H PRN         lidocaine 1 % 0.1-1 mL  0.1-1 mL Other Q1H " PRN         Medication instructions: Do NOT use nebulized medications   Does not apply Continuous PRN         melatonin tablet 1 mg  1 mg Oral At Bedtime PRN         naloxone (NARCAN) injection 0.1-0.4 mg  0.1-0.4 mg Intravenous Q2 Min PRN         No lozenges or gum should be given while patient on BIPAP/AVAPS/AVAPS AE   Does not apply Continuous PRN         ondansetron (ZOFRAN-ODT) ODT tab 4 mg  4 mg Oral Q6H PRN        Or     ondansetron (ZOFRAN) injection 4 mg  4 mg Intravenous Q6H PRN   4 mg at 11/17/20 0136     Patient may continue current oral medications   Does not apply Continuous PRN         polyethylene glycol (MIRALAX) Packet 17 g  17 g Oral Daily         remdesivir 100 mg in sodium chloride 0.9 % 250 mL intermittent infusion  100 mg Intravenous Q24H 250 mL/hr at 11/17/20 1318 100 mg at 11/17/20 1318     senna-docusate (SENOKOT-S/PERICOLACE) 8.6-50 MG per tablet 1 tablet  1 tablet Oral BID   1 tablet at 11/17/20 1959    Or     senna-docusate (SENOKOT-S/PERICOLACE) 8.6-50 MG per tablet 2 tablet  2 tablet Oral BID         sodium chloride (PF) 0.9% PF flush 3 mL  3 mL Intracatheter q1 min prn         sodium chloride (PF) 0.9% PF flush 3 mL  3 mL Intracatheter q1 min prn         sodium chloride (PF) 0.9% PF flush 3 mL  3 mL Intracatheter Q8H   3 mL at 11/18/20 0458     No current [facility]-ordered outpatient medications on file.           CMP  Recent Labs   Lab 11/18/20  0510 11/17/20  0728 11/16/20  0724 11/15/20  0748 11/11/20  0836 11/11/20  0836    140 138 139   < > 139   POTASSIUM 4.7 4.3 4.2 4.3   < > 4.1   CHLORIDE 105 110* 107 108   < > 106   CO2 25 24 26 24   < > 27   ANIONGAP 7 6 5 7   < > 6   * 106* 103* 112*   < > 149*   BUN 20 23 23 22   < > 17   CR 0.52* 0.64* 0.67 0.63*   < > 0.68   GFRESTIMATED >90 >90 90 >90   < > 89   GFRESTBLACK >90 >90 >90 >90   < > >90   ELIA 8.1* 8.1* 8.1* 8.0*   < > 8.3*   PROTTOTAL  --   --  5.8* 5.6*  --   --    ALBUMIN  --   --  2.2* 2.3*  --   --    BILITOTAL   --   --  0.7 0.4  --   --    ALKPHOS  --   --  82 75  --   --    AST  --   --  22 29  --  54*   ALT  --   --  34 40  --  26    < > = values in this interval not displayed.     CBC  Recent Labs   Lab 11/18/20  0510 11/17/20  0728 11/16/20  0724 11/15/20  0748   WBC 23.2* 18.9* 17.2* 15.0*   RBC 4.90 4.62 4.78 4.59   HGB 15.4 14.6 15.2 14.5   HCT 45.6 43.2 45.1 43.2   MCV 93 94 94 94   MCH 31.4 31.6 31.8 31.6   MCHC 33.8 33.8 33.7 33.6   RDW 13.4 13.5 13.6 13.6    245 387 442     INR  Recent Labs   Lab 11/18/20  0510 11/17/20  0728 11/16/20  0724 11/14/20  0616   INR 1.33* 1.44* 1.24* 1.25*     Arterial Blood Gas  Recent Labs   Lab 11/16/20  1002 11/12/20  1204 11/12/20  0150   PH  --   --  7.48*   PCO2  --   --  34*   PO2  --   --  62*   HCO3  --   --  26   O2PER 30% .5L nasal cannula  --      No results for input(s): CULT in the last 168 hours.          Diagnostic Studies:  Chest Radiology: CT CHEST PULMONARY EMBOLISM WITH CONTRAST 11/10/2020 6:04 PM     CLINICAL HISTORY: Shortness of breath.     TECHNIQUE: CT angiogram chest during arterial phase injection IV  contrast. 2D and 3D MIP reconstructions were performed by the CT  technologist. Dose reduction techniques were used.      CONTRAST: 69mL Isovue-370     COMPARISON: CT chest 10/30/2019.     FINDINGS:  ANGIOGRAM CHEST: Pulmonary arteries are normal caliber and negative  for pulmonary emboli. Thoracic aorta is negative for dissection. No CT  evidence of right heart strain.     LUNGS AND PLEURA: Patchy bilateral airspace consolidation is present  concerning for viral pneumonitis. Trace amount of right pleural fluid  is noted. No left pleural fluid.     MEDIASTINUM/AXILLAE: Heart is normal in size. No pericardial fluid.  Esophagus is unremarkable. Thoracic aorta is unremarkable with minimal  calcified plaque.     UPPER ABDOMEN: Adrenal gland thickening noted bilaterally. Upper  abdomen is otherwise unremarkable.     MUSCULOSKELETAL:  Normal.                                                                      IMPRESSION:  1.  No evidence of pulmonary embolism. Thoracic aorta is unremarkable.     2.  Patchy groundglass opacities within both lungs concerning for  viral pneumonitis. ARDS could appear similar.             Assessment:      80-year-old male patient with past medical history of prostate cancer, melanoma, spondylosis of lumbar region,   Admitted on November 12, 2020 with COVID-19 infection and Covid pneumonia.  Respiratory failure.  Covid diagnosis was November 1 + PCR on November 7. Exposure was likely from his wife who tested positive on 11/1. Upon admission. Persistent respiratory failure although improved from when he was in the intensive care unit. Although proning awake patients was not routinely used in the beginning of the coronavirus pandemic, recent data suggests potential improvement with this non-invasive, low-risk practice. While the benefit may not be sustained     Pulmonary Diagnoses: Abnl CT/CXR R91.8, Cough R05, GUERRA R06.09, Hpoxemia R09.02, Pnemonia unspec J18.9 and Resp fail acute J96.00   .covi  Recommendations       BiPAP with every sleep opportunity, And as needed  Duonebs every four hours as needed   Supplemental oxygen target pulse oxymetry >89%  Agree with proning trials up to 4 times a day while awake as much as the patient can tolerated.    Ideally over 30 minutes Per trial  Continue corticosteroids, dexamethasone,  Physical activity as tolerated  Continue enoxaparin  Remdesivir per ID, use has been extended due to poor respiratory status  We will continue to follow  Please call if questions              Baldemar Campbell M.D.  Pulmonary, Critical Care and Sleep Medicine  [facility]  Pager: [phone]  Office:[phone]

## 2020-11-18 NOTE — PROGRESS NOTES
[facility]    Hospitalist Progress Note    Assessment & Plan     Aly Sorensen is an 80 year-old male with remote history of prostate cancer s/p prostatectomy 10/2005, PSA recently normal, melanoma, recent diagnosis of COVID infection (+ 11/7), who was admitted on 11/10/2020 with worsening shortness of breath and declining oxygen saturations. He was transferred to the ICU 2 days ago for worsening respiratory status and required continuous BiPAP but never intubated. Transferred to Hospitalist service on 11/14/20 off BiPAP and stable on high flow nasal cannula with 16 L of supplemental oxygen. Since transfer O2 has been increased to 50 LPM HFNC     Acute hypoxic respiratory failure   Covid 19 Pneumonia   * procal undetectable on 11/14.        On 11/15/20, continues with severe hypoxia on 50 LPM but improving CRP, LDH. D-dimer is noted to be up to 2.    afebrile. Nl vitals. Remains on high flow oxygen    satting 89-90% on FiO2 91%, 68-80% Fio2 11/14-11/15.     increasing ddimer and crp since admission. ddimer 20.  Discussed with pulmonary and no clear need for continue monitoring.     pt clinically improving but very slowly,     tolerate bipap at bedtime restarted 11/16 per pulmonarh    started and tolerating prone positioning at least 30min qid. Goal of 3 hour per day.     pCXR 11/16: Peripheral ground-glass infiltrates compatible with  COVID-19 pneumonia    decreased FiO2 80% this am with sats low 90s.     afebrile. Some abd pain yesterday but benign abd exam and resolved.    lactate wnl now. Given 500cc fluid bolus 11/16.     desats with minimal movement    CT PE protocol and LE dopplers IF clinical decompensation given elevated ddimer per discussion with pulmonary. Appreciate pulmonary input.     high flow oxygen-RT - per pulmonary goal oxygen sats 88-94%.     pulmonary consult requested, they recommend proning trials 4x/day while awake, 30 minutes per trial    On enoxaparin 40 mg  "subcutaneous BID per ICU. This can likely be decreased to once daily once up and more mobile.     Continues on dexamethasone 6 mg daily to complete 10 day course on 11/20.     ID consult appreciated; extending Remdesivir to complete 10 day course.     Diet: Combination Diet Regular Diet Adult    DVT Prophylaxis: Enoxaparin (Lovenox) SQ  Jane Catheter: not present  Code Status: Full Code           Disposition Plan     Expected discharge: 4 - 7 days, recommended to TBD once oxygenation has improved. .    Lorrie Maldonado MD  Text Page  (7am to 6pm)  Interval History   \"I think maybe I could have a bowel movement today.\"  Patient says he didn't have a BM yesterday, thinks he could today, needs help to bathroom.  He says his breathing is comfortable, he has done one proning session today, \"I have three to go.\"      -Data reviewed today: I reviewed all new labs and imaging results over the last 24 hours. I personally reviewed     Physical Exam   Temp: 96.4  F (35.8  C) Temp src: Axillary BP: 124/60 Pulse: 74   Resp: 20 SpO2: 91 % O2 Device: High Flow Nasal Cannula (HFNC) Oxygen Delivery: 45 LPM  Vitals:    11/12/20 0256 11/13/20 0600 11/14/20 0600   Weight: 80.4 kg (177 lb 4 oz) 81.1 kg (178 lb 12.7 oz) 82 kg (180 lb 12.4 oz)     Vital Signs with Ranges  Temp:  [95.6  F (35.3  C)-96.6  F (35.9  C)] 96.4  F (35.8  C)  Pulse:  [74-77] 74  Resp:  [20-22] 20  BP: (120-124)/(60-68) 124/60  FiO2 (%):  [60 %-80 %] 80 %  SpO2:  [89 %-97 %] 91 %  I/O last 3 completed shifts:  In: 480 [P.O.:480]  Out: 1000 [Urine:1000]    Constitutional: Alert, up in bed  Respiratory: Slight tachypnea, Bilateral crackles, no accessory muscle use. No increased work of breathing  Cardiovascular: RRR no r/g/m,limb swelling or calf tenderness  GI: soft, nt  Skin/Integumen: no cyanosis, no edema  Neuro/psych: nl speech and mentation, moves all 4 extrem    Medications     - MEDICATION INSTRUCTIONS -       - MEDICATION INSTRUCTIONS -       - MEDICATION " INSTRUCTIONS -         dexamethasone  6 mg Oral Daily     enoxaparin ANTICOAGULANT  40 mg Subcutaneous Q12H     ipratropium-albuterol  1 puff Inhalation 4x daily     polyethylene glycol  17 g Oral Daily     remdesivir  100 mg Intravenous Q24H     senna-docusate  1 tablet Oral BID    Or     senna-docusate  2 tablet Oral BID     sodium chloride (PF)  3 mL Intracatheter Q8H       Data   Recent Labs   Lab 11/18/20  0510 11/17/20  0728 11/16/20  0724 11/15/20  0748 11/13/20  0614 11/13/20  0614   WBC 23.2* 18.9* 17.2* 15.0*   < > 12.2*   HGB 15.4 14.6 15.2 14.5   < > 15.0   MCV 93 94 94 94   < > 96    245 387 442   < > 395   INR 1.33* 1.44* 1.24*  --    < > 1.09    140 138 139   < > 140   POTASSIUM 4.7 4.3 4.2 4.3   < > 4.6   CHLORIDE 105 110* 107 108   < > 110*   CO2 25 24 26 24   < > 28   BUN 20 23 23 22   < > 28   CR 0.52* 0.64* 0.67 0.63*   < > 0.72   ANIONGAP 7 6 5 7   < > 2*   ELIA 8.1* 8.1* 8.1* 8.0*   < > 8.2*   * 106* 103* 112*   < > 157*   ALBUMIN  --   --  2.2* 2.3*  --   --    PROTTOTAL  --   --  5.8* 5.6*  --   --    BILITOTAL  --   --  0.7 0.4  --   --    ALKPHOS  --   --  82 75  --   --    ALT  --   --  34 40  --   --    AST  --   --  22 29  --   --    TROPI  --   --   --   --   --  <0.015    < > = values in this interval not displayed.       Imaging:   No results found for this or any previous visit (from the past 24 hour(s)).

## 2020-11-19 NOTE — PROGRESS NOTES
[facility]    Infectious Disease Progress Note    Date of Service (when I saw the patient): 11/19/2020     Assessment & Plan   Aly Sorensen is a 80 year old male who was admitted on 11/10/2020.     IMPRESSION:   1.  An 80-year-old male with acute COVID-19 pneumonia, fluctuating course, but major hypoxia, ongoing.   2.  Prior history of prostate cancer.      RECOMMENDATIONS:    Agree with current management ongoing oxygen as needed, anticoagulation, dexamethasone   Extend remdesivir out given the tenuous respiratory status. Day 10/ 10 today.     No other recommendations, will sign off please call if ques.                Mirian Jay MD    Interval History   Afebrile   On 50 L of oxygen     Physical Exam   Temp: 97.9  F (36.6  C) Temp src: Oral BP: 119/65 Pulse: 86   Resp: 22 SpO2: 95 % O2 Device: High Flow Nasal Cannula (HFNC) Oxygen Delivery: 50 LPM  Vitals:    11/12/20 0256 11/13/20 0600 11/14/20 0600   Weight: 80.4 kg (177 lb 4 oz) 81.1 kg (178 lb 12.7 oz) 82 kg (180 lb 12.4 oz)     Vital Signs with Ranges  Temp:  [96.6  F (35.9  C)-98.9  F (37.2  C)] 97.9  F (36.6  C)  Pulse:  [52-86] 86  Resp:  [18-24] 22  BP: (107-123)/(63-77) 119/65  FiO2 (%):  [78 %-80 %] 78 %  SpO2:  [87 %-96 %] 95 %    Constitutional: HFNC   Lungs: Coarse   Cardiovascular: Regular rate and rhythm, normal S1 and S2, and no murmur noted  Abdomen: Normal bowel sounds, soft, non-distended, non-tender  Skin: No rashes, no cyanosis, no edema  Other:    Medications     - MEDICATION INSTRUCTIONS -       - MEDICATION INSTRUCTIONS -       - MEDICATION INSTRUCTIONS -         dexamethasone  6 mg Oral Daily     enoxaparin ANTICOAGULANT  40 mg Subcutaneous Q12H     ipratropium-albuterol  1 puff Inhalation 4x daily     polyethylene glycol  17 g Oral Daily     senna-docusate  1 tablet Oral BID    Or     senna-docusate  2 tablet Oral BID     sodium chloride (PF)  3 mL Intracatheter Q8H       Data   All microbiology laboratory data  reviewed.  Recent Labs   Lab Test 11/19/20  0545 11/18/20  0510 11/17/20  0728   WBC 19.0* 23.2* 18.9*   HGB 14.8 15.4 14.6   HCT 44.7 45.6 43.2   MCV 93 93 94    280 245     Recent Labs   Lab Test 11/19/20  0545 11/18/20  0510 11/17/20  0728   CR 0.48* 0.52* 0.64*     No lab results found.  No lab results found.    Invalid input(s): UC    Attestation:  Total time on the floor involved in the patient's care: 35 minutes. Total time spent in counseling/care coordination: >50%

## 2020-11-19 NOTE — PLAN OF CARE
Covid positive. VSS on bipap, 45L HCFN during day. Plan to try to wean O2. A/Ox4. Ax1 to [facility], had 1 small BM this shift. Denies chest pain, HA, has SOB w/ activity and desats quickly. Pt reports a productive cough. Mepilex on bottom. Lays prone 4x a day for 30 min. Tele reads  NSR. Continue to monitor.

## 2020-11-19 NOTE — PLAN OF CARE
Assumed care at 1500. Pt is covid  positive. A/o. VSS. No pain. SOB with exertion. Currently on 45L HFNC with Fio2 at 80%. Tolerating reg diet. Up with 1 with walker. CT chest and LE US done. Result reviewed. MD paged. NSR on tele. Continue to monitor.

## 2020-11-19 NOTE — PROGRESS NOTES
[facility]    Hospitalist Progress Note    Assessment & Plan     Aly Sorensen is an 80 year-old male with remote history of prostate cancer s/p prostatectomy 10/2005, PSA recently normal, melanoma, recent diagnosis of COVID infection (+ 11/7), who was admitted on 11/10/2020 with worsening shortness of breath and declining oxygen saturations. He was transferred to the ICU 2 days ago for worsening respiratory status and required continuous BiPAP but never intubated. Transferred to Hospitalist service on 11/14/20 off BiPAP and stable on high flow nasal cannula with 16 L of supplemental oxygen. Since transfer O2 has been increased to 50 LPM HFNC     Acute hypoxic respiratory failure   Covid 19 Pneumonia   * procal undetectable on 11/14.        On 11/15/20, continues with severe hypoxia on 50 LPM but improving CRP, LDH. D-dimer is noted to be up to 2.    afebrile. Nl vitals. Remains on high flow oxygen    satting 89-90% on FiO2 91%, 68-80% Fio2 11/14-11/15.     increasing ddimer and crp since admission. ddimer 20.      Based on this, and lack of clinical progress, checked CT chest PE study today.  This shows new filling defects within the distal aortic arch and mid to distal descending thoracic aorta; I discussed with radiology and these are clearly new from study done 9 days ago    There is also a small, wedge-shaped hypoenhancing area in the spleen, possibly a small splenic infarct    Hematology consult requested.  COVID guidelines call for increase to therapeutic dose Lovenox, 1 mg/kilg every 12 for treatment of DVT/PE.  While he does not have new PE, he does have new thrombus, so we will extrapolate from this guideline.  Discussed with Pharm.D., she agrees    Check Doppler venous ultrasound of lower extremities since he has demonstrated new thrombus in spite of prophylactic anticoagulants    Continue bipap at bedtime    started and tolerating prone positioning at least 30min qid. Goal of 3  "hour per day.     pCXR 11/16: Peripheral ground-glass infiltrates compatible with  COVID-19 pneumonia    desats with minimal movement    high flow oxygen-RT - per pulmonary goal oxygen sats 88-94%.     pulmonary consult requested, they recommend proning trials 4x/day while awake, 30 minutes per trial    Continues on dexamethasone 6 mg daily to complete 10 day course on 11/20.     ID consult appreciated; extending Remdesivir to complete 10 day course.     Diet: Combination Diet Regular Diet Adult    DVT Prophylaxis: Enoxaparin (Lovenox) SQ  Jane Catheter: not present  Code Status: Full Code           Disposition Plan     Expected discharge: 4 - 7 days, recommended to TBD once oxygenation has improved. .    Total time spent:  > 35 minutes  Time spent counseling, coordination of care: 25 minutes including discussion with care team and daughter, Lakshmi ([phone]) also PharmD, personal review and interpretation of labs and studies as noted above     Lorrie Maldonado MD  Text Page  (7am to 6pm)   Interval History   \" My breathing is not as good today.\"  Patient acknowledges that his breathing is not as comfortable as it was yesterday.  He denies chest pain, no leg pain, no GI complaints.    -Data reviewed today: I reviewed all new labs and imaging results over the last 24 hours. I personally reviewed     Physical Exam   Temp: 98.9  F (37.2  C) Temp src: Axillary BP: 107/67 Pulse: 67   Resp: 24 SpO2: 96 % O2 Device: High Flow Nasal Cannula (HFNC) Oxygen Delivery: 50 LPM  Vitals:    11/12/20 0256 11/13/20 0600 11/14/20 0600   Weight: 80.4 kg (177 lb 4 oz) 81.1 kg (178 lb 12.7 oz) 82 kg (180 lb 12.4 oz)     Vital Signs with Ranges  Temp:  [96.6  F (35.9  C)-98.9  F (37.2  C)] 98.9  F (37.2  C)  Pulse:  [52-83] 67  Resp:  [18-24] 24  BP: (107-123)/(63-77) 107/67  FiO2 (%):  [78 %-80 %] 78 %  SpO2:  [87 %-96 %] 96 %  I/O last 3 completed shifts:  In: 240 [P.O.:240]  Out: 875 [Urine:875]    Constitutional: Alert, up in " bed  Respiratory: Seems to have subtle increased work of breathing, even with normal conversation of coarse, bilateral  Cardiovascular: RRR no r/g/m,limb swelling or calf tenderness  GI: soft, nt  Skin/Integumen: no cyanosis, no edema  Neuro/psych: nl speech and mentation, moves all 4 extrem    Medications     - MEDICATION INSTRUCTIONS -       - MEDICATION INSTRUCTIONS -       - MEDICATION INSTRUCTIONS -         dexamethasone  6 mg Oral Daily     enoxaparin ANTICOAGULANT  40 mg Subcutaneous Q12H     ipratropium-albuterol  1 puff Inhalation 4x daily     polyethylene glycol  17 g Oral Daily     senna-docusate  1 tablet Oral BID    Or     senna-docusate  2 tablet Oral BID     sodium chloride (PF)  3 mL Intracatheter Q8H       Data   Recent Labs   Lab 11/18/20  0510 11/17/20  0728 11/16/20  0724 11/15/20  0748 11/13/20  0614 11/13/20  0614   WBC 23.2* 18.9* 17.2* 15.0*   < > 12.2*   HGB 15.4 14.6 15.2 14.5   < > 15.0   MCV 93 94 94 94   < > 96    245 387 442   < > 395   INR 1.33* 1.44* 1.24*  --    < > 1.09    140 138 139   < > 140   POTASSIUM 4.7 4.3 4.2 4.3   < > 4.6   CHLORIDE 105 110* 107 108   < > 110*   CO2 25 24 26 24   < > 28   BUN 20 23 23 22   < > 28   CR 0.52* 0.64* 0.67 0.63*   < > 0.72   ANIONGAP 7 6 5 7   < > 2*   ELIA 8.1* 8.1* 8.1* 8.0*   < > 8.2*   * 106* 103* 112*   < > 157*   ALBUMIN  --   --  2.2* 2.3*  --   --    PROTTOTAL  --   --  5.8* 5.6*  --   --    BILITOTAL  --   --  0.7 0.4  --   --    ALKPHOS  --   --  82 75  --   --    ALT  --   --  34 40  --   --    AST  --   --  22 29  --   --    TROPI  --   --   --   --   --  <0.015    < > = values in this interval not displayed.       Imaging:   CT CHEST PULMONARY EMBOLISM WITH CONTRAST 11/19/2020 1:22 PM     CLINICAL HISTORY: COVID infection, rising D-dimer, high oxygen needs.     TECHNIQUE: CT angiogram chest during arterial phase injection IV  contrast. 2D and 3D MIP reconstructions were performed by the CT  technologist. Dose  reduction techniques were used.      CONTRAST: 68mL [id number]-[id number]     COMPARISON: November 10, 2020     FINDINGS:  ANGIOGRAM CHEST: Pulmonary arteries are normal caliber and negative  for pulmonary emboli with the limitations of some motion artifact and  extensive infiltrates. Thoracic aorta is negative for dissection. No  CT evidence of right heart strain. Filling defects in the distal  aortic arch as well as the mid and distal descending thoracic aorta  are new since the comparison study and likely represent thrombus  within the aortic arch and descending aorta.     LUNGS AND PLEURA: Extensive bilateral interstitial and groundglass  infiltrates. No aneurysm.     MEDIASTINUM/AXILLAE: No adenopathy or pericardial effusion.     UPPER ABDOMEN: No acute findings. Hypodensity/hypoenhancement in the  spleen noted, possibly related to small splenic infarct.     MUSCULOSKELETAL: No frankly destructive bony lesions.                                                                      IMPRESSION:  1.  No pulmonary embolism demonstrated.  2.  Extensive bilateral groundglass and interstitial infiltrates  compatible with history of COVID pneumonia.  3.  New filling defects within the distal aortic arch and mid to  distal descending thoracic aorta from the comparison 9 days prior.  This is most consistent with mural thrombus.  4.  Small wedge-shaped hypoenhancing area in the spleen, possibly a  small splenic infarct.

## 2020-11-19 NOTE — PLAN OF CARE
A&Ox4, coop, pleasant, sl anxious when prone. Assist of 1 with GB, up to BSC x1. VSS. Cindy diet, fair appetite, ate oatmeal, requested to skip lunch but plans to eat dinner. LS dim, HFNC at 55L, prone x2 for total of 1 hr 15 mins, dry cough. COVID +, continue iso, PO decadron and remdesivir, ID following. CT chest performed for elevated D Dimer, increased lovenox dose, moisés LE ultrasound ordered, Heme consult placed.

## 2020-11-19 NOTE — PROGRESS NOTES
MD Notification    Notified Person: MD    Notified Person Name: Erica     Notification Date/Time: 11/19/2020 1630      Notification Interaction: via text    Purpose of Notification: Mild R calf DVT    Orders Received:    Comments:

## 2020-11-20 NOTE — PLAN OF CARE
RN:  Patient A/O x4.  Tylenol x1 for complaints of right ribcage pain with relief.  Warm packs to neck for c/o neck discomfort with relief.  Afeb.  Continues on HFNC.  Patient is able to maintain sats above 90% on 60 LPM at rest and with little activity.  With activity, patient sats dip into the 80's.  Patient on Bipap at night.  X2 placed into prone position for 30 minutes at a time.  Patient is able to help put himself into prone position with minimal assist of 2.  Not OOB otherwise this shift.  Using urinal in bed.  Voiding adequately.  No BM this shift.  Fair appetite.  Lungs diminished throughout.  Frequent, NP loose cough.  RT following. Hemat/Onc consult completed.  Patient on a high dose of lovenox for mural thrombus in aortic arch and blood clot in right LE. IV saline locked.  Skin bruised but intact.  Patient agreeable to current treatment plan.  Discharge pending progress.

## 2020-11-20 NOTE — PLAN OF CARE
COVID positive. A&Ox4, VSS, afebrile. HFNC during days 45% LPM, FIO2 80%, saturations 90-93%. During night on BIPAP, tolerating well. LS diminished, infrequent cough. Using urinal at bedside. Tolerating reg diet, ambulates assist x1 to BSC. IV is SL. No c/o pain. US and chest CT done and resulted. Tele SB. Will continue to monitor.

## 2020-11-20 NOTE — PROGRESS NOTES
Hem/Onc office called re consult from yesterday.  Answering service unaware of consult. Information given re consult. Answering service to page on-call MD.

## 2020-11-20 NOTE — CONSULTS
Consultation    Aly Sorensen MRN# [id number]   YOB: 1940 Age: 80 year old   Date of Admission: 11/10/2020  Requesting physician: Dr. Maldonado  Reason for consult: COVID, new thrombus aortic arch           Assessment and Plan:     Aly is a 80 year old admitted 11/10 with COVID. He has new aortic thrombus on imaging.    Thrombophilia  - Covid positive 11/7  - 11/17/2020 D-dimer >20, fibrinogen 452, INR 1.44  - 11/19 CT PE was negative for PE, but new filling defects within the distal aortic arch and mid to distal descending thoracic aorta   - 11/19 US positive for right calf DVT  - On dexamethasone and remdesivir  - Requiring Bipap  - Was on enoxaprin 40 mg daily since admission  - Agree with conversion to full dose anticoagulation with LMWH 1 mg/kg every 12 hours started 11/19/2020  - Plan for anticoagulation duration of 3-6 months. At discharge, he can transition to oral therapy with warfarin or DOAC therapy.  - I do not think that he needs interventional therapies as there is not evidence of right heart strain or ischemia  - No additional thrombophilia work up needed at this time. He does have a history of prostate cancer which was treated with prostatectomy only in 2005. No evidence of recurrence by PSA. Last 8/20/2020 was <0.01. He also has a history of melanoma that was resected.      Discussed with patient, his daughter, ALLYSON RN and Dr. Maldonado.    Abelardo Early APRN, [facility]  [phone] (office); [phone]       Discussed with Abelardo Early and with the patient by phone. Agree with full dose anticoagulation with enoxaparin 1 mg/kg q12h (can eventually convert to oral agent) for 3-6 months with total duration of therapy based on his clinical course.    Dao Miguel MD                 Chief Complaint:   Covid Concern         History of Present Illness:   Aly is a 80 year old admitted 11/10 with COVID. He has new aortic thrombus on imaging.    Shana and his family were  "diagnosed with Covid. They have been hospitalized as well. He has been treated with dexamethasone and remdesivir. With ongoing high oxygen demands a CT PE study was done . This was compared to previous CT 9 days earlier and shows clotting in the aortic arch and descending throacic aorta. He was on enoxaparin 40 mg subcutaneous daily since admission. This was converted to full dose anticoagulation 1 mg/kg every 12 hours 2020. Hematology is consulted for further work up and recommendations.    His CT was personally reviewed.     He was seen on the observation unit. He is short of breath and on high flow NC oxygen. He is conversational, but moderately dyspneic at rest.    We reviewed the need for ongoing anticoagulation particularly during this hospitalization. He was informed of the clotting in his leg and in the aortic arch and descending aorta. He would like me to call his daughter with an update.         Physical Exam:   Vitals were reviewed  Blood pressure 113/71, pulse 86, temperature 96  F (35.6  C), temperature source Oral, resp. rate 28, height 1.702 m (5' 7\"), weight 82 kg (180 lb 12.4 oz), SpO2 91 %.  Temperatures:  Current - Temp: 96  F (35.6  C); Max - Temp  Av.5  F (35.8  C)  Min: 96  F (35.6  C)  Max: 96.8  F (36  C)  Respiration range: Resp  Av.6  Min: 22  Max: 28  Pulse range: Pulse  Av  Min: 67  Max: 86  Blood pressure range: Systolic (24hrs), Av , Min:113 , Max:123   ; Diastolic (24hrs), Av, Min:68, Max:73    Pulse oximetry range: SpO2  Av.5 %  Min: 84 %  Max: 94 %    Intake/Output Summary (Last 24 hours) at 2020 1356  Last data filed at 2020 1134  Gross per 24 hour   Intake 480 ml   Output 1050 ml   Net -570 ml       GENERAL: He is moderately dyspneic at rest.   No additional exam completed today.              Past Medical History:   I have reviewed this patient's past medical history  Past Medical History:   Diagnosis Date     Arthritis " degenerative joint disease     Malignant neoplasm of prostate (H) 12/29/2016     Melanoma (H)      Melanoma of skin (H) 12/29/2016    Back     Prostate cancer (H)      Spondylosis of lumbar region without myelopathy or radiculopathy 12/29/2016             Past Surgical History:   I have reviewed this patient's past surgical history  Past Surgical History:   Procedure Laterality Date     ARTHROPLASTY KNEE  12/8/2011    Procedure:ARTHROPLASTY KNEE; Left Total Knee Arthroplasty (JILLIAN)^; Surgeon:YASMIN PELAEZ; Location:[facility] OR     ARTHROPLASTY KNEE Right 12/28/2018    Procedure: RIGHT TOTAL KNEE ARTHROPLASTY;  Surgeon: Yasmin Pelaez MD;  Location:  OR     BACK SURGERY       COLONOSCOPY       GENITOURINARY SURGERY  history of prostatectomy     HERNIA REPAIR  inguinal hernia repair as a teenager     ORTHOPEDIC SURGERY  back fusion 2002     PHACOEMULSIFICATION CLEAR CORNEA WITH STANDARD INTRAOCULAR LENS IMPLANT Right 11/10/2015    Procedure: PHACOEMULSIFICATION CLEAR CORNEA WITH STANDARD INTRAOCULAR LENS IMPLANT;  Surgeon: Criss Pulliam MD;  Location:  [facility]     PHACOEMULSIFICATION CLEAR CORNEA WITH STANDARD INTRAOCULAR LENS IMPLANT Left 11/17/2015    Procedure: PHACOEMULSIFICATION CLEAR CORNEA WITH STANDARD INTRAOCULAR LENS IMPLANT;  Surgeon: Criss Pulliam MD;  Location:  [facility]     removal of melanoma[  approx. 20 years ago               Social History:   I have reviewed this patient's social history  Social History     Tobacco Use     Smoking status: Former Smoker     Types: Cigars     Smokeless tobacco: Never Used     Tobacco comment: quit smoking cigars 2008   Substance Use Topics     Alcohol use: No     Alcohol/week: 3.0 - 4.0 standard drinks     Types: 3 - 4 Standard drinks or equivalent per week     Frequency: Never     Comment: none for last yr             Family History:   I have reviewed this patient's family history  Family History   Problem Relation Age of Onset     Uterine Cancer Mother      Colon  Cancer Father      Diabetes Brother      Heart Failure Brother              Allergies:   No Known Allergies          Medications:   I have reviewed this patient's current medications  Medications Prior to Admission   Medication Sig Dispense Refill Last Dose     Acetaminophen 325 MG CAPS Take 650 mg by mouth every 4 hours as needed   11/10/2020 at Unknown time     aspirin (ASA) 325 MG EC tablet Take 325 mg by mouth every 6 hours as needed for moderate pain   11/10/2020 at Unknown time     ibuprofen (ADVIL/MOTRIN) 200 MG capsule Take 200-800 mg by mouth every 6 hours as needed for moderate pain    11/9/2020 at Unknown time     Current Facility-Administered Medications Ordered in Epic   Medication Dose Route Frequency Last Rate Last Admin     acetaminophen (TYLENOL) tablet 650 mg  650 mg Oral Q4H PRN   650 mg at 11/20/20 0951    Or     acetaminophen (TYLENOL) Suppository 650 mg  650 mg Rectal Q4H PRN         carboxymethylcellulose PF (REFRESH PLUS) 0.5 % ophthalmic solution 1 drop  1 drop Both Eyes Q1H PRN         enoxaparin ANTICOAGULANT (LOVENOX) injection 80 mg  80 mg Subcutaneous Q12H   80 mg at 11/20/20 0115     ibuprofen (ADVIL/MOTRIN) tablet 400 mg  400 mg Oral Q6H PRN         ipratropium-albuterol (COMBIVENT RESPIMAT) inhaler 1 puff  1 puff Inhalation 4x daily   1 puff at 11/20/20 1206     lidocaine (LMX4) cream   Topical Q1H PRN         lidocaine 1 % 0.1-1 mL  0.1-1 mL Other Q1H PRN         Medication instructions: Do NOT use nebulized medications   Does not apply Continuous PRN         melatonin tablet 1 mg  1 mg Oral At Bedtime PRN         naloxone (NARCAN) injection 0.1-0.4 mg  0.1-0.4 mg Intravenous Q2 Min PRN         No lozenges or gum should be given while patient on BIPAP/AVAPS/AVAPS AE   Does not apply Continuous PRN         ondansetron (ZOFRAN-ODT) ODT tab 4 mg  4 mg Oral Q6H PRN        Or     ondansetron (ZOFRAN) injection 4 mg  4 mg Intravenous Q6H PRN   4 mg at 11/17/20 0136     Patient may  continue current oral medications   Does not apply Continuous PRN         polyethylene glycol (MIRALAX) Packet 17 g  17 g Oral Daily         senna-docusate (SENOKOT-S/PERICOLACE) 8.6-50 MG per tablet 1 tablet  1 tablet Oral BID   1 tablet at 11/19/20 0850    Or     senna-docusate (SENOKOT-S/PERICOLACE) 8.6-50 MG per tablet 2 tablet  2 tablet Oral BID         sodium chloride (PF) 0.9% PF flush 3 mL  3 mL Intracatheter q1 min prn         sodium chloride (PF) 0.9% PF flush 3 mL  3 mL Intracatheter q1 min prn   3 mL at 11/19/20 1049     sodium chloride (PF) 0.9% PF flush 3 mL  3 mL Intracatheter Q8H   3 mL at 11/20/20 1241     No current [facility]-ordered outpatient medications on file.             Review of Systems:     The 10 point Review of Systems is negative other than noted in the HPI.            Data:   Data   Results for orders placed or performed during the hospital encounter of 11/10/20 (from the past 24 hour(s))   US Lower Extremity Venous Duplex Bilateral    Narrative    ULTRASOUND LOWER EXTREMITY VENOUS DUPLEX BILATERAL 11/19/2020 3:11 PM    CLINICAL HISTORY: New filling defect aortic arch, presumed to be  thrombus. Evaluate for DVT.    TECHNIQUE: Venous Duplex ultrasound of bilateral lower extremities  with and without compression, augmentation and duplex. Color flow and  spectral Doppler with waveform analysis performed.    COMPARISON: None.    FINDINGS: Exam includes the common femoral, femoral, popliteal veins  as well as segmentally visualized deep calf veins and greater  saphenous vein.     RIGHT: Mild area of occlusive thrombus within the mid calf posterior  tibial vein. No superficial thrombophlebitis. No popliteal cyst.    LEFT: No deep vein thrombosis. No superficial thrombophlebitis. No  popliteal cyst.      Impression    IMPRESSION: Positive for mild right calf DVT.    BROOKLYN COVARRUBIAS MD   Basic metabolic panel   Result Value Ref Range    Sodium 132 (L) 133 - 144 mmol/L    Potassium 4.3 3.4 - 5.3 mmol/L     Chloride 101 94 - 109 mmol/L    Carbon Dioxide 22 20 - 32 mmol/L    Anion Gap 9 3 - 14 mmol/L    Glucose 183 (H) 70 - 99 mg/dL    Urea Nitrogen 20 7 - 30 mg/dL    Creatinine 0.51 (L) 0.66 - 1.25 mg/dL    GFR Estimate >90 >60 mL/min/[1.73_m2]    GFR Estimate If Black >90 >60 mL/min/[1.73_m2]    Calcium 8.3 (L) 8.5 - 10.1 mg/dL

## 2020-11-20 NOTE — PROGRESS NOTES
"[facility]    Hospitalist Progress Note    Assessment & Plan     Aly Sorensen is an 80 year-old male with remote history of prostate cancer s/p prostatectomy 10/2005, PSA recently normal, melanoma, recent diagnosis of COVID infection (+ 11/7), who was admitted on 11/10/2020 with worsening shortness of breath and declining oxygen saturations. He was transferred to the ICU 2 days ago for worsening respiratory status and required continuous BiPAP but never intubated. Transferred to Hospitalist service on 11/14/20 off BiPAP and stable on high flow nasal cannula with 16 L of supplemental oxygen. Since transfer O2 has been increased to 50 LPM HFNC     Acute hypoxic respiratory failure due to Covid 19 Pneumonia   Thrombus, aortic arch, new since 11/10/2020  Right lower extremity DVT       On 11/15/20, continues with severe hypoxia on 50 LPM but improving CRP, LDH. D-dimer is noted to be up to 2.    afebrile. Nl vitals. Remains on high flow oxygen    satting 89-90% on FiO2 91%, 68-80% Fio2 11/14-11/15.     increasing ddimer and crp since admission. ddimer 20.      Based on this, and lack of clinical progress, checked CT chest PE study today.  This shows new filling defects within the distal aortic arch and mid to distal descending thoracic aorta; I discussed with radiology and these are clearly new from study done 9 days ago    There is also a small, wedge-shaped hypoenhancing area in the spleen, possibly a small splenic infarct    Also obtained Doppler venous ultrasound of lower extremities, this is positive for \"mild right calf DVT\"    Hematology consult requested.  COVID guidelines call for increase to therapeutic dose Lovenox, 1 mg/kilg every 12 for treatment of DVT/PE.  While he does not have new PE, he does have new thrombus, so we will extrapolate from this guideline.  Discussed with Pharm.D., she agrees    Continue bipap at bedtime    started and tolerating prone positioning at least 30min " "qid. Goal of 3 hour per day.     pCXR 11/16: Peripheral ground-glass infiltrates compatible with  COVID-19 pneumonia    desats with minimal movement    high flow oxygen-RT - per pulmonary goal oxygen sats 88-94%.     pulmonary consult requested, they recommend proning trials 4x/day while awake, 30 minutes per trial    Continues on dexamethasone 6 mg daily to complete 10 day course on 11/20.     ID consult appreciated; extending Remdesivir to complete 10 day course.     Diet: Combination Diet Regular Diet Adult    DVT Prophylaxis: Enoxaparin (Lovenox) SQ  Jane Catheter: not present  Code Status: Full Code           Disposition Plan     Expected discharge: 4 - 7 days, recommended to TBD once oxygenation has improved. .    Total time spent:  > 35 minutes  Time spent counseling, coordination of care: 25 minutes including discussion with care team and OSORIO Christensen,  personal review and interpretation of labs and studies as noted above     Lorrie Maldonado MD  Text Page  (7am to 6pm)   Interval History   \"It's good to hear your voice.\"  Patient seen during proning session.  He says he is determined to proning sessions 4 times daily.  He is aware of CT and ultrasound findings, also new recommendations for blood thinner.  He has no new respiratory or GI complaints.    -Data reviewed today: I reviewed all new labs and imaging results over the last 24 hours. I personally reviewed     Physical Exam   Temp: 96  F (35.6  C) Temp src: Oral BP: 113/71 Pulse: 86   Resp: 28 SpO2: 91 % O2 Device: High Flow Nasal Cannula (HFNC) Oxygen Delivery: 60 LPM  Vitals:    11/12/20 0256 11/13/20 0600 11/14/20 0600   Weight: 80.4 kg (177 lb 4 oz) 81.1 kg (178 lb 12.7 oz) 82 kg (180 lb 12.4 oz)     Vital Signs with Ranges  Temp:  [96  F (35.6  C)-96.8  F (36  C)] 96  F (35.6  C)  Pulse:  [67-86] 86  Resp:  [22-28] 28  BP: (113-123)/(68-73) 113/71  FiO2 (%):  [80 %-89.4 %] 88.2 %  SpO2:  [84 %-94 %] 91 %  I/O last 3 completed shifts:  In: " 660 [P.O.:660]  Out: 1125 [Urine:1125]    Constitutional: Alert, up in bed  Respiratory: Seems to have subtle increased work of breathing, even with normal conversation of coarse, bilateral  Cardiovascular: RRR no r/g/m,limb swelling or calf tenderness  GI: soft, nt  Skin/Integumen: no cyanosis, no edema  Neuro/psych: nl speech and mentation, moves all 4 extrem    Medications     - MEDICATION INSTRUCTIONS -       - MEDICATION INSTRUCTIONS -       - MEDICATION INSTRUCTIONS -         enoxaparin ANTICOAGULANT  80 mg Subcutaneous Q12H     ipratropium-albuterol  1 puff Inhalation 4x daily     polyethylene glycol  17 g Oral Daily     senna-docusate  1 tablet Oral BID    Or     senna-docusate  2 tablet Oral BID     sodium chloride (PF)  3 mL Intracatheter Q8H       Data   Recent Labs   Lab 11/20/20  1130 11/19/20  0545 11/18/20  0510 11/17/20  0728 11/16/20  0724 11/15/20  0748   WBC  --  19.0* 23.2* 18.9* 17.2* 15.0*   HGB  --  14.8 15.4 14.6 15.2 14.5   MCV  --  93 93 94 94 94   PLT  --  293 280 245 387 442   INR  --  1.35* 1.33* 1.44* 1.24*  --    * 136 137 140 138 139   POTASSIUM 4.3 5.6* 4.7 4.3 4.2 4.3   CHLORIDE 101 107 105 110* 107 108   CO2 22 16* 25 24 26 24   BUN 20 21 20 23 23 22   CR 0.51* 0.48* 0.52* 0.64* 0.67 0.63*   ANIONGAP 9 13 7 6 5 7   ELIA 8.3* 7.9* 8.1* 8.1* 8.1* 8.0*   * 70 105* 106* 103* 112*   ALBUMIN  --   --   --   --  2.2* 2.3*   PROTTOTAL  --   --   --   --  5.8* 5.6*   BILITOTAL  --   --   --   --  0.7 0.4   ALKPHOS  --   --   --   --  82 75   ALT  --   --   --   --  34 40   AST  --   --   --   --  22 29       Imaging:   CT CHEST PULMONARY EMBOLISM WITH CONTRAST 11/19/2020 1:22 PM     CLINICAL HISTORY: COVID infection, rising D-dimer, high oxygen needs.     TECHNIQUE: CT angiogram chest during arterial phase injection IV  contrast. 2D and 3D MIP reconstructions were performed by the CT  technologist. Dose reduction techniques were used.      CONTRAST: 68mL  Isovue-[id number]     COMPARISON: November 10, 2020     FINDINGS:  ANGIOGRAM CHEST: Pulmonary arteries are normal caliber and negative  for pulmonary emboli with the limitations of some motion artifact and  extensive infiltrates. Thoracic aorta is negative for dissection. No  CT evidence of right heart strain. Filling defects in the distal  aortic arch as well as the mid and distal descending thoracic aorta  are new since the comparison study and likely represent thrombus  within the aortic arch and descending aorta.     LUNGS AND PLEURA: Extensive bilateral interstitial and groundglass  infiltrates. No aneurysm.     MEDIASTINUM/AXILLAE: No adenopathy or pericardial effusion.     UPPER ABDOMEN: No acute findings. Hypodensity/hypoenhancement in the  spleen noted, possibly related to small splenic infarct.     MUSCULOSKELETAL: No frankly destructive bony lesions.                                                                      IMPRESSION:  1.  No pulmonary embolism demonstrated.  2.  Extensive bilateral groundglass and interstitial infiltrates  compatible with history of COVID pneumonia.  3.  New filling defects within the distal aortic arch and mid to  distal descending thoracic aorta from the comparison 9 days prior.  This is most consistent with mural thrombus.  4.  Small wedge-shaped hypoenhancing area in the spleen, possibly a  small splenic infarct.

## 2020-11-21 NOTE — PLAN OF CARE
COVID POSITIVE, special precautions maintained. A&Ox4. Did not tolerate HFNC w/ proning this AM. Has been on 100% continuous BiPAP since 0900. HR 110s-120, MD aware. RR mid 20s-mid 30s. Afebrile. Repo q 2 hrs. Using urinal for voiding, adequate output. Mild R calf DVT (see U/S 11/19/20). D-dimer 2.3. On subcutaneous lovenox injections. Tele: Sinus tach. Heme/onc and pulmonology following. Mepilex on coccyx. Ax1-2 for cares. Very pleasant. Son and daughter-in-law called pt this afternoon so he could hear their voices. Discharge plan unclear, likely 4+ days. Continue to monitor closely.

## 2020-11-21 NOTE — PROVIDER NOTIFICATION
MD Notification    Notified Person: MD    Notified Person Name: Tu      Notification Date/Time: 0900 11/21/20    Notification Interaction: text page    Purpose of Notification: FYI pt did not tolerate HFNC this AM. RT placed back on BiPAP. Tolerating at 100%. other VSS.     Orders Received: ANGEL VAUGHN    Comments: laid prone 35 min this AM on HFNC. see flowsheet for vitals

## 2020-11-21 NOTE — PROVIDER NOTIFICATION
MD Notification    Notified Person: MD    Notified Person Name: Tu    Notification Date/Time: 11/21/20 1212    Notification Interaction: in person    Purpose of Notification: Last hour or so pt HR sustained in 115-120 range. O2 sats ok, 93-96%    Orders Received: No new orders, continue to monitor closely    Comments: pt has known DVT in R calf (see U/S 11/19/20)

## 2020-11-21 NOTE — PROGRESS NOTES
SW consulted to assist with discharge planning, emotional support and transportation arrangements. SW to follow to assist with discharge plan once discharge needs are identified.    SW to continue to follow and assist with discharge planning.    ITZ Quiroz  Daytime (8:00am-4:30pm): [phone]  After-Hours SW Pager (4:30pm-11:30pm): [phone]

## 2020-11-21 NOTE — PLAN OF CARE
AVSS; pt on Bipap at 80% overnight; O2 sats 93-95%; RR 22 while asleep; up to the 30's with assessments/activity; lung sounds diminished; pt denied feeling short of breath, or having difficulty breathing; pt denied pain; voiding via urinal; pt able to weight shift independently, but also assisted with turns; pt stated he was able to sleep some.

## 2020-11-21 NOTE — PLAN OF CARE
Pt is COVID positive. Admitted on the 10th. Is often short of breath even with talking. Ax1 with transfers to bedside commode and has urinal at bedside. A&Ox4. Regular diet but appetite is poor. On HFNC at 60LPM/80%, tolerated well.   Will have RT put Pt on BIPAP at bedtime. IV is SL in L hand. TELE=NSR. Pulse in R foot weaker than L known blood clot in R calf.  BLE cool to touch, pt states this is baseline.  Did prone for 30 minutes X1 and did well but felt too fatigued to do 2nd attempt later, tried to encourage pt twice but still refused.

## 2020-11-21 NOTE — PROGRESS NOTES
Pulmonary Note    Requiring 100% FiO2 and BiPAP despite proning etc and infiltrates are not better on Ct 11/19. New finding of clot in aorta and possibly spleen on the Ct.     Not doing well.   On max support short of vent.  We have nothing to offer but steroids for the infiltrates and supportive care with oxygen and anticoagulation; all is being done.   I would consider re-initiating steroids for post inflammatory organizing pneumonia based on the CT but there is limited data on how well this might work and it will increase risk of nosocomial infections. If you opt to try it, 60 mg iv q 8 solumedrol first 2 days.    CXR in am.     JeffryMD  [phone]

## 2020-11-21 NOTE — PROGRESS NOTES
[facility]    Medicine Progress Note - Hospitalist Service       Date of Admission:  11/10/2020  Assessment & Plan   Aly Sorensen is an 80 year-old male with remote history of prostate cancer s/p prostatectomy 10/2005, PSA recently normal, melanoma, recent diagnosis of COVID infection (+ test 11/7), who was admitted on 11/10/2020 with worsening shortness of breath and declining oxygen saturations.     Acute hypoxic respiratory failure due to COVID19 Pneumonia   Thrombus, aortic arch   Right lower extremity DVT  Possible splenic infarct   Presented with worsening shortness of breath and declining oxygen saturations. Transferred to ICU shortly after admission due to progressive respiratory failure, though did not require intubation.   * CT chest PE study 11/19 with filling defects within the distal aortic arch and mid to distal descending thoracic aorta; small, wedge-shaped hypoenhancing area in the spleen, possibly a small splenic infarct, as well as extensive bilateral ground-glass and interstitial infiltrates compatible with COVID pneumonia.   * US 11/19 positive for mild right calf DVT  * Heme/onc consulted, recommended continuing therapeutic anticoagulation for 3-6 months.  * Completed 10 day course of dexamethasone and remdesivir   - Continues to alternate between HFNC and BiPAP  - Wean oxygen BiPAP/oxygen as tolerated  - Continue enoxaparin, transition to DOAC on discharge  - Continue proning as tolerated  - Continue inhalers     Diet: Combination Diet Regular Diet Adult  Snacks/Supplements Adult: Boost Shake; Between Meals    DVT Prophylaxis: Enoxaparin (Lovenox) SQ  Jane Catheter: not present  Code Status: Full Code      Disposition Plan   Expected discharge: Unclear. Pending weaning of oxygen. Likely 4+ days.  Entered: Isaac Mae MD 11/21/2020, 11:54 AM       The patient's care was discussed with the patient, bedside RN and patient's daughter     Isaac Mae,  MD  Hospitalist Service  [facility]    ______________________________________________________________________    Interval History   No acute events overnight. Did not tolerate HFNC this morning, back on BiPAP. Denies any new symptoms.     Data reviewed today: I reviewed all medications, new labs and imaging results over the last 24 hours. I personally reviewed no images or EKG's today.    Physical Exam   Vital Signs: Temp: 97.5  F (36.4  C) Temp src: Oral BP: 116/68 Pulse: 96   Resp: 30 SpO2: 96 % O2 Device: BiPAP/CPAP(bipap) Oxygen Delivery: 50 LPM  Weight: 180 lbs 12.44 oz    Constitutional: NAD  Respiratory: Normal respiratory effort on BiPAP   Cardiovascular: Regular rhythm with slightly tachycardic rate    GI: Soft, non-tender, non-distended.   Skin/Integumen: Warm, dry  Other:      Data   Recent Labs   Lab 11/21/20  0737 11/20/20  1130 11/19/20  0545 11/18/20  0510 11/17/20  0728 11/16/20  0724 11/15/20  0748 11/15/20  0748   WBC 32.2*  --  19.0* 23.2* 18.9* 17.2*  --  15.0*   HGB 16.2  --  14.8 15.4 14.6 15.2  --  14.5   MCV 93  --  93 93 94 94  --  94     --  293 280 245 387  --  442   INR  --   --  1.35* 1.33* 1.44* 1.24*   < >  --     132* 136 137 140 138  --  139   POTASSIUM 4.4 4.3 5.6* 4.7 4.3 4.2  --  4.3   CHLORIDE 105 101 107 105 110* 107  --  108   CO2 26 22 16* 25 24 26  --  24   BUN 21 20 21 20 23 23  --  22   CR 0.55* 0.51* 0.48* 0.52* 0.64* 0.67  --  0.63*   ANIONGAP 6 9 13 7 6 5  --  7   ELIA 8.7 8.3* 7.9* 8.1* 8.1* 8.1*  --  8.0*   * 183* 70 105* 106* 103*  --  112*   ALBUMIN  --   --   --   --   --  2.2*  --  2.3*   PROTTOTAL  --   --   --   --   --  5.8*  --  5.6*   BILITOTAL  --   --   --   --   --  0.7  --  0.4   ALKPHOS  --   --   --   --   --  82  --  75   ALT  --   --   --   --   --  34  --  40   AST  --   --   --   --   --  22  --  29    < > = values in this interval not displayed.       No results found for this or any previous visit (from  the past 24 hour(s)).  Medications     - MEDICATION INSTRUCTIONS -       - MEDICATION INSTRUCTIONS -       - MEDICATION INSTRUCTIONS -         enoxaparin ANTICOAGULANT  80 mg Subcutaneous Q12H     ipratropium-albuterol  1 puff Inhalation 4x daily     polyethylene glycol  17 g Oral Daily     senna-docusate  1 tablet Oral BID    Or     senna-docusate  2 tablet Oral BID     sodium chloride (PF)  3 mL Intracatheter Q8H

## 2020-11-22 PROBLEM — I74.10 AORTIC THROMBUS (H): Status: ACTIVE | Noted: 2020-01-01

## 2020-11-22 PROBLEM — I82.409 DVT (DEEP VENOUS THROMBOSIS) (H): Status: ACTIVE | Noted: 2020-01-01

## 2020-11-22 NOTE — ANESTHESIA PROCEDURE NOTES
Airway   Date/Time: 11/22/2020 4:00 PM   Patient location during procedure: Floor    Staff -   CRNA: Xenia Barron APRN CRNA  Performed By: CRNA    Consent for Airway   Urgency: emergentConsent: No emergent situation. Verbal consent obtained.  Consent given by: patient  Patient identity confirmed: verbally with patient and arm band      Report Obtained from Primary Care Team  History regarding most recent potassium obtained: Yes  History regarding presence/absence of renal failure obtained:Yes  History regarding stroke/CVA obtained:Yes  History regarding presence/absence of NM disorder:Yes    Indications and Patient Condition  Indications for airway management: respiratory insufficiency  Mallampati: Not AssessedInduction type:RSIMask difficulty assessment: 0 - not attempted    Final Airway Details  Final airway type: endotracheal airway  Successful airway:ETT - single  Endotracheal Airway Details   ETT size (mm): 8.0  Cuffed: yes  Successful intubation technique: video laryngoscopy  Grade View of Cords: 1  Adjucts: stylet  Measured from: gums/teeth  Secured at (cm): 23  Secured with: commercial tube theodore  Bite block used: None    Post intubation assessment   ETT secured, Vent settings by primary/ICU team, Primary/ICU team to review CXR, Sedation to be ordered by primary/ICU team and No apparent complications  Placement verified by: capnometry, equal breath sounds and chest rise   Number of attempts at approach: 1  Number of other approaches attempted: 0  Secured with:commercial tube theodore  Ease of procedure: easy  Dentition: Intact    Medications Administered  Propofol (DIPRIVAN) injection 10 mg/mL vial, 100 mg  succinylcholine (ANECTINE) 20 mg/mL injection, 100 mg

## 2020-11-22 NOTE — CODE/RAPID RESPONSE
[facility]    RRT Note  11/22/2020   Time Called: 304pm    RRT called for: Hypoxia    Assessment & Plan   IMPRESSION & PLAN:    Aly Sorensen is a 80 year old male w/ PMH of remote history of prostate cancer status post prostatectomy, melanoma, recent Covid positive pneumonia from 11/7/2020 who was admitted on 11/10/2020 for acute hypoxia related to Covid.  His course has included brief ICU stay at [place] during intubation, aortic arch thrombus, right lower extremity DVT and possible splenic infarct.    Patient had been on 1.0 FiO2 on high flow nasal cannula with SpO2 91-92% prior to using toilet on the afternoon of 11/22/2020.  He was having SPO2 92% on commode. After using toilet SPO2 decreased to 82% & hasn't recovered.  He was urgently started on bilevel 12/6, 1.0 FiO2.  Rapid response was called.    ICU RN arrived prior to.  Initially the BiPAP was not connected to wall oxygen and patient became acutely hypoxic down to 50% and was dusky.  On my arrival SPO2 was approximately 88%, he is in a left side-lying position tachypneic with a rate in the 30s range.  After conferring with his attending physician I opted to intubate patient sooner rather than later.  Patient was able to speak with his daughter on the phone prior to intubation    Impression  Differential diagnosis:    INTERVENTIONS:  -place pt back on bipap at 310pm, no significant improvement in oxygenation despite 15 to 20 minutes on the above settings.  -dilaudid 0.5mg IV x1 stat which did result with some subjective relief of dyspnea and air hunger  -Intubated by CRNA at approximately 4 PM with 8.0 ET tube.  -Post intubation patient was transiently hypoxic but with bag ventilation and Peep valve approximately 10 cmH2O oxygenation did improve.  Patient was largely hemodynamically stable and did not require vasopressor support.  -Propofol infusion was started.  ICU  -Handoff was given to the intensivist attending  physician.    Working diagnosis: Acute hypoxic respiratory failure unclear if this is purely related to Covid or something else, such as PE although he has been on therapeutic dosing LMWH    At the end of the RRT patient had been intubated, he remained hemodynamically stable and care been transferred to the intensivist team    Discussed with and defer further cares to hospitalist and intensivist attending physicians     Code Status: Full Code    Allergies   No Known Allergies    Physical Exam   Vital Signs with Ranges:  Temp:  [95.9  F (35.5  C)-100.8  F (38.2  C)] 100.4  F (38  C)  Pulse:  [] 93  Resp:  [20-42] 24  BP: ()/(61-86) 91/61  FiO2 (%):  [98 %-100 %] 100 %  SpO2:  [88 %-97 %] 94 %  I/O last 3 completed shifts:  In: 600 [P.O.:600]  Out: 400 [Urine:400]    Constitutional:no acute distress  Neuro: +follows commands wiggle toes and show 2 fingers bilat, face symmetric, tongue midline, speech fluent  HEENT: dry oral mucosa  Neck: supple  Heart: S1S2 sinus tach, hypertensive, SBP is 170s  Lungs: Tachypneic, few inspiratory wheezes mostly in the right   abd:normoactive bowel sounds, soft, nontender, nondisteded  Ext: no edema    Data       ABG:  -  Recent Labs   Lab 11/22/20  1720   PH 7.37   PCO2 48*   PO2 79*   HCO3 28   O2PER FI02 100%       CBC with Diff:  Recent Labs   Lab Test 11/22/20  1720   WBC 35.7*   HGB 14.3   MCV 95      INR 1.38*      Comprehensive Metabolic Panel:  Recent Labs   Lab 11/22/20  1525 11/16/20  0724 11/16/20  0724      < > 138   POTASSIUM 4.2   < > 4.2   CHLORIDE 106   < > 107   CO2 26   < > 26   ANIONGAP 8   < > 5   *   < > 103*   BUN 32*   < > 23   CR 0.57*   < > 0.67   GFRESTIMATED >90   < > 90   GFRESTBLACK >90   < > >90   ELIA 8.8   < > 8.1*   PROTTOTAL  --   --  5.8*   ALBUMIN  --   --  2.2*   BILITOTAL  --   --  0.7   ALKPHOS  --   --  82   AST  --   --  22   ALT  --   --  34    < > = values in this interval not displayed.     UA:  Recent Labs    Lab 11/22/20  1720   COLOR Yellow   APPEARANCE Slightly Cloudy   URINEGLC 70*   URINEBILI Negative   URINEKETONE Negative   SG 1.025   UBLD Negative   URINEPH 6.0   PROTEIN 30*   NITRITE Negative   LEUKEST Negative   RBCU 1   WBCU 2       Time Spent on this Encounter   I spent 72 minutes (313-425pm) of critical care time on the unit/floor managing the care of Aly Sorensen. Upon evaluation, this patient had a high probability of imminent or life-threatening deterioration due to acute hypoxic respiratory failure, which required my direct attention, intervention, and personal management. 100% of my time was spent at the bedside counseling the patient and/or coordinating care regarding services listed in this note.    Griselda Martínez, [facility]  Hospitalist [facility]ail ALEX  [phone]

## 2020-11-22 NOTE — PROGRESS NOTES
Comprehensive Daily ICU Note        Aly Sorensen MRN# [id number]   Age: 80 year old YOB: 1940     Date of Admission: 11/10/2020    Primary care provider: Zev Austin     CODE STATUS: FULL      Problem List:         Principal Problem:    Acute respiratory failure with hypoxia (H)  Active Problems:    Pneumonia due to 2019 novel coronavirus    Hypokalemia    Aortic thrombus (H)    DVT (deep venous thrombosis) (H)             Treatment goals for next 24 hours:   Prone positioning  Continue steroids  Continue blood thinners  Support family    Aurora Kerr MD        Subjective/ Last 24 hours:   Events: Patient admitted to the hospital on 11/10 with COVID.  Was transferred to the ICU on 11/12 because of high oxygen needs.  Stabilized somewhat and was able to transition to high flow oxygen but over the last few days has spent a lot of the time on Bipap at 100% or High flow at same. Had an episode of emotional upset that led to a sudden decompensation and transfer to the ICU for intubation. PAtient is intubated and sedated when I see him.             Mechanical Ventilation/Vitalsigns/IsandOs:       Temp:  [95.9  F (35.5  C)-100.3  F (37.9  C)] 100.3  F (37.9  C)  Pulse:  [] 130  Resp:  [20-42] 42  BP: (108-173)/(61-86) 131/77  FiO2 (%):  [98 %-100 %] 98 %  SpO2:  [88 %-97 %] 88 %      FiO2 (%): 98 %  Resp: (!) 42      Day since 11/22    Peak airway pressure: initially 50  Plateau airway pressure:  High 40s   Auto-PEEP:  no      Intake/Output Summary (Last 24 hours) at 11/22/2020 1556  Last data filed at 11/22/2020 0918  Gross per 24 hour   Intake 360 ml   Output 400 ml   Net -40 ml     Net IO Since Admission: -4,660 mL [11/22/20 1556]           Physical Examination:     General: Stated age, heavily sedated  HEENT: ET tube  Lungs: quiet breath sounds bilaterally anteriorly  CVS: RRR, tachycardia  Abdomen: +BS, soft  Extremities/musculoskeletal: good pulses, warm  Neurology: heavily sedated  Skin:  normal  Psychiatry: unable  Exam of Line sites:  Groin arterial and central lines            Feeding/Glucose:     Orders Placed This Encounter      Combination Diet Regular Diet Adult        Recent Labs   Lab 11/21/20  0737 11/20/20  1130 11/19/20  0545 11/18/20  0510 11/17/20  0728 11/16/20  0724   * 183* 70 105* 106* 103*                Medications:       enoxaparin ANTICOAGULANT  80 mg Subcutaneous Q12H     ipratropium-albuterol  1 puff Inhalation 4x daily     meropenem  1,000 mg Intravenous Q8H     methylPREDNISolone  62.5 mg Intravenous Q8H     polyethylene glycol  17 g Oral Daily     sodium chloride (PF)  3 mL Intracatheter Q8H     vancomycin (VANCOCIN) IV  1,750 mg (central catheter) Intravenous Q12H          - MEDICATION INSTRUCTIONS -       - MEDICATION INSTRUCTIONS -       - MEDICATION INSTRUCTIONS -                Labs:         ROUTINE ICU LABS (Last four results)  CMP  Recent Labs   Lab 11/22/20  1720 11/22/20  1525 11/21/20  0737 11/20/20  1130 11/16/20  0724 11/16/20  0724    140 137 132*   < > 138   POTASSIUM 3.9 4.2 4.4 4.3   < > 4.2   CHLORIDE 108 106 105 101   < > 107   CO2 26 26 26 22   < > 26   ANIONGAP 6 8 6 9   < > 5   * 232* 106* 183*   < > 103*   BUN 31* 32* 21 20   < > 23   CR 0.62* 0.57* 0.55* 0.51*   < > 0.67   GFRESTIMATED >90 >90 >90 >90   < > 90   GFRESTBLACK >90 >90 >90 >90   < > >90   ELIA 7.8* 8.8 8.7 8.3*   < > 8.1*   MAG 2.3  --   --   --   --   --    PHOS 2.8  --   --   --   --   --    PROTTOTAL 5.1*  --   --   --   --  5.8*   ALBUMIN 1.5*  --   --   --   --  2.2*   BILITOTAL 0.4  --   --   --   --  0.7   ALKPHOS 78  --   --   --   --  82   AST 21  --   --   --   --  22   ALT 20  --   --   --   --  34    < > = values in this interval not displayed.     CBC  Recent Labs   Lab 11/22/20  1720 11/22/20  0759 11/21/20  0737 11/19/20  0545 11/18/20  0510   WBC 35.7*  --  32.2* 19.0* 23.2*   RBC 4.48  --  5.02 4.80 4.90   HGB 14.3  --  16.2 14.8 15.4   HCT 42.4  --   46.6 44.7 45.6   MCV 95  --  93 93 93   MCH 31.9  --  32.3 30.8 31.4   MCHC 33.7  --  34.8 33.1 33.8   RDW 13.7  --  13.6 13.8 13.4    360 349 293 280     INR  Recent Labs   Lab 11/19/20  0545 11/18/20  0510 11/17/20  0728 11/16/20  0724   INR 1.35* 1.33* 1.44* 1.24*     Arterial Blood Gas  Recent Labs   Lab 11/16/20  1002   O2PER 30%       Other Lab Data:  Lactic acid: 2.4    Cultures:  No results for input(s): CULT in the last 168 hours.  Blood culture:  Invalid input(s): BC   Urine culture:  No results for input(s): URC in the last 168 hours.          Imaging/Other results:     CXR: ET tube in good position. ARDS           Assessment and Plan:     Summary:  Aly Sorensen is a 80 year old male admitted on 11/10/2020 for COVID. Now transferred to the ICU for worsening ARDS.  I have personally reviewed the daily labs, imaging studies, cultures and discussed the case with referring physician and consulting physicians. My assessment and plan as follows:    Neurology and Psychiatry:  No pre hospital issues  - Heavy sedation as needed    Pulmonary:   ARDS from COVID. Doesn't look as though he ever got much better and that this intubation doesn't represent a significant worsening but is rather indicative of a lack of improvement, although may have a new infection. Compliance appears very low.   - Place prone. Check blood gas after that. Lung protective settings as possible.  - Culture and antibiotics  - Patient's lack of improvement at this stage of illness is very concerning. Communicated this to family.     Cardiovascular system:   Hypotensive post intubation. Have been diuresing in an attempt to improve oxygenation - Bolus IVF and reassess.     Renal/Electrolytes:  BUN increased suggesting prerenal  - Bolus IVF and start maintenance IVF.     ID:  Clinical worsening along with newer fever and on steroids  - Blood, sputum and urine cultures  - Broad spectrum antibiotics for now. Consider anti fungal if appears  more septic.    GI/:  Start Famotidine    Nutrition:   Nutrition consult in the morning for tube feeds    Musculoskeletal/Rheumatology:  No issues at this time    Endocrine:   ICU insulin protocol     Heme/Onc:  Aortic thrombus and DVT related to COVID coagulopathy. On Enoxaparin  - Continue and follow     ICU prophylaxis:      DVT: Enoxaparin     VAP: As above     Stress ulcer: Famotidine     Restraints needed: yes     Lines   Central Line/PICC - placed 11/22 needed: y   Arterial line - placed 11/22 needed: y   Jane catheter.  Needed: y       Prognosis:    Very very guarded      Family update: children    Billing: total time spend providing critical care was 60 min, excluding procedure time.    Aurora Kerr MD  [phone]

## 2020-11-22 NOTE — PLAN OF CARE
Pt is COVID positive. Admitted for shortness of breath on 11/10/2020. A&Ox4. Ax1 with gait belt and walker for transfers but is bed bound today. Diet is regular but is NPO this shift since needing Bipap entire shift.  On 100% O2 via Bipap tolerating well. Repo every 2 hours, did not wish to be proned this shift.TELE=SR/tachy. Resp also tachy in the low 30s. Feet still feel cool to touch but pt denies any numbness and/or tingling. Pulses felt but still weaker on the right foot.  Pt denies pain that couldn't be relieved from repositioning.

## 2020-11-22 NOTE — PROGRESS NOTES
Spoke w/ pt's son, Raoul, and daughter-in-law, Nu, and provided update on pt status and plan of care.

## 2020-11-22 NOTE — PLAN OF CARE
COVID positive. A&Ox4. Tachypneic, other VSS on Bipap, 100%fiO2 overnight. Tele-SR. RLE DVT. Pulses on R foot weak. Denies pain.  Turned & repo'd q2hr. Mepilex to sacrum. SW and heme/onc following. Refused proning overnight, Continue to monitor.

## 2020-11-22 NOTE — PROGRESS NOTES
1445: Pt stated he needed to have BM and was assisted w/ pivot to bedside commode. Before getting up O2 sats on HFNC 91-92%. HR: 109. RR: mid 20s.    8310-2833: Pt unable to have BM. Assisted back to bed, positioned side laying left. O2 sats at this time were 81-83%. HR: mid 120s. RR: mid 30s. I asked Mariluz GUZMAN to call resp therapy (RT) to assess and likely place pt back on BiPAP. Mariluz informed me she was only able to reach RT voicemail. Pt began using accessory muscles and was clearly in distress, so I called an RRT.

## 2020-11-22 NOTE — PROGRESS NOTES
CLINICAL NUTRITION SERVICES - REASSESSMENT NOTE      Future/Additional Recommendations:     If unable to increase po intake, may need to consider alternate means of nutrition     Malnutrition: (11/18)  % Weight Loss:  > 2% in 1 week (severe malnutrition)  % Intake:  <75% for > 7 days (non-severe malnutrition)  Subcutaneous Fat Loss:  Unable to determine  Muscle Loss:  Unable to determine  Fluid Retention:  None noted     Malnutrition Diagnosis: Non-Severe malnutrition  In Context of:  Acute illness or injury       EVALUATION OF PROGRESS TOWARD GOALS   Diet:    Regular  Boost shake 10am and 2pm    Intake/Tolerance:    Chart reviewed  Note pt on Bipap/HFNC - limiting ability to take po    Hasn't ordered any meals since kt yest (previously ordering only a few items per meal)  Had some applesauce this am      NEW FINDINGS:   Vitals:    11/10/20 2121 11/12/20 0256 11/13/20 0600 11/14/20 0600   Weight: 79.7 kg (175 lb 11.2 oz) 80.4 kg (177 lb 4 oz) 81.1 kg (178 lb 12.7 oz) 82 kg (180 lb 12.4 oz)     Continue proning as tolerated    Palliative care consulted for goals of care / decisional support  (pt's wife hospitalized at [facility] with covid - tx to ICU and now intubated)    Previous Goals:   Pt will consume > 75% meals and > 50% supplements in 3-5 days  Evaluation: Not met    Previous Nutrition Diagnosis (11/18):   Inadequate oral intake related to decreased appetite and taste changes as evidenced by variable oral intake with resultant 5% weight loss over the past 1-2 weeks.  Evaluation: No change, modified below        CURRENT NUTRITION DIAGNOSIS  Inadequate oral intake related to respiratory status (Bipap/HFNC) and decreased appetite as evidenced by pt with very limited po intake past 2 days and decreased po overall since admit    INTERVENTIONS  Recommendations / Nutrition Prescription  Regular diet as able  Boost shake BID    If unable to increase po intake, may need to consider alternate means of  nutrition    Implementation  Continue shake BID for added cals/pro    Goals  Pt to be able to tolerate 3 meals per day, consuming 50%      MONITORING AND EVALUATION:  Progress towards goals will be monitored and evaluated per protocol and Practice Guidelines

## 2020-11-22 NOTE — PROGRESS NOTES
[facility]    Medicine Progress Note - Hospitalist Service       Date of Admission:  11/10/2020  Assessment & Plan   Aly Sorensen is an 80 year-old male with remote history of prostate cancer s/p prostatectomy 10/2005, PSA recently normal, melanoma, recent diagnosis of COVID infection (+ test 11/7), who was admitted on 11/10/2020 with worsening shortness of breath and declining oxygen saturations.     Acute hypoxic respiratory failure due to COVID19 pneumonia   Thrombus, aortic arch   Right lower extremity DVT  Possible splenic infarct   Presented with worsening shortness of breath and declining oxygen saturations. Transferred to ICU shortly after admission due to progressive respiratory failure, though did not require intubation.   * CT chest PE study 11/19 with filling defects within the distal aortic arch and mid to distal descending thoracic aorta; small, wedge-shaped hypoenhancing area in the spleen, possibly a small splenic infarct, as well as extensive bilateral ground-glass and interstitial infiltrates compatible with COVID pneumonia.   * US 11/19 positive for mild right calf DVT  * Heme/onc consulted, recommended continuing therapeutic anticoagulation for 3-6 months.  * Completed 10 day course of dexamethasone and remdesivir   - Continues to alternate between HFNC and BiPAP  - Wean oxygen BiPAP/oxygen as tolerated  - Continue enoxaparin, transition to DOAC on discharge  - Continue proning as tolerated  - Continue inhalers   - Will trial methylprednisolone 62.5 mg IV q8H x6 doses per pulmonary recommendations. Discussed risks/benefits with patient.   - Palliative care consulted for goals of care / decisional support. Patient's wife was intubated 11/22 and his children are understandably struggling with both of their parents' serious illness. Will discuss with palliative team 11/23/20.     Diet: Combination Diet Regular Diet Adult  Snacks/Supplements Adult: Boost Shake; Between  Meals    DVT Prophylaxis: Enoxaparin (Lovenox) SQ  Jane Catheter: not present  Code Status: Full Code      Disposition Plan   Expected discharge: Unclear. Pending weaning of oxygen. Likely 4+ days.  Entered: Isaac Mae MD 11/22/2020, 1:35 PM       The patient's care was discussed with the patient, bedside RN and patient's daughter     Isaac Mae MD  Hospitalist Service  [facility]    ______________________________________________________________________    Interval History   No acute events overnight. Tolerate HFNC so far today. Denies any new symptoms. Continues to have a cough, shortness of breath, though stable.    Data reviewed today: I reviewed all medications, new labs and imaging results over the last 24 hours. I personally reviewed no images or EKG's today.    Physical Exam   Vital Signs: Temp: 97.4  F (36.3  C) Temp src: Oral BP: 108/63 Pulse: 117   Resp: 30 SpO2: 90 % O2 Device: High Flow Nasal Cannula (HFNC) Oxygen Delivery: 55 LPM  Weight: 180 lbs 12.44 oz    Constitutional: Ill-appearing, NAD  Respiratory: Normal respiratory effort on HFNC  Cardiovascular: Regular rhythm with tachycardic rate    GI: Soft, non-tender, non-distended.   Skin/Integumen: Warm, dry  Other:      Data   Recent Labs   Lab 11/22/20  0759 11/21/20  0737 11/20/20  1130 11/19/20  0545 11/18/20  0510 11/17/20  0728 11/16/20  0724   WBC  --  32.2*  --  19.0* 23.2* 18.9* 17.2*   HGB  --  16.2  --  14.8 15.4 14.6 15.2   MCV  --  93  --  93 93 94 94    349  --  293 280 245 387   INR  --   --   --  1.35* 1.33* 1.44* 1.24*   NA  --  137 132* 136 137 140 138   POTASSIUM  --  4.4 4.3 5.6* 4.7 4.3 4.2   CHLORIDE  --  105 101 107 105 110* 107   CO2  --  26 22 16* 25 24 26   BUN  --  21 20 21 20 23 23   CR  --  0.55* 0.51* 0.48* 0.52* 0.64* 0.67   ANIONGAP  --  6 9 13 7 6 5   ELIA  --  8.7 8.3* 7.9* 8.1* 8.1* 8.1*   GLC  --  106* 183* 70 105* 106* 103*   ALBUMIN  --   --   --   --   --   --  2.2*    PROTTOTAL  --   --   --   --   --   --  5.8*   BILITOTAL  --   --   --   --   --   --  0.7   ALKPHOS  --   --   --   --   --   --  82   ALT  --   --   --   --   --   --  34   AST  --   --   --   --   --   --  22       No results found for this or any previous visit (from the past 24 hour(s)).  Medications     - MEDICATION INSTRUCTIONS -       - MEDICATION INSTRUCTIONS -       - MEDICATION INSTRUCTIONS -         enoxaparin ANTICOAGULANT  80 mg Subcutaneous Q12H     ipratropium-albuterol  1 puff Inhalation 4x daily     methylPREDNISolone  62.5 mg Intravenous Q8H     polyethylene glycol  17 g Oral Daily     sodium chloride (PF)  3 mL Intracatheter Q8H

## 2020-11-22 NOTE — PROGRESS NOTES
SW consulted to assist with discharge planning, emotional support and transportation arrangements. SW to follow to assist with discharge plan once discharge needs are identified. Review of patient chart indicates patient to be hospitalized likely another 4 days.    SW to continue to follow and assist with discharge planning.    ITZ Quiroz  Daytime (8:00am-4:30pm): [phone]  After-Hours SW Pager (4:30pm-11:30pm): [phone]

## 2020-11-23 NOTE — PLAN OF CARE
Pt tolerating vent settings, decreased FiO2 to 60%. Remains heavily sedated as pt is proning. Plan to supine this afternoon. Family conference via ipad w/ palliative and dr performed today. [place]/central line continues to bleed, plan to change when supine. Adequate urine output via [place]man. No BM. Several bruise throughout body, most notable on RL lip.

## 2020-11-23 NOTE — PROCEDURES
Procedure: Arterial line placement    Consent: emergent       Location: Arterial line placement into the left femoral  artery.    Universal Protocol: Patient Identification was verified, time out was performed, site marking N/A, Imaging data N/A. Full Barrier precaution done: Hands washed, mask, gloves, gown, cap and eye protection all used.    Diagnosis: COVD-19 and ARDS  Indication: Monitoring of BP and frequent  For ABG blood draw in an hemodynamically unstable patient.    Narrative: Area prepped with chlorhexedine and draped in sterile fashion. 1% lidocaine injected subcutaneously for local anesthesia. Arterial catheter inserted using seldinger technique and sutured to the skin. Distal blood flow (Skin color and temperature) preserved and good arterial wave form documented.    Complications: No apparent complication    Procedure performed by Dr Kerr on November 22, 2020

## 2020-11-23 NOTE — SIGNIFICANT EVENT
Discussed with patient's son Dirk obtain consent for chest tube placement with with pneumothorax.    They also would want to not pursue CPR.  Will change CODE STATUS to DNR.      Clark Ahn MD

## 2020-11-23 NOTE — PROGRESS NOTES
Chart Check:    Now in ICU intubated. No new anticoagulation recommendations. We will follow peripherally. Please reach out if there are questions or concerns.     Abelardo EAGLE, [facility]  [phone] (office) or [phone] (cell)

## 2020-11-23 NOTE — CONSULTS
[facility]  Palliative Care Consultation Note    Patient: Aly Sorensen  Date of Admission:  11/10/2020    Requesting Clinician / Team: Dr. Mae/Hospitalist   Reason for consult: Goals of care, Patient and family support    Recommendations:    Goals of care remain restorative     Family seem to grasp pt's severity of illness, but given pt's wife's grave condition (she is also critically ill in ICU with COVID-19), family seem a bit more hopeful for pt in relation to their mother     Reviewed code status and recommended DNR. Family asking to deliberate about this, not prepared to make any decision regarding this today      These recommendations have been discussed with Dr. Ahn, bedside RN Anuradha, and Dr. Mae.    [facility]  Contact information available via [facility] Paging/Directory        Thank you for the opportunity to participate in the care of this patient and family. Our team: will continue to follow.     During regular M-F work hours ([id number]) -- if you are not sure who specifically to contact -- please contact us on [facility] Smart Web.     After regular work hours and on weekends/holidays, you can call our answering service at [phone].     Attestation:  Total time on the floor involved in the patient's care: 100 minutes (counseling to family via phone care conference [phone])   Total time spent in counseling/care coordination: >50%    Assessments:  Aly Sorensen is a 80 year old male with PMH significant for prostate cancer s/p prostatectomy 10/2005 with PSA recently normal, melanoma, and recent diagnosis of COVID infection (+ test 11/7), who was admitted on 11/10/2020 with worsening shortness of breath and declining oxygen saturations. Pt's hospital course has been notable for acute respiratory failure with waxing and waning O2 needs. He required an ICU stay earlier in the hospitalization, but stabilized and returned to  the floor, only to have worsening respiratory status again. He is now intubated and mechanically ventilated in the ICU. He is proned. He is meeting ARDS criteria. He has developed coagulopathy with a new RLE DVT, aortic arch thrombus, and a possible splenic infarct.     Today, the patient was seen for:  Goals of care, pt and family support    Family care conference held via phone. This was a joint conversation on both pt, as well as pt's wife, who is also critically in the ICU. Present initially on the call was pt's daughter Lakshmi and Dr. Ahn. Dr. Ahn detailed pt's COVID-19 PNA and waxing and waning course. Given that pt was improving, and now quickly decompensated is concerning. We will evaluate for other causes for his decompensation. We make note that he is on moderate ventilator settings. He is proned, with the need to be supined later today. He has thrombus complications from the infection. We are worried about his critical illness, and his course trajectory is not completely clear to us. Yet we express more concern for his wife's present situation.      Dr. Ahn needed to leave the conference. We thus joined Lakshmi's family members, including brothers Raoul and Austin, and Lakshmi's  Bishop.     Introduced myself and our services to family; assistance in pt and family support in complex illness, symptom management, and complex discussions regarding goals of care.    We reviewed Dr. Ahn's account of pt's critical illness with COVID-19. The length of the illness and sudden decline this late in the infection is concerning. Could there be other sources of his decline, we are not sure, and will be further investigated. He has complications of thrombus in his leg and aortic arch for which he is on blood thinners. Pt is currently proned, and will need to be supined later today.     We again discussed that pt's trajectory is not fully understood. We are still very concerned about the critical and  seriousness of his condition, but I express we are more looking day to day, week to week, as opposed to pt's wife's condition, which is more moment to moment, hour to hour.     Code status reviewed. I educated regarding the pros and cons of attempting cardiac resuscitation. Discussed probability of survival as well as quality of life implications. Recommended DNR. Family report that pt has always historically wanted everything to be done medically. We acknowledge that to be true, but given the serious, grave situation we are in, that oftentimes can change our opinion. Family express they are not prepared to change code status today, but will further review as a family.     A portion of this discussion, largely interwoven, was also used to review pt's wife's critical illness, as well.    Prognosis, Goals, & Planning:      Functional Status just prior to hospitalization: 0 (Fully active, able to carry on all activities without restriction)      Prognosis, Goals, and/or Advance Care Planning were addressed today: Yes      Patient's decision making preferences: unable to assess          Patient has decision-making capacity today for complex decisions: No            I have concerns about the patient/family's health literacy today: No           Patient has a completed Health Care Directive: No.       Code status: Full Code    Coping, Meaning, & Spirituality:   Mood, coping, and/or meaning in the context of serious illness were addressed today: Yes  Summary/Comments: Alyssa and spirituality deeply important to pt and family. Appreciate support from spiritual health team.     Social:     Living situation: With wife Nelsy, who is presently critically ill in the ICU    Key family / caregivers: Wife Nelsy is critically ill in the ICU. Adult children; sons Austin, Dax, Raoul, daughters Aurora and Lakshmi    History of Present Illness:  History gathered today from: family/loved ones, medical chart, medical team members, unit team  members    Aly Sorensen is a 80 year old male with PMH significant for prostate cancer s/p prostatectomy 10/2005 with PSA recently normal, melanoma, and recent diagnosis of COVID infection (+ test 11/7), who was admitted on 11/10/2020 with worsening shortness of breath and declining oxygen saturations. Pt's hospital course has been notable for acute respiratory failure with waxing and waning O2 needs. He required an ICU stay earlier in the hospitalization, but stabilized and returned to the floor, only to have worsening respiratory status again. He is now intubated and mechanically ventilated in the ICU. He is proned. He is meeting ARDS criteria. He has developed coagulopathy with a new RLE DVT, aortic arch thrombus, and a possible splenic infarct.     Key Palliative Symptom Data:  # Pain severity the last 12 hours: low  # Dyspnea severity the last 12 hours: low  # Nausea severity the last 12 hours: none  # Anxiety severity the last 12 hours: low    Patient is on opioids: bowels not assessed today.    ROS:  Comprehensive ROS is reviewed and is negative except as here & per HPI: N/A     Past Medical History:  Past Medical History:   Diagnosis Date     Arthritis degenerative joint disease     Malignant neoplasm of prostate (H) 12/29/2016     Melanoma (H)      Melanoma of skin (H) 12/29/2016    Back     Prostate cancer (H)      Spondylosis of lumbar region without myelopathy or radiculopathy 12/29/2016        Past Surgical History:  Past Surgical History:   Procedure Laterality Date     ARTHROPLASTY KNEE  12/8/2011    Procedure:ARTHROPLASTY KNEE; Left Total Knee Arthroplasty (JILLIAN)^; Surgeon:MICHAEL PELAEZ; Location:[facility] OR     ARTHROPLASTY KNEE Right 12/28/2018    Procedure: RIGHT TOTAL KNEE ARTHROPLASTY;  Surgeon: Michael Pelaez MD;  Location:  OR     BACK SURGERY       COLONOSCOPY       GENITOURINARY SURGERY  history of prostatectomy     HERNIA REPAIR  inguinal hernia repair as a teenager     ORTHOPEDIC SURGERY   back fusion 2002     PHACOEMULSIFICATION CLEAR CORNEA WITH STANDARD INTRAOCULAR LENS IMPLANT Right 11/10/2015    Procedure: PHACOEMULSIFICATION CLEAR CORNEA WITH STANDARD INTRAOCULAR LENS IMPLANT;  Surgeon: Criss Pulliam MD;  Location:  [facility]     PHACOEMULSIFICATION CLEAR CORNEA WITH STANDARD INTRAOCULAR LENS IMPLANT Left 11/17/2015    Procedure: PHACOEMULSIFICATION CLEAR CORNEA WITH STANDARD INTRAOCULAR LENS IMPLANT;  Surgeon: Criss Pulliam MD;  Location:  [facility]     removal of melanoma[  approx. 20 years ago         Family History:  Family History   Problem Relation Age of Onset     Uterine Cancer Mother      Colon Cancer Father      Diabetes Brother      Heart Failure Brother          Allergies:  No Known Allergies     Medications:  I have reviewed this patient's medication profile and medications from this hospitalization.   Noted scheduled meds are:  Lovenox 80mg subcutaneous BID   Meropenem   Solumedrol 62.5mg IV Q8hrs   Vancomycin   IVF  Fentanyl gtt 100mcg/hr   Insulin gtt   Versed gtt 5mg/hr   Levophed gtt   Propofol gtt     Noted PRN meds are:  Fentanyl 25-50mcg IV Q30 mins PRN pain   Versed 1-2mg IV Q30 mins PNR agitation     Physical Exam:  Vital Signs: Temp: 95  F (35  C) Temp src: Oral BP: 100/62 Pulse: 55   Resp: 24 SpO2: 96 % O2 Device: Mechanical Ventilator    Weight: 169 lbs 15.59 oz   Assessed via video conference   CONSTITUTIONAL: Critically ill elderly man seen lying in ICU bed, intubated and mechanically ventilated, proned, sedated   Remainder of physical exam, per Dr. Ahn, note dated 11/23    Data reviewed:  Recent imaging reviewed, my comments on pertinents:   Results for orders placed or performed during the hospital encounter of 11/10/20   CT Chest Pulmonary Embolism w Contrast    Impression    IMPRESSION:  1.  No evidence of pulmonary embolism. Thoracic aorta is unremarkable.    2.  Patchy groundglass opacities within both lungs concerning for  viral pneumonitis. ARDS could  appear similar.    NICHOLAS DE LEON MD   XR Chest Port 1 View    Impression    IMPRESSION: Peripheral ground-glass infiltrates compatible with  history of COVID-19 pneumonia.    EBONY ROCHA MD   CT Chest Pulmonary Embolism w Contrast    Impression    IMPRESSION:  1.  No pulmonary embolism demonstrated.  2.  Extensive bilateral groundglass and interstitial infiltrates  compatible with history of COVID pneumonia.  3.  New filling defects within the distal aortic arch and mid to  distal descending thoracic aorta from the comparison 9 days prior.  This is most consistent with mural thrombus.  4.  Small wedge-shaped hypoenhancing area in the spleen, possibly a  small splenic infarct.    Aortic and pulmonary findings discussed with Dr. Maldonado on November 19, 2020 at 1:43 PM.    EBONY ROCHA MD   US Lower Extremity Venous Duplex Bilateral    Impression    IMPRESSION: Positive for mild right calf DVT.    BROOKLYN COVARRUBIAS MD   XR Chest Port 1 View    Impression    IMPRESSION: Single portable AP view of the chest was obtained.  Endotracheal tube tip projects over lower thoracic trachea  approximately 3 cm from the dalia. Enteric tube crosses the diaphragm  with the distal tip outside the field of view. An esophageal  temperature probe distal tip projects over the distal esophagus.  Stable cardiomediastinal silhouette. Bilateral airspace pulmonary  opacities, worrisome for infection. No significant pleural effusion or  pneumothorax.    MINESH BENTON MD   XR Chest Port 1 View    Impression    IMPRESSION: Endotracheal tube tip just below the clavicle heads. Nasogastric tube tip projects over the gastric fundus. Worsening bilateral pulmonary infiltrates. No effusion. Normal heart size.       Recent lab data reviewed, my comments on pertinents:   Na 139  K 4.6  Creat 0.51  WBC 43.7  Hgb 14.5  Plt 409  Albumin 1.5  INR 1.38

## 2020-11-23 NOTE — PROGRESS NOTES
SPIRITUAL HEALTH SERVICES Progress Note  [facility] ICU    Referral Source: Request by Family    Provided L[employer]-day Prayer for the Sick per request by son (Raoul) & daughter (Lakshmi). Prayer was recited at door of room and witnessed via telephone by son and his spouse.    Family is aware of the availability of [facility] services for patients and their families. Follow-up with family while PT remains in hospital.    Rodolfo Hamilton  Chaplain Resident

## 2020-11-23 NOTE — PROGRESS NOTES
[facility] ICU RESPIRATORY NOTE        Date of Admission: 11/10/2020    Date of Intubation (most recent): 11/22/20    Reason for Mechanical Ventilation: Respiratory Failure    Number of Days on Mechanical Ventilation: 1    Met Criteria for Spontaneous Breathing Trial: No    Reason for No Spontaneous Breathing Trial: Per MD    Significant Events Today: Patient arrived intubated.    ABG Results:   Recent Labs   Lab 11/22/20  1720 11/22/20  1540 11/16/20  1002   PH 7.37 7.44  --    PCO2 48* 45  --    PO2 79* 26*  --    HCO3 28 30*  --    O2PER FI02 100% BIPAP 30%       Current Vent Settings: Ventilation Mode: CMV/AC  (Continuous Mandatory Ventilation/ Assist Control)  FiO2 (%): 100 %  Rate Set (breaths/minute): 24 breaths/min  Tidal Volume Set (mL): 450 mL  PEEP (cm H2O): 15 cmH2O  Oxygen Concentration (%): 100 %  Resp: 24      Plan: Continue to monitor. Plan to prone tonight.    Jose Francisco Chandler, RT

## 2020-11-23 NOTE — PROGRESS NOTES
Comprehensive Daily ICU Note        Aly Sorensen MRN# [id number]   Age: 80 year old YOB: 1940     Date of Admission: 11/10/2020    Primary care provider: Zev Austin     CODE STATUS: FULL      Problem List:         Principal Problem:    Acute respiratory failure with hypoxia (H)  Active Problems:    Pneumonia due to 2019 novel coronavirus    Hypokalemia    Aortic thrombus (H)    DVT (deep venous thrombosis) (H)             Treatment goals for next 24 hours:   Prone positioning  Continue steroids  Continue blood thinners  Support family    Aurora Kerr MD        Subjective/ Last 24 hours:   Events: Patient admitted to the hospital on 11/10 with COVID.  He was transferred to the ICU on 11/12 because of high oxygen needs.  He was stabilized somewhat and was able to transition to high flow oxygen but over the  few days has spent a lot of the time on Bipap at 100% or High flow .  On 11/22/2020 was transferred to the ICU worsening respiratory status after being emotionally upset regarding his wife's medical issues.  He was intubated and sedated on transfer.  -Remained prone overnight.  -Urine output acceptable         Mechanical Ventilation/Vitalsigns/IsandOs:       Temp:  [95.9  F (35.5  C)-100.8  F (38.2  C)] 98.1  F (36.7  C)  Pulse:  [] 58  Resp:  [24-42] 26  BP: ()/(61-86) 100/62  MAP:  [59 mmHg-100 mmHg] 82 mmHg  Arterial Line BP: ()/(30-77) 115/66  FiO2 (%):  [65 %-100 %] 65 %  SpO2:  [88 %-97 %] 95 %      Ventilation Mode: CMV/AC  (Continuous Mandatory Ventilation/ Assist Control)  FiO2 (%): 65 %  Rate Set (breaths/minute): 24 breaths/min  Tidal Volume Set (mL): 350 mL  PEEP (cm H2O): 15 cmH2O  Oxygen Concentration (%): 65 %  Resp: 26      Day since 11/22    Peak airway pressure: 30s  Plateau airway pressure: 24-26  Auto-PEEP:  no    Intake/Output Summary (Last 24 hours) at 11/23/2020 1211  Last data filed at 11/23/2020 1200  Gross per 24 hour   Intake 3566.36 ml   Output  800 ml   Net 2766.36 ml              Physical Examination:     General: Stated age, heavily sedated  HEENT: ET tube  Lungs: quiet breath sounds bilaterally anteriorly  CVS: RRR, tachycardia  Abdomen: +BS, soft  Extremities/musculoskeletal: good pulses, warm  Neurology: heavily sedated  Skin: normal  Psychiatry: unable  Exam of Line sites:  Groin arterial and central lines            Feeding/Glucose:     Orders Placed This Encounter      NPO for Medical/Clinical Reasons Except for: No Exceptions      Recent Labs   Lab 11/23/20  1135 11/23/20  0822 11/23/20  0429 11/23/20  0212 11/22/20  2147 11/22/20  1720 11/22/20  1525 11/21/20  0737 11/20/20  1130 11/19/20  0545   GLC  --   --  217*  --   --  191* 232* 106* 183* 70   * 173*  --  192* 206*  --   --   --   --   --             Medications:       albuterol  2.5 mg Nebulization Q6H     enoxaparin ANTICOAGULANT  80 mg Subcutaneous Q12H     famotidine  20 mg Intravenous Q12H     meropenem  1,000 mg Intravenous Q8H     methylPREDNISolone  62.5 mg Intravenous Q8H     polyethylene glycol  17 g Oral Daily     sodium chloride (PF)  3 mL Intracatheter Q8H     vancomycin (VANCOCIN) IV  1,750 mg (central catheter) Intravenous Q12H          0.45% sodium chloride + KCl 20 mEq/L 75 mL/hr at 11/23/20 0747     dextrose       fentaNYL 100 mcg/hr (11/23/20 0746)     insulin (regular) 1 Units/hr (11/23/20 1142)     - MEDICATION INSTRUCTIONS -       midazolam 5 mg/hr (11/23/20 1127)     - MEDICATION INSTRUCTIONS -       norepinephrine 0.03 mcg/kg/min (11/23/20 1045)     - MEDICATION INSTRUCTIONS -       propofol (DIPRIVAN) infusion 55 mcg/kg/min (11/23/20 0934)              Labs:         ROUTINE ICU LABS (Last four results)  CMP  Recent Labs   Lab 11/23/20  0429 11/22/20  1720 11/22/20  1525 11/21/20  0737    140 140 137   POTASSIUM 4.6 3.9 4.2 4.4   CHLORIDE 108 108 106 105   CO2 25 26 26 26   ANIONGAP 6 6 8 6   * 191* 232* 106*   BUN 24 31* 32* 21   CR 0.51* 0.62*  0.57* 0.55*   GFRESTIMATED >90 >90 >90 >90   GFRESTBLACK >90 >90 >90 >90   ELIA 7.8* 7.8* 8.8 8.7   MAG  --  2.3  --   --    PHOS  --  2.8  --   --    PROTTOTAL  --  5.1*  --   --    ALBUMIN  --  1.5*  --   --    BILITOTAL  --  0.4  --   --    ALKPHOS  --  78  --   --    AST  --  21  --   --    ALT  --  20  --   --      CBC  Recent Labs   Lab 11/23/20  0429 11/22/20  1720 11/22/20  0759 11/21/20  0737 11/19/20  0545   WBC 43.7* 35.7*  --  32.2* 19.0*   RBC 4.51 4.48  --  5.02 4.80   HGB 14.5 14.3  --  16.2 14.8   HCT 43.3 42.4  --  46.6 44.7   MCV 96 95  --  93 93   MCH 32.2 31.9  --  32.3 30.8   MCHC 33.5 33.7  --  34.8 33.1   RDW 13.8 13.7  --  13.6 13.8    373 360 349 293     INR  Recent Labs   Lab 11/22/20  1720 11/19/20  0545 11/18/20  0510 11/17/20  0728   INR 1.38* 1.35* 1.33* 1.44*     Arterial Blood Gas  Recent Labs   Lab 11/23/20  0438 11/22/20  2310 11/22/20  1950 11/22/20  1720   PH 7.32* 7.30* 7.30* 7.37   PCO2 52* 55* 47* 48*   PO2 88 113* 100 79*   HCO3 27 27 23 28   O2PER 70 100 FIO2 100% FI02 100%       Other Lab Data:  Lactic acid:     Cultures:  Recent Labs   Lab 11/23/20  0415 11/22/20  1720   CULT PENDING No growth after 8 hours     Blood culture:  Invalid input(s): BC   Urine culture:  No results for input(s): URC in the last 168 hours.        Imaging/Other results:     CXR: Single portable AP view of the chest was obtained. Endotracheal tube tip projects over lower thoracic trachea approximately 3 cm from the dalia. Enteric tube crosses the diaphragm with the distal tip outside the field of view. An esophageal temperature probe distal tip projects over the distal esophagus. Stable cardiomediastinal silhouette. Bilateral airspace pulmonary opacities, worrisome for infection. No significant pleural effusion or pneumothorax.    LE ultrasound 11/19/2020  RIGHT: Mild area of occlusive thrombus within the mid calf posterior tibial vein. No superficial thrombophlebitis. No popliteal  cyst.  LEFT: No deep vein thrombosis. No superficial thrombophlebitis. No  popliteal cyst.    CT Chest 11/19/20 1.  No pulmonary embolism demonstrated. 2.  Extensive bilateral groundglass and interstitial infiltrates compatible with history of COVID pneumonia. 3.  New filling defects within the distal aortic arch and mid to  distal descending thoracic aorta from the comparison 9 days prior.  This is most consistent with mural thrombus. 4.  Small wedge-shaped hypoenhancing area in the spleen, possibly a small splenic infarct.           Assessment and Plan:     Summary:  Aly Sorensen is a 80 year old male admitted on 11/10/2020 for COVID. Now transferred to the ICU for worsening ARDS.  I have personally reviewed the daily labs, imaging studies, cultures and discussed the case with referring physician and consulting physicians. My assessment and plan as follows:    Neurology and Psychiatry:  No pre hospital issues  Sedated with propofol, Versed.  On fentanyl for pain control\  -RASS goal -4 to -5    Pulmonary:   ARDS from COVID.  Initial infection in early November.  Appears to have persistent symptoms and hypoxia.  Now meets ARDS criteria.  -Continue prone ventilation, placed in supine position later today.  -Flolan full strength.  -Consider paralysis  -We will switch MDIs to nebulizer.  Will only use albuterol.  Avoid anticholinergic with concerns for thickening of secretions and worsening mucous plugging.  -Currently on Solu-Medrol for question of inflammatory lung process.    Cardiovascular system:   Shock: Likely multifactorial.  Could have component of sepsis.  Last lactic acid 2.4.  -Continue current pressors.  On Solu-Medrol already.  -Goal keep managed for I's and O's today.  Sinus rhythm  -Keep K >4, Mg >2    Renal/Electrolytes:  Oliguria: Slight increase in BUN on transfer to the ICU.  Stabilized.  -Match I's and O's  -Renal dosing of medications  -avoid nephrotoxic medications    ID:  Clinical worsening  along with newer fever and on steroids.  Sputum cultures from 11/22/2020 with positive Gram stain including gram-negative rods.  -On empiric vancomycin and meropenem continue.  -Simplify antibiotics based on cultures.  -Remdesivir course completed.  -We will check inflammatory markers including cytokine storm panel.    GI/:  On famotidine     Nutrition:   Nutrition consult in the morning for tube feeds    Musculoskeletal/Rheumatology:  No issues at this time    Endocrine:   ICU insulin protocol     Heme/Onc:  Aortic thrombus and DVT related to COVID coagulopathy. On Enoxaparin  - Continue and follow     ICU prophylaxis:      DVT: Enoxaparin     VAP: As above     Stress ulcer: Famotidine     Restraints needed: yes     Lines   Central Line/PICC - placed 11/22 needed: y   Arterial line - placed 11/22 needed: y   Jane catheter.  Needed: y       Prognosis:   Guarded    Family update: children    Billing: total time spend providing critical care was 60 min, excluding procedure time.

## 2020-11-23 NOTE — PROGRESS NOTES
SPIRITUAL HEALTH SERVICES  SPIRITUAL ASSESSMENT Progress Note  [facility] ICU      REFERRAL SOURCE: Family Request     I shared a brief phone call with patient's daughter Lakshmi today as patient's family has requested spiritual health support for their father and mother (who is also in the ICU). Lakshmi was tearful as she reflected on what is currently happening with both of her parents. She shared about her father sharing his mundo and family are deeply important to him and that he loves ritual. Lakshmi asked for prayers for the sick from the Book of Common prayer, which I offered outside her father's room. She also asked I say the names of each member of their family who are with him, which I did as well. I offered grief support, reflective listening and words of comfort and peace.      PLAN: [facility] will continue to follow.      Milvia Matamoros  Associate    Phone: [phone]  Pager: [phone]

## 2020-11-23 NOTE — PROCEDURES
Procedure: Central Line Placement  Consent:   1.  Could not be obtained from patient and next of kin and procedure felt to be urgent and could not posptoned any further to get consent Yes     Universal Protocol:   Patient Identification was verified, time out was performed, site marking N/A, Imaging data N/A. Full Barrier precaution done: Hands washed, mask, gloves, gown, cap and eye protection all used.    Diagnosis: COVID-19 ARDS  Indication: same. Need for vasopressors and multiple labs    Narrative: The left femoral vein was identified with portable ultrasound device. Area prepped with chlorhexedine and Patient was head to toe draped. Sterile technique and full barrier precautions used. 1% lidocaine injected subcutaneously for local anesthesia. A triple lumen catheter was inserted using standard Seldinger technique under real time US guidance after careful evaluation of the best site to minimize risk of infection and complication related to insertion. The central line was sutured at 20 cm depth.    Post-procedure imaging:  Central line is in good position and no visible pneumothorax per my review.     Complications: No apparent complication.    Procedure performed by Dr Kerr on November 22, 2020

## 2020-11-23 NOTE — PHARMACY-VANCOMYCIN DOSING SERVICE
Pharmacy Vancomycin Initial Note  Date of Service 2020  Patient's  1940  80 year old, male    Indication: Sepsis    Current estimated CrCl = Estimated Creatinine Clearance: 104.2 mL/min (A) (based on SCr of 0.57 mg/dL (L)).    Creatinine for last 3 days  2020: 11:30 AM Creatinine 0.51 mg/dL  2020:  7:37 AM Creatinine 0.55 mg/dL  2020:  3:25 PM Creatinine 0.57 mg/dL      Nephrotoxins and other renal medications (From now, onward)    Start     Dose/Rate Route Frequency Ordered Stop    20 1830  vancomycin (VANCOCIN) 1,750 mg in sodium chloride 0.9 % 250 mL intermittent infusion      1,750 mg (central catheter)  over 60 Minutes Intravenous EVERY 12 HOURS 20 1817      20 1700  norepinephrine (LEVOPHED) 16 mg in  mL infusion CENTRAL LINE      0.03-0.4 mcg/kg/min × 82 kg  2.3-30.8 mL/hr  Intravenous CONTINUOUS 20 1649      11/10/20 2116  ibuprofen (ADVIL/MOTRIN) tablet 400 mg      400 mg Oral EVERY 6 HOURS PRN 11/10/20 2116                Plan:  1.  Start vancomycin  1750 mg IV q12h.   2.  Goal Trough Level: 15-20 mg/L   3.  Pharmacy will check trough levels as appropriate in 1-3 Days.    4. Serum creatinine levels will be ordered daily for the first week of therapy and at least twice weekly for subsequent weeks.    5. [place] method utilized to dose vancomycin therapy: Method 1    Karen Kaufman RP

## 2020-11-23 NOTE — PLAN OF CARE
Pt prone and sedated well. Able to titrate FiO2 down from 100 to 70 through the night. Pt has required some levo to keep pressure MAP above 65. Pt son called and was updated this AM. Pt has had oozing at left femoral sites. A line and Central line still functioning properly, thrombix applied during second dressing change of the night.

## 2020-11-24 NOTE — PROGRESS NOTES
[facility] ICU RESPIRATORY NOTE        Date of Admission: 11/10/2020    Date of Intubation (most recent): 11/22/2020    Reason for Mechanical Ventilation: Respiratory failure     Number of Days on Mechanical Ventilation: 2    Met Criteria for Spontaneous Breathing Trial: No     Reason for No Spontaneous Breathing Trial: Per MD     Significant Events Today: Vent setting were changed per MD    ABG Results:   Recent Labs   Lab 11/24/20  0410 11/23/20  2105 11/23/20  1600 11/23/20  0438   PH 7.34* 7.25* 7.31* 7.32*   PCO2 51* 59* 53* 52*   PO2 104 136* 58* 88   HCO3 28 26 27 27   O2PER 65% 80% FiO2 100 70       Current Vent Settings: Ventilation Mode: CMV/AC  (Continuous Mandatory Ventilation/ Assist Control)  FiO2 (%): 55 %  Rate Set (breaths/minute): 26 breaths/min  Tidal Volume Set (mL): 350 mL  PEEP (cm H2O): 15 cmH2O  Oxygen Concentration (%): 65 %  Resp: 26        Plan: Continue on full ventilator support    Ronni Barragan, RT

## 2020-11-24 NOTE — CONSULTS
CLINICAL NUTRITION SERVICES - REASSESSMENT NOTE      Recommendations Ordered by Registered Dietitian (RD):   Today, begin TF Replete 1.0 (No Fiber) at 10 mL/hr = 240 kcals (3 kcal/kg), 15 gm pro (0.2 gm/kg), 27 gm CHO, 202 mL H20  Free H20 60 mL every 4 hrs  Certavite daily - to meet vitamin/mineral needs while on low volume TF   Future/Additional Recommendations:   Recommend increase TF as tolerated to eventual preliminary goal Replete 1.0 (No Fiber) at 60 mL/hr = 1440 kcals (18 kcal/kg), 92 gm pro (1.2 gm/kg), 1210 mL H20, 161 gm CHO   Malnutrition:   (11/18)  % Weight Loss:  > 2% in 1 week (severe malnutrition)  % Intake:  <75% for > 7 days (non-severe malnutrition)  Subcutaneous Fat Loss:  Unable to determine  Muscle Loss:  Unable to determine  Fluid Retention:  None noted     Malnutrition Diagnosis: Non-Severe malnutrition  In Context of:  Acute illness or injury       EVALUATION OF PROGRESS TOWARD GOALS   Diet:  NPO on vent    Intake/Tolerance:    Patient had variable oral intake prior to most recent  transfer to ICU (x 2 weeks) and now NPO x 3 days.    11/22:  RRT called for: Hypoxia   11/22:  Intubated and Proned   11/23:  OG placed   11/24:  Procedure = Left tube thoracostomy, 20-Kosovan     ASSESSED NUTRITION NEEDS PER APPROVED PRACTICE GUIDELINES:     Dosing Weight:  79.7 kg (admit wt)   Estimated Energy Needs:    Preliminary Needs during 1st week = 8961-5623 kcals (75% needs)  Final Needs:  6796-3226 kcals (25-30 Kcal/Kg)  Justification: maintenance  Estimated Protein Needs:   grams protein (1.2-1.5 g pro/Kg)  Justification: hypercatabolism with acute illness    NEW FINDINGS:   RN to place post pyloric feeding tube at bedside.  Norepinephrine off today.  IVF 1/2 NaCl + 20 KCl at 75 mL/hr - for fluid delivery  Insulin drip - for glycemic control  Miralax - for bowel program  Propofol 9.8 mL/hr = 259 kcals (lipid) - for sedation (Plan taper off)    Previous Goals (11/21):   Pt to be able to tolerate 3  meals per day, consuming 50%  Evaluation: Not met    Previous Nutrition Diagnosis (11/21):   Inadequate oral intake related to respiratory status (Bipap/HFNC) and decreased appetite as evidenced by pt with very limited po intake past 2 days and decreased po overall since admit  Evaluation: Declining      CURRENT NUTRITION DIAGNOSIS  Inadequate protein-energy intake related to intubation as evidenced by NPO x 3 days, Propofol meeting 17% preliminary energy and 0% protein needs, and TF planned    INTERVENTIONS  Recommendations / Nutrition Prescription  Today, begin TF Replete 1.0 (No Fiber) at 10 mL/hr = 240 kcals (3 kcal/kg), 15 gm pro (0.2 gm/kg), 27 gm CHO, 202 mL H20  Free H20 60 mL every 4 hrs  Certavite daily - to meet vitamin/mineral needs while on low volume TF    Recommend increase TF as tolerated to eventual preliminary goal Replete 1.0 (No Fiber) at 60 mL/hr = 1440 kcals (18 kcal/kg), 92 gm pro (1.2 gm/kg), 1210 mL H20, 161 gm CHO    Implementation  Collaboration and Referral of Nutrition care - Pt was discussed during ICU interdisciplinary rounds this morning  EN Composition, EN Schedule, Feeding Tube Flush, and Multivitamin/Mineral - Orders entered in Epic as above    Goals  TF will meet % preliminary goal in the next 48-72 hrs    MONITORING AND EVALUATION:  Progress towards goals will be monitored and evaluated per protocol and Practice Guidelines    Marilou Garcia, RD, LD, [facility]

## 2020-11-24 NOTE — PROGRESS NOTES
FiO2 weaned down 100% to 50% this shift, plan to wean down PEEP as tolerated today.  Weaning sedation; remains on versed, propofol and fentanyl.  Low dose levo to keep MAP greater than 65.  GUOP from [facility].  Femoral dressing changed x2 d/t bleeding at site.  Son Raoul called this AM and updated on Lucrecia's night.  Continue to monitor.

## 2020-11-24 NOTE — PROGRESS NOTES
Comprehensive Daily ICU Note        Aly Sorensen MRN# [id number]   Age: 80 year old YOB: 1940     Date of Admission: 11/10/2020    Primary care provider: Zev Austin     CODE STATUS: FULL      Problem List:     Principal Problem:    Acute respiratory failure with hypoxia (H)  Active Problems:    Pneumonia due to 2019 novel coronavirus    Hypokalemia    Aortic thrombus (H)    DVT (deep venous thrombosis) (H)             Treatment goals for next 24 hours:   1.  Lung protective ventilation  2.  Chest tube to suction        Subjective/ Last 24 hours:   Events: Patient admitted to the hospital on 11/10 with COVID.  He was transferred to the ICU on 11/12 because of high oxygen needs.  He was stabilized somewhat and was able to transition to high flow oxygen but over the  few days has spent a lot of the time on Bipap at 100% or High flow .   11/22/2020 was transferred to the ICU worsening respiratory status after being emotionally upset regarding his wife's medical issues.  He was intubated and sedated on transfer.  11/23/2020: Left sided tension pneumothorax with emergent chest tube placement         Mechanical Ventilation/Vitalsigns/IsandOs:       Temp:  [92.3  F (33.5  C)-99.5  F (37.5  C)] 98.8  F (37.1  C)  Pulse:  [] 68  Resp:  [10-28] 20  MAP:  [64 mmHg-103 mmHg] 78 mmHg  Arterial Line BP: ()/(13-79) 125/60  FiO2 (%):  [50 %-100 %] 50 %  SpO2:  [88 %-100 %] 96 %      Ventilation Mode: CMV/AC  (Continuous Mandatory Ventilation/ Assist Control)  FiO2 (%): 50 %  Rate Set (breaths/minute): 26 breaths/min  Tidal Volume Set (mL): 350 mL  PEEP (cm H2O): 15 cmH2O  Oxygen Concentration (%): 50 %  Resp: 20      Day since 11/22    Peak airway pressure: 30s  Plateau airway pressure: 31  Auto-PEEP:  no      Intake/Output Summary (Last 24 hours) at 11/24/2020 1531  Last data filed at 11/24/2020 1200  Gross per 24 hour   Intake 2863.41 ml   Output 900 ml   Net 1963.41 ml              Physical  Examination:     General: Stated age, heavily sedated  HEENT: ET tube  Lungs: quiet breath sounds bilaterally anteriorly  CVS: RRR, tachycardia  Abdomen: +BS, soft  Extremities/musculoskeletal: good pulses, warm  Neurology: heavily sedated  Skin: normal  Psychiatry: unable  Exam of Line sites:  Groin arterial and central lines            Feeding/Glucose:     Orders Placed This Encounter      NPO for Medical/Clinical Reasons Except for: No Exceptions      Recent Labs   Lab 11/24/20  1441 11/24/20  1118 11/24/20  0950 11/24/20  0903 11/24/20  0608 11/24/20  0410 11/24/20  0206 11/23/20  0429 11/23/20  0429 11/22/20  1720 11/22/20  1720 11/22/20  1525 11/21/20  0737 11/20/20  1130   GLC  --   --   --   --   --  142*  --   --  217*  --  191* 232* 106* 183*   * 121* 120* 115* 124*  --  137*   < >  --    < >  --   --   --   --     < > = values in this interval not displayed.            Medications:       albuterol  2.5 mg Nebulization Q6H     enoxaparin ANTICOAGULANT  80 mg Subcutaneous Q12H     famotidine  20 mg Intravenous Q12H     meropenem  1,000 mg Intravenous Q8H     multivitamins w/minerals  15 mL Per Feeding Tube Daily     polyethylene glycol  17 g Oral Daily     sodium chloride (PF)  3 mL Intracatheter Q8H     vancomycin (VANCOCIN) IV  1,500 mg Intravenous Q12H          0.45% sodium chloride + KCl 20 mEq/L 75 mL/hr at 11/24/20 0800     dextrose       epoprostenol (VELETRI) 20 mcg/mL in sterile water inhalation solution 20 ng/kg/min (11/24/20 0900)     fentaNYL 100 mcg/hr (11/24/20 1432)     insulin (regular) 1 Units/hr (11/24/20 1441)     - MEDICATION INSTRUCTIONS -       midazolam 1.5 mg/hr (11/24/20 0921)     - MEDICATION INSTRUCTIONS -       norepinephrine Stopped (11/24/20 1438)     - MEDICATION INSTRUCTIONS -       propofol (DIPRIVAN) infusion 20 mcg/kg/min (11/24/20 1437)              Labs:         ROUTINE ICU LABS (Last four results)  CMP  Recent Labs   Lab 11/24/20  0410 11/23/20  0059  11/22/20  1720 11/22/20  1525    139 140 140   POTASSIUM 5.3 4.6 3.9 4.2   CHLORIDE 110* 108 108 106   CO2 26 25 26 26   ANIONGAP 3 6 6 8   * 217* 191* 232*   BUN 28 24 31* 32*   CR 0.82 0.51* 0.62* 0.57*   GFRESTIMATED 83 >90 >90 >90   GFRESTBLACK >90 >90 >90 >90   ELIA 7.5* 7.8* 7.8* 8.8   MAG 2.7*  --  2.3  --    PHOS 2.6  --  2.8  --    PROTTOTAL 5.2*  --  5.1*  --    ALBUMIN 1.4*  --  1.5*  --    BILITOTAL 0.4  --  0.4  --    ALKPHOS 67  --  78  --    AST 15  --  21  --    ALT 18  --  20  --      CBC  Recent Labs   Lab 11/24/20  0410 11/23/20  0429 11/22/20  1720 11/22/20  0759 11/21/20  0737   WBC 29.8* 43.7* 35.7*  --  32.2*   RBC 4.06* 4.51 4.48  --  5.02   HGB 12.9* 14.5 14.3  --  16.2   HCT 39.6* 43.3 42.4  --  46.6   MCV 98 96 95  --  93   MCH 31.8 32.2 31.9  --  32.3   MCHC 32.6 33.5 33.7  --  34.8   RDW 14.0 13.8 13.7  --  13.6    409 373 360 349     INR  Recent Labs   Lab 11/22/20  1720 11/19/20  0545 11/18/20  0510   INR 1.38* 1.35* 1.33*     Arterial Blood Gas  Recent Labs   Lab 11/24/20  0410 11/23/20  2105 11/23/20  1600 11/23/20  0438   PH 7.34* 7.25* 7.31* 7.32*   PCO2 51* 59* 53* 52*   PO2 104 136* 58* 88   HCO3 28 26 27 27   O2PER 65% 80% FiO2 100 70       Other Lab Data:  Lactic acid:     Cultures:  Recent Labs   Lab 11/23/20  0415 11/22/20  1720   CULT Light growth  Normal jenifer to date   No growth after 2 days     Blood culture:  Invalid input(s): BC   Urine culture:  No results for input(s): URC in the last 168 hours.        Imaging/Other results:     CXR: Portable chest. Left chest tube is again in place with a small left apical pneumothorax. Subcutaneous emphysema is no longer evident. Patchy airspace opacities in the mid and lower lungs overall appear stable if not slightly improved. No evidence of right pneumothorax. No significant pleural effusions appreciated on this  study. Heart is normal in size. Endotracheal tube is in good position approximately 3-4 cm above  the dalia. Nasogastric tube extends below the left hemidiaphragm presumably in the stomach.    LE ultrasound 11/19/2020  RIGHT: Mild area of occlusive thrombus within the mid calf posterior tibial vein. No superficial thrombophlebitis. No popliteal cyst.  LEFT: No deep vein thrombosis. No superficial thrombophlebitis. No  popliteal cyst.    CT Chest 11/19/20 1.  No pulmonary embolism demonstrated. 2.  Extensive bilateral groundglass and interstitial infiltrates compatible with history of COVID pneumonia. 3.  New filling defects within the distal aortic arch and mid to  distal descending thoracic aorta from the comparison 9 days prior.  This is most consistent with mural thrombus. 4.  Small wedge-shaped hypoenhancing area in the spleen, possibly a small splenic infarc           Assessment and Plan:     Summary:  Aly Sorensen is a 80 year old male admitted on 11/10/2020 for COVID. Now transferred to the ICU for worsening ARDS.  I have personally reviewed the daily labs, imaging studies, cultures and discussed the case with referring physician and consulting physicians. My assessment and plan as follows:    Neurology and Psychiatry:  No pre hospital issues  Sedated with propofol, Versed.  On fentanyl for pain control\  -RASS goal -4 to -5.  Will wean propofol to off.    Pulmonary:   ARDS from COVID. Initial infection in early November.  Appears to have persistent symptoms and hypoxia.  Now meets ARDS criteria.  -Continue lung protective ventilation.  Proning as needed.  -Flolan full strength.  -Did 1 dose of Solu-Medrol on 1122.  Planning to continue it for now.  Tension pneumothorax on 11/23/2020  -Left-sided chest tube in place with persistent air leak    Cardiovascular system:   Shock: Likely multifactorial.  Could have component of sepsis.  Last lactic acid 2.4.  -Continue current pressors.  On Solu-Medrol already.  -Goal keep managed for I's and O's today.  Sinus rhythm  -Keep K >4, Mg  >2    Renal/Electrolytes:  Oliguria: Slight increase in BUN on transfer to the ICU.  Stabilized.  -Match I's and O's  -Renal dosing of medications  -avoid nephrotoxic medications    ID:  Clinical worsening along with newer fever.  Sputum cultures from 11/22/2020 with positive Gram stain including gram-negative rods.  --> l 67.3, ferritin 1/4/2009, .  -On empiric vancomycin and meropenem continue.  -Simplify antibiotics based on cultures.  -Remdesivir course completed.  -We will check inflammatory markers including cytokine storm panel.    GI/:  On famotidine     Nutrition:   Nutrition consult in the morning for tube feeds    Musculoskeletal/Rheumatology:  No issues at this time    Endocrine:   ICU insulin protocol     Heme/Onc:  Aortic thrombus and DVT related to COVID coagulopathy. On Enoxaparin  - Continue and follow     ICU prophylaxis:      DVT: Enoxaparin     VAP: As above     Stress ulcer: Famotidine     Restraints needed: yes     Lines   Central Line/PICC - placed 11/22 needed: y   Arterial line - placed 11/22 needed: y   Jane catheter.  Needed: y       Prognosis:   Guarded    Family update: children    Billing: total time spend providing critical care was 50 min, excluding procedure time.

## 2020-11-24 NOTE — PROGRESS NOTES
Palliative check in. Reviewed case with Dr. Ahn. No palliative needs for today. Thanks.    Brigid Gold, FCO CNP  [facility]  Contact information available via [facility] Paging/Directory

## 2020-11-24 NOTE — PROGRESS NOTES
Chart Check:     Now in ICU intubated. Tension pneumothorax s/p chest tube 11/24.  No new anticoagulation recommendations. We will follow peripherally. Please reach out if there are questions or concerns.      Abelardo EAGLE, CNP  [facility]  [phone] (office) or [phone] (cell)

## 2020-11-24 NOTE — PROGRESS NOTES
Pt O2 needs started to increased at 1600. Pt repositioned, helped for a small amount of time. FiO2 increased to 100%, MD notified and CXR ordered. Left pneumothorax present. Pt SBP dropped into 60's, levo titrated up. Dr. Grande released pneumo with needle, Pt then supine and a chest tube placed. Breath sounds on left returned, diminished. CXR done.

## 2020-11-24 NOTE — PROVIDER NOTIFICATION
RN spoke with Dr. Vivar re: latest ABG results.  Orders received to increase RR from 24 to 26 and decreased FiO2 to 75%.  Will continue to monitor closely.

## 2020-11-24 NOTE — PHARMACY-VANCOMYCIN DOSING SERVICE
Pharmacy Vancomycin Note  Date of Service 2020  Patient's  1940   80 year old, male    Indication: Sepsis  Goal Trough Level: 15-20 mg/L  Day of Therapy: 3  Current Vancomycin regimen:  1750 mg IV q12h    Current estimated CrCl = Estimated Creatinine Clearance: 77.7 mL/min (based on SCr of 0.82 mg/dL).    Creatinine for last 3 days  2020:  3:25 PM Creatinine 0.57 mg/dL;  5:20 PM Creatinine 0.62 mg/dL  2020:  4:29 AM Creatinine 0.51 mg/dL  2020:  4:10 AM Creatinine 0.82 mg/dL    Recent Vancomycin Levels (past 3 days)  2020:  6:00 AM Vancomycin Level 21.4 mg/L    Vancomycin IV Administrations (past 72 hours)                   vancomycin (VANCOCIN) 1,750 mg in sodium chloride 0.9 % 250 mL intermittent infusion (mg) 1,750 mg New Bag 20 1856     1,750 mg New Bag  0555     1,750 mg New Bag 20                Nephrotoxins and other renal medications (From now, onward)    Start     Dose/Rate Route Frequency Ordered Stop    20 1000  vancomycin 1500 mg in 0.9% NaCl 250 ml intermittent infusion 1,500 mg      1,500 mg  over 90 Minutes Intravenous EVERY 12 HOURS 20 0753      20 1700  norepinephrine (LEVOPHED) 16 mg in  mL infusion CENTRAL LINE      0.03-0.4 mcg/kg/min × 82 kg  2.3-30.8 mL/hr  Intravenous CONTINUOUS 20 1649      11/10/20 2116  ibuprofen (ADVIL/MOTRIN) tablet 400 mg      400 mg Oral EVERY 6 HOURS PRN 11/10/20 211               Contrast Orders - past 72 hours (72h ago, onward)    None          Interpretation of levels and current regimen:  Trough level is  Supratherapeutic    Has serum creatinine changed > 50% in last 72 hours: No    Urine output:  good urine output    Renal Function: Stable    Plan:  1.  Decrease Dose to Vancomycin 1500 mg IV q12h.   2.  Pharmacy will check trough levels as appropriate in 1-3 Days.    3. Serum creatinine levels will be ordered daily for the first week of therapy and at least twice weekly  for subsequent weeks.      Anahy Michele, PharmD, BCPS        .

## 2020-11-25 NOTE — PROGRESS NOTES
Comprehensive Daily ICU Note        Aly Sorensen MRN# [id number]   Age: 80 year old YOB: 1940     Date of Admission: 11/10/2020    Primary care provider: Zev Austin     CODE STATUS: DNR    Problem List:     Principal Problem:    Acute respiratory failure with hypoxia (H)  Active Problems:    Pneumonia due to 2019 novel coronavirus    Hypokalemia    Aortic thrombus (H)    DVT (deep venous thrombosis) (H)         Treatment goals for next 24 hours:   1.  Lung protective ventilation  2.  Gentel diuresis  3.  Possible aspiration pneumonia         Subjective/ Last 24 hours:   No major events overnight.  Tenuous on full mechanical ventilation support.  Good urine output.  Bleeding around left femoral line, needed sutures.    Hospital Course:   Patient admitted to the hospital on 11/10 with COVID.  He was transferred to the ICU on 11/12 because of high oxygen needs.  He was stabilized somewhat and was able to transition to high flow oxygen but over the  few days has spent a lot of the time on Bipap at 100% or High flow .   11/22/2020 was transferred to the ICU worsening respiratory status after being emotionally upset regarding his wife's medical issues.  He was intubated and sedated on transfer.  11/23/2020: Left sided tension pneumothorax with emergent chest tube placement  11/24/2020: Remained supine with PEEP at lower level of 13.         Mechanical Ventilation/Vitalsigns/IsandOs:       Temp:  [97.5  F (36.4  C)-99  F (37.2  C)] 99  F (37.2  C)  Pulse:  [] 93  Resp:  [8-31] 17  BP: ()/(52-94) 114/66  MAP:  [51 mmHg-132 mmHg] 56 mmHg  Arterial Line BP: ()/(34-92) 110/38  FiO2 (%):  [50 %] 50 %  SpO2:  [86 %-98 %] 97 %      Ventilation Mode: CMV/AC  (Continuous Mandatory Ventilation/ Assist Control)  FiO2 (%): 50 %  Rate Set (breaths/minute): 26 breaths/min  Tidal Volume Set (mL): 350 mL  PEEP (cm H2O): 13 cmH2O  Oxygen Concentration (%): 50 %  Resp: 17    Day since 11/22    Peak  airway pressure: 30s  Plateau airway pressure: 30  Auto-PEEP:  No           Physical Examination:     General: Stated age, heavily sedated  HEENT: ET tube  Lungs: quiet breath sounds bilaterally anteriorly  CVS: RRR, tachycardia  Abdomen: +BS, soft  Extremities/musculoskeletal: good pulses, warm  Neurology: heavily sedated  Skin: normal  Psychiatry: unable  Exam of Line sites:  Groin arterial and central lines          Feeding/Glucose:     Orders Placed This Encounter      NPO for Medical/Clinical Reasons Except for: No Exceptions      Recent Labs   Lab 11/25/20  1301 11/25/20  0857 11/25/20  0642 11/25/20  0429 11/25/20  0215 11/25/20  0015 11/24/20  0410 11/24/20  0410 11/23/20  0429 11/23/20  0429 11/22/20  1720 11/22/20  1720 11/22/20  1525 11/21/20  0737   GLC  --   --   --  114*  --   --   --  142*  --  217*  --  191* 232* 106*   * 122* 99 111* 106* 110*   < >  --    < >  --    < >  --   --   --     < > = values in this interval not displayed.            Medications:       albuterol  2.5 mg Nebulization Q6H     chlorhexidine  15 mL Mouth/Throat Q12H     enoxaparin ANTICOAGULANT  80 mg Subcutaneous Q12H     famotidine  20 mg Intravenous Q12H     insulin aspart  1-4 Units Subcutaneous Q4H     meropenem  1,000 mg Intravenous Q8H     multivitamins w/minerals  15 mL Per Feeding Tube Daily     polyethylene glycol  17 g Oral Daily     sodium chloride (PF)  3 mL Intracatheter Q8H     vancomycin (VANCOCIN) IV  1,500 mg Intravenous Q12H          dextrose       epoprostenol (VELETRI) 20 mcg/mL in sterile water inhalation solution 20 ng/kg/min (11/25/20 1054)     fentaNYL 100 mcg/hr (11/25/20 1438)     - MEDICATION INSTRUCTIONS -       midazolam 2 mg/hr (11/25/20 1405)     - MEDICATION INSTRUCTIONS -       - MEDICATION INSTRUCTIONS -                Labs:         ROUTINE ICU LABS (Last four results)  CMP  Recent Labs   Lab 11/25/20  0429 11/24/20  0410 11/23/20  0429 11/22/20  1720    139 139 140   POTASSIUM  4.9 5.3 4.6 3.9   CHLORIDE 111* 110* 108 108   CO2 24 26 25 26   ANIONGAP 4 3 6 6   * 142* 217* 191*   BUN 40* 28 24 31*   CR 0.72 0.82 0.51* 0.62*   GFRESTIMATED 87 83 >90 >90   GFRESTBLACK >90 >90 >90 >90   ELIA 7.4* 7.5* 7.8* 7.8*   MAG 2.9* 2.7*  --  2.3   PHOS  --  2.6  --  2.8   PROTTOTAL 4.7* 5.2*  --  5.1*   ALBUMIN 1.4* 1.4*  --  1.5*   BILITOTAL 0.8 0.4  --  0.4   ALKPHOS 58 67  --  78   AST 33 15  --  21   ALT 24 18  --  20     CBC  Recent Labs   Lab 11/25/20  0429 11/24/20  0410 11/23/20  0429 11/22/20  1720   WBC 24.2* 29.8* 43.7* 35.7*   RBC 3.79* 4.06* 4.51 4.48   HGB 12.0* 12.9* 14.5 14.3   HCT 36.9* 39.6* 43.3 42.4   MCV 97 98 96 95   MCH 31.7 31.8 32.2 31.9   MCHC 32.5 32.6 33.5 33.7   RDW 14.0 14.0 13.8 13.7    376 409 373     INR  Recent Labs   Lab 11/22/20  1720 11/19/20  0545   INR 1.38* 1.35*     Arterial Blood Gas  Recent Labs   Lab 11/25/20  1035 11/24/20  0410 11/23/20  2105 11/23/20  1600 11/23/20  0438   PH 7.42 7.34* 7.25* 7.31* 7.32*   PCO2 41 51* 59* 53* 52*   PO2 78* 104 136* 58* 88   HCO3 27 28 26 27 27   O2PER  --  65% 80% FiO2 100 70       Other Lab Data:  Lactic acid:     Cultures:  Recent Labs   Lab 11/23/20  0415 11/22/20  1720   CULT Light growth  Normal jenifer   No growth after 3 days     Blood culture:  Invalid input(s): BC   Urine culture:   No results for input(s): URC in the last 168 hours.        Imaging/Other results:     CXR:  Endotracheal tube tip 4 cm above the dalia. PH probe in the esophagus. Endotracheal tube tip below the diaphragm. Single left-sided chest tube. No pneumothorax. Normal heart size and pulmonary vascularity. Patchy bilateral mid and lower lung infiltrates, greater on the left. No significant bony abnormalities. The left-sided pneumothorax present on 11/24/2020 is not seen on today's exam.    LE ultrasound 11/19/2020  RIGHT: Mild area of occlusive thrombus within the mid calf posterior tibial vein. No superficial thrombophlebitis. No  popliteal cyst.  LEFT: No deep vein thrombosis. No superficial thrombophlebitis. No  popliteal cyst.    CT Chest 11/19/20 1.  No pulmonary embolism demonstrated. 2.  Extensive bilateral groundglass and interstitial infiltrates compatible with history of COVID pneumonia. 3.  New filling defects within the distal aortic arch and mid to  distal descending thoracic aorta from the comparison 9 days prior.  This is most consistent with mural thrombus. 4.  Small wedge-shaped hypoenhancing area in the spleen, possibly a small splenic infarc         Assessment and Plan:     Summary: Aly Sorensen is a 80 year old male admitted on 11/10/2020 for COVID. Transferred to the ICU for worsening ARDS on 11/22/20.      I have personally reviewed the daily labs, imaging studies, cultures and discussed the case with referring physician and consulting physicians. My assessment and plan as follows:    Neurology and Psychiatry:  No pre hospital issues  Sedated with Versed.  On fentanyl for pain control\  -RASS goal --2 to -3    Pulmonary:   ARDS from COVID. Initial infection in early November.  Intubated for mechanical ventilation on 11/22/2020. Meets ARDS criteria.  -Continue lung protective ventilation.  Proning as needed.  -Flolan full strength.  -Got one  dose of Solu-Medrol on 11/22/2020.  Not planning to continue.  Tension pneumothorax on 11/23/2020  -Left-sided chest tube in place with persistent air leak.  Will keep in place till on positive pressure ventilation.    Cardiovascular system:   Shock: Likely multifactorial. Could have component of sepsis.  Lactic acid normalized.  No echo on file..  -Wean pressors.  Keep MAP greater than 65.  -Volume goal for today net negative ~500 cc.  Sinus rhythm  -Keep K >4, Mg >2    Renal/Electrolytes:  Oliguria: Slight increase in BUN on transfer to the ICU.  Renal function normal.  -Renal dosing of medications  -avoid nephrotoxic medications    ID:  Clinical worsening along with newer fever.   Sputum stain from 11/22/2020 with positive Gram stain including gram-negative rods.  IL-618.6, IL-1 beta 0.2, IL-823.0, TNF alpha 14.0.  Cultures with normal jenifer --> l 67.3, ferritin 1/4/2009, .  -Vancomycin 1/22-11/25   - Meropenem, plan for ED course.  Started 1122.  -Remdesivir course completed.  -We will check inflammatory markers including cytokine storm panel.    GI/:  On famotidine     Nutrition:   Nutrition consult in the morning for tube feeds    Musculoskeletal/Rheumatology:  No issues at this time    Endocrine:   ICU insulin protocol     Heme/Onc:  Aortic thrombus and DVT related to COVID coagulopathy. On Enoxaparin  - Continue and follow.  Slight bleeding at the site of left femoral CVC.  Improved.    ICU prophylaxis:      DVT: Enoxaparin     VAP: As above     Stress ulcer: Famotidine     Restraints needed: yes     Lines   Central Line/PICC - placed 11/22 needed: y   Arterial line - placed 11/22 needed: y   Jane catheter.  Needed: y       Prognosis:   Guarded    Family update: Yes    Billing: total time spend providing critical care was 30 min, excluding procedure time.

## 2020-11-25 NOTE — PLAN OF CARE
Patient weaned off Levophed and Propofol, still does not withdraw to pain (on Midazolam and Fentanyl). PEEP lowered from 15 to 13, tolerated well. Lung sounds extremely diminished and coarse. Chest tube to LIS, moderate old bloody drainage noted, dressing changed - no new bleeding. Bowel sounds hypoactive, no BM yet. Post pyloric tube placed at bedside, tube feeds to be started at trickle and stool softeners to be given. Urine output 30-40 ml/ hr, dark yvrose. Son updated over the phone.

## 2020-11-25 NOTE — PROGRESS NOTES
"SPIRITUAL HEALTH SERVICES  SPIRITUAL ASSESSMENT Progress Note  [facility] ICU     REFERRAL SOURCE: Follow Up    I shared a brief phone call with patient's son, Raoul today as a follow up outreach to family. Our call ended as Raoul received another call he needed to take.     Raoul shares their family is managing as well as can be expected right now. He reflected on their tremendous grief at this time. He shares his parents are people who have impacted so many lives and that they have people all over the country praying for them and supporting their family right now. He shares they feel well cared for.    Raoul shares they were able to visit with their mother (also in the ICU) yesterday via ipad for a few hours. He shares this was really meaningful and they hope to be able to do this with their father tomorrow too. Raoul shares his family understand's their parents conditions and are also taking it day by day and \"finding hope.\"    I offered emotional and spiritual support through reflective listening, validation of emotions/experiences and words of comfort and peace. Raoul shares their family welcomes continued spiritual health involvement in care for their parents.    PLAN: [facility] will continue to follow for support.     Milvia Matamoros  Associate    Phone: [phone]  Pager: [phone]    "

## 2020-11-25 NOTE — PLAN OF CARE
Neuro: intubated, sedated. PERRL. Not follow commands. RASS -4  to -3.  Gtt: Versed and fentanyl continuous. Insulin stopped @ 0644 BG 99  Resp: LS coarse . Moderate amount inline secretions, scant oral.  Cardio: NSR to S Chi,HR 50's - 70's. This morning -130, switch to A fib. MD notified, just observe, no medication given. At 0650 converted to back SR HR 80's.  GI/: NPO, TF  Started @ 10 ml with 60 ml free water Q 4 H. Patient tolerate well.  Jane in place with adequate urin output.   Skin: intact   Events: L Femoral CVC line bleeding, was stitched new dressing applied.Dressing appeared bloody with new drainage.

## 2020-11-25 NOTE — PROGRESS NOTES
Chart Check:    Remains in ICU intubated. Tension pneumothorax s/p chest tube 11/24. No new anticoagulation recommendations, but could transition to IV heparin if that was more convenient should bleeding become an issue and quick reversal needed.   We will follow peripherally. Please reach out if there are questions or concerns.     Abelardo EAGLE, CNP  [facility]  [phone] (office) or [phone] (cell)

## 2020-11-25 NOTE — PROGRESS NOTES
[facility] ICU RESPIRATORY NOTE           Date of Admission: 11/10/2020     Date of Intubation (most recent): 11/22/2020     Reason for Mechanical Ventilation: Respiratory failure      Number of Days on Mechanical Ventilation: 3     Met Criteria for Spontaneous Breathing Trial: No      Reason for No Spontaneous Breathing Trial: Per MD      Significant Events Today: no acute changes overnight.      ABG Results:          Recent Labs   Lab 11/24/20  0410 11/23/20  2105 11/23/20  1600 11/23/20  0438   PH 7.34* 7.25* 7.31* 7.32*   PCO2 51* 59* 53* 52*   PO2 104 136* 58* 88   HCO3 28 26 27 27   O2PER 65% 80% FiO2 100 70       Current Vent Settings: Ventilation Mode: CMV/AC  (Continuous Mandatory Ventilation/ Assist Control)  FiO2 (%): 55 %  Rate Set (breaths/minute): 26 breaths/min  Tidal Volume Set (mL): 350 mL  PEEP (cm H2O): 15 cmH2O  Oxygen Concentration (%): 65 %  Resp: 26           Plan: Continue on full ventilator support and assess for daily sedation vacation / SBT    Jose Chaparro, RT

## 2020-11-25 NOTE — PROGRESS NOTES
BRIEF NUTRITION NOTE:    Monitoring TF adequacy.  A full Nutrition Assessment was completed 11/24.  See note for details.    NEW FINDINGS:  11/24:  AXR = Feeding tube in good position with tip in the distal duodenum (ND)   Propofol, Norepinephrine, and IVF off.  TF was started yesterday (2200) at 10 mL/hr.   Last recorded stool 11/19 (Mirilax + other bowel med to be added).  Mg 2.9 (H)  BUN 40 (H)  Received approval in rounds from Dr. Ahn to increase TF today (plan to meet preliminary needs).    INTERVENTIONS:  Enteral Nutrition - Modify composition and rate --> Will change TF to Replete with Fiber at 15 mL/hr;  Increase by 15 mL every 12 hrs to goal  60 mL/hr = 1440 kcals (18 kcal/kg), 92 gm pro (1.2 gm/kg), 1196 mL H20, 179 gm CHO,  22 gm fiber.    Marilou Garcia, RD, LD, CNSC

## 2020-11-26 NOTE — PLAN OF CARE
No acute change today; Family updated over the phone.   Continues to ooze / bleed at the a-line insertions site, despite interventions for hemostasis.     Problem: Adult Inpatient Plan of Care  Goal: Plan of Care Review  Outcome: No Change  Goal: Patient-Specific Goal (Individualized)  Outcome: No Change  Goal: Absence of Hospital-Acquired Illness or Injury  Outcome: No Change  Intervention: Identify and Manage Fall Risk  Recent Flowsheet Documentation  Taken 11/25/2020 1600 by Varinder Joaquin RN  Safety Promotion/Fall Prevention:   clutter free environment maintained   fall prevention program maintained   room organization consistent   safety round/check completed   activity supervised  Taken 11/25/2020 1200 by Varinder Joaquin RN  Safety Promotion/Fall Prevention:   clutter free environment maintained   fall prevention program maintained   room organization consistent   safety round/check completed   activity supervised  Taken 11/25/2020 0800 by Varinder Joaquin RN  Safety Promotion/Fall Prevention:   clutter free environment maintained   fall prevention program maintained   room organization consistent   safety round/check completed   activity supervised  Intervention: Prevent Skin Injury  Recent Flowsheet Documentation  Taken 11/25/2020 1800 by Varinder Joaquin, RN  Body Position: turned  Taken 11/25/2020 1600 by Varinder Joaquin, RN  Body Position: turned  Taken 11/25/2020 1400 by Varinder Joaquin, RN  Body Position: turned  Taken 11/25/2020 1200 by Varinder Joaquin, RN  Body Position: turned  Taken 11/25/2020 1000 by Varinder Joaquin, RN  Body Position: turned  Taken 11/25/2020 0800 by Varinder Joaquin RN  Body Position: turned  Intervention: Prevent and Manage VTE (Venous Thromboembolism) Risk  Recent Flowsheet Documentation  Taken 11/25/2020 1600 by Varinder Joaquin, RN  VTE Prevention/Management: pneumatic compression device  Taken 11/25/2020 1200 by Varinder Joaquin, RN  VTE Prevention/Management: pneumatic  compression device  Taken 11/25/2020 0800 by Varinder Joaquin, RN  VTE Prevention/Management: pneumatic compression device  Intervention: Prevent Infection  Recent Flowsheet Documentation  Taken 11/25/2020 1600 by Varinder Joaquin, RN  Infection Prevention:   environmental surveillance performed   equipment surfaces disinfected   hand hygiene promoted   personal protective equipment utilized   single patient room provided   visitors restricted/screened  Taken 11/25/2020 1200 by Varinder Joaquin, RN  Infection Prevention:   environmental surveillance performed   equipment surfaces disinfected   hand hygiene promoted   personal protective equipment utilized   single patient room provided   visitors restricted/screened  Taken 11/25/2020 0800 by Varinder Joaquin, RN  Infection Prevention:   environmental surveillance performed   equipment surfaces disinfected   hand hygiene promoted   personal protective equipment utilized   single patient room provided   visitors restricted/screened  Goal: Optimal Comfort and Wellbeing  Outcome: No Change  Goal: Readiness for Transition of Care  Outcome: No Change

## 2020-11-26 NOTE — PHARMACY-VANCOMYCIN DOSING SERVICE
Pharmacy Vancomycin Initial Note  Date of Service 2020  Patient's  1940  80 year old, male    Indication: Sepsis    Current estimated CrCl = Estimated Creatinine Clearance: 90.1 mL/min (based on SCr of 0.72 mg/dL).    Creatinine for last 3 days  2020:  4:29 AM Creatinine 0.51 mg/dL  2020:  4:10 AM Creatinine 0.82 mg/dL  2020:  4:29 AM Creatinine 0.72 mg/dL    Recent Vancomycin Level(s) for last 3 days  2020:  6:00 AM Vancomycin Level 21.4 mg/L  2020: 10:25 PM Vancomycin Level 22.8 mg/L      Vancomycin IV Administrations (past 72 hours)                   vancomycin 1500 mg in 0.9% NaCl 250 ml intermittent infusion 1,500 mg (mg) 1,500 mg Given 20 0902     1,500 mg Given 20     1,500 mg Given  09    vancomycin (VANCOCIN) 1,750 mg in sodium chloride 0.9 % 250 mL intermittent infusion (mg) 1,750 mg New Bag 20 1856     1,750 mg New Bag  0555                Nephrotoxins and other renal medications (From now, onward)    Start     Dose/Rate Route Frequency Ordered Stop    20 0200  vancomycin 1500 mg in 0.9% NaCl 250 ml intermittent infusion 1,500 mg      1,500 mg  over 90 Minutes Intravenous EVERY 18 HOURS 204      11/10/20 2116  ibuprofen (ADVIL/MOTRIN) tablet 400 mg      400 mg Oral EVERY 6 HOURS PRN 11/10/20 2116            Contrast Orders - past 72 hours (72h ago, onward)    None                Plan:  1.  Start vancomycin  1500 mg IV q18h.   2.  Goal Trough Level: 15-20 mg/L   3.  Pharmacy will check trough levels as appropriate in 1-3 Days.    4. Serum creatinine levels will be ordered daily for the first week of therapy and at least twice weekly for subsequent weeks.    5. Schulenburg method utilized to dose vancomycin therapy: Method 1    Perry Shaw [facility]

## 2020-11-26 NOTE — PROGRESS NOTES
Chart Check:    Remains in ICU intubated. Tension pneumothorax s/p chest tube 11/24. No new anticoagulation recommendations, but could transition to IV heparin if that was more convenient should bleeding become an issue and quick reversal needed.   We will follow peripherally. Please reach out if there are questions or concerns.     Lanie Lopes MD  [facility]  [phone] (office)

## 2020-11-26 NOTE — PROGRESS NOTES
Comprehensive Daily ICU Note        Aly Sorensen MRN# [id number]   Age: 80 year old YOB: 1940     Date of Admission: 11/10/2020    Primary care provider: Zev Austin     CODE STATUS: DNR    Problem List:     Principal Problem:    Acute respiratory failure with hypoxia (H)  Active Problems:    Pneumonia due to 2019 novel coronavirus    Hypokalemia    Aortic thrombus (H)    DVT (deep venous thrombosis) (H)         Treatment goals for next 24 hours:   1.  Diuresis  2.  Switch Versed to Precedex  3 PICC line  4.  Full mechanical ventilation support.        Subjective/ Last 24 hours:   More bleeding in the left femoral area.  CVC and arterial catheter remain.    Hospital Course:   Patient admitted to the hospital on 11/10 with COVID.  He was transferred to the ICU on 11/12 because of high oxygen needs.  He was stabilized somewhat and was able to transition to high flow oxygen but over the  few days has spent a lot of the time on Bipap at 100% or High flow .   11/22/2020 was transferred to the ICU worsening respiratory status after being emotionally upset regarding his wife's medical issues.  He was intubated and sedated on transfer.  11/23/2020: Left sided tension pneumothorax with emergent chest tube placement  11/24/2020: Remained supine with PEEP at lower level of 13.         Mechanical Ventilation/Vitalsigns/IsandOs:       Temp:  [96.6  F (35.9  C)-99.5  F (37.5  C)] 98.8  F (37.1  C)  Pulse:  [76-93] 85  Resp:  [14-25] 22  BP: (100-126)/(55-78) 108/68  MAP:  [65 mmHg-74 mmHg] 72 mmHg  Arterial Line BP: (113-128)/(46-55) 128/54  FiO2 (%):  [50 %] 50 %  SpO2:  [90 %-97 %] 93 %      Ventilation Mode: CMV/AC  (Continuous Mandatory Ventilation/ Assist Control)  FiO2 (%): 50 %  Rate Set (breaths/minute): 26 breaths/min  Tidal Volume Set (mL): 350 mL  PEEP (cm H2O): 12 cmH2O  Oxygen Concentration (%): 50 %  Resp: 22    Day since 11/22           Physical Examination:     General: Stated age, heavily  sdated  HEENT: ET tube  Lungs: quiet breath sounds bilaterally anteriorly  CVS: RRR, tachycardia  Abdomen: +BS, soft  Extremities/musculoskeletal: good pulses, warm  Neurology: heavily sedated  Skin: normal  Psychiatry: unable  Exam of Line sites:  Groin arterial and central lines          Feeding/Glucose:     Orders Placed This Encounter      NPO for Medical/Clinical Reasons Except for: No Exceptions      Recent Labs   Lab 11/26/20  1129 11/26/20  0839 11/26/20  0612 11/26/20  0322 11/25/20  2350 11/25/20  1942 11/25/20  1641 11/25/20  0429 11/25/20  0429 11/24/20  0410 11/24/20  0410 11/23/20  0429 11/23/20  0429 11/22/20  1720 11/22/20  1720 11/22/20  1525   GLC  --   --  148*  --   --   --   --   --  114*  --  142*  --  217*  --  191* 232*   * 167*  --  131* 126* 127* 123*   < > 111*   < >  --    < >  --    < >  --   --     < > = values in this interval not displayed.            Medications:       albuterol  2.5 mg Nebulization Q6H     chlorhexidine  15 mL Mouth/Throat Q12H     enoxaparin ANTICOAGULANT  80 mg Subcutaneous Q12H     famotidine  20 mg Intravenous Q12H     furosemide  40 mg Intravenous Once     insulin aspart  1-4 Units Subcutaneous Q4H     meropenem  1,000 mg Intravenous Q8H     multivitamins w/minerals  15 mL Per Feeding Tube Daily     polyethylene glycol  17 g Oral Daily     sodium chloride (PF)  10 mL Intracatheter Q8H     sodium chloride (PF)  3 mL Intracatheter Q8H          dexmedetomidine       dextrose       epoprostenol (VELETRI) 20 mcg/mL in sterile water inhalation solution 20 ng/kg/min (11/26/20 1126)     fentaNYL 100 mcg/hr (11/26/20 1417)     - MEDICATION INSTRUCTIONS -       midazolam 2 mg/hr (11/26/20 1429)     - MEDICATION INSTRUCTIONS -       - MEDICATION INSTRUCTIONS -                Labs:         ROUTINE ICU LABS (Last four results)  CMP  Recent Labs   Lab 11/26/20  0612 11/25/20  0429 11/24/20  0410 11/23/20  0429 11/22/20  1720    139 139 139 140   POTASSIUM 4.6  4.9 5.3 4.6 3.9   CHLORIDE 110* 111* 110* 108 108   CO2 26 24 26 25 26   ANIONGAP 5 4 3 6 6   * 114* 142* 217* 191*   BUN 45* 40* 28 24 31*   CR 0.69 0.72 0.82 0.51* 0.62*   GFRESTIMATED 89 87 83 >90 >90   GFRESTBLACK >90 >90 >90 >90 >90   ELIA 7.4* 7.4* 7.5* 7.8* 7.8*   MAG 2.9* 2.9* 2.7*  --  2.3   PHOS 2.7  --  2.6  --  2.8   PROTTOTAL 5.0* 4.7* 5.2*  --  5.1*   ALBUMIN 1.4* 1.4* 1.4*  --  1.5*   BILITOTAL 0.6 0.8 0.4  --  0.4   ALKPHOS 66 58 67  --  78   AST 27 33 15  --  21   ALT 25 24 18  --  20     CBC  Recent Labs   Lab 11/26/20  0612 11/25/20  0429 11/24/20 0410 11/23/20  0429   WBC 20.5* 24.2* 29.8* 43.7*   RBC 4.08* 3.79* 4.06* 4.51   HGB 12.9* 12.0* 12.9* 14.5   HCT 39.6* 36.9* 39.6* 43.3   MCV 97 97 98 96   MCH 31.6 31.7 31.8 32.2   MCHC 32.6 32.5 32.6 33.5   RDW 14.0 14.0 14.0 13.8    311 376 409     INR  Recent Labs   Lab 11/22/20  1720   INR 1.38*     Arterial Blood Gas  Recent Labs   Lab 11/26/20  0612 11/25/20  1035 11/24/20  0410 11/23/20  2105 11/23/20  1600   PH  --  7.42 7.34* 7.25* 7.31*   PCO2  --  41 51* 59* 53*   PO2  --  78* 104 136* 58*   HCO3  --  27 28 26 27   O2PER 50  --  65% 80% FiO2 100       Other Lab Data:  Lactic acid:     Cultures:  Recent Labs   Lab 11/23/20  0415 11/22/20  1720   CULT Light growth  Normal jenifer   No growth after 4 days     Blood culture:  Invalid input(s): BC   Urine culture:   No results for input(s): URC in the last 168 hours.        Imaging/Other results:     CXR:  Endotracheal tube 3 cm above dalia. Enteric tube tip below lower aspect of film. Probe over mid esophageal region. Left chest tube. Bilateral infiltrates.    LE ultrasound 11/19/2020  RIGHT: Mild area of occlusive thrombus within the mid calf posterior tibial vein. No superficial thrombophlebitis. No popliteal cyst.  LEFT: No deep vein thrombosis. No superficial thrombophlebitis. No  popliteal cyst.    CT Chest 11/19/20 1.  No pulmonary embolism demonstrated. 2.  Extensive  bilateral groundglass and interstitial infiltrates compatible with history of COVID pneumonia. 3.  New filling defects within the distal aortic arch and mid to distal descending thoracic aorta from the comparison 9 days prior. This is most consistent with mural thrombus. 4.  Small wedge-shaped hypoenhancing area in the spleen, possibly a small splenic infarc         Assessment and Plan:     Summary: Aly Sorensen is a 80 year old male admitted on 11/10/2020 for COVID. Transferred to the ICU for worsening ARDS on 11/22/20.      I have personally reviewed the daily labs, imaging studies, cultures and discussed the case with referring physician and consulting physicians. My assessment and plan as follows:    Neurology and Psychiatry:  No pre hospital issues  Sedated with Versed.  On fentanyl for pain control\  -RASS goal --2 to -3    Pulmonary:   ARDS from COVID. Initial infection in early November.  Intubated for mechanical ventilation on 11/22/2020. Meets ARDS criteria.  -Continue lung protective ventilation.  Proning as needed.  -Viliterifull strength.  -Got one  dose of Solu-Medrol on 11/22/2020.  Not planning to continue.  Tension pneumothorax on 11/23/2020  -Left-sided chest tube in place with persistent air leak.  Will keep in place till on positive pressure ventilation.    Cardiovascular system:   Shock: Likely multifactorial. Could have component of sepsis.  Lactic acid normalized.  No echo on file..  -Wean pressors.  Keep MAP greater than 65.  -Volume goal for today net negative ~500 cc.  Sinus rhythm  -Keep K >4, Mg >2    Renal/Electrolytes:   Slight increase in BUN on transfer but creatinine within normal range.  -Renal dosing of medications  -avoid nephrotoxic medications    ID: Fever curve improved.  Sputum stain from 11/22/2020 with positive Gram stain including gram-negative rods.  Cultures with light growth of normal jenifer.  IL-6: 18.6, IL-1 beta 0.2, IL-823.0, TNF alpha 14.0. --> l 67.3,  ferritin 1/4/2009, .  -Vancomycin 1/22-11/25   - Meropenem, plan for ED course.  Started 1122.  -Remdesivir course completed.  -We will check inflammatory markers including cytokine storm panel.    GI/:  On famotidine     Nutrition:   Nutrition consult in the morning for tube feeds    Musculoskeletal/Rheumatology:  No issues at this time    Endocrine:   ICU insulin protocol     Heme/Onc:  Aortic thrombus and DVT related to COVID coagulopathy. On Enoxaparin  - Continue and follow.  Slight bleeding at the site of left femoral CVC.  Improved.    ICU prophylaxis:      DVT: Enoxaparin     VAP: As above     Stress ulcer: Famotidine     Restraints needed: yes     Lines   Central Line/PICC - placed 11/22-11/25: y   Arterial line - placed 11/22 -11/25.              PICC line placed 1126   Jane catheter.  Needed: y       Prognosis:   Guarded    Family update: Yes    Billing: total time spend providing critical care was 40 min, excluding procedure time.

## 2020-11-26 NOTE — PROGRESS NOTES
Patients left femoral arterial line and left femoral CVC had been leaking blood despite added sutures. Patient no longer has pressors infusing therefore both burak and cvc have been removed at 20:55 with firm pressure held until 21:25. Pressure dressing has been applied appropriately.

## 2020-11-26 NOTE — PROCEDURES
[facility]    Triple Lumen PICC Placement    Date/Time: 11/26/2020 12:51 PM  Performed by: Paulette German RN  Authorized by: Clark Ahn MD   Indications: vascular access    UNIVERSAL PROTOCOL   Site Marked: Yes  Prior Images Obtained and Reviewed:  Yes  Required items: Required blood products, implants, devices and special equipment available    Patient identity confirmed:  Arm band and hospital-assigned identification number  NA - No sedation, light sedation, or local anesthesia  Confirmation Checklist:  Patient's identity using two indicators, relevant allergies, procedure was appropriate and matched the consent or emergent situation and correct equipment/implants were available  Time out: Immediately prior to the procedure a time out was called    Universal Protocol: the Joint Commission Universal Protocol was followed    Preparation: Patient was prepped and draped in usual sterile fashion           ANESTHESIA    Anesthesia: Local infiltration  Local Anesthetic:  Lidocaine 1% without epinephrine  Anesthetic Total (mL):  3      SEDATION    Patient Sedated: No    Vital signs: Vital signs monitored during sedation        Preparation: skin prepped with 2% chlorhexidine  Skin prep agent: skin prep agent completely dried prior to procedure  Sterile barriers: maximum sterile barriers were used: cap, mask, sterile gown, sterile gloves, and large sterile sheet  Hand hygiene: hand hygiene performed prior to central venous catheter insertion  Type of line used: PICC and Power PICC  Catheter type: triple lumen  Lumen type: valved  Catheter size: 5 Fr  Brand: Bard  Lot number: [id number]  Placement method: MST and ultrasound  Number of attempts: 1  Successful placement: yes  Orientation: right  Location: brachial vein (lateral)  Arm circumference: adults 10 cm  Extremity circumference: 30  Visible catheter length: 7  Internal length: 41 cm  Total catheter length: 48  Dressing and securement:  blood cleaned with CHG, blood removed, chlorhexidine patch applied, occlusive dressing applied, sterile dressing applied and securement device  Post procedure assessment: blood return through all ports (tip confirmed by ECG)  PROCEDURE   Patient Tolerance:  Patient tolerated the procedure well with no immediate complications  Describe Procedure: Right upper arm picc line placed  Line ready for immediate use

## 2020-11-26 NOTE — PROGRESS NOTES
Count includes the [facility] ICU RESPIRATORY NOTE           Date of Admission: 11/10/2020     Date of Intubation (most recent): 11/22/2020     Reason for Mechanical Ventilation: Respiratory failure      Number of Days on Mechanical Ventilation: 4     Met Criteria for Spontaneous Breathing Trial: No      Reason for No Spontaneous Breathing Trial: Per MD      Significant Events Today: no acute changes overnight.      ABG Results:               Recent Labs   Lab 11/24/20  0410 11/23/20  2105 11/23/20  1600 11/23/20  0438   PH 7.34* 7.25* 7.31* 7.32*   PCO2 51* 59* 53* 52*   PO2 104 136* 58* 88   HCO3 28 26 27 27   O2PER 65% 80% FiO2 100 70       Current Vent Settings: Ventilation Mode: CMV/AC  (Continuous Mandatory Ventilation/ Assist Control)  FiO2 (%): 55 %  Rate Set (breaths/minute): 26 breaths/min  Tidal Volume Set (mL): 350 mL  PEEP (cm H2O): 15 cmH2O  Oxygen Concentration (%): 65 %  Resp: 26           Plan: Continue on full ventilator support and assess for daily sedation vacation / SBT    Jose Chaparro, RT

## 2020-11-26 NOTE — PROGRESS NOTES
[facility] ICU RESPIRATORY NOTE        Date of Admission: 11/10/2020    Date of Intubation (most recent): 11/22/20    Reason for Mechanical Ventilation: Respiratory Failure    Number of Days on Mechanical Ventilation: 5    Met Criteria for Spontaneous Breathing Trial: No    Reason for No Spontaneous Breathing Trial: Did not meet criteria     Significant Events Today: None    ABG Results:   Recent Labs   Lab 11/26/20  0612 11/25/20  1035 11/24/20  0410 11/23/20  2105 11/23/20  1600   PH  --  7.42 7.34* 7.25* 7.31*   PCO2  --  41 51* 59* 53*   PO2  --  78* 104 136* 58*   HCO3  --  27 28 26 27   O2PER 50  --  65% 80% FiO2 100       Current Vent Settings: Ventilation Mode: CMV/AC  (Continuous Mandatory Ventilation/ Assist Control)  FiO2 (%): 50 %  Rate Set (breaths/minute): 26 breaths/min  Tidal Volume Set (mL): 350 mL  PEEP (cm H2O): 12 cmH2O  Oxygen Concentration (%): 50 %  Resp: 22      Plan: Continue vent support    Josselyn Hess, RT

## 2020-11-27 NOTE — PROGRESS NOTES
Comprehensive Daily ICU Note        Aly Sorensen MRN# [id number]   Age: 80 year old YOB: 1940     Date of Admission: 11/10/2020    Primary care provider: Zev Austin     CODE STATUS: DNR    Problem List:     Principal Problem:    Acute respiratory failure with hypoxia (H)  Active Problems:    Pneumonia due to 2019 novel coronavirus    Hypokalemia    Aortic thrombus (H)    DVT (deep venous thrombosis) (H)         Treatment goals for next 24 hours:   1.  CT chest abdomen pelvis  2.  Hold Lovenox.  If no obvious bleeding on the imaging studies will restart with IV UFH  3.  Switch sedation to propofol.  4.  Diurese        Subjective/ Last 24 hours:     No major changes overnight.  This morning crease chest tube output.  Also question of bloody secretions in the ET tube aspirate.      Hospital Course:   Patient admitted to the hospital on 11/10 with COVID.  He was transferred to the ICU on 11/12 because of high oxygen needs.  He was stabilized somewhat and was able to transition to high flow oxygen but over the  few days has spent a lot of the time on Bipap at 100% or High flow .   11/22/2020 was transferred to the ICU worsening respiratory status after being emotionally upset regarding his wife's medical issues.  He was intubated and sedated on transfer.  11/23/2020: Left sided tension pneumothorax with emergent chest tube placement  11/24/2020: Remained supine with PEEP at lower level of 13.  11/26/20: Incision to Precedex but vent asynchrony.         Mechanical Ventilation/Vitalsigns/IsandOs:       Temp:  [98.4  F (36.9  C)-100  F (37.8  C)] 99.7  F (37.6  C)  Pulse:  [64-94] 65  Resp:  [19-33] 25  BP: ()/(49-83) 109/75  FiO2 (%):  [50 %] 50 %  SpO2:  [85 %-95 %] 94 %      Ventilation Mode: CMV/AC  (Continuous Mandatory Ventilation/ Assist Control)  FiO2 (%): 50 %  Rate Set (breaths/minute): 26 breaths/min  Tidal Volume Set (mL): 370 mL  PEEP (cm H2O): 10 cmH2O  Oxygen Concentration (%): 60  %  Resp: 25    Day since 11/22           Physical Examination:     General: Stated age, heavily sdated  HEENT: ET tube  Lungs: quiet breath sounds bilaterally anteriorly  CVS: RRR, tachycardia  Abdomen: +BS, soft  Extremities/musculoskeletal: good pulses, warm  Neurology: heavily sedated  Skin: normal  Psychiatry: unable  Exam of Line sites:  Groin arterial and central lines          Feeding/Glucose:     Orders Placed This Encounter      NPO for Medical/Clinical Reasons Except for: No Exceptions      Recent Labs   Lab 11/27/20  1210 11/27/20  0756 11/27/20  0433 11/27/20  0430 11/26/20  2338 11/26/20  2103 11/26/20  1655 11/26/20  0612 11/26/20  0612 11/25/20  0429 11/25/20  0429 11/24/20  0410 11/24/20  0410 11/23/20  0429 11/23/20  0429 11/22/20  1720 11/22/20  1720   GLC  --   --   --  192*  --   --   --   --  148*  --  114*  --  142*  --  217*  --  191*   * 175* 162*  --  181* 181* 188*   < >  --    < > 111*   < >  --    < >  --    < >  --     < > = values in this interval not displayed.            Medications:       albuterol  2.5 mg Nebulization Q6H     chlorhexidine  15 mL Mouth/Throat Q12H     famotidine  20 mg Intravenous Q12H     insulin aspart  1-6 Units Subcutaneous Q4H     meropenem  1,000 mg Intravenous Q8H     multivitamins w/minerals  15 mL Per Feeding Tube Daily     polyethylene glycol  17 g Oral Daily     sodium chloride (PF)  10 mL Intracatheter Q8H     sodium chloride (PF)  3 mL Intracatheter Q8H          dexmedetomidine 0.3 mcg/kg/hr (11/27/20 1331)     epoprostenol (VELETRI) 20 mcg/mL in sterile water inhalation solution 20 ng/kg/min (11/27/20 1203)     fentaNYL 100 mcg/hr (11/27/20 1216)     - MEDICATION INSTRUCTIONS -       midazolam Stopped (11/27/20 1322)     - MEDICATION INSTRUCTIONS -       - MEDICATION INSTRUCTIONS -       propofol 20 mcg/kg/min (11/27/20 1319)          Labs:     ROUTINE ICU LABS (Last four results)  CMP  Recent Labs   Lab 11/27/20  6900 11/26/20  2929  11/25/20 0429 11/24/20  0410 11/22/20  1720 11/22/20  1720    141 139 139   < > 140   POTASSIUM 4.1 4.6 4.9 5.3   < > 3.9   CHLORIDE 109 110* 111* 110*   < > 108   CO2 31 26 24 26   < > 26   ANIONGAP 3 5 4 3   < > 6   * 148* 114* 142*   < > 191*   BUN 51* 45* 40* 28   < > 31*   CR 0.63* 0.69 0.72 0.82   < > 0.62*   GFRESTIMATED >90 89 87 83   < > >90   GFRESTBLACK >90 >90 >90 >90   < > >90   ELIA 7.6* 7.4* 7.4* 7.5*   < > 7.8*   MAG  --  2.9* 2.9* 2.7*  --  2.3   PHOS  --  2.7  --  2.6  --  2.8   PROTTOTAL 4.5* 5.0* 4.7* 5.2*  --  5.1*   ALBUMIN 1.2* 1.4* 1.4* 1.4*  --  1.5*   BILITOTAL 0.4 0.6 0.8 0.4  --  0.4   ALKPHOS 58 66 58 67  --  78   AST 18 27 33 15  --  21   ALT 18 25 24 18  --  20    < > = values in this interval not displayed.     CBC  Recent Labs   Lab 11/27/20 0430 11/26/20 0612 11/25/20 0429 11/24/20  0410   WBC 16.7* 20.5* 24.2* 29.8*   RBC 3.42* 4.08* 3.79* 4.06*   HGB 10.6* 12.9* 12.0* 12.9*   HCT 33.1* 39.6* 36.9* 39.6*   MCV 97 97 97 98   MCH 31.0 31.6 31.7 31.8   MCHC 32.0 32.6 32.5 32.6   RDW 14.0 14.0 14.0 14.0    216 311 376     INR  Recent Labs   Lab 11/22/20  1720   INR 1.38*     Arterial Blood Gas  Recent Labs   Lab 11/27/20 0430 11/26/20  0612 11/25/20  1035 11/24/20  0410 11/23/20  2105 11/23/20  1600   PH  --   --  7.42 7.34* 7.25* 7.31*   PCO2  --   --  41 51* 59* 53*   PO2  --   --  78* 104 136* 58*   HCO3  --   --  27 28 26 27   O2PER 50% 50  --  65% 80% FiO2 100       Other Lab Data:  Lactic acid:     Cultures:  Recent Labs   Lab 11/23/20  0415 11/22/20  1720   CULT Light growth  Normal jenifer   No growth after 5 days     Blood culture:  Invalid input(s): BC   Urine culture:   No results for input(s): URC in the last 168 hours.        Imaging/Other results:     CXR:  New right PICC line tip in the SVC. Left chest tube unchanged. No pneumothorax. Bilateral pulmonary infiltrates have not appreciably changed. Endotracheal tube remains in good position above the  dalia. Esophageal probe in the mid esophagus. Feeding tube  courses below the diaphragm.     LE ultrasound 11/19/2020  RIGHT: Mild area of occlusive thrombus within the mid calf posterior tibial vein. No superficial thrombophlebitis. No popliteal cyst.  LEFT: No deep vein thrombosis. No superficial thrombophlebitis. No  popliteal cyst.    CT Chest 11/19/20 1.  No pulmonary embolism demonstrated. 2.  Extensive bilateral groundglass and interstitial infiltrates compatible with history of COVID pneumonia. 3.  New filling defects within the distal aortic arch and mid to distal descending thoracic aorta from the comparison 9 days prior. This is most consistent with mural thrombus. 4.  Small wedge-shaped hypoenhancing area in the spleen, possibly a small splenic infarc         Assessment and Plan:     Summary: Aly Sorensen is a 80 year old male admitted on 11/10/2020 for COVID. Transferred to the ICU for worsening ARDS on 11/22/20.      I have personally reviewed the daily labs, imaging studies, cultures and discussed the case with referring physician and consulting physicians. My assessment and plan as follows:    Neurology and Psychiatry:  No pre hospital issues  Sedated with Versed/Precedex.  On fentanyl for pain control\  -Switch to propofol with RASS goal --2 to -3    Pulmonary:   ARDS from COVID. Initial infection in early November.  Intubated for mechanical ventilation on 11/22/2020. Meets ARDS criteria.  Plan  -Continue lung protective ventilation.  Proning as needed.  -Viliteri full strength.  Will consider decreasing once PEEP below 10.  -Got one dose of Solu-Medrol on 11/22/2020.  Not planning to continue.  Tension pneumothorax on 11/23/2020  -Left-sided chest tube in place with persistent air leak.  Will keep in place till on positive pressure ventilation.  -Obtain chest CT for chest tube placement.    Cardiovascular system:   Shock: Likely multifactorial. Could have component of sepsis.  Echo 11/26/2020  with LVEF 60 to 65%, dilated RV.  No change in IVC diameter with hypoventilation.  -Wean pressors.  Keep MAP greater than 65.  -Volume goal for today: Diurese for net negative by ~500 cc. Overall fairly even since admit.  Sinus rhythm  -Keep K >4, Mg >2    Renal/Electrolytes:   Slight increase in BUN on transfer but creatinine within normal range.  -Renal dosing of medications  -avoid nephrotoxic medications    ID: Fever curve improved.  Sputum stain from 11/22/2020 with positive Gram stain including gram-negative rods.  Cultures with light growth of normal jenifer.  IL-6: 18.6, IL-1 beta 0.2, IL-823.0, TNF alpha 14.0. --> l 67.3, ferritin 1/4/2009, .  -Vancomycin 11/22-11/25   - Meropenem, plan for ED course.  Started 1122.  -Remdesivir course completed.    GI/:  On famotidine     Nutrition:   On tube feeds.    Musculoskeletal/Rheumatology:  No issues at this time    Endocrine:   ICU insulin protocol     Heme/Onc:  Aortic thrombus and DVT related to COVID coagulopathy. On Enoxaparin.  Some bleeding at the CVC and A-line site resulting in removal of the central catheters.  Now concern for is decreasing drainage in the chest tube and slightly bloody ET tube secretions.  -10 a levels now.  -Hold enoxaparin.  -CT chest abdomen pelvis.  If no bleeding will restart anticoagulation with IV UFH    ICU prophylaxis:      DVT: Enoxaparin     VAP: As above     Stress ulcer: Famotidine     Restraints needed: yes     Lines   Central Line/PICC - placed 11/22-11/25: y   Arterial line - placed 11/22 -11/25.              PICC line placed 1126   Jane catheter.  Needed: y       Prognosis:   Guarded    Family update: Yes    Billing: total time spend providing critical care was 40 min, excluding procedure time.

## 2020-11-27 NOTE — PROGRESS NOTES
Chart Check:    Remains in ICU intubated. Tension pneumothorax s/p chest tube 11/24. No new anticoagulation recommendations, but could transition to IV heparin if that was more convenient should bleeding become an issue and quick reversal needed.   We will follow peripherally. Please reach out if there are questions or concerns.       Abelardo EAGLE, CNP  [facility]  [phone] (office) or [phone] (cell)

## 2020-11-27 NOTE — PLAN OF CARE
Remains intubated and sedated. Versed infusing at 1, and dex at 0.4. On full strength velitri. TF infusing increased to goal rate of 60. Jane patent with adequate output. Will continue to monitor.

## 2020-11-27 NOTE — PROGRESS NOTES
[facility] ICU RESPIRATORY NOTE           Date of Admission: 11/10/2020     Date of Intubation (most recent): 11/22/20     Reason for Mechanical Ventilation: Respiratory Failure     Number of Days on Mechanical Ventilation: 7     Met Criteria for Spontaneous Breathing Trial: No     Reason for No Spontaneous Breathing Trial: Did not meet criteria      Significant Events Today: None overnight.      ABG Results:           Recent Labs   Lab 11/26/20  0612 11/25/20  1035 11/24/20  0410 11/23/20  2105 11/23/20  1600   PH  --  7.42 7.34* 7.25* 7.31*   PCO2  --  41 51* 59* 53*   PO2  --  78* 104 136* 58*   HCO3  --  27 28 26 27   O2PER 50  --  65% 80% FiO2 100       Current Vent Settings: Ventilation Mode: CMV/AC  (Continuous Mandatory Ventilation/ Assist Control)  FiO2 (%): 50 %  Rate Set (breaths/minute): 26 breaths/min  Tidal Volume Set (mL): 350 mL  PEEP (cm H2O): 12 cmH2O  Oxygen Concentration (%): 50 %  Resp: 22        Plan: Continue vent support and assess as needed per MD order.    Jose Chaparro, RT

## 2020-11-27 NOTE — PLAN OF CARE
No acute change; PICC line placed at bedside; Started weaning Versed, added Precedex to sedation. Heart rate and blood pressure started dropping, will monitor Precedex side effects while weaning Versed. PEEP down to 10, tolerated well, 02 Sats adequate. Family updated, had a video chat at bedside this AM.

## 2020-11-28 NOTE — PROGRESS NOTES
[facility] ICU RESPIRATORY NOTE        Date of Admission: 11/10/2020    Date of Intubation (most recent): 11/22/20    Reason for Mechanical Ventilation: Resp failure     Number of Days on Mechanical Ventilation: 8    Met Criteria for Spontaneous Breathing Trial: NO  Reason for No Spontaneous Breathing Trial: Per MD    Significant Events Today: Pt remained proned and head turned Q2.     ABG Results:   Recent Labs   Lab 11/28/20  0305 11/27/20  2120 11/27/20  0430 11/26/20  0612 11/25/20  1035 11/24/20  0410   PH 7.39 7.43  --   --  7.42 7.34*   PCO2 56* 52*  --   --  41 51*   PO2 102 57*  --   --  78* 104   HCO3 34* 35*  --   --  27 28   O2PER 60 60% 50% 50  --  65%       Current Vent Settings: Ventilation Mode: CMV/AC  (Continuous Mandatory Ventilation/ Assist Control)  FiO2 (%): 60 %  Rate Set (breaths/minute): 26 breaths/min  Tidal Volume Set (mL): 400 mL  PEEP (cm H2O): 10 cmH2O  Oxygen Concentration (%): 60 %  Resp: 13      Plan: Continue on full ventilator support.     Ronni Barragan, RT

## 2020-11-28 NOTE — PLAN OF CARE
Neuro: Sedated on propofol. PERRL. No response to stimuli this evening. Not following commands.    Pain: Fentanyl infusion.    CV: SR with occ. PACs. BPs soft with propofol, started levophed this evening.     Pulm: Dyssynchronous with the vent. Lung sounds coarse/diminished. Chest tube to suction, serosanguinous output.      GI/: Jane patent, good urine output; diurese with IV lasix. NJ with continuous tube feed at goal rate. Blood glucose monitored.    Skin: Bruising, blanchable redness. Left groin site with suture, moist - site cleansed.    Lines/Drips: Heparin drip started this evening.     Additional: Chest/abdominal/pelvis CT completed today. Proned at 18:30 this evening. Plan for oncoming shift to give paralytic.

## 2020-11-28 NOTE — PROGRESS NOTES
Comprehensive Daily ICU Note        Aly Sorensen MRN# [id number]   Age: 80 year old YOB: 1940     Date of Admission: 11/10/2020    Primary care provider: Zev Austin     CODE STATUS: DNR    Problem List:     Principal Problem:    Acute respiratory failure with hypoxia (H)  Active Problems:    Pneumonia due to 2019 novel coronavirus    Hypokalemia    Aortic thrombus (H)    DVT (deep venous thrombosis) (H)         Treatment goals for next 24 hours:   1.  Continue prone ventilation  2.  Amiodarone for rate control  3.  Volume challenge.  4.  DC propofol switch to Versed, bis monitoring to assist with switching to Versed.        Subjective/ Last 24 hours:   CT scan chest abdomen pelvis concerns for bleeding.  Extensive airspace disease present no obvious bleeding.  Was asynchronous with the ventilator so switched to prone positioning.  Also needed paralysis.      Hospital Course:   Patient admitted to the hospital on 11/10 with COVID.  He was transferred to the ICU on 11/12 because of high oxygen needs.  He was stabilized somewhat and was able to transition to high flow oxygen but over the  few days has spent a lot of the time on Bipap at 100% or High flow .   11/22/2020 was transferred to the ICU worsening respiratory status after being emotionally upset regarding his wife's medical issues.  He was intubated and sedated on transfer.  11/23/2020: Left sided tension pneumothorax with emergent chest tube placement  11/24/2020: Remained supine with PEEP at lower level of 13.  11/26/20: Transitioned to Precedex but vent asynchrony.  11/27/2020: CT chest abdomen pelvis for hemoglobin drop ~2 points.  Extensive airspace disease.  Prone ventilation and paralysis reinitiated because of vent asynchrony.         Mechanical Ventilation/Vitalsigns/IsandOs:       Temp:  [96.4  F (35.8  C)-100  F (37.8  C)] 98.8  F (37.1  C)  Pulse:  [] 103  Resp:  [10-33] 22  BP: ()/(51-83) 85/62  FiO2 (%):  [50  %-60 %] 50 %  SpO2:  [85 %-100 %] 96 %      Ventilation Mode: CMV/AC  (Continuous Mandatory Ventilation/ Assist Control)  FiO2 (%): 50 %  Rate Set (breaths/minute): 22 breaths/min  Tidal Volume Set (mL): 370 mL  PEEP (cm H2O): 10 cmH2O  Oxygen Concentration (%): 50 %  Resp: 22    Day since 11/22         Physical Examination:     General: Stated age, heavily sdated  HEENT: ET tube  Lungs: quiet breath sounds bilaterally anteriorly  CVS: RRR, tachycardia  Abdomen: +BS, soft  Extremities/musculoskeletal: good pulses, warm  Neurology: heavily sedated  Skin: normal  Psychiatry: unable  Exam of Line sites:  Groin arterial and central lines          Feeding/Glucose:     Orders Placed This Encounter      NPO for Medical/Clinical Reasons Except for: No Exceptions      Recent Labs   Lab 11/28/20  0809 11/28/20  0413 11/28/20  0404 11/27/20  2356 11/27/20  2033 11/27/20  1532 11/27/20  1210 11/27/20  0430 11/27/20  0430 11/26/20  0612 11/26/20  0612 11/25/20  0429 11/25/20  0429 11/24/20  0410 11/24/20  0410 11/23/20  0429 11/23/20  0429   GLC  --   --  146*  --   --   --   --   --  192*  --  148*  --  114*  --  142*  --  217*   * 136*  --  157* 159* 169* 159*   < >  --    < >  --    < > 111*   < >  --    < >  --     < > = values in this interval not displayed.            Medications:       albumin human  50 g Intravenous Once     amiodarone  150 mg Intravenous Once     artificial tears   Both Eyes Q8H     calcium chloride  1 g Intravenous Once     chlorhexidine  15 mL Mouth/Throat Q12H     famotidine  20 mg Intravenous Q12H     insulin aspart  1-6 Units Subcutaneous Q4H     meropenem  1,000 mg Intravenous Q8H     multivitamins w/minerals  15 mL Per Feeding Tube Daily     polyethylene glycol  17 g Oral Daily     sodium chloride (PF)  10 mL Intracatheter Q8H     sodium chloride (PF)  3 mL Intracatheter Q8H          amiodarone       amiodarone       epoprostenol (VELETRI) 20 mcg/mL in sterile water inhalation solution 20  ng/kg/min (11/28/20 0810)     fentaNYL 150 mcg/hr (11/28/20 0851)     heparin 1,400 Units/hr (11/28/20 0851)     - MEDICATION INSTRUCTIONS -       midazolam       - MEDICATION INSTRUCTIONS -       norepinephrine Stopped (11/27/20 2142)     - MEDICATION INSTRUCTIONS -       propofol 50 mcg/kg/min (11/28/20 0752)     sodium chloride 20 mL/hr at 11/27/20 1643     vecuronium (NORCURON) infusion ADULT 0.9 mcg/kg/min (11/28/20 0851)          Labs:     ROUTINE ICU LABS (Last four results)  CMP  Recent Labs   Lab 11/28/20  0404 11/27/20  0430 11/26/20  0612 11/25/20  0429 11/24/20  0410 11/22/20  1720 11/22/20  1720    143 141 139 139   < > 140   POTASSIUM 4.2 4.1 4.6 4.9 5.3   < > 3.9   CHLORIDE 105 109 110* 111* 110*   < > 108   CO2 34* 31 26 24 26   < > 26   ANIONGAP 2* 3 5 4 3   < > 6   * 192* 148* 114* 142*   < > 191*   BUN 48* 51* 45* 40* 28   < > 31*   CR 0.63* 0.63* 0.69 0.72 0.82   < > 0.62*   GFRESTIMATED >90 >90 89 87 83   < > >90   GFRESTBLACK >90 >90 >90 >90 >90   < > >90   ELIA 7.8* 7.6* 7.4* 7.4* 7.5*   < > 7.8*   MAG 2.8*  --  2.9* 2.9* 2.7*  --  2.3   PHOS 4.0  --  2.7  --  2.6  --  2.8   PROTTOTAL 5.3* 4.5* 5.0* 4.7* 5.2*  --  5.1*   ALBUMIN 1.3* 1.2* 1.4* 1.4* 1.4*  --  1.5*   BILITOTAL 0.7 0.4 0.6 0.8 0.4  --  0.4   ALKPHOS 73 58 66 58 67  --  78   AST 29 18 27 33 15  --  21   ALT 29 18 25 24 18  --  20    < > = values in this interval not displayed.     CBC  Recent Labs   Lab 11/28/20  0404 11/27/20  0430 11/26/20  0612 11/25/20  0429   WBC 20.4* 16.7* 20.5* 24.2*   RBC 3.80* 3.42* 4.08* 3.79*   HGB 11.8* 10.6* 12.9* 12.0*   HCT 37.5* 33.1* 39.6* 36.9*   MCV 99 97 97 97   MCH 31.1 31.0 31.6 31.7   MCHC 31.5 32.0 32.6 32.5   RDW 14.1 14.0 14.0 14.0    184 216 311     INR  Recent Labs   Lab 11/22/20  1720   INR 1.38*     Arterial Blood Gas  Recent Labs   Lab 11/28/20  0305 11/27/20  2120 11/27/20  0430 11/26/20  0612 11/25/20  1035 11/24/20  0410   PH 7.39 7.43  --   --  7.42 7.34*    PCO2 56* 52*  --   --  41 51*   PO2 102 57*  --   --  78* 104   HCO3 34* 35*  --   --  27 28   O2PER 60 60% 50% 50  --  65%       Other Lab Data:  Lactic acid:     Cultures:  Recent Labs   Lab 11/23/20  0415 11/22/20  1720   CULT Light growth  Normal jenifer   No growth     Blood culture:  Invalid input(s): BC   Urine culture:   No results for input(s): URC in the last 168 hours.        Imaging/Other results:     CXR:  Support lines stable. Patchy bilateral infiltrates increased from previous. Left chest tube tip at the apex. No definite pneumothorax evident in this position.    LE ultrasound 11/19/2020  RIGHT: Mild area of occlusive thrombus within the mid calf posterior tibial vein. No superficial thrombophlebitis. No popliteal cyst. LEFT: No deep vein thrombosis. No superficial thrombophlebitis. No  popliteal cyst.    CT CAP 11/27/20:  1. No convincing hemorrhage demonstrated in the chest, abdomen, and pelvis. 2. Increased pulmonary consolidative changes and extensive infiltrates  bilaterally.         Assessment and Plan:     Summary: Aly Sorensen is a 80 year old male admitted on 11/10/2020 for COVID. Transferred to the ICU for worsening ARDS on 11/22/20.      I have personally reviewed the daily labs, imaging studies, cultures and discussed the case with referring physician and consulting physicians. My assessment and plan as follows:    Neurology and Psychiatry:  No pre hospital issues  Sedated with propofol and fentanyl.  Also on paralysis  -Keep paralyzed x24 hours  -We will switch from propofol (hypertension) to Versed.  Bis monitoring to assist with transition  Pulmonary:   ARDS from COVID. Initial infection in early November.  Intubated for mechanical ventilation on 11/22/2020. Meets ARDS criteria.  Plan  -Continue lung protective ventilation.  Keep on for the next 24 hours  -Viliteri full strength.  -Got one dose of Solu-Medrol on 11/22/2020.  Not planning to continue.  Tension pneumothorax on  11/23/2020  -Left-sided chest tube in place with persistent air leak.  Will keep in place till on positive pressure ventilation.    Cardiovascular system:   Shock: Likely multifactorial. Could have component of sepsis.  Echo 11/26/2020 with LVEF 60 to 65%, dilated RV.  No change in IVC diameter with hypoventilation.  -Wean pressors.  Keep MAP greater than 65. Will consider hydrocortisone for CIRCI  -Volume goal for today: Hold diuretics.  A. fib with RVR.  -Keep K >4, Mg >2  -Amnio bolus and gtt. for rate control    Renal/Electrolytes:   Slight increase in BUN on transfer but creatinine within normal range.  -Renal dosing of medications  -avoid nephrotoxic medications    ID: Fever curve improved.  Sputum stain from 11/22/2020 with positive Gram stain including gram-negative rods.  Cultures with light growth of normal jenifer.  IL-6: 18.6, IL-1 beta 0.2, IL-823.0, TNF alpha 14.0. --> l 67.3, ferritin 1/4/2009, .  -Vancomycin 11/22-11/25   - Meropenem, plan for ED course.  Started 1122.  -Remdesivir course completed.    GI/:  On famotidine     Nutrition:   On tube feeds.  Decrease tube feeds rate while prone and paralyzed    Musculoskeletal/Rheumatology:  No issues at this time    Endocrine:   ICU insulin protocol     Heme/Onc:  Aortic thrombus and DVT related to COVID coagulopathy.  No retroperitoneal bleeding on CT.  Started on heparin gtt. on 11/27/2020.  Previously on enoxaparin.  -Continue heparin for anticoagulation    ICU prophylaxis:      DVT: IV UFH     VAP: As above     Stress ulcer: Famotidine     Restraints needed: yes     Lines   Central Line/PICC - placed 11/22-11/25: y   Arterial line - placed 11/22 -11/25.              PICC line placed 1126   Jane catheter.  Needed: y       Prognosis:   Guarded    Family update: Yes    Billing: total time spend providing critical care was 40 min, excluding procedure time.

## 2020-11-28 NOTE — PLAN OF CARE
[facility] Intensive Care Unit   Nursing Note                                                       Neuro:  PERRL.  Does not follow commands.  On Vecuronium.  Cardiovascular:  iRRR.  Hemodynamically stable.  Levo off.  Pulmonary:  Tolerating ventilator on propofol and fentanyl.  Oxygen saturation > 92  GI/:  Adequate UOP.  Endocrine: Glucose well controlled.  Skin:  Intact except incisional areas.  Protective mepilex applied to sacrum.  Restraints:  Not in use or necessary.  AM labs noted; electrolytes replaced as indicated.  Family updated on evenings  Continue to monitor closely.    Recent Labs   Lab 11/28/20  0305 11/27/20 2120 11/27/20 0430 11/26/20  0612 11/25/20  1035 11/24/20  0410   PH 7.39 7.43  --   --  7.42 7.34*   PCO2 56* 52*  --   --  41 51*   PO2 102 57*  --   --  78* 104   HCO3 34* 35*  --   --  27 28   O2PER 60 60% 50% 50  --  65%       ROUTINE IP LABS (Last four results)  BMP  Recent Labs   Lab 11/28/20 0404 11/27/20 0430 11/26/20 0612 11/25/20  0429    143 141 139   POTASSIUM 4.2 4.1 4.6 4.9   CHLORIDE 105 109 110* 111*   ELIA 7.8* 7.6* 7.4* 7.4*   CO2 34* 31 26 24   BUN 48* 51* 45* 40*   CR 0.63* 0.63* 0.69 0.72   * 192* 148* 114*     CBC  Recent Labs   Lab 11/28/20 0404 11/27/20 0430 11/26/20 0612 11/25/20  0429   WBC 20.4* 16.7* 20.5* 24.2*   RBC 3.80* 3.42* 4.08* 3.79*   HGB 11.8* 10.6* 12.9* 12.0*   HCT 37.5* 33.1* 39.6* 36.9*   MCV 99 97 97 97   MCH 31.1 31.0 31.6 31.7   MCHC 31.5 32.0 32.6 32.5   RDW 14.1 14.0 14.0 14.0    184 216 311     INR  Recent Labs   Lab 11/22/20  1720   INR 1.38*       Blood Glucose  Glucose (mg/dL)   Date Value   11/28/2020 136 (H)   11/27/2020 157 (H)   11/27/2020 159 (H)   11/27/2020 169 (H)   11/27/2020 159 (H)   11/27/2020 175 (H)       Intake/Output Summary (Last 24 hours) at 11/28/2020 0611  Last data filed at 11/28/2020 0600  Gross per 24 hour   Intake 3161.86 ml   Output 2615 ml   Net 546.86 ml       Michael Jolley MSN  RN PHN CCRN  [facility]  Intensive Care Unit

## 2020-11-29 NOTE — PROGRESS NOTES
[facility] ICU RESPIRATORY NOTE           Date of Admission: 11/10/2020     Date of Intubation (most recent): 11/22/20     Reason for Mechanical Ventilation: Resp failure      Number of Days on Mechanical Ventilation: 9     Met Criteria for Spontaneous Breathing Trial: NO  Reason for No Spontaneous Breathing Trial: Per MD     Significant Events Today: Pt remained proned and head turned Q2.      ABG Results:   Recent Labs   Lab 11/28/20  1045 11/28/20  0305 11/27/20  2120 11/27/20  0430 11/25/20  1035 11/25/20  1035 11/24/20  0410   PH  --  7.39 7.43  --   --  7.42 7.34*   PCO2  --  56* 52*  --   --  41 51*   PO2  --  102 57*  --   --  78* 104   HCO3  --  34* 35*  --   --  27 28   O2PER 40% 60 60% 50%   < >  --  65%    < > = values in this interval not displayed.     Ventilation Mode: CMV/AC  (Continuous Mandatory Ventilation/ Assist Control)  FiO2 (%): 50 %  Rate Set (breaths/minute): 22 breaths/min  Tidal Volume Set (mL): 370 mL  PEEP (cm H2O): 10 cmH2O  Oxygen Concentration (%): 50 %  Resp: 22    Brown Campos, RT

## 2020-11-29 NOTE — PROCEDURES
PREPROCEDURE DIAGNOSIS:  ARDS due to COVID   POSTPROCEDURE DIAGNOSIS: same   PROCEDURE:  Right Femoral arterial line     EBL: 5cc   FINDINGS: Arterial line in RIGHT femoral artery with good waveform   COMPLICATIONS: None apparent     PROCEDURE DETAILS:   Informed consent was obtained prior to our procedure.The patient's anatomical landmarks were reviewed prior to the procedure.   A timeout procedure was performed.  The patient was positioned supine with the right groin prepped and draped in sterile fashion.  The nurse monitored vital signs.  Additional sedation and analgesia were not needed as patient was sedated prior to line placement.     A strong femoral pulse was palpated.  Under ultrasound guidance, the right femoral artery was cannulated. A wire was passed through the needle. The introducer needle was removed over the wire.  The arterial line was inserted over the wire. The arterial line was attached to the pressure transducer tubing. The arterial line was sutured to the skin. A good waveform was demostrated. A biopatch and then sterile dressing was placed over the arterial line.   Dr. Ahn was present for all parts of this case.     Ulises Holt PA-C  Critical Care Service  [facility]

## 2020-11-29 NOTE — PROGRESS NOTES
Esophageal probe placed for assessment of transpleural pressures.    Esophageal -plateau pressures: 22  Esophageal-PEEP pressure: 2    These measurements were obtained on tidal volume of 350 and a PEEP of 10.  Medically decreased static lung compliance.  Likely related to ARDS physiology.  We will continue current ventilator settings.    Check ABG in 1 hour post prone ventilation and increased respiratory needed.

## 2020-11-29 NOTE — PLAN OF CARE
[facility] Intensive Care Unit   Nursing Note                                                       Neuro:  PERRL.  Chemically paralyzed.  Does not follow commands.  Cardiovascular:  RRR.  SLIM converted to NSR with Amio bolus and drip.  Hemodynamically stable off pressors.  Pulmonary:  Tolerating ventilator on versed and fentanyl.  Oxygen saturation > 92  GI/:  Adequate UOP.  Endocrine: Glucose well controlled.  Skin:  Intact except incisional areas.  Protective mepilex applied to prominences.  Restraints:  Not in use or necessary.  AM labs noted; Heparin drip increased by 150 units/hr to 1550 per order set.  Electrolyte replacement not indicated.  Patient's son, Raoul, updated in the morning.  Continue to monitor closely.    Recent Labs   Lab 11/28/20  1045 11/28/20  0305 11/27/20 2120 11/27/20  0430 11/25/20  1035 11/25/20  1035 11/24/20  0410   PH  --  7.39 7.43  --   --  7.42 7.34*   PCO2  --  56* 52*  --   --  41 51*   PO2  --  102 57*  --   --  78* 104   HCO3  --  34* 35*  --   --  27 28   O2PER 40% 60 60% 50%   < >  --  65%    < > = values in this interval not displayed.       ROUTINE IP LABS (Last four results)  BMP  Recent Labs   Lab 11/29/20 0402 11/28/20 0404 11/27/20 0430 11/26/20  0612    141 143 141   POTASSIUM 4.6 4.2 4.1 4.6   CHLORIDE 106 105 109 110*   ELIA 8.3* 7.8* 7.6* 7.4*   CO2 35* 34* 31 26   BUN 43* 48* 51* 45*   CR 0.57* 0.63* 0.63* 0.69   * 146* 192* 148*     CBC  Recent Labs   Lab 11/29/20 0402 11/28/20 0404 11/27/20 0430 11/26/20  0612   WBC 17.7* 20.4* 16.7* 20.5*   RBC 3.42* 3.80* 3.42* 4.08*   HGB 10.5* 11.8* 10.6* 12.9*   HCT 34.3* 37.5* 33.1* 39.6*    99 97 97   MCH 30.7 31.1 31.0 31.6   MCHC 30.6* 31.5 32.0 32.6   RDW 14.0 14.1 14.0 14.0    205 184 216     INR  Recent Labs   Lab 11/22/20  1720   INR 1.38*       Blood Glucose  Glucose (mg/dL)   Date Value   11/29/2020 119 (H)   11/28/2020 125 (H)   11/28/2020 104 (H)   11/28/2020 117 (H)    11/28/2020 118 (H)   11/28/2020 134 (H)       Intake/Output Summary (Last 24 hours) at 11/29/2020 0626  Last data filed at 11/29/2020 0600  Gross per 24 hour   Intake 3003.39 ml   Output 1300 ml   Net 1703.39 ml       Michael Jolley MSN RN PHN CCRN  [facility]  Intensive Care Unit

## 2020-11-29 NOTE — PROVIDER NOTIFICATION
Dr. Alvarez notified of patient in atrial fibrillation again. HR 90-low 100's. MAP maintained >65, SBP >100. Per MD, hold on giving amiodarone bolus/load.

## 2020-11-29 NOTE — PROGRESS NOTES
Comprehensive Daily ICU Note        Aly Sorensen MRN# [id number]   Age: 80 year old YOB: 1940     Date of Admission: 11/10/2020    Primary care provider: Zev Austin     CODE STATUS: DNR    Problem List:     Principal Problem:    Acute respiratory failure with hypoxia (H)  Active Problems:    Pneumonia due to 2019 novel coronavirus    Hypokalemia    Aortic thrombus (H)    DVT (deep venous thrombosis) (H)         Treatment goals for next 24 hours:   1.  Lung protective ventilation with permissive hypercapnia.  Could increase respirator if needed.  2.  Amiodarone GTT for A. fib  3.  Lasix/albumin goal net negative ~1 L  4.  Rest in supine position x4 to 6 hours and then reprone.        Subjective/ Last 24 hours:   Intermittent atrial fibrillation, now on amnio gtt.  No other major changes overnight.      Hospital Course:   Patient admitted to the hospital on 11/10 with COVID.  He was transferred to the ICU on 11/12 because of high oxygen needs.  He was stabilized somewhat and was able to transition to high flow oxygen but over the  few days has spent a lot of the time on Bipap at 100% or High flow .   11/22/2020 was transferred to the ICU worsening respiratory status after being emotionally upset regarding his wife's medical issues.  He was intubated and sedated on transfer.  11/23/2020: Left sided tension pneumothorax with emergent chest tube placement  11/24/2020: Remained supine with PEEP at lower level of 13.  11/26/20: Transitioned to Precedex but vent asynchrony.  11/27/2020: CT chest abdomen pelvis for hemoglobin drop ~2 points.  Extensive airspace disease.  Prone ventilation and paralysis reinitiated because of vent asynchrony.         Mechanical Ventilation/Vitalsigns/IsandOs:     Temp:  [96.1  F (35.6  C)-100.9  F (38.3  C)] 98.4  F (36.9  C)  Pulse:  [] 73  Resp:  [22-26] 26  BP: ()/(56-81) 96/56  MAP:  [68 mmHg-83 mmHg] 68 mmHg  Arterial Line BP: (121-135)/(48-58)  121/48  FiO2 (%):  [40 %-50 %] 50 %  SpO2:  [89 %-100 %] 95 %      Ventilation Mode: CMV/AC  (Continuous Mandatory Ventilation/ Assist Control)  FiO2 (%): 50 %  Rate Set (breaths/minute): 22 breaths/min  Tidal Volume Set (mL): 350 mL  PEEP (cm H2O): 10 cmH2O  Oxygen Concentration (%): 50 %  Resp: 26    Day since 11/22         Physical Examination:     General: Stated age, heavily sdated  HEENT: ET tube  Lungs: quiet breath sounds bilaterally anteriorly  CVS: RRR, tachycardia  Abdomen: +BS, soft  Extremities/musculoskeletal: good pulses, warm  Neurology: heavily sedated  Skin: normal  Psychiatry: unable  Exam of Line sites:  Groin arterial and central lines          Feeding/Glucose:     Orders Placed This Encounter      NPO for Medical/Clinical Reasons Except for: No Exceptions      Recent Labs   Lab 11/29/20  1136 11/29/20  0730 11/29/20  0402 11/28/20  2349 11/28/20  2007 11/28/20  1625 11/28/20  0404 11/28/20  0404 11/27/20  0430 11/27/20  0430 11/26/20  0612 11/26/20  0612 11/25/20  0429 11/25/20  0429 11/24/20  0410 11/24/20  0410   GLC  --   --  123*  --   --   --   --  146*  --  192*  --  148*  --  114*  --  142*   * 119* 119* 125* 104* 117*   < >  --    < >  --    < >  --    < > 111*   < >  --     < > = values in this interval not displayed.            Medications:       albumin human  25 g Intravenous Q8H     artificial tears   Both Eyes Q8H     chlorhexidine  15 mL Mouth/Throat Q12H     famotidine  20 mg Intravenous Q12H     furosemide  20 mg Intravenous Q8H     insulin aspart  1-6 Units Subcutaneous Q4H     meropenem  1,000 mg Intravenous Q8H     multivitamins w/minerals  15 mL Per Feeding Tube Daily     polyethylene glycol  17 g Oral Daily     sennosides  5 mL Oral At Bedtime     sodium chloride (PF)  10 mL Intracatheter Q8H     sodium chloride (PF)  3 mL Intracatheter Q8H          amiodarone 0.5 mg/min (11/29/20 1448)     epoprostenol (VELETRI) 20 mcg/mL in sterile water inhalation solution 20  ng/kg/min (11/29/20 1331)     fentaNYL 150 mcg/hr (11/29/20 0747)     heparin 1,550 Units/hr (11/29/20 0748)     - MEDICATION INSTRUCTIONS -       midazolam 5 mg/hr (11/29/20 0748)     - MEDICATION INSTRUCTIONS -       norepinephrine Stopped (11/27/20 2142)     - MEDICATION INSTRUCTIONS -       propofol Stopped (11/28/20 1312)     sodium chloride 20 mL/hr at 11/29/20 0748     vecuronium (NORCURON) infusion ADULT 0.9 mcg/kg/min (11/29/20 1249)          Labs:     ROUTINE ICU LABS (Last four results)  CMP  Recent Labs   Lab 11/29/20  1120 11/29/20  0402 11/28/20  0404 11/27/20  0430 11/26/20  0612 11/25/20  0429 11/24/20  0410   NA  --  142 141 143 141 139 139   POTASSIUM  --  4.6 4.2 4.1 4.6 4.9 5.3   CHLORIDE  --  106 105 109 110* 111* 110*   CO2  --  35* 34* 31 26 24 26   ANIONGAP  --  1* 2* 3 5 4 3   GLC  --  123* 146* 192* 148* 114* 142*   BUN  --  43* 48* 51* 45* 40* 28   CR  --  0.57* 0.63* 0.63* 0.69 0.72 0.82   GFRESTIMATED  --  >90 >90 >90 89 87 83   GFRESTBLACK  --  >90 >90 >90 >90 >90 >90   ELIA  --  8.3* 7.8* 7.6* 7.4* 7.4* 7.5*   MAG 2.4*  --  2.8*  --  2.9* 2.9* 2.7*   PHOS 2.9  --  4.0  --  2.7  --  2.6   PROTTOTAL  --  5.5* 5.3* 4.5* 5.0* 4.7* 5.2*   ALBUMIN  --  2.0* 1.3* 1.2* 1.4* 1.4* 1.4*   BILITOTAL  --  0.6 0.7 0.4 0.6 0.8 0.4   ALKPHOS  --  63 73 58 66 58 67   AST  --  19 29 18 27 33 15   ALT  --  24 29 18 25 24 18     CBC  Recent Labs   Lab 11/29/20  0402 11/28/20  0404 11/27/20  0430 11/26/20  0612   WBC 17.7* 20.4* 16.7* 20.5*   RBC 3.42* 3.80* 3.42* 4.08*   HGB 10.5* 11.8* 10.6* 12.9*   HCT 34.3* 37.5* 33.1* 39.6*    99 97 97   MCH 30.7 31.1 31.0 31.6   MCHC 30.6* 31.5 32.0 32.6   RDW 14.0 14.1 14.0 14.0    205 184 216     INR  Recent Labs   Lab 11/22/20  1720   INR 1.38*     Arterial Blood Gas  Recent Labs   Lab 11/29/20  1230 11/28/20  1045 11/28/20  0305 11/27/20  2120 11/25/20  1035 11/25/20  1035   PH 7.41  --  7.39 7.43  --  7.42   PCO2 58*  --  56* 52*  --  41   PO2 61*   --  102 57*  --  78*   HCO3 37*  --  34* 35*  --  27   O2PER 50% 40% 60 60%   < >  --     < > = values in this interval not displayed.       Other Lab Data:  Lactic acid:     Cultures:  Recent Labs   Lab 11/23/20  0415 11/22/20  1720   CULT Light growth  Normal jenifer   No growth     Blood culture:  Invalid input(s): BC   Urine culture:   No results for input(s): URC in the last 168 hours.        Imaging/Other results:     CXR:  Support lines stable. Patchy bilateral infiltrates increased from previous. Left chest tube tip at the apex. No definite pneumothorax evident in this position.    LE ultrasound 11/19/2020  RIGHT: Mild area of occlusive thrombus within the mid calf posterior tibial vein. No superficial thrombophlebitis. No popliteal cyst. LEFT: No deep vein thrombosis. No superficial thrombophlebitis. No  popliteal cyst.    CT CAP 11/27/20:  1. No convincing hemorrhage demonstrated in the chest, abdomen, and pelvis. 2. Increased pulmonary consolidative changes and extensive infiltrates  bilaterally.         Assessment and Plan:     Summary: Aly Sorensen is a 80 year old male admitted on 11/10/2020 for COVID. Transferred to the ICU for worsening ARDS on 11/22/20.  Also has otic thrombus and a DVT for which anticoagulation switched from enoxaparin to IV UFH.    I have personally reviewed the daily labs, imaging studies, cultures and discussed the case with referring physician and consulting physicians. My assessment and plan as follows:    Neurology and Psychiatry:  No pre hospital issues  Sedated with Versed and fentanyl. Also on paralysis    Pulmonary:   ARDS from COVID. Initial infection in early November.  Intubated for mechanical ventilation on 11/22/2020. Meets ARDS criteria.  Worsening lung mechanics in the last 24 hours.  Plan  -Continue lung protective ventilation.   -Consider supine positioning and holding paralysis.  -Viliteri full strength.  -Got one dose of Solu-Medrol on 11/22/2020.  Not planning  to continue.  Tension pneumothorax on 11/23/2020  -Left-sided chest tube in place with persistent air leak.  Will keep in place till on positive pressure ventilation.    Cardiovascular system:   Shock: Likely multifactorial. Could have component of sepsis.  Echo 11/26/2020 with LVEF 60 to 65%, dilated RV.  No change in IVC diameter with hypoventilation.  -Wean pressors.  Keep MAP greater than 65. Will consider hydrocortisone for CIRCI  -Volume goal for today: Lasix/albumin every 8 hours x4 doses and then reassess.  Goal net negative ~1 L if possible today  A. fib with RVR.  -Keep K >4, Mg >2  -Amnio gtt. for rate control    Renal/Electrolytes:   Slight increase in BUN on transfer but creatinine within normal range.  -Renal dosing of medications  -avoid nephrotoxic medications  Developing metabolic alkalosis  -Monitor and consider bicarb diuresis.    ID: Fever curve improved.  Sputum stain from 11/22/2020 with positive Gram stain including gram-negative rods.  Cultures with light growth of normal jenifer.  IL-6: 18.6, IL-1 beta 0.2, IL-823.0, TNF alpha 14.0. --> l 67.3-->94.1, ferritin 1409-->1699, -->233.  -Vancomycin 11/22-11/25   - Meropenem, plan for ED course.  Started 1122.  -Remdesivir course completed.    GI/:  On famotidine     Nutrition:   On tube feeds.  Decrease tube feeds rate while prone and paralyzed    Musculoskeletal/Rheumatology:  No issues at this time    Endocrine:   ICU insulin protocol     Heme/Onc:  Aortic thrombus and DVT related to COVID coagulopathy.  No retroperitoneal bleeding on CT.  Started on heparin gtt. on 11/27/2020.  Previously on enoxaparin.  -Continue heparin for anticoagulation    ICU prophylaxis:      DVT: IV UFH     VAP: As above     Stress ulcer: Famotidine     Restraints needed: yes     Lines   Central Line/PICC - placed 11/22-11/25: y   Arterial line - placed 11/22 -11/25, Replaced 11/29.              PICC line placed 1126   Jane catheter.  Needed:  y       Prognosis:   Guarded    Family update: Yes    Billing: total time spend providing critical care was 50 min, excluding procedure time.

## 2020-11-29 NOTE — PLAN OF CARE
Neuro: Sedated and paralyzed. BIS in use. PERRL.      Pain: Fentanyl infusion.     CV: Atrial fibrilation this AM, converted to SR around 11 AM with no intervention. Patient converted back to atrial fib around 18:00, MD aware, no intervention at this time. BPs stable.     Pulm: Mechanically assisted on ventilator. Chest tube to suction, serosanguinous output.       GI/: Jane patent, concentrated urine. Albumin given today. NJ with continuous tube feed,rated reduced due to paralytic. Blood glucose monitored.     Skin: Bruising, blanchable redness.     Lines/Drips: Fentanyl, versed, Vecuronium, Veletri and heparin.    Additional: Prone, turned every 2 hours. SonRaoul updated via phone.

## 2020-11-30 NOTE — CONSULTS
Care Management Initial Consult    General Information  Assessment completed with: Children, Raoul  Type of CM/SW Visit: Initial Assessment  Primary Care Provider verified and updated as needed: Yes   Readmission within the last 30 days:           Advance Care Planning: Advance Care Planning Reviewed: concerns discussed          Communication Assessment  Patient's communication style: spoken language (English or Bilingual)    Hearing Difficulty or Deaf: no   Wear Glasses or Blind: yes    Cognitive  Cognitive/Neuro/Behavioral: .WDL except  Level of Consciousness: sedated;unresponsive  Arousal Level: unresponsive  Orientation: other (see comments)(paralyzed and sedated BIS 40)  Mood/Behavior: behavior appropriate to situation  Best Language: (SHAI)  Speech: endotracheal tube    Living Environment:   People in home: spouse  Daija  Current living Arrangements: condominium      Able to return to prior arrangements:         Family/Social Support:  Care provided by: self  Provides care for: no one  Marital Status:   Wife;Children  Nelsy, also a patient       Description of Support System: Supportive         Current Resources:   Skilled Home Care Services:    Community Resources: None  Equipment currently used at home: none  Supplies currently used at home:      Employment/Financial:  Employment Status: retired        Financial Concerns: No concerns identified           Lifestyle & Psychosocial Needs:        Socioeconomic History     Marital status:      Spouse name: Not on file     Number of children: Not on file     Years of education: Not on file     Highest education level: Not on file     Tobacco Use     Smoking status: Former Smoker     Types: Cigars     Smokeless tobacco: Never Used     Tobacco comment: quit smoking cigars 2008   Substance and Sexual Activity     Alcohol use: No     Alcohol/week: 3.0 - 4.0 standard drinks     Types: 3 - 4 Standard drinks or equivalent per week     Frequency: Never      Comment: none for last yr     Drug use: No     Sexual activity: Not Currently       Functional Status:  Prior to admission patient needed assistance:              Mental Health Status:          Chemical Dependency Status:                Values/Beliefs:  Spiritual, Cultural Beliefs, [employer]ism Practices, Values that affect care: yes               Additional Information:  Will follow patient. Writer explained to son, Raoul role and if needing anything, CC and Sw are here.    Mercedes Lopes RN

## 2020-11-30 NOTE — PROGRESS NOTES
"CLINICAL NUTRITION SERVICES - REASSESSMENT NOTE      Recommendations Ordered by Registered Dietitian (RD):   - TF at goal rate not contraindicated with paralytics   - OK to resume back to meeting nutritional needs  - Change formula to Isosource 1.5 to meet >80% after the first week of hospitalization. Isosource 1.5 at 60 mL/hr will provide 2160 kcal (27 kcal/kg), 98 gm protein (1.3 gm/kg), 20 gm fiber, 232 gm CHO and 1100 mL free water  - Cancel Certavite as meeting >/=100% DRIs     - If pressor reliance increases or another is added, recommend decrease TF rate      Malnutrition: (11/18)  % Weight Loss:  > 2% in 1 week (severe malnutrition)  % Intake:  <75% for > 7 days (non-severe malnutrition)  Subcutaneous Fat Loss:  Unable to determine  Muscle Loss:  Unable to determine  Fluid Retention:  None noted     Malnutrition Diagnosis: Non-Severe malnutrition  In Context of:  Acute illness or injury       EVALUATION OF PROGRESS TOWARD GOALS   Diet:  NPO, vented     Nutrition Support:  Trickle TF started 11/24, began increasing on 11/25 and achieved goal rate on 11/27 as ordered below. TF was \"reduced d/t paralytic\" per RN on 11/28 and continues to run at 25 mL/hr, below goal rate     Nutrition Support Enteral:  Type of Feeding Tube: NDT   Enteral Frequency:  Continuous  Enteral Regimen: Replete with Fiber at 60 mL/hr  Total Enteral Provisions: 1440 kcals (18 kcal/kg), 92 gm pro (1.2 gm/kg), 1196 mL H20, 179 gm CHO,  22 gm fiber  Free Water Flush: 60 mL q 4 hrs       Intake/Tolerance:    Receiving suboptimal nutrition for the past 6 days (was at goal rate x1 day during this time)  Labs reviewed: Na 141 (NL), K 4.2 (NL), Mg 2.3 (NL), Phos 2.8 (NL), BUN 36 (H), Cr 0.52 (L)  Recent Labs   Lab 11/30/20  1239 11/30/20  0843 11/30/20  0407 11/30/20  0220 11/29/20  2316 11/29/20  2043 11/29/20  1545 11/29/20  0402 11/29/20  0402 11/28/20  0404 11/28/20  0404 11/27/20  0430 11/27/20  0430 11/26/20  0612 11/26/20  0612 " 11/25/20  0429 11/25/20  0429   GLC  --   --   --  173*  --   --   --   --  123*  --  146*  --  192*  --  148*  --  114*   * 151* 162*  --  148* 146* 126*   < > 119*   < >  --    < >  --    < >  --    < > 111*    < > = values in this interval not displayed.     Medications reviewed: vecuronium, lasix off today, Certavite, Miralax, Senokot, low dose Levophed (1.5 mL/hr, previously off earlier this AM), D5 in amio at 30 mL/hr (122 kcal/d)  Stooling: small BM recorded in nursing note from this AM (none recorded in I/O or flowsheets)     Wt: 78 kg   Vitals:    11/25/20 0400 11/26/20 0248 11/27/20 0000 11/28/20 0400   Weight: 79.2 kg (174 lb 9.7 oz) 78.6 kg (173 lb 4.5 oz) 78.2 kg (172 lb 6.4 oz) 76.8 kg (169 lb 5 oz)    11/29/20 0227   Weight: 78 kg (171 lb 15.3 oz)       ASSESSED NUTRITION NEEDS:  Dosing Weight: 78 kg (11/30)   Estimated Final Energy Needs (>1 week hospitalized):  6876-6555 kcals (25-30 Kcal/Kg)  Justification: maintenance  Estimated Protein Needs:   grams protein (1.2-1.5 g pro/Kg)  Justification: hypercatabolism with acute illness    NEW FINDINGS:   Per rounds, supine today then re-prone within 4-6 hrs   No PST today   Palliative following for GOC     Previous Goals:   TF will meet % preliminary goal in the next 48-72 hrs  Evaluation: Met for one day, now not met     Previous Nutrition Diagnosis:   Inadequate protein-energy intake related to intubation as evidenced by NPO x 3 days, Propofol meeting 17% preliminary energy and 0% protein needs, and TF planned  Evaluation: No change, updated below       CURRENT NUTRITION DIAGNOSIS  Inadequate protein-energy intake related to NPO, vented and TF reliant for nutrition as evidenced by TF running below goal rate x3 days, meeting </=50% estimated needs     INTERVENTIONS  Recommendations / Nutrition Prescription  Change formula and advance TF back to goal rate (OK per MD)  Continue 60 mL q 4 hrs flushes at this time   Cancel Certavite      Implementation  EN Composition: as above  Collaboration and Referral of Nutrition care: patient discussed during ICU rounds this morning     Goals  TF to meet % needs in 48 hrs       MONITORING AND EVALUATION:  Progress towards goals will be monitored and evaluated per protocol and Practice Guidelines      Aminata Rincon RD, LD  Clinical Dietitian

## 2020-11-30 NOTE — PROGRESS NOTES
Pt increasing Ppeak (49), Pplat (41). MD notified. Pt placed on Ventilation Mode: PCV Plus assist  (Pressure Control Ventilation/ Assist Control)  FiO2 (%): 50 %  Rate Set (breaths/minute): 26 breaths/min  Tidal Volume Set (mL): 350 mL  PEEP (cm H2O): 10 cmH2O  Oxygen Concentration (%): 50 %  Peak Inspiratory Pressure (cm H2O) (Drager Mae): 28  Resp: 26    Will check ABG 30 mins    Brown Campos, RT

## 2020-11-30 NOTE — PROGRESS NOTES
[facility]  Palliative Care Daily Progress Note       Recommendations & Counseling       Goals of care remain restorative     Our team will continue to follow for support and assistance in care conferences, goals of care as needed    Will plan to check in weekly      Case was reviewed with Dr. Figueroa.    [facility]  Contact information available via [facility] Paging/Directory      Thank you for the opportunity to participate in the care of this patient and family. Our team: will continue to follow.     During regular M-F work hours ([phone]) -- if you are not sure who specifically to contact -- please contact us in [facility] Smart Web.     After regular work hours and on weekends/holidays, you can call our answering service at [phone].     Attestation:  Total time on the floor involved in the patient's care: 35 minutes  Total time spent in counseling/care coordination: >50%     Assessments          Aly Sorensen is a 80 year old male with PMH significant for prostate cancer s/p prostatectomy 10/2005 with PSA recently normal, melanoma, and recent diagnosis of COVID infection (+ test 11/7), who was admitted on 11/10/2020 with worsening shortness of breath and declining oxygen saturations. Pt's hospital course has been notable for acute respiratory failure with waxing and waning O2 needs. He required an ICU stay earlier in the hospitalization, but stabilized and returned to the floor, only to have worsening respiratory status again. He is now intubated and mechanically ventilated in the ICU with ARDS. He is paralyzed and proned. He has developed coagulopathy with a new RLE DVT, aortic arch thrombus, and a possible splenic infarct. He also developed a tension PTX with chest tube now in place.     Today, the patient was seen for:  Pt and family support    Spoke with son Raoul via phone. The last we spoke was last week, when he and his family first heard  of the very serious, critical condition both his parents are in. Today's conversation was thus in combination with pt's wife, who is also critically ill in the ICU.    Raoul reports that family has excellent support through this journey. Family are appropriately concerned for parents' health, yet they continue to hope for ongoing medical support and recovery.     We acknowledge the challenge of not being able to be in the hospital to support their parents. Raoul and family were comforted by RNs putting hearts in their parents' windows so they could see them from outside.    Raoul had appropriate questions regarding POA and health care directives. In the absence of a HCD, adult children are serving as surrogate decision makers, per our hospital policy.    Raoul is agreeable to me checking in weekly, or sooner if medical updates/care conferences are needed.     Prognosis, Goals, or Advance Care Planning was addressed today with: Yes.  Mood, coping, and/or meaning in the context of serious illness were addressed today: Yes.  Summary/Comments: Family feel very well supported by their community and parish. Appreciate support from spiritual health team.             Interval History:     Chart review/discussion with unit or clinical team members:   Continues in ICU, vented. Tension PTX, chest tube.     Per patient or family/caregivers today:  No acute events.     Key Palliative Symptoms:  We are not helping to manage these symptoms currently in this patient.    Patient is on opioids: bowels not assessed today.           Review of Systems:     Besides above, an additional N/A system ROS was reviewed and is unremarkable          Medications:     I have reviewed this patient's medication profile and medications during this hospitalization.    Noted meds:    Hydrocortisone 50mg IV Q6hrs   Meropenem   Amiodarone gtt stopped    Veletri gtt   Fentanyl gtt 150mcg/hr   Heparin gtt   Versed gtt 6mg/hr   Levophed gtt   Vecuronium gtt    Fentanyl 25-50mcg IV Q30 mins PRN pain   Versed 1-2mg IV Q30 mins PNR agitation            Physical Exam:   Temp: 98.8  F (37.1  C) Temp src: Rectal   Pulse: (!) 47   Resp: 26 SpO2: 95 % O2 Device: Mechanical Ventilator    CONSTITUTIONAL: Chronically ill man seen proned, sedated, intubated and mechanically ventilated            Data Reviewed:     Recent imaging reviewed, my comments on pertinents:   Results for orders placed or performed during the hospital encounter of 11/10/20   CT Chest Pulmonary Embolism w Contrast    Impression    IMPRESSION:  1.  No evidence of pulmonary embolism. Thoracic aorta is unremarkable.    2.  Patchy groundglass opacities within both lungs concerning for  viral pneumonitis. ARDS could appear similar.    NICHOLAS DE LEON MD   XR Chest Port 1 View    Impression    IMPRESSION: Peripheral ground-glass infiltrates compatible with  history of COVID-19 pneumonia.    EBONY ROCHA MD   CT Chest Pulmonary Embolism w Contrast    Impression    IMPRESSION:  1.  No pulmonary embolism demonstrated.  2.  Extensive bilateral groundglass and interstitial infiltrates  compatible with history of COVID pneumonia.  3.  New filling defects within the distal aortic arch and mid to  distal descending thoracic aorta from the comparison 9 days prior.  This is most consistent with mural thrombus.  4.  Small wedge-shaped hypoenhancing area in the spleen, possibly a  small splenic infarct.    Aortic and pulmonary findings discussed with Dr. Maldonado on November 19, 2020 at 1:43 PM.    EBONY ROCHA MD   US Lower Extremity Venous Duplex Bilateral    Impression    IMPRESSION: Positive for mild right calf DVT.    BROOKLYN COVARRUBIAS MD   XR Chest Port 1 View    Impression    IMPRESSION: Single portable AP view of the chest was obtained.  Endotracheal tube tip projects over lower thoracic trachea  approximately 3 cm from the dalia. Enteric tube crosses the diaphragm  with the distal tip outside the field of view. An  esophageal  temperature probe distal tip projects over the distal esophagus.  Stable cardiomediastinal silhouette. Bilateral airspace pulmonary  opacities, worrisome for infection. No significant pleural effusion or  pneumothorax.    MINESH BENTON MD   XR Chest Port 1 View    Impression    IMPRESSION: Endotracheal tube tip just below the clavicle heads. Nasogastric tube tip projects over the gastric fundus. Worsening bilateral pulmonary infiltrates. No effusion. Normal heart size.   XR Chest Port 1 View    Impression    IMPRESSION: There is a large left pneumothorax, causing complete  atelectasis of the left lung and mild displacement of the mediastinum  to the right. Hazy opacities in the right mid lung are unchanged.  Endotracheal tube is unchanged in position, with tip 6.7 cm above the  dalia. Enteric tube, tip not seen but below the diaphragm.    The referring service is aware of the pneumothorax, and at the time of  this interpretation there is subsequent imaging demonstrating left  chest tube placement.    ANDREINA SUNSHINE MD   XR Chest Port 1 View    Impression    IMPRESSION: There has been interval placement of a single left apical  chest tube. Previously described large left pneumothorax has almost  entirely resolved, with a small residual pneumothorax noted at the  left lung base. Mild subcutaneous emphysema is present in the left  chest wall. Hazy opacities in the right mid and lower lung and left  midlung are not significantly changed. Endotracheal tube is unchanged.  Enteric tube is unchanged, with tip near the gastric fundus.    ANDREINA SUNSHINE MD   XR Chest Port 1 View    Impression    IMPRESSION: Portable chest. Left chest tube is again in place with a  small left apical pneumothorax. Subcutaneous emphysema is no longer  evident. Patchy airspace opacities in the mid and lower lungs overall  appear stable if not slightly improved. No evidence of right  pneumothorax. No significant pleural  effusions appreciated on this  study. Heart is normal in size. Endotracheal tube is in good position  approximately 3-4 cm above the dalia. Nasogastric tube extends below  the left hemidiaphragm presumably in the stomach.    NICHOLAS DE LEON MD   XR Abdomen Port 1 View    Impression    IMPRESSION: Feeding tube in good position with the tip in the distal  duodenum. Bowel gas pattern is nonobstructed.    MICHELLE HAYS MD   XR Chest Port 1 View    Impression    IMPRESSION: Endotracheal tube tip 4 cm above the dalia. PH probe in the esophagus. Endotracheal tube tip below the diaphragm. Single left-sided chest tube. No pneumothorax. Normal heart size and pulmonary vascularity. Patchy bilateral mid and lower lung   infiltrates, greater on the left. No significant bony abnormalities. The left-sided pneumothorax present on 11/24/2020 is not seen on today's exam.     XR Chest Port 1 View    Impression    IMPRESSION: Endotracheal tube tip in the midtrachea, esophageal  temperature probe, enteric tube and left apically directed chest tube  in place. Slight worsening of patchy airspace opacities in both lungs  predominantly in the left mid and lower lungs. No pneumothorax. Normal  heart size.    DEE DEE LOPEZ MD   XR Chest Port 1 View    Impression    IMPRESSION: Endotracheal tube 3 cm above dalia. Enteric tube tip below lower aspect of film. Probe over mid esophageal region. Left chest tube. Bilateral infiltrates.   XR Chest Port 1 View    Impression    IMPRESSION: New right PICC line tip in the SVC. Left chest tube  unchanged. No pneumothorax. Bilateral pulmonary infiltrates have not  appreciably changed. Endotracheal tube remains in good position above  the dalia. Esophageal probe in the mid esophagus. Feeding tube  courses below the diaphragm.     MICHELLE HAYS MD   CT Chest Abdomen Pelvis w/o Contrast    Impression    IMPRESSION:  1. No convincing hemorrhage demonstrated in the chest, abdomen, and  pelvis.  2. Increased  pulmonary consolidative changes and extensive infiltrates  bilaterally.    EBONY ROCHA MD   XR Chest Port 1 View    Impression    IMPRESSION: Support lines stable. Patchy bilateral infiltrates  increased from previous. Left chest tube tip at the apex. No definite  pneumothorax evident in this position.    EBONY ROCHA MD   XR Chest Port 1 View    Impression    IMPRESSION: Left chest tube. Endotracheal tube, probe, enteric tube and right PICC line again noted. Diffuse, bilateral infiltrates. No visible pneumothorax.   XR Chest Port 1 View    Impression    IMPRESSION: Stable position of the left-sided chest tube. No definitive evidence for significant left-sided pneumothorax. Endotracheal tube tip in good position in the mid trachea. Enteric tube extending below level of the EG junction the upper stomach.   Feeding tube extending into the duodenum. Extensive bilateral pulmonary infiltrates unchanged. Normal heart size and pulmonary vascularity.       Recent lab data reviewed, my comments on pertinents:   Na 141  K 4.2  Creat 0.52  WBC 19  Hgb 9.4  Plt 200  Albumin 2.4

## 2020-11-30 NOTE — PLAN OF CARE
Neuro: Sedated and paralyzed. BIS in use. PERRL.      Pain: Fentanyl infusion.     CV: SR, on amiodarone drip. Arterial line placed today. BPs stable.     Pulm: Mechanically assisted on ventilator. Chest tube to suction, serosanguinous output. OG for esophageal pressure monitoring.     GI/: Jane patent, adequate output. Albumin and lasix given today. NJ with continuous tube feed,rated reduced due to paralytic. Blood glucose monitored.     Skin: Bruising, blanchable redness, left groin with suture in place. Upper lip ulcerations from being prone. Urethral meatus purple tissue around catheter. WOC consult ordered.     Lines/Drips: Fentanyl, versed, Vecuronium, Veletri, amiodarone and heparin.     Additional: Supine at 11:00, prone at 17:30, turned every 2 hours. Video call with family this morning.

## 2020-11-30 NOTE — PROVIDER NOTIFICATION
MD called to bedside for worsening BP and MAPs, Levo restarting, Bradycardia, and Afib rhythm changes. Patient bradycardic to low 30s. MAPs to the 50s, SBP to the 70s, Levo restarted and MAPs improved, HR decreased. MD at bedside, EKG, labs, and Xray completed. Ordered for Amio to stop. Levo titrated down, see MAR. HR stabilized. Awaiting lab results. Continuing to monitor.     Wilma Aguilar RN

## 2020-11-30 NOTE — PROGRESS NOTES
Neuro: Paralyzed and sedated, PERRLA,   CV: Afib and SR, Amio off per MD, Levo srestarted for low BP, pulses good, trace edema.  Resp: Setting changed due to high peek pressures, See Results for ABGs., Generally diminished, X ray completed this morning.   GI/Nutrition: TF infusing, tolerating, small BM  : Jane in place, good output  Skin: Lips wounds, barrier applied, closely monitored  Critical Labs: ABGs monitored, Hep Xa closely monitored, no critical labs  ID: ABX on MAR, afebrile  Social/Psych: No contact with family as of yet this shift, expected to call.  Therapy: Scheduled  Plan: Continue to monitor.     Wilma Aguilar RN

## 2020-11-30 NOTE — PROGRESS NOTES
Critical Care Progress Note      11/29/2020    Name: Aly Sorensen MRN#: [id number]   Age: 80 year old YOB: 1940     [facility] Day# 19                 Problem List:   Principal Problem:    Acute respiratory failure with hypoxia (H)  Active Problems:    Pneumonia due to 2019 novel coronavirus    Hypokalemia    Aortic thrombus (H)    DVT (deep venous thrombosis) (H)           Summary/Hospital Course:     80M h/o prostate ca and melanoma now presents with respiratory failure due to covid 19 pna.  Admitted 11/10, intubated 11/22.  Tension ptx on 11/23 now s/p ch tube placement.  Now on inhaled flolan, paralyzed, proning.      Assessment and plan :     Aly Sorensen IS a 80 year old male admitted on 11/10/2020 for covid19 pna.   I have personally reviewed the daily labs, imaging studies, cultures and discussed the case with referring physician and consulting physicians.     My assessment and plan by system for this patient is as follows:    Neurology/Psychiatry:   1. Sedation:  midaz drip  2. Analgesia: fentanyl drip    Cardiovascular:   1.  Shock: likely vasoplegic due to sedation vs obstructive due to intrathoracic pressures from mechanical ventilation.  Continue levophed as needed.  2.  Afib with rvr:  Continues on amio drip.    Pulmonary/Ventilator Management:   1. Acute hypoxemic respiratory failure, vent dependent:  In setting of covid pna.  Continue lung protective tidal volumes, proning, paralysis, and inhaled flolan.  PEEP 10 today.    GI and Nutrition :   1. Continue tf  2.  Famotidine for pud prophy    Renal/Fluids/Electrolytes:   1. Creatinine ok 0.52  2. Metabolic alkalosis:  In setting of diuresis 7.45/57/110.  Tolerate for now.  3.  Diuresing.    Infectious Disease:   1. covid-19 pna:  On stress dose steroids, but can initiate taper; by the time tapered off will have >10d steroid therapy.  Remdesivir finished.  2. Empirically on merrem.  Sputum and blood negative.  Checking urine today.  If  negative ok to stop merrem.    Endocrine:   1. Hyperglycemia:  Continue sliding scale insulin.    Hematology/Oncology:   1. Wbc elevated 19- in setting of infection and steroids.  2. Anemia:  9.4 monitor.  3.  pltlts 200 acceptable  4.  DVT: heparin drip    ICU Prophylaxis:   1. DVT: Hep drip  2. VAP: HOB 30 degrees, chlorhexidine rinse  3. Stress Ulcer: H2 blocker  4. Restraints: Nonviolent soft two point restraints required and necessary for patient safety and continued cares and good effect as patient continues to pull at necessary lines, tubes despite education and distraction. Will readdress daily.   5. Wound care - per unit routine   6. Feeding - tf  7. Family Update: pending today.  8. Disposition - icu             Interim History:   Unable to obtain history directly due to intubated/vented status. No events overnight.           Key Medications:       albumin human  25 g Intravenous Q8H     artificial tears   Both Eyes Q8H     chlorhexidine  15 mL Mouth/Throat Q12H     famotidine  20 mg Intravenous Q12H     furosemide  20 mg Intravenous Q8H     hydrocortisone sodium succinate PF  50 mg Intravenous Q6H     insulin aspart  1-6 Units Subcutaneous Q4H     meropenem  1,000 mg Intravenous Q8H     multivitamins w/minerals  15 mL Per Feeding Tube Daily     polyethylene glycol  17 g Oral Daily     sennosides  5 mL Oral At Bedtime     sodium chloride (PF)  10 mL Intracatheter Q8H     sodium chloride (PF)  3 mL Intracatheter Q8H       amiodarone 0.5 mg/min (11/29/20 2000)     epoprostenol (VELETRI) 20 mcg/mL in sterile water inhalation solution 20 ng/kg/min (11/29/20 2011)     fentaNYL 150 mcg/hr (11/29/20 2000)     heparin 1,850 Units/hr (11/29/20 2000)     - MEDICATION INSTRUCTIONS -       midazolam 6 mg/hr (11/29/20 2030)     - MEDICATION INSTRUCTIONS -       norepinephrine Stopped (11/27/20 2142)     - MEDICATION INSTRUCTIONS -       propofol Stopped (11/28/20 1312)     sodium chloride 20 mL/hr at 11/29/20 0748      vecuronium (NORCURON) infusion ADULT 0.9 mcg/kg/min (11/29/20 2000)               Physical Examination:   Temp:  [96.1  F (35.6  C)-100.9  F (38.3  C)] 98.9  F (37.2  C)  Pulse:  [] 86  Resp:  [22-26] 26  BP: ()/(56-79) 96/56  MAP:  [65 mmHg-101 mmHg] 101 mmHg  Arterial Line BP: (116-166)/(45-71) 166/67  FiO2 (%):  [40 %-55 %] 55 %  SpO2:  [89 %-100 %] 95 %    Intake/Output Summary (Last 24 hours) at 11/29/2020 2114  Last data filed at 11/29/2020 2100  Gross per 24 hour   Intake 3258.48 ml   Output 2765 ml   Net 493.48 ml     Wt Readings from Last 4 Encounters:   11/29/20 78 kg (171 lb 15.3 oz)   08/20/20 83 kg (183 lb)   12/11/19 81.9 kg (180 lb 8 oz)   10/28/19 82.4 kg (181 lb 11.2 oz)     Arterial Line BP: (116-166)/(45-71) 166/67  MAP:  [65 mmHg-101 mmHg] 101 mmHg  BP - Mean:  [] 73  Ventilation Mode: CMV/AC  (Continuous Mandatory Ventilation/ Assist Control)  FiO2 (%): 55 %  Rate Set (breaths/minute): 22 breaths/min  Tidal Volume Set (mL): 350 mL  PEEP (cm H2O): 10 cmH2O  Oxygen Concentration (%): 50 %  Resp: 26    Recent Labs   Lab 11/29/20  1540 11/29/20  1230 11/28/20  1045 11/28/20  0305 11/27/20  2120   PH 7.43 7.41  --  7.39 7.43   PCO2 58* 58*  --  56* 52*   PO2 68* 61*  --  102 57*   HCO3 38* 37*  --  34* 35*   O2PER  --  50% 40% 60 60%       GEN: no acute distress   HEENT: head ncat, sclera anicteric, OP patent, trachea midline   NECK: supple  PULM: unlabored synchronous with vent, clear anteriorly    CV/COR: RRR S1S2 no gallop,  No rub, no murmur  ABD: soft nontender, hypoactive bowel sounds, no mass  EXT:  No Edema;   Warm x4  NEURO: sedated/paralyzed.  Unable to assess.  SKIN: no obvious rash  LINES: clean, dry intact         Data:   All data and imaging reviewed     ROUTINE ICU LABS (Last four results)  CMP  Recent Labs   Lab 11/29/20  1120 11/29/20  0402 11/28/20  0404 11/27/20  0430 11/26/20  0612 11/25/20  0429 11/24/20  0410   NA  --  142 141 143 141 139 139   POTASSIUM  --   4.6 4.2 4.1 4.6 4.9 5.3   CHLORIDE  --  106 105 109 110* 111* 110*   CO2  --  35* 34* 31 26 24 26   ANIONGAP  --  1* 2* 3 5 4 3   GLC  --  123* 146* 192* 148* 114* 142*   BUN  --  43* 48* 51* 45* 40* 28   CR  --  0.57* 0.63* 0.63* 0.69 0.72 0.82   GFRESTIMATED  --  >90 >90 >90 89 87 83   GFRESTBLACK  --  >90 >90 >90 >90 >90 >90   ELIA  --  8.3* 7.8* 7.6* 7.4* 7.4* 7.5*   MAG 2.4*  --  2.8*  --  2.9* 2.9* 2.7*   PHOS 2.9  --  4.0  --  2.7  --  2.6   PROTTOTAL  --  5.5* 5.3* 4.5* 5.0* 4.7* 5.2*   ALBUMIN  --  2.0* 1.3* 1.2* 1.4* 1.4* 1.4*   BILITOTAL  --  0.6 0.7 0.4 0.6 0.8 0.4   ALKPHOS  --  63 73 58 66 58 67   AST  --  19 29 18 27 33 15   ALT  --  24 29 18 25 24 18     CBC  Recent Labs   Lab 11/29/20  0402 11/28/20  0404 11/27/20  0430 11/26/20  0612   WBC 17.7* 20.4* 16.7* 20.5*   RBC 3.42* 3.80* 3.42* 4.08*   HGB 10.5* 11.8* 10.6* 12.9*   HCT 34.3* 37.5* 33.1* 39.6*    99 97 97   MCH 30.7 31.1 31.0 31.6   MCHC 30.6* 31.5 32.0 32.6   RDW 14.0 14.1 14.0 14.0    205 184 216     INRNo lab results found in last 7 days.  Arterial Blood Gas  Recent Labs   Lab 11/29/20  1540 11/29/20  1230 11/28/20  1045 11/28/20  0305 11/27/20  2120   PH 7.43 7.41  --  7.39 7.43   PCO2 58* 58*  --  56* 52*   PO2 68* 61*  --  102 57*   HCO3 38* 37*  --  34* 35*   O2PER  --  50% 40% 60 60%       All cultures:  Recent Labs   Lab 11/23/20  0415   CULT Light growth  Normal jenifer       Recent Results (from the past 24 hour(s))   XR Chest Port 1 View    Narrative    EXAM: XR CHEST PORT 1 VW  LOCATION: [facility]  DATE/TIME: 11/29/2020 5:00 AM    INDICATION: Left pneumothorax. Post chest tube placement.  COMPARISON: 11/28/2020      Impression    IMPRESSION: Left chest tube. Endotracheal tube, probe, enteric tube and right PICC line again noted. Diffuse, bilateral infiltrates. No visible pneumothorax.     CXR (personally reviewed):  ett in good position.  Stable b/l infiltrates.    Labs (personally reviewed):  See  a/p    Billing: This patient is critically ill: Yes. Total critical care time today 45 min.

## 2020-11-30 NOTE — PROGRESS NOTES
[facility] ICU RESPIRATORY NOTE           Date of Admission: 11/10/2020     Date of Intubation (most recent): 11/22/20     Reason for Mechanical Ventilation: Resp failure      Number of Days on Mechanical Ventilation: 10     Met Criteria for Spontaneous Breathing Trial: NO  Reason for No Spontaneous Breathing Trial: Per MD     Significant Events Today: Mode changed to PC overnight due to increased airway pressures.      ABG Results:   Recent Labs   Lab 11/29/20  2315 11/29/20  1540 11/29/20  1230 11/28/20  1045 11/28/20  0305 11/27/20  2120   PH 7.37 7.43 7.41  --  7.39 7.43   PCO2 71* 58* 58*  --  56* 52*   PO2 107* 68* 61*  --  102 57*   HCO3 41* 38* 37*  --  34* 35*   O2PER  --   --  50% 40% 60 60%     Ventilation Mode: PCV Plus assist  (Pressure Control Ventilation/ Assist Control)  FiO2 (%): 50 %  Rate Set (breaths/minute): 26 breaths/min  Tidal Volume Set (mL): 350 mL  PEEP (cm H2O): 10 cmH2O  Oxygen Concentration (%): 50 %  Peak Inspiratory Pressure (cm H2O) (Drager Mae): 28  Resp: 26    Brown Campos, RT

## 2020-11-30 NOTE — PROGRESS NOTES
Chart check only.     Continues on IV heparin gtt for aortic thrombus and DVT related to COVID coagulapathy. Anemia of acute and chronic disease and stress leukocytosis.    We will follow peripherally. Please call if any questions.

## 2020-11-30 NOTE — PROGRESS NOTES
Brief ICU Note    Nursing starting some NE for low MAP ~ high 50s. Patient found to be profoundly bradycardic with HR in 40s. SBP then found to be ~ 140s. Came to bedside and examined patient.     No change on ventilator.   Sats of ~ 94%  Equal breath sounds  Skin warm     EKG - appears to be bradycardia, narrow complex, afib, no concerning ST or T wave changes    CXR done and looks unremarkable except for bilateral airspace disease, CT in good position w/o pneumo    Will check CBC, labs, lytes, trop.     Will stop amiodarone as this can be contributing to bradycardia.     ? Perhaps too much diuresis given contraction alkylosis. Will bring up to team in AM.     Will monitor closely overnight.     Mac Terry MD  Critical Care Surgery

## 2020-12-01 NOTE — PLAN OF CARE
Pt remains sedated/paralyzed/intubated. Vent settings unchanged. BIS 30-45. PERRL. SR/SB. Levo remains on, MAP>65. Minimal secretions. Dim/course LS. TF continued, readdressing set goal today d/t higher residuals yesterday. Jane in place, adequate UOP. No BM. Phos replacement ordered. Son updated this AM. Continue to closely monitor.

## 2020-12-01 NOTE — PROGRESS NOTES
[facility] ICU RESPIRATORY NOTE           Date of Admission: 11/10/2020     Date of Intubation (most recent): 11/22/20     Reason for Mechanical Ventilation: Resp failure      Number of Days on Mechanical Ventilation: 11     Met Criteria for Spontaneous Breathing Trial: NO    Reason for No Spontaneous Breathing Trial: Per MD     Significant Events Today: no changes overnight.      ABG Results:            Recent Labs   Lab 11/29/20  2315 11/29/20  1540 11/29/20  1230 11/28/20  1045 11/28/20  0305 11/27/20  2120   PH 7.37 7.43 7.41  --  7.39 7.43   PCO2 71* 58* 58*  --  56* 52*   PO2 107* 68* 61*  --  102 57*   HCO3 41* 38* 37*  --  34* 35*   O2PER  --   --  50% 40% 60 60%      Ventilation Mode: PCV Plus assist  (Pressure Control Ventilation/ Assist Control)  FiO2 (%): 50 %  Rate Set (breaths/minute): 26 breaths/min  Tidal Volume Set (mL): 350 mL  PEEP (cm H2O): 10 cmH2O  Oxygen Concentration (%): 50 %  Peak Inspiratory Pressure (cm H2O) (Drager Mae): 28  Resp: 26     Continue full vent support.    Jose Chaparro, RT

## 2020-12-01 NOTE — PROGRESS NOTES
[facility]  WO Nurse Inpatient Wound Assessment     Reason for consultation: Evaluate and treat Rt upper lip suspected PI                                           Urethral meatal suspected PI    Assessment   Rt upper lip:  Mucosal PI secondary to ETT and prone positioning   Urethral Meatal: mucosal PI secondary to silverman and prone positioning    Treatment Plan   Wound care to Rt upper lip mucosal PI and urethral Meatal PI: BID and prn   1. Assess with full skin inspections BID and reposition silverman tube  2. Off load ETT per ICU protocol  3. Keep lips moisturized  4. Apply Bacitracin to meatal opening BID         Orders  IN Epic  Recommended provider order: NA  WO Nurse follow-up plan: weekly and prn   Nursing to notify the Provider(s) and re-consult the WO Nurse if wound(s) deteriorates or new skin concern.    Patient History  According to provider note(s):   Aly Sorensen MRN# [id number]   Age: 80 year old YOB: 1940      Date of Admission: 11/10/2020     Primary care provider: Zev Austin     CODE STATUS: DNR     Problem List:      Principal Problem:    Acute respiratory failure with hypoxia (H)  Active Problems:    Pneumonia due to 2019 novel coronavirus    Hypokalemia    Aortic thrombus (H)    DVT (deep venous thrombosis) (H)          Treatment goals for next 24 hours:   1.  Lung protective ventilation with permissive hypercapnia.  Could increase respirator if needed.  2.  Amiodarone GTT for A. fib  3.  Lasix/albumin goal net negative ~1 L  4.  Rest in supine position x4 to 6 hours and then reprone.       Objective Data  Containment of urine/stool: Silverman for UIC     Active Diet Order: TF  Orders Placed This Encounter      NPO for Medical/Clinical Reasons Except for: No Exceptions    Output:   I/O last 3 completed shifts:  In: 2838.86 [I.V.:1718.86; NG/GT:320]  Out: 3585 [Urine:3200; Emesis/NG output:250; Chest Tube:135]    Risk Assessment:   Sensory Perception:  1-->completely limited  Moisture: 4-->rarely moist  Activity: 1-->bedfast  Mobility: 1-->completely immobile  Nutrition: 2-->probably inadequate  Friction and Shear: 2-->potential problem  Niko Score: 11                          Labs:   Recent Labs   Lab 11/30/20  0220   ALBUMIN 2.4*   HGB 9.4*   WBC 19.0*   CRP 71.5*       Physical Exam    #1 Wound Location:  Rt upper lip  Wound History: Pt proned, vented /paralyzed secondary CoVid, pneumonia and respiratory failure  Date of last [facility] photo:  11-30-20      Measurements (length x width x depth, in cm):  2.0cm x .5cm x superficial    Wound Base: 100% red base  Palpation of the wound bed: firm   Temperature: warm   Drainage: scant sero-sang  Odor: None  Pain: Unable to determine    #2: Urethral meatus      [facility] Photo:  11-30-20        Measurements (length x width x depth, in cm): .5cm x ..3cm x superficial    Wound Base: intact with non-blanchable ecchymosis @ Lt lateral edge   Palpation of the wound bed: firm   Temperature: warm   Drainage: None  Odor: None  Pain: Unable to determine    Interventions  Current support surface: Isolibrium  Current off-loading measures: Pillows/Taps, today pt tolerating supine position  Visual inspection of wound(s) completed  Discussed plan of care with Discussed care with RN    Amara Robert WO RN

## 2020-12-01 NOTE — PROGRESS NOTES
"SPIRITUAL HEALTH SERVICES  SPIRITUAL ASSESSMENT Progress Note  [facility] ICU     REFERRAL SOURCE: Follow Up    I shared a brief, reflective phone visit with patient's daughter Lakshmi today as a follow up from last week's conversation. Lakshmi was driving at the time and on her way to the hospital to pray outside her parent's rooms. She reflected on her and her family's tremendous grief and distress at this time, sharing the challenge in holding this with the many other details they are all navigating during the day. She shares their family and mundo are their main sources of support right now. Lakshmi shared ways their family continues to support and communicate with one another and shares they are \"leaning into hope.\" I spent time offering emotional and spiritual support through reflective listening, validation of emotions/experiences and words of comfort and peace.     PLAN: Lakshmi shares their family is well supported communally and by their mundo community, no specific needs for family identified today but she shares appreciation for the check ins. [facility] will continue to follow.     Milvia Matamoros  Associate    Phone: [phone]  Pager: [phone]    "

## 2020-12-01 NOTE — PROGRESS NOTES
Critical Care Progress Note      12/01/2020    Name: Aly Sorensen MRN#: [id number]   Age: 80 year old YOB: 1940     \[facility] [place] [facility]\"" Day# 21                 Problem List:   Principal Problem:    Acute respiratory failure with hypoxia (H)  Active Problems:    Pneumonia due to 2019 novel coronavirus    Hypokalemia    Aortic thrombus (H)    DVT (deep venous thrombosis) (H)           Summary/Hospital Course:     80M h/o prostate ca and melanoma now presents with respiratory failure due to covid 19 pna.  Admitted 11/10, intubated 11/22.  Tension ptx on 11/23 now s/p ch tube placement.  Now on inhaled flolan, paralyzed, proning.      Assessment and plan :     Aly Sorensen IS a 80 year old male admitted on 11/10/2020 for covid19 pna.   I have personally reviewed the daily labs, imaging studies, cultures and discussed the case with referring physician and consulting physicians.     My assessment and plan by system for this patient is as follows:    Neurology/Psychiatry:   1. Sedation:  midaz drip  2. Analgesia: fentanyl drip    Cardiovascular:   1.  Shock: likely vasoplegic due to sedation vs obstructive due to intrathoracic pressures from mechanical ventilation.  Continue levophed as needed.  2.  Afib with rvr:  Resolved.  Now completed amio drip.    Pulmonary/Ventilator Management:   1. Acute hypoxemic respiratory failure, vent dependent:  In setting of covid pna.  Continue lung protective tidal volumes, and inhaled flolan.  Now seems to be tolerating supined position.  Will attempt paralytic wean today.  PEEP 10 today.     GI and Nutrition :   1. Continue tf  2.  Famotidine for pud prophy    Renal/Fluids/Electrolytes:   1. Creatinine ok 0.49  2. Metabolic alkalosis:  In setting of diuresis. Tolerate for now.  3.  Resuming diuresis    Infectious Disease:   1. covid-19 pna:  On stress dose steroids, but can initiate taper; by the time tapered off will have >10d steroid therapy.  Remdesivir finished.  2. Empirically  on merrem.  Sputum, urine and blood all negative.  Stop today.    Endocrine:   1. Hyperglycemia:  Continue sliding scale insulin.    Hematology/Oncology:   1. Wbc elevated 22- in setting of infection and steroids.  2. Anemia:  9.5 monitor.  3.  pltlts 230 acceptable  4.  DVT: heparin drip    ICU Prophylaxis:   1. DVT: Hep drip  2. VAP: HOB 30 degrees, chlorhexidine rinse  3. Stress Ulcer: H2 blocker  4. Restraints: Nonviolent soft two point restraints required and necessary for patient safety and continued cares and good effect as patient continues to pull at necessary lines, tubes despite education and distraction. Will readdress daily.   5. Wound care - per unit routine   6. Feeding - tf  7. Family Update: son rashawn by phone  8. Disposition - icu             Interim History:   Unable to obtain history directly due to intubated/vented status. No events overnight.           Key Medications:       artificial tears   Both Eyes Q8H     chlorhexidine  15 mL Mouth/Throat Q12H     famotidine  20 mg Intravenous Q12H     hydrocortisone sodium succinate PF  50 mg Intravenous Q6H     insulin aspart  1-6 Units Subcutaneous Q4H     meropenem  1,000 mg Intravenous Q8H     polyethylene glycol  17 g Oral Daily     potassium & sodium phosphates  1 packet Oral TID     sennosides  5 mL Oral At Bedtime     sodium chloride (PF)  10 mL Intracatheter Q8H     sodium chloride (PF)  3 mL Intracatheter Q8H       amiodarone Stopped (11/30/20 0137)     epoprostenol (VELETRI) 20 mcg/mL in sterile water inhalation solution 20 ng/kg/min (12/01/20 0808)     fentaNYL 150 mcg/hr (12/01/20 0807)     heparin 1,750 Units/hr (12/01/20 0806)     - MEDICATION INSTRUCTIONS -       midazolam 6 mg/hr (12/01/20 0809)     - MEDICATION INSTRUCTIONS -       norepinephrine Stopped (12/01/20 0850)     - MEDICATION INSTRUCTIONS -       propofol Stopped (11/28/20 1312)     sodium chloride 20 mL/hr at 12/01/20 0810     vecuronium (NORCURON) infusion ADULT Stopped  (12/01/20 0820)               Physical Examination:   Temp:  [95  F (35  C)-98.8  F (37.1  C)] 95  F (35  C)  Pulse:  [45-80] 63  Resp:  [22-27] 22  BP: (131)/(72) 131/72  MAP:  [56 mmHg-103 mmHg] 103 mmHg  Arterial Line BP: ()/(45-75) 156/71  FiO2 (%):  [50 %] 50 %  SpO2:  [62 %-98 %] 98 %      Intake/Output Summary (Last 24 hours) at 12/1/2020 0949  Last data filed at 12/1/2020 0800  Gross per 24 hour   Intake 2891 ml   Output 2055 ml   Net 836 ml         Wt Readings from Last 4 Encounters:   12/01/20 80.3 kg (177 lb 0.5 oz)   08/20/20 83 kg (183 lb)   12/11/19 81.9 kg (180 lb 8 oz)   10/28/19 82.4 kg (181 lb 11.2 oz)     Arterial Line BP: ()/(45-75) 156/71  MAP:  [56 mmHg-103 mmHg] 103 mmHg  BP - Mean:  [98] 98  Ventilation Mode: CMV/AC  (Continuous Mandatory Ventilation/ Assist Control)  FiO2 (%): 50 %  Rate Set (breaths/minute): 22 breaths/min  Tidal Volume Set (mL): 350 mL  PEEP (cm H2O): 10 cmH2O  Oxygen Concentration (%): 50 %  Peak Inspiratory Pressure (cm H2O) (Drager Mae): 28  Resp: 22    Recent Labs   Lab 11/30/20  1725 11/30/20  0600 11/29/20  2315 11/29/20  1540 11/29/20  1230 11/28/20  1045 11/28/20  0305 11/27/20  2120   PH 7.49* 7.45 7.37 7.43 7.41  --  7.39 7.43   PCO2 53* 57* 71* 58* 58*  --  56* 52*   PO2 86 110* 107* 68* 61*  --  102 57*   HCO3 40* 39* 41* 38* 37*  --  34* 35*   O2PER  --   --   --   --  50% 40% 60 60%       GEN: no acute distress   HEENT: head ncat, sclera anicteric, OP patent, trachea midline   NECK: supple  PULM: unlabored synchronous with vent, clear anteriorly    CV/COR: RRR S1S2 no gallop,  No rub, no murmur  ABD: soft nontender, hypoactive bowel sounds, no mass  EXT:  No Edema;   Warm x4  NEURO: sedated/paralyzed.  Unable to assess.  SKIN: no obvious rash  LINES: clean, dry intact         Data:   All data and imaging reviewed     ROUTINE ICU LABS (Last four results)  CMP  Recent Labs   Lab 12/01/20  0345 11/30/20  0220 11/29/20  1120 11/29/20  0402  11/28/20  0404 11/27/20  0430    141  --  142 141 143   POTASSIUM 3.8 4.2  --  4.6 4.2 4.1   CHLORIDE 100 102  --  106 105 109   CO2 37* 36*  --  35* 34* 31   ANIONGAP 3 3  --  1* 2* 3   * 173*  --  123* 146* 192*   BUN 37* 36*  --  43* 48* 51*   CR 0.49* 0.52*  --  0.57* 0.63* 0.63*   GFRESTIMATED >90 >90  --  >90 >90 >90   GFRESTBLACK >90 >90  --  >90 >90 >90   ELIA 7.8* 8.1*  --  8.3* 7.8* 7.6*   MAG 2.4* 2.3 2.4*  --  2.8*  --    PHOS 2.3* 2.8 2.9  --  4.0  --    PROTTOTAL  --  5.2*  --  5.5* 5.3* 4.5*   ALBUMIN  --  2.4*  --  2.0* 1.3* 1.2*   BILITOTAL  --  0.9  --  0.6 0.7 0.4   ALKPHOS  --  60  --  63 73 58   AST  --  23  --  19 29 18   ALT  --  28  --  24 29 18     CBC  Recent Labs   Lab 12/01/20  0345 11/30/20  0220 11/29/20  0402 11/28/20  0404   WBC 21.9* 19.0* 17.7* 20.4*   RBC 3.05* 2.99* 3.42* 3.80*   HGB 9.5* 9.4* 10.5* 11.8*   HCT 29.7* 29.2* 34.3* 37.5*   MCV 97 98 100 99   MCH 31.1 31.4 30.7 31.1   MCHC 32.0 32.2 30.6* 31.5   RDW 13.6 13.6 14.0 14.1    200 214 205     INRNo lab results found in last 7 days.  Arterial Blood Gas  Recent Labs   Lab 11/30/20  1725 11/30/20  0600 11/29/20  2315 11/29/20  1540 11/29/20  1230 11/28/20  1045 11/28/20  0305 11/27/20  2120   PH 7.49* 7.45 7.37 7.43 7.41  --  7.39 7.43   PCO2 53* 57* 71* 58* 58*  --  56* 52*   PO2 86 110* 107* 68* 61*  --  102 57*   HCO3 40* 39* 41* 38* 37*  --  34* 35*   O2PER  --   --   --   --  50% 40% 60 60%       All cultures:  No results for input(s): CULT in the last 168 hours.  Recent Results (from the past 24 hour(s))   XR Chest Port 1 View    Narrative    XR CHEST PORT 1 VW  12/1/2020 6:12 AM       INDICATION: left PTX, s/p left chest tube placement  COMPARISON: 11/30/2020 0209 hours       Impression    IMPRESSION: Tubes and lines remain in good position. No pneumothorax.  Extensive bilateral pulmonary infiltrates have minimally improved.    MICHELLE HAYS MD     Labs (personally reviewed):  See a/p    Billing:  This patient is critically ill: Yes. Total critical care time today 45 min.

## 2020-12-01 NOTE — PROGRESS NOTES
Pt remains deeply sedated on midazolam/fentanyl IV RASS -5 BIS 35-45   on Vecuronium titrating to lower dose slowly r/t TOF 1/4, synchronous with ventilation and pt sensitivity to small gtt changes.    Tolerating RR wean to 22, remains on full strength Veletri, PEEP 10 Fio2 50% Supine at 1300 plan to leave supine at this time. PO2 84 supine Plan to wean off vecuronium in am as pt tolerates  CT with intermittant airleak, small amount of serous drainage      Amiodarone off converted to sinus erwin, HR 40-60   Norepi gtt therapeutic at lower doses 0.01-0.03, higher doses produce gradual htn and profound bradycardia with HR 30s    Postpyloric TF rate increased to 45/hr will hold at this rate for gastric residuals totaling 325ml, readdress in am per Dr Figueroa.  2 large soft BM today    UOP adequate, 700 ml/4hour diuresis from albumin/ lasix UA sent    Heparin therapeutic (high dose for  DVT, aortic thrombus, AF)    WOC RN recommendations for wound care.   Dr Figueroa updated thru out day with VS, gtt, I/O, labs, general status    Lakshmi ( daughter updated x 2) very tearful and emotional on phone feeling very stressed with both parents critically in ICU, reports updating her 4 siblings/ families and supportive each other

## 2020-12-02 NOTE — PROGRESS NOTES
"SPIRITUAL HEALTH SERVICES  SPIRITUAL ASSESSMENT Progress Note  [facility] ICU      REFERRAL SOURCE: Follow Up     I received a call from patient's daughter, Lakshmi today:     Lakshmi expressed awareness that her mother is \"declining.\" She asked for the family to be the ones to let her father know if this does happen and if he is alert and able to receive this information (also on the ICU).     Lakshmi asked for our department's assistance in setting up a virtual sacrament of anointing in the [employer] tradition for both of her parents. I will coordinate with ICU staff to determine how we can do this, she shares their hope is for this to be done on Saturday at 10 am.     I spent time offering grief support through reflective listening, validation of emotions/experiences and words of comfort and support.     PLAN: [facility] will continue to follow for support.      Milvia Matamoros  Associate    Phone: [phone]  Pager: [phone]     "

## 2020-12-02 NOTE — PLAN OF CARE
Pt weaned off pressor and paralytic today, well tolerated, see MAR and VS, pt remains sedated due to concern for ventilator compliance, plan to try to wean sedation tomorrow, heparin gtt continues, see MAR, no fever today, IV steroids and lasix continue, see MAR, pt's son, Austin, updated by phone today.

## 2020-12-02 NOTE — PROGRESS NOTES
[facility] ICU RESPIRATORY NOTE        Date of Admission: 11/10/2020    Date of Intubation (most recent): 11/22/20    Reason for Mechanical Ventilation: Respiratory failure     Number of Days on Mechanical Ventilation: 12    Met Criteria for Spontaneous Breathing Trial: No    Reason for No Spontaneous Breathing Trial: per MD     Significant Events Today: none     ABG Results:   Recent Labs   Lab 12/02/20  0430 12/01/20  1020 11/30/20  1725 11/30/20  0600 11/29/20  1230 11/29/20  1230 11/28/20  1045   PH 7.49* 7.42 7.49* 7.45   < > 7.41  --    PCO2 59* 61* 53* 57*   < > 58*  --    PO2 111* 82 86 110*   < > 61*  --    HCO3 44* 40* 40* 39*   < > 37*  --    O2PER 60 60  --   --   --  50% 40%    < > = values in this interval not displayed.       Current Vent Settings: Ventilation Mode: PCV Plus assist  (Pressure Control Ventilation/ Assist Control)  FiO2 (%): 60 %  Rate Set (breaths/minute): 22 breaths/min  PEEP (cm H2O): 10 cmH2O  Oxygen Concentration (%): 60 %  Resp: 22      Plan: continue full ventilatory support and assess for daily weaning trial     Sola Lamas, RT

## 2020-12-02 NOTE — PLAN OF CARE
Vss, occ. Squeezes eyelids weakly with pupil assessments, follows no commands, extremeties flaccid. Continues to be sedated on Versed & Fentanyl. Lungs rhonchi @ times, scant -sm. Amt creamy ETT secretions. CT patent scant amt. Serosanguious drainage.

## 2020-12-02 NOTE — PROGRESS NOTES
[facility]  Palliative Care Daily Progress Note       Recommendations & Counseling       Family want to notify me and ICU staff that in the circumstance that pt's wife,  in 259, die, family wants to be very careful and involved in the delivery of this news to Lucrecia (when/if he reaches a point where he is able to be extubated and is alert). Family voice understanding that we are uncertain Lucrecia could safely get to this point, and if he does, it is likely to be many weeks from now     Our team will continue to follow for support and assistance in care conferences, goals of care as needed    Will plan to check in weekly      Case was reviewed with Dr. Figueroa and nursing staff.    FCO Hidalgo CNP  [facility]  Contact information available via [facility] Paging/Directory      Thank you for the opportunity to participate in the care of this patient and family. Our team: will continue to follow.     During regular M-F work hours ([phone]) -- if you are not sure who specifically to contact -- please contact us in [facility] Smart Web.     After regular work hours and on weekends/holidays, you can call our answering service at [phone].     Attestation:  Total time on the floor involved in the patient's care: 30 minutes  Total time spent in counseling/care coordination: >50%     Assessments          Aly Sorensen is a 80 year old male with PMH significant for prostate cancer s/p prostatectomy 10/2005 with PSA recently normal, melanoma, and recent diagnosis of COVID infection (+ test 11/7), who was admitted on 11/10/2020 with worsening shortness of breath and declining oxygen saturations. Pt's hospital course has been notable for acute respiratory failure with waxing and waning O2 needs. He required an ICU stay earlier in the hospitalization, but stabilized and returned to the floor, only to have worsening respiratory status again. He is now intubated and mechanically  ventilated in the ICU with ARDS. He is paralyzed and proned. He has developed coagulopathy with a new RLE DVT, aortic arch thrombus, and a possible splenic infarct. He also developed a tension PTX with chest tube now in place.     Today, the patient was seen for:  Pt and family support    Received a phone call from pt's daughter, Lakshmi. She and family are hearing news that their mother,  in 259, is not doing well and may die. Lakshmi had questions about how to procure belongings. She also wondered about the visitor policy, as she was told family would be able to visit (I did state to her that should we reach the point of comfort measures with either or both parents, the decision of visitors would need to be addressed by the unit manager, per policy). She also expressed that should pt's wife die at some point, family would want to be very careful and deliberate with delivering this news to Lucrecia (when he is able to be extubated and alert). I expressed concern that the day for Lucrecia to be able to do this, if at all, could be many weeks+ away. Family continue to support each other, and have a large mundo community to support one another.     Prognosis, Goals, or Advance Care Planning was addressed today with: Yes.  Mood, coping, and/or meaning in the context of serious illness were addressed today: Yes.  Summary/Comments: Family feel very well supported by their community and parish. Appreciate support from spiritual health team as well.             Interval History:     Chart review/discussion with unit or clinical team members:   Continues in ICU, vented, critically ill.     Per patient or family/caregivers today:  Family heard that pt is making very slow, subtle improvements today.    Key Palliative Symptoms:  We are not helping to manage these symptoms currently in this patient.    Patient is on opioids: bowels not assessed today.           Review of Systems:     Besides above, an additional N/A system ROS was reviewed  and is unremarkable          Medications:     I have reviewed this patient's medication profile and medications during this hospitalization.    Noted meds:    Hydrocortisone 50mg IV daily   Lasix 40mg IV BID   Amiodarone gtt stopped    Veletri inh neb  Fentanyl gtt 150mcg/hr   Heparin gtt   Versed gtt 7mg/hr   Vecuronium gtt   Fentanyl 25-50mcg IV Q30 mins PRN pain   Versed 1-2mg IV Q30 mins PNR agitation            Physical Exam:   Not able to be assessed today. Physical exam per Dr. Figueroa, ICU, note dated 12/2.            Data Reviewed:     Recent imaging reviewed, my comments on pertinents:   Results for orders placed or performed during the hospital encounter of 11/10/20   CT Chest Pulmonary Embolism w Contrast    Impression    IMPRESSION:  1.  No evidence of pulmonary embolism. Thoracic aorta is unremarkable.    2.  Patchy groundglass opacities within both lungs concerning for  viral pneumonitis. ARDS could appear similar.    NICHOLAS DE LEON MD   XR Chest Port 1 View    Impression    IMPRESSION: Peripheral ground-glass infiltrates compatible with  history of COVID-19 pneumonia.    EBONY ROCHA MD   CT Chest Pulmonary Embolism w Contrast    Impression    IMPRESSION:  1.  No pulmonary embolism demonstrated.  2.  Extensive bilateral groundglass and interstitial infiltrates  compatible with history of COVID pneumonia.  3.  New filling defects within the distal aortic arch and mid to  distal descending thoracic aorta from the comparison 9 days prior.  This is most consistent with mural thrombus.  4.  Small wedge-shaped hypoenhancing area in the spleen, possibly a  small splenic infarct.    Aortic and pulmonary findings discussed with Dr. Maldonado on November 19, 2020 at 1:43 PM.    EBONY ROCHA MD   US Lower Extremity Venous Duplex Bilateral    Impression    IMPRESSION: Positive for mild right calf DVT.    BROOKLYN COVARRUBIAS MD   XR Chest Port 1 View    Impression    IMPRESSION: Single portable AP view of the chest was  obtained.  Endotracheal tube tip projects over lower thoracic trachea  approximately 3 cm from the dalia. Enteric tube crosses the diaphragm  with the distal tip outside the field of view. An esophageal  temperature probe distal tip projects over the distal esophagus.  Stable cardiomediastinal silhouette. Bilateral airspace pulmonary  opacities, worrisome for infection. No significant pleural effusion or  pneumothorax.    MINESH BENTON MD   XR Chest Port 1 View    Impression    IMPRESSION: Endotracheal tube tip just below the clavicle heads. Nasogastric tube tip projects over the gastric fundus. Worsening bilateral pulmonary infiltrates. No effusion. Normal heart size.   XR Chest Port 1 View    Impression    IMPRESSION: There is a large left pneumothorax, causing complete  atelectasis of the left lung and mild displacement of the mediastinum  to the right. Hazy opacities in the right mid lung are unchanged.  Endotracheal tube is unchanged in position, with tip 6.7 cm above the  dalia. Enteric tube, tip not seen but below the diaphragm.    The referring service is aware of the pneumothorax, and at the time of  this interpretation there is subsequent imaging demonstrating left  chest tube placement.    ANDREINA SUNSHINE MD   XR Chest Port 1 View    Impression    IMPRESSION: There has been interval placement of a single left apical  chest tube. Previously described large left pneumothorax has almost  entirely resolved, with a small residual pneumothorax noted at the  left lung base. Mild subcutaneous emphysema is present in the left  chest wall. Hazy opacities in the right mid and lower lung and left  midlung are not significantly changed. Endotracheal tube is unchanged.  Enteric tube is unchanged, with tip near the gastric fundus.    ANDREINA SUNSHINE MD   XR Chest Port 1 View    Impression    IMPRESSION: Portable chest. Left chest tube is again in place with a  small left apical pneumothorax. Subcutaneous  emphysema is no longer  evident. Patchy airspace opacities in the mid and lower lungs overall  appear stable if not slightly improved. No evidence of right  pneumothorax. No significant pleural effusions appreciated on this  study. Heart is normal in size. Endotracheal tube is in good position  approximately 3-4 cm above the dalia. Nasogastric tube extends below  the left hemidiaphragm presumably in the stomach.    NICHOLAS DE LEON MD   XR Abdomen Port 1 View    Impression    IMPRESSION: Feeding tube in good position with the tip in the distal  duodenum. Bowel gas pattern is nonobstructed.    MICHELLE HAYS MD   XR Chest Port 1 View    Impression    IMPRESSION: Endotracheal tube tip 4 cm above the dalia. PH probe in the esophagus. Endotracheal tube tip below the diaphragm. Single left-sided chest tube. No pneumothorax. Normal heart size and pulmonary vascularity. Patchy bilateral mid and lower lung   infiltrates, greater on the left. No significant bony abnormalities. The left-sided pneumothorax present on 11/24/2020 is not seen on today's exam.     XR Chest Port 1 View    Impression    IMPRESSION: Endotracheal tube tip in the midtrachea, esophageal  temperature probe, enteric tube and left apically directed chest tube  in place. Slight worsening of patchy airspace opacities in both lungs  predominantly in the left mid and lower lungs. No pneumothorax. Normal  heart size.    DEE DEE LOPEZ MD   XR Chest Port 1 View    Impression    IMPRESSION: Endotracheal tube 3 cm above dalia. Enteric tube tip below lower aspect of film. Probe over mid esophageal region. Left chest tube. Bilateral infiltrates.   XR Chest Port 1 View    Impression    IMPRESSION: New right PICC line tip in the SVC. Left chest tube  unchanged. No pneumothorax. Bilateral pulmonary infiltrates have not  appreciably changed. Endotracheal tube remains in good position above  the dalia. Esophageal probe in the mid esophagus. Feeding tube  courses below the  diaphragm.     MICHELLE HAYS MD   CT Chest Abdomen Pelvis w/o Contrast    Impression    IMPRESSION:  1. No convincing hemorrhage demonstrated in the chest, abdomen, and  pelvis.  2. Increased pulmonary consolidative changes and extensive infiltrates  bilaterally.    EBONY ROCHA MD   XR Chest Port 1 View    Impression    IMPRESSION: Support lines stable. Patchy bilateral infiltrates  increased from previous. Left chest tube tip at the apex. No definite  pneumothorax evident in this position.    EBONY ROCHA MD   XR Chest Port 1 View    Impression    IMPRESSION: Left chest tube. Endotracheal tube, probe, enteric tube and right PICC line again noted. Diffuse, bilateral infiltrates. No visible pneumothorax.   XR Chest Port 1 View    Impression    IMPRESSION: Stable position of the left-sided chest tube. No definitive evidence for significant left-sided pneumothorax. Endotracheal tube tip in good position in the mid trachea. Enteric tube extending below level of the EG junction the upper stomach.   Feeding tube extending into the duodenum. Extensive bilateral pulmonary infiltrates unchanged. Normal heart size and pulmonary vascularity.       Recent lab data reviewed, my comments on pertinents:   Na 145  K 3.1  Creat 0.52  WBC 16.6  Hgb 9.2  Plt 222

## 2020-12-02 NOTE — PROGRESS NOTES
Critical Care Progress Note      12/02/2020    Name: Aly Sorensen MRN#: [id number]   Age: 80 year old YOB: 1940     [facility]# [facility]                 Problem List:   Principal Problem:    Acute respiratory failure with hypoxia (H)  Active Problems:    Pneumonia due to 2019 novel coronavirus    Hypokalemia    Aortic thrombus (H)    DVT (deep venous thrombosis) (H)           Summary/Hospital Course:     80M h/o prostate ca and melanoma now presents with respiratory failure due to covid 19 pna.  Admitted 11/10, intubated 11/22.  Tension ptx on 11/23 now s/p ch tube placement.  Now on inhaled flolan.  No longer requiring paralysis/proning.      Assessment and plan :     Aly Sorensen IS a 80 year old male admitted on 11/10/2020 for covid19 pna.   I have personally reviewed the daily labs, imaging studies, cultures and discussed the case with referring physician and consulting physicians.   My assessment and plan by system for this patient is as follows:    Neurology/Psychiatry:   1. Sedation:  midaz drip  2. Analgesia: fentanyl drip    Cardiovascular:   1.  Shock: likely vasoplegic due to sedation vs obstructive due to intrathoracic pressures from mechanical ventilation.  Continue levophed as needed.  2.  Afib with rvr:  Resolved.  Now completed amio drip.    Pulmonary/Ventilator Management:   1. Acute hypoxemic respiratory failure, vent dependent:  In setting of covid pna.  Continue lung protective tidal volumes, and inhaled flolan.  Now tolerating supined position .  Continue PEEP 10 today.     GI and Nutrition :   1. Continue tf  2.  Famotidine for pud prophy    Renal/Fluids/Electrolytes:   1. Creatinine ok 0.52  2. Metabolic alkalosis:  In setting of diuresis. Giving PO acetazolamide today (iv unavailable).  3.  Continue diuresis    Infectious Disease:   1. covid-19 pna:  On stress dose steroids, but can initiate taper; by the time tapered off will have >10d steroid therapy.  Remdesivir finished.  2. S/p  empiric course of merrem.  No pathogen on culture.    Endocrine:   1. Hyperglycemia:  Continue sliding scale insulin.     Hematology/Oncology:   1. Wbc downtrending 16- in setting of infection and steroids.  2. Anemia:  9.2 monitor.  3.  pltlts 222 acceptable  4.  DVT: heparin drip    ICU Prophylaxis:   1. DVT: Hep drip  2. VAP: HOB 30 degrees, chlorhexidine rinse  3. Stress Ulcer: H2 blocker  4. Restraints: Nonviolent soft two point restraints required and necessary for patient safety and continued cares and good effect as patient continues to pull at necessary lines, tubes despite education and distraction. Will readdress daily.   5. Wound care - per unit routine   6. Feeding - tf  7. Family Update: son rashawn by phone  8. Disposition - icu             Interim History:   Unable to obtain history directly due to intubated/vented status. No events overnight.           Key Medications:       artificial tears   Both Eyes Q8H     chlorhexidine  15 mL Mouth/Throat Q12H     famotidine  20 mg Intravenous Q12H     furosemide  40 mg Intravenous Q12H     hydrocortisone sodium succinate PF  50 mg Intravenous Q12H     insulin aspart  1-6 Units Subcutaneous Q4H     polyethylene glycol  17 g Oral Daily     potassium & sodium phosphates  1 packet Oral TID     sennosides  5 mL Oral At Bedtime     sodium chloride (PF)  10 mL Intracatheter Q8H     sodium phosphate  9 mmol Intravenous Once       epoprostenol (VELETRI) 20 mcg/mL in sterile water inhalation solution 20 ng/kg/min (12/02/20 0818)     fentaNYL 150 mcg/hr (12/02/20 0813)     heparin 1,550 Units/hr (12/02/20 0812)     - MEDICATION INSTRUCTIONS -       midazolam 7 mg/hr (12/02/20 0811)     - MEDICATION INSTRUCTIONS -       norepinephrine Stopped (12/01/20 0850)     - MEDICATION INSTRUCTIONS -       propofol Stopped (11/28/20 1312)     sodium chloride 20 mL/hr at 12/02/20 0811     vecuronium (NORCURON) infusion ADULT Stopped (12/01/20 0820)               Physical Examination:    Temp:  [97.2  F (36.2  C)-98.8  F (37.1  C)] 97.2  F (36.2  C)  Pulse:  [74-93] 79  Resp:  [22-24] 22  BP: (106)/(65) 106/65  MAP:  [68 mmHg-110 mmHg] 78 mmHg  Arterial Line BP: (112-160)/(47-76) 122/58  FiO2 (%):  [60 %] 60 %  SpO2:  [90 %-99 %] 95 %      Intake/Output Summary (Last 24 hours) at 12/2/2020 0920  Last data filed at 12/2/2020 0800  Gross per 24 hour   Intake 2339.53 ml   Output 3415 ml   Net -1075.47 ml         Wt Readings from Last 4 Encounters:   12/02/20 78.1 kg (172 lb 2.9 oz)   08/20/20 83 kg (183 lb)   12/11/19 81.9 kg (180 lb 8 oz)   10/28/19 82.4 kg (181 lb 11.2 oz)     Arterial Line BP: (112-160)/(47-76) 122/58  MAP:  [68 mmHg-110 mmHg] 78 mmHg  BP - Mean:  [22-77] 77  Ventilation Mode: PCV Plus assist  (Pressure Control Ventilation/ Assist Control)  FiO2 (%): 60 %  Rate Set (breaths/minute): 22 breaths/min  PEEP (cm H2O): 10 cmH2O  Oxygen Concentration (%): 60 %  Resp: 22    Recent Labs   Lab 12/02/20  0430 12/01/20  1020 11/30/20  1725 11/30/20  0600 11/29/20  1230 11/29/20  1230 11/28/20  1045   PH 7.49* 7.42 7.49* 7.45   < > 7.41  --    PCO2 59* 61* 53* 57*   < > 58*  --    PO2 111* 82 86 110*   < > 61*  --    HCO3 44* 40* 40* 39*   < > 37*  --    O2PER 60 60  --   --   --  50% 40%    < > = values in this interval not displayed.       GEN: no acute distress   HEENT: head ncat, sclera anicteric, OP patent, trachea midline   NECK: supple  PULM: unlabored synchronous with vent, clear anteriorly    CV/COR: RRR S1S2 no gallop,  No rub, no murmur  ABD: soft nontender, hypoactive bowel sounds, no mass  EXT:  No Edema;   Warm x4  NEURO: sedated/paralyzed.  Unable to assess.  SKIN: no obvious rash  LINES: clean, dry intact         Data:   All data and imaging reviewed     ROUTINE ICU LABS (Last four results)  CMP  Recent Labs   Lab 12/02/20  0430 12/01/20  1827 12/01/20  0345 11/30/20  0220 11/29/20  1120 11/29/20  0402 11/28/20  0404 11/27/20  0430   * 143 140 141  --  142 141 143    POTASSIUM 3.1* 3.4 3.8 4.2  --  4.6 4.2 4.1   CHLORIDE 102 102 100 102  --  106 105 109   CO2 41* 40* 37* 36*  --  35* 34* 31   ANIONGAP 2* 1* 3 3  --  1* 2* 3   * 151* 191* 173*  --  123* 146* 192*   BUN 36* 36* 37* 36*  --  43* 48* 51*   CR 0.52* 0.56* 0.49* 0.52*  --  0.57* 0.63* 0.63*   GFRESTIMATED >90 >90 >90 >90  --  >90 >90 >90   GFRESTBLACK >90 >90 >90 >90  --  >90 >90 >90   ELIA 7.7* 7.7* 7.8* 8.1*  --  8.3* 7.8* 7.6*   MAG 2.4* 2.5* 2.4* 2.3 2.4*  --  2.8*  --    PHOS 2.2*  --  2.3* 2.8 2.9  --  4.0  --    PROTTOTAL  --   --   --  5.2*  --  5.5* 5.3* 4.5*   ALBUMIN  --   --   --  2.4*  --  2.0* 1.3* 1.2*   BILITOTAL  --   --   --  0.9  --  0.6 0.7 0.4   ALKPHOS  --   --   --  60  --  63 73 58   AST  --   --   --  23  --  19 29 18   ALT  --   --   --  28  --  24 29 18     CBC  Recent Labs   Lab 12/02/20  0430 12/01/20  0345 11/30/20  0220 11/29/20  0402   WBC 16.6* 21.9* 19.0* 17.7*   RBC 2.91* 3.05* 2.99* 3.42*   HGB 9.2* 9.5* 9.4* 10.5*   HCT 28.5* 29.7* 29.2* 34.3*   MCV 98 97 98 100   MCH 31.6 31.1 31.4 30.7   MCHC 32.3 32.0 32.2 30.6*   RDW 13.9 13.6 13.6 14.0    230 200 214     INRNo lab results found in last 7 days.  Arterial Blood Gas  Recent Labs   Lab 12/02/20  0430 12/01/20  1020 11/30/20  1725 11/30/20  0600 11/29/20  1230 11/29/20  1230 11/28/20  1045   PH 7.49* 7.42 7.49* 7.45   < > 7.41  --    PCO2 59* 61* 53* 57*   < > 58*  --    PO2 111* 82 86 110*   < > 61*  --    HCO3 44* 40* 40* 39*   < > 37*  --    O2PER 60 60  --   --   --  50% 40%    < > = values in this interval not displayed.       All cultures:  No results for input(s): CULT in the last 168 hours.  No results found for this or any previous visit (from the past 24 hour(s)).  Labs (personally reviewed):  See a/p    Billing: This patient is critically ill: Yes. Total critical care time today 45 min.

## 2020-12-03 NOTE — PROGRESS NOTES
Hematology chart check:      Continues on IV heparin gtt for aortic thrombus and DVT related to COVID coagulapathy. Anemia of acute and chronic disease and stress leukocytosis.     No new recommendations from our team.  We will follow peripherally. Please call if any questions.    Otoniel Quinn  [facility]  [phone]

## 2020-12-03 NOTE — PROGRESS NOTES
CLINICAL NUTRITION SERVICES - REASSESSMENT NOTE      Future/Additional Recommendations:   - Continue EN regimen of Isosource 1.5 60 mL/hr   - Continue standard FWF 60 mL q 4 hrs    Malnutrition: (11/18)  % Weight Loss:  > 2% in 1 week (severe malnutrition)  % Intake:  <75% for > 7 days (non-severe malnutrition)  Subcutaneous Fat Loss:  Unable to determine  Muscle Loss:  Unable to determine  Fluid Retention:  None noted     Malnutrition Diagnosis: Non-Severe malnutrition  In Context of:  Acute illness or injury     EVALUATION OF PROGRESS TOWARD GOALS   Diet:  NPO (vented x 13 days)     Nutrition Support:  Switched TF formula on 11/30, reached goal on 12/2. Continues as follows --     Nutrition Support Enteral:  Type of Feeding Tube: NDT   Enteral Frequency: Continuous   Enteral Regimen: Isosource 1.5 at 60 mL/hr   Total Enteral Provisions: 2160 kcal (28 kcal/kg), 98 g protein (1.3 g/kg), 20 g fiber, 232 g CHO, and 1100 mL free water   Free Water Flush: 60 mL q 4 hrs      Intake/Tolerance:    - Na 145 (H), BUN 36 (H), Mg 2.5 (H) -- IV lasix   - -190 -- on medium sliding scale insulin   - BM x 1 today -- miralax held   - I/O 3042/4170, wt 76.8 kg -- down 1.2 kg since last assessment 11/30 though likely fluid related with diuresis       ASSESSED NUTRITION NEEDS:  Dosing Weight:  78 kg (actual wt, 11/30)  Estimated Final Energy Needs (>1 week hospitalized):  4669-8771 kcals (25-30 Kcal/Kg)  Justification: maintenance  Estimated Protein Needs:   grams protein (1.2-1.5 g pro/Kg)  Justification: hypercatabolism with acute illness  Estimated Fluid Needs: 0526-6117 kcals (1 mL/kcal)  Justification: Maintenance or per provider pending fluid status     NEW FINDINGS:   12/1: Weaned off pressor and paralytic   12/2: No longer requiring paralysis or proning per MD   12/3: Tapering stress steroids     Previous Goals:   TF to meet % needs in 48 hrs   Evaluation: Met    Previous Nutrition Diagnosis:   Inadequate  protein-energy intake related to NPO, vented and TF reliant for nutrition as evidenced by TF running below goal rate x3 days, meeting </=50% estimated needs   Evaluation: Resolved       CURRENT NUTRITION DIAGNOSIS  No nutrition diagnosis identified at this time. TF currently running at goal without issues, will continue to follow.     INTERVENTIONS  Recommendations / Nutrition Prescription  - Continue EN regimen of Isosource 1.5 60 mL/hr   - Continue standard FWF 60 mL q 4 hrs     Implementation  Collaboration and Referral of Nutrition care: Pt discussed at interdisciplinary rounds     Goals  EN regimen will continue to meet % of assessed nutrition needs.       MONITORING AND EVALUATION:  Progress towards goals will be monitored and evaluated per protocol and Practice Guidelines      Laney Estrada  Dietetic Intern

## 2020-12-03 NOTE — PROGRESS NOTES
Received phone call from Dr. Figueroa stating that a care conference is being planned for pt and his wife on Saturday 12/5 at 1100. Dr. Seals, palliative MD on call, is willing to participate virtually. Details of how to connect with team/family to be determined. VM left for unit care coordinator. Thanks.    FCO Hidalgo [facility]  Contact information available via [facility] Paging/Directory

## 2020-12-03 NOTE — PLAN OF CARE
CNS: Heavily sedated, RASS -4, versed 7, fentanyl 150, pt unable to follow commands. Given fentanyl bolus of 50 for vent dyssynchrony.     CVS: SR, BP stable w/o pressors, +2 Bilat lower and upper ext edema    Resp: Mech vent PC 28 RR 22 PEEP 10  FI02 50    GI: TF increased to 60 @ 1800, at goal rate. Max residuals at 90. No BM this shift, BS hypoactive    : Jane in place, diuresing well with lasix. K+ replaced, recheck 2300    Skin: Lip ulceration, WOC following. Mepilex on coccyx.     Access: R PICC, R Fem A-line-line changed out @ 1500       Plan: Attempt to wean FI02, continue at current sedation rates.

## 2020-12-03 NOTE — PROGRESS NOTES
Care Management Follow Up    Length of Stay (days): 23    Expected Discharge Date: 12/10/20     Concerns to be Addressed:       Patient plan of care discussed at interdisciplinary rounds: Yes    Anticipated Discharge Disposition:       Anticipated Discharge Services:    Anticipated Discharge DME:      Patient/family educated on Medicare website which has current facility and service quality ratings:    Education Provided on the Discharge Plan:    Patient/Family in Agreement with the Plan:      Referrals Placed by CM/SW:    Private pay costs discussed: Not applicable    Additional Information:    Spoke with patient's son Raoul. Care Conference Sat 12/5 @ 1100 via iPad in patient's room. Family, Dr Figueroa & Dr Seals (Palliative) aware

## 2020-12-03 NOTE — PLAN OF CARE
Neuro: Sedated (midazolam, fentanyl), withdraws to vigorous stimulation. PERRLA.    Respiratory: LS diminished. Minimal EET output.    Cardiac: Tele is SR. VSS.    GI: BSx4, BM this shift, tolerating TF 60ml/hr with [id number] flushes.    : Voiding adequately, lasix given with good results.    Skin: Lip ulceration, improving. Skin tear to L back covered with mepilex. Mepilex to sacrum.     Pain: Pain controlled with fentanyl    Mobility: Turned Q2h.      Family updated and involved in plan of care. Discussed patient and spouse with son.

## 2020-12-03 NOTE — PROGRESS NOTES
Critical Care Progress Note      12/03/2020    Name: Aly Sorensen MRN#: [id number]   Age: 80 year old YOB: 1940     [facility] Day# 23                 Problem List:   Principal Problem:    Acute respiratory failure with hypoxia (H)  Active Problems:    Pneumonia due to 2019 novel coronavirus    Hypokalemia    Aortic thrombus (H)    DVT (deep venous thrombosis) (H)           Summary/Hospital Course:     80M h/o prostate ca and melanoma now presents with respiratory failure due to covid 19 pna.  Admitted 11/10, intubated 11/22.  Tension ptx on 11/23 now s/p ch tube placement.  Now on inhaled flolan.  No longer requiring paralysis/proning.      Assessment and plan :     Aly Sorensen IS a 80 year old male admitted on 11/10/2020 for covid19 pna.   I have personally reviewed the daily labs, imaging studies, cultures and discussed the case with referring physician and consulting physicians.   My assessment and plan by system for this patient is as follows:    Neurology/Psychiatry:   1. Sedation:  midaz drip  2. Analgesia: fentanyl drip    Cardiovascular:   1.  Shock: likely vasoplegic due to sedation vs obstructive due to intrathoracic pressures from mechanical ventilation.  Continue levophed as needed.  2.  Afib with rvr:  Resolved.  Now completed amio drip.     Pulmonary/Ventilator Management:   1. Acute hypoxemic respiratory failure, vent dependent:  In setting of covid pna.  Continue lung protective tidal volumes, and inhaled flolan.  Now tolerating supined position .  Continue PEEP 10 today.     GI and Nutrition :   1. Continue tf  2.  Famotidine for pud prophy    Renal/Fluids/Electrolytes:   1. Creatinine ok 0.57  2. Metabolic alkalosis:  In setting of diuresis. Improved today 7.42/57. Hold diamox today.  3.  Continue diuresis today (lasix 40 bid)    Infectious Disease:   1. covid-19 pna: Remdesivir finished, steroid course completed.  Tapering stress steroids.  2. S/p empiric course of merrem.  No  pathogen on culture.    Endocrine:   1. Hyperglycemia:  Continue sliding scale insulin.     Hematology/Oncology:   1. Wbc stable 16- in setting of infection and steroids.  2. Anemia:  9.1 monitor.  3.  pltlts 244 acceptable  4.  DVT: heparin drip    ICU Prophylaxis:   1. DVT: Hep drip  2. VAP: HOB 30 degrees, chlorhexidine rinse  3. Stress Ulcer: H2 blocker  4. Restraints: Nonviolent soft two point restraints required and necessary for patient safety and continued cares and good effect as patient continues to pull at necessary lines, tubes despite education and distraction. Will readdress daily.   5. Wound care - per unit routine   6. Feeding - tf  7. Family Update: son rashawn by phone  8. Disposition - icu         Interim History:   Unable to obtain history directly due to intubated/vented status. No events overnight.           Key Medications:       artificial tears   Both Eyes Q8H     chlorhexidine  15 mL Mouth/Throat Q12H     famotidine  20 mg Intravenous Q12H     furosemide  40 mg Intravenous Q12H     hydrocortisone sodium succinate PF  50 mg Intravenous Daily     insulin aspart  1-6 Units Subcutaneous Q4H     polyethylene glycol  17 g Oral Daily     sennosides  5 mL Oral At Bedtime     sodium chloride (PF)  10 mL Intracatheter Q8H     sodium phosphate  9 mmol Intravenous Once       epoprostenol (VELETRI) 20 mcg/mL in sterile water inhalation solution 20 ng/kg/min (12/03/20 0901)     fentaNYL 150 mcg/hr (12/03/20 0828)     heparin 1,450 Units/hr (12/03/20 0828)     - MEDICATION INSTRUCTIONS -       midazolam 8 mg/hr (12/03/20 0829)     - MEDICATION INSTRUCTIONS -       norepinephrine Stopped (12/01/20 0850)     - MEDICATION INSTRUCTIONS -       propofol Stopped (11/28/20 1312)     sodium chloride 20 mL/hr at 12/03/20 0829     vecuronium (NORCURON) infusion ADULT Stopped (12/01/20 0820)               Physical Examination:   Temp:  [98.3  F (36.8  C)-98.9  F (37.2  C)] 98.8  F (37.1  C)  Pulse:  [72-89] 81  Resp:   [22] 22  BP: (107-117)/(60-67) 117/67  MAP:  [66 mmHg-87 mmHg] 84 mmHg  Arterial Line BP: (107-140)/(31-62) 140/60  FiO2 (%):  [50 %-60 %] 50 %  SpO2:  [89 %-98 %] 95 %      Intake/Output Summary (Last 24 hours) at 12/3/2020 1150  Last data filed at 12/3/2020 1100  Gross per 24 hour   Intake 2627.6 ml   Output 4075 ml   Net -1447.4 ml         Wt Readings from Last 4 Encounters:   12/03/20 76.8 kg (169 lb 5 oz)   08/20/20 83 kg (183 lb)   12/11/19 81.9 kg (180 lb 8 oz)   10/28/19 82.4 kg (181 lb 11.2 oz)     Arterial Line BP: (107-140)/(31-62) 140/60  MAP:  [66 mmHg-87 mmHg] 84 mmHg  BP - Mean:  [70-87] 87  Ventilation Mode: PCV Plus assist  (Pressure Control Ventilation/ Assist Control)  FiO2 (%): 50 %  Rate Set (breaths/minute): 22 breaths/min  Tidal Volume Set (mL): 350 mL  PEEP (cm H2O): 10 cmH2O  Oxygen Concentration (%): 50 %  Resp: 22    Recent Labs   Lab 12/03/20  0446 12/02/20  0430 12/01/20  1020 11/30/20  1725 11/29/20  1230 11/29/20  1230   PH 7.42 7.49* 7.42 7.49*   < > 7.41   PCO2 57* 59* 61* 53*   < > 58*   PO2 69* 111* 82 86   < > 61*   HCO3 37* 44* 40* 40*   < > 37*   O2PER 50 60 60  --   --  50%    < > = values in this interval not displayed.       GEN: no acute distress   HEENT: head ncat, sclera anicteric, OP patent, trachea midline   NECK: supple  PULM: unlabored synchronous with vent, clear anteriorly    CV/COR: RRR S1S2 no gallop,  No rub, no murmur  ABD: soft nontender, hypoactive bowel sounds, no mass  EXT:  No Edema;   Warm x4  NEURO: sedated/paralyzed.  Unable to assess.  SKIN: no obvious rash  LINES: clean, dry intact         Data:   All data and imaging reviewed     ROUTINE ICU LABS (Last four results)  CMP  Recent Labs   Lab 12/03/20  0446 12/02/20  2045 12/02/20  1730 12/02/20  0430 12/01/20  1827 12/01/20  0345 11/30/20  0220 11/29/20  0402 11/29/20  0402 11/28/20  0404 11/27/20  0430   *  --  144 145* 143 140 141  --  142 141 143   POTASSIUM 3.6 3.7 3.2* 3.1* 3.4 3.8 4.2  --   4.6 4.2 4.1   CHLORIDE 108  --  104 102 102 100 102  --  106 105 109   CO2 36*  --  37* 41* 40* 37* 36*  --  35* 34* 31   ANIONGAP 1*  --  3 2* 1* 3 3  --  1* 2* 3   *  --  160* 166* 151* 191* 173*  --  123* 146* 192*   BUN 36*  --  38* 36* 36* 37* 36*  --  43* 48* 51*   CR 0.57*  --  0.61* 0.52* 0.56* 0.49* 0.52*  --  0.57* 0.63* 0.63*   GFRESTIMATED >90  --  >90 >90 >90 >90 >90  --  >90 >90 >90   GFRESTBLACK >90  --  >90 >90 >90 >90 >90  --  >90 >90 >90   ELIA 7.8*  --  7.8* 7.7* 7.7* 7.8* 8.1*  --  8.3* 7.8* 7.6*   MAG 2.5*  --  2.4* 2.4* 2.5* 2.4* 2.3   < >  --  2.8*  --    PHOS 3.1  --   --  2.2*  --  2.3* 2.8   < >  --  4.0  --    PROTTOTAL  --   --   --   --   --   --  5.2*  --  5.5* 5.3* 4.5*   ALBUMIN  --   --   --   --   --   --  2.4*  --  2.0* 1.3* 1.2*   BILITOTAL  --   --   --   --   --   --  0.9  --  0.6 0.7 0.4   ALKPHOS  --   --   --   --   --   --  60  --  63 73 58   AST  --   --   --   --   --   --  23  --  19 29 18   ALT  --   --   --   --   --   --  28  --  24 29 18    < > = values in this interval not displayed.     CBC  Recent Labs   Lab 12/03/20  0446 12/02/20  0430 12/01/20 0345 11/30/20  0220   WBC 16.4* 16.6* 21.9* 19.0*   RBC 2.93* 2.91* 3.05* 2.99*   HGB 9.1* 9.2* 9.5* 9.4*   HCT 29.1* 28.5* 29.7* 29.2*   MCV 99 98 97 98   MCH 31.1 31.6 31.1 31.4   MCHC 31.3* 32.3 32.0 32.2   RDW 14.3 13.9 13.6 13.6    222 230 200     INRNo lab results found in last 7 days.  Arterial Blood Gas  Recent Labs   Lab 12/03/20  0446 12/02/20  0430 12/01/20  1020 11/30/20  1725 11/29/20  1230 11/29/20  1230   PH 7.42 7.49* 7.42 7.49*   < > 7.41   PCO2 57* 59* 61* 53*   < > 58*   PO2 69* 111* 82 86   < > 61*   HCO3 37* 44* 40* 40*   < > 37*   O2PER 50 60 60  --   --  50%    < > = values in this interval not displayed.       All cultures:  No results for input(s): CULT in the last 168 hours.  No results found for this or any previous visit (from the past 24 hour(s)).  Labs (personally reviewed):  See  a/p    Billing: This patient is critically ill: Yes. Total critical care time today 45 min.

## 2020-12-03 NOTE — PROGRESS NOTES
Brief palliative check in. Reviewed case with Dr. Figueroa this AM. Dtr Lakshmi calling to enquire about getting her parents' belongings bagged up and collected. We also reviewed the visitor policy in the setting that one or both of her parents move toward comfort cares/compassioante extubation. Lakshmi will call the nursing station to arrange for picking up her parents' belongings. Thanks.    [facility]  Contact information available via [facility] Paging/Directory

## 2020-12-04 NOTE — PROGRESS NOTES
[facility] ICU RESPIRATORY NOTE        Date of Admission: 11/10/2020    Date of Intubation (most recent): 11/22/2020    Reason for Mechanical Ventilation: Respiratory failure    Number of Days on Mechanical Ventilation: 12    Met Criteria for Spontaneous Breathing Trial: No    Reason for No Spontaneous Breathing Trial: Per MD    Significant Events Today: None    ABG Results:   Recent Labs   Lab 12/03/20  0446 12/02/20  0430 12/01/20  1020 11/30/20  1725 11/29/20  1230 11/29/20  1230   PH 7.42 7.49* 7.42 7.49*   < > 7.41   PCO2 57* 59* 61* 53*   < > 58*   PO2 69* 111* 82 86   < > 61*   HCO3 37* 44* 40* 40*   < > 37*   O2PER 50 60 60  --   --  50%    < > = values in this interval not displayed.       Current Vent Settings: Ventilation Mode: PCV Plus assist  (Pressure Control Ventilation/ Assist Control)  FiO2 (%): 50 %  Rate Set (breaths/minute): 22 breaths/min  Tidal Volume Set (mL): 350 mL  PEEP (cm H2O): 10 cmH2O  Oxygen Concentration (%): 50 %  Resp: 22      Plan: Continue full ventilatory support and assess for daily weaning trial     Suresh Castillo RT

## 2020-12-04 NOTE — PLAN OF CARE
No change t/o noc. When speaking to pt noted slight grimace and flinch of hand. K replacement per order. Labs essentially unchanged. No markable chest tube output. Crackles noted. Able to decrease FIO2 to 50%. Daughter updated via phone this am when called. Cont current care.

## 2020-12-04 NOTE — PROGRESS NOTES
VSS. Did not tolerate laying on side of tension pneumo chest tube. Replaced K. SR. 1 BM. Bath done. Will continue to monitor and assess for any changes.

## 2020-12-04 NOTE — PROGRESS NOTES
Chart Check:    On IV heparin for aortic thrombus. Notes reviewed. Plans for care conference 12/5 with Palliative Care.     No new recommendations at this time. Please reach out if questions or concerns.    Abelardo EAGLE, CNP  [facility]  [phone] (office) or [phone] (cell)

## 2020-12-04 NOTE — PROGRESS NOTES
SPIRITUAL HEALTH SERVICES  SPIRITUAL ASSESSMENT Progress Note  [facility] ICU     REFERRAL SOURCE: Follow Up-Family Request    I coordinated with ICU nursing staff to arrange details for patient to receive the sacrament of anointing in the Mandaeism tradition tomorrow at 10 am through a virtual call with family. I spoke with patient's RN, Lake who is available tomorrow to place oil on patient's forehead during ritual, which will be led virtually by family's . I called patient's daughter, Lakshmi and confirmed details and set up made for this.     PLAN: [facility] continues to remain available.     Milvia Matamoros  Associate    Phone: [phone]  Pager: [phone]

## 2020-12-04 NOTE — PROGRESS NOTES
Critical Care Progress Note      12/04/2020    Name: Aly Sorensen MRN#: [id number]   Age: 80 year old YOB: 1940     [facility] Day# 24                 Problem List:   Principal Problem:    Acute respiratory failure with hypoxia (H)  Active Problems:    Pneumonia due to 2019 novel coronavirus    Hypokalemia    Aortic thrombus (H)    DVT (deep venous thrombosis) (H)           Summary/Hospital Course:     80M h/o prostate ca and melanoma now presents with respiratory failure due to covid 19 pna.  Admitted 11/10, intubated 11/22.  Tension ptx on 11/23 now s/p ch tube placement.  Now on inhaled flolan.  No longer requiring paralysis/proning.      Assessment and plan :     Aly Sorensen IS a 80 year old male admitted on 11/10/2020 for covid19 pna.   I have personally reviewed the daily labs, imaging studies, cultures and discussed the case with referring physician and consulting physicians.   My assessment and plan by system for this patient is as follows:    Neurology/Psychiatry:   1. Sedation:  midaz drip  2. Analgesia: fentanyl drip    Cardiovascular:   1.  Shock: likely vasoplegic due to sedation vs obstructive due to intrathoracic pressures from mechanical ventilation.  Continue levophed as needed.  2.  Afib with rvr:  Resolved.  Now completed amio drip.     Pulmonary/Ventilator Management:   1. Acute hypoxemic respiratory failure, vent dependent:  In setting of covid pna.  Continue lung protective tidal volumes, and inhaled flolan.  Now tolerating supined position .  Continue PEEP 10 today.     GI and Nutrition :   1. Continue tf  2.  Famotidine for pud prophy    Renal/Fluids/Electrolytes:   1. Creatinine ok 0.58  2. Metabolic alkalosis:  In setting of diuresis. Improved today 7.42/57. Hold diamox today.  3.  Continue diuresis today (lasix 40 bid)  4.  HyperNa:  Sodium 145--increased fw from 60 to 120 q4.    Infectious Disease:   1. covid-19 pna: Remdesivir finished, steroid course completed.  Tapering  stress steroids.  2. S/p empiric course of merrem.  No pathogen on culture.    Endocrine:   1. Hyperglycemia:  Continue sliding scale insulin.     Hematology/Oncology:   1. Wbc stable 15.9- in setting of infection and steroids.  2. Anemia:  9.6 monitor.  3.  pltlts 246 acceptable  4.  DVT: heparin drip    ICU Prophylaxis:   1. DVT: Hep drip  2. VAP: HOB 30 degrees, chlorhexidine rinse  3. Stress Ulcer: H2 blocker  4. Restraints: Nonviolent soft two point restraints required and necessary for patient safety and continued cares and good effect as patient continues to pull at necessary lines, tubes despite education and distraction. Will readdress daily.   5. Wound care - per unit routine   6. Feeding - tf  7. Family Update: son rashawn by phone  8. Disposition - icu         Interim History:   Unable to obtain history directly due to intubated/vented status. No events overnight.           Key Medications:       chlorhexidine  15 mL Mouth/Throat Q12H     famotidine  20 mg Intravenous Q12H     furosemide  40 mg Intravenous Q12H     hydrocortisone sodium succinate PF  50 mg Intravenous Daily     insulin aspart  1-6 Units Subcutaneous Q4H     polyethylene glycol  17 g Oral Daily     sennosides  5 mL Oral At Bedtime     sodium chloride (PF)  10 mL Intracatheter Q8H     sodium phosphate  9 mmol Intravenous Once       epoprostenol (VELETRI) 20 mcg/mL in sterile water inhalation solution 20 ng/kg/min (12/04/20 0747)     fentaNYL 150 mcg/hr (12/04/20 0809)     heparin 1,450 Units/hr (12/04/20 0754)     - MEDICATION INSTRUCTIONS -       midazolam 8 mg/hr (12/04/20 0747)     - MEDICATION INSTRUCTIONS -       norepinephrine Stopped (12/01/20 0850)     - MEDICATION INSTRUCTIONS -       propofol Stopped (11/28/20 1312)     sodium chloride 20 mL/hr at 12/03/20 1537     vecuronium (NORCURON) infusion ADULT Stopped (12/01/20 0820)               Physical Examination:   Temp:  [97.1  F (36.2  C)-99.5  F (37.5  C)] 99.5  F (37.5  C)  Pulse:   [77-87] 86  BP: (117)/(67) 117/67  MAP:  [68 mmHg-84 mmHg] 81 mmHg  Arterial Line BP: (107-140)/(48-63) 119/63  FiO2 (%):  [50 %-60 %] 50 %  SpO2:  [89 %-98 %] 95 %        Intake/Output Summary (Last 24 hours) at 12/4/2020 0822  Last data filed at 12/4/2020 0800  Gross per 24 hour   Intake 2792 ml   Output 3460 ml   Net -668 ml             Wt Readings from Last 4 Encounters:   12/04/20 71.6 kg (157 lb 13.6 oz)   08/20/20 83 kg (183 lb)   12/11/19 81.9 kg (180 lb 8 oz)   10/28/19 82.4 kg (181 lb 11.2 oz)     Arterial Line BP: (107-140)/(48-63) 119/63  MAP:  [68 mmHg-84 mmHg] 81 mmHg  BP - Mean:  [87] 87  Ventilation Mode: PCV Plus assist  (Pressure Control Ventilation/ Assist Control)  FiO2 (%): 50 %  Rate Set (breaths/minute): 22 breaths/min  Tidal Volume Set (mL): 350 mL  PEEP (cm H2O): 10 cmH2O  Oxygen Concentration (%): 50 %    Recent Labs   Lab 12/04/20  0420 12/03/20  0446 12/02/20  0430 12/01/20  1020 11/29/20  1230 11/29/20  1230   PH 7.42 7.42 7.49* 7.42   < > 7.41   PCO2 57* 57* 59* 61*   < > 58*   PO2 87 69* 111* 82   < > 61*   HCO3 37* 37* 44* 40*   < > 37*   O2PER  --  50 60 60  --  50%    < > = values in this interval not displayed.       GEN: no acute distress   HEENT: head ncat, sclera anicteric, OP patent, trachea midline   NECK: supple  PULM: unlabored synchronous with vent, clear anteriorly    CV/COR: RRR S1S2 no gallop,  No rub, no murmur  ABD: soft nontender, hypoactive bowel sounds, no mass  EXT:  No Edema;   Warm x4  NEURO: sedated/paralyzed.  Unable to assess.  SKIN: no obvious rash  LINES: clean, dry intact         Data:   All data and imaging reviewed     ROUTINE ICU LABS (Last four results)  CMP  Recent Labs   Lab 12/04/20  0420 12/03/20  1800 12/03/20  0446 12/02/20  2045 12/02/20  1730 12/02/20  0430 12/01/20  0345 12/01/20  0345 11/30/20  0220 11/29/20  0402 11/29/20  0402 11/28/20  0404   *  --  145*  --  144 145*   < > 140 141  --  142 141   POTASSIUM 3.7 3.7 3.6 3.7 3.2* 3.1*    < > 3.8 4.2  --  4.6 4.2   CHLORIDE 108  --  108  --  104 102   < > 100 102  --  106 105   CO2 36*  --  36*  --  37* 41*   < > 37* 36*  --  35* 34*   ANIONGAP 1*  --  1*  --  3 2*   < > 3 3  --  1* 2*   *  --  160*  --  160* 166*   < > 191* 173*  --  123* 146*   BUN 36*  --  36*  --  38* 36*   < > 37* 36*  --  43* 48*   CR 0.58*  --  0.57*  --  0.61* 0.52*   < > 0.49* 0.52*  --  0.57* 0.63*   GFRESTIMATED >90  --  >90  --  >90 >90   < > >90 >90  --  >90 >90   GFRESTBLACK >90  --  >90  --  >90 >90   < > >90 >90  --  >90 >90   ELIA 7.9*  --  7.8*  --  7.8* 7.7*   < > 7.8* 8.1*  --  8.3* 7.8*   MAG 2.2  --  2.5*  --  2.4* 2.4*   < > 2.4* 2.3   < >  --  2.8*   PHOS 3.0  --  3.1  --   --  2.2*  --  2.3* 2.8   < >  --  4.0   PROTTOTAL  --   --   --   --   --   --   --   --  5.2*  --  5.5* 5.3*   ALBUMIN  --   --   --   --   --   --   --   --  2.4*  --  2.0* 1.3*   BILITOTAL  --   --   --   --   --   --   --   --  0.9  --  0.6 0.7   ALKPHOS  --   --   --   --   --   --   --   --  60  --  63 73   AST  --   --   --   --   --   --   --   --  23  --  19 29   ALT  --   --   --   --   --   --   --   --  28  --  24 29    < > = values in this interval not displayed.     CBC  Recent Labs   Lab 12/04/20  0420 12/03/20  0446 12/02/20  0430 12/01/20  0345   WBC 15.9* 16.4* 16.6* 21.9*   RBC 3.08* 2.93* 2.91* 3.05*   HGB 9.6* 9.1* 9.2* 9.5*   HCT 30.5* 29.1* 28.5* 29.7*   MCV 99 99 98 97   MCH 31.2 31.1 31.6 31.1   MCHC 31.5 31.3* 32.3 32.0   RDW 14.5 14.3 13.9 13.6    244 222 230     INRNo lab results found in last 7 days.  Arterial Blood Gas  Recent Labs   Lab 12/04/20 0420 12/03/20 0446 12/02/20  0430 12/01/20  1020 11/29/20  1230 11/29/20  1230   PH 7.42 7.42 7.49* 7.42   < > 7.41   PCO2 57* 57* 59* 61*   < > 58*   PO2 87 69* 111* 82   < > 61*   HCO3 37* 37* 44* 40*   < > 37*   O2PER  --  50 60 60  --  50%    < > = values in this interval not displayed.       All cultures:  No results for input(s): CULT in the last  168 hours.  No results found for this or any previous visit (from the past 24 hour(s)).  Labs (personally reviewed):  See a/p    Billing: This patient is critically ill: Yes. Total critical care time today 45 min.

## 2020-12-04 NOTE — PROGRESS NOTES
[facility] ICU RESPIRATORY NOTE           Date of Admission: 11/10/2020     Date of Intubation (most recent): 11/22/2020     Reason for Mechanical Ventilation: Respiratory failure     Number of Days on Mechanical Ventilation: 13     Met Criteria for Spontaneous Breathing Trial: No     Reason for No Spontaneous Breathing Trial: Per MD     Significant Events Today: None overnight     ABG Results:   Recent Labs   Lab 12/04/20  0420 12/03/20  0446 12/02/20  0430 12/01/20  1020 11/29/20  1230 11/29/20  1230   PH 7.42 7.42 7.49* 7.42   < > 7.41   PCO2 57* 57* 59* 61*   < > 58*   PO2 87 69* 111* 82   < > 61*   HCO3 37* 37* 44* 40*   < > 37*   O2PER  --  50 60 60  --  50%    < > = values in this interval not displayed.     Ventilation Mode: PCV Plus assist  (Pressure Control Ventilation/ Assist Control)  FiO2 (%): 50 %  Rate Set (breaths/minute): 22 breaths/min  Tidal Volume Set (mL): 350 mL  PEEP (cm H2O): 10 cmH2O  Oxygen Concentration (%): 50 %    Brown Campos, RT

## 2020-12-04 NOTE — PROVIDER NOTIFICATION
MD Notification    Notified Person: MD    Notified Person Name: Dr. Kerr    Notification Date/Time: 1825 12/3/20    Purpose of Notification: K 6.6    Orders Received: MD will review chart

## 2020-12-05 NOTE — PROGRESS NOTES
[facility] ICU RESPIRATORY NOTE        Date of Admission: 11/10/2020    Date of Intubation (most recent): 11/22/2020    Reason for Mechanical Ventilation: Respiratory failure    Number of Days on Mechanical Ventilation: 14    Met Criteria for Spontaneous Breathing Trial: No    Reason for No Spontaneous Breathing Trial: Per MD    Significant Events Today: None    ABG Results:   Recent Labs   Lab 12/05/20  0430 12/04/20  0420 12/03/20  0446 12/02/20  0430 12/01/20  1020 11/29/20  1230 11/29/20  1230   PH 7.40 7.42 7.42 7.49* 7.42   < > 7.41   PCO2 60* 57* 57* 59* 61*   < > 58*   PO2 118* 87 69* 111* 82   < > 61*   HCO3 37* 37* 37* 44* 40*   < > 37*   O2PER  --   --  50 60 60  --  50%    < > = values in this interval not displayed.       Current Vent Settings: Ventilation Mode: PCV Plus assist  (Pressure Control Ventilation/ Assist Control)  FiO2 (%): 50 %  Rate Set (breaths/minute): 22 breaths/min  PEEP (cm H2O): 10 cmH2O  Oxygen Concentration (%): 45 %  Resp: 20      Plan: Continue full ventilatory support.    Suresh Castillo RT

## 2020-12-05 NOTE — PROGRESS NOTES
[facility] ICU RESPIRATORY NOTE      Date of Admission: 11/10/2020     Date of Intubation (most recent): 11/22/2020     Reason for Mechanical Ventilation: Respiratory failure     Number of Days on Mechanical Ventilation: 13     Met Criteria for Spontaneous Breathing Trial: No     Reason for No Spontaneous Breathing Trial: Per MD      ABG Results:   Recent Labs   Lab 12/04/20  0420 12/03/20  0446 12/02/20  0430 12/01/20  1020 11/29/20  1230 11/29/20  1230   PH 7.42 7.42 7.49* 7.42   < > 7.41   PCO2 57* 57* 59* 61*   < > 58*   PO2 87 69* 111* 82   < > 61*   HCO3 37* 37* 44* 40*   < > 37*   O2PER  --  50 60 60  --  50%    < > = values in this interval not displayed.       Current Vent Settings: Ventilation Mode: PCV Plus assist  (Pressure Control Ventilation/ Assist Control)  FiO2 (%): 50 %  Rate Set (breaths/minute): 22 breaths/min  Tidal Volume Set (mL): 350 mL  PEEP (cm H2O): 10 cmH2O  Oxygen Concentration (%): 50 %      Plan: Continue full ventilator support.    Nabila Ortiz, RT  12/4/2020

## 2020-12-05 NOTE — PROGRESS NOTES
[facility] ICU RESPIRATORY NOTE        Date of Admission: 11/10/2020     Date of Intubation (most recent): 11/22/2020     Reason for Mechanical Ventilation: Respiratory failure     Number of Days on Mechanical Ventilation: 14     Met Criteria for Spontaneous Breathing Trial: No     Reason for No Spontaneous Breathing Trial: Per MD        ABG Results:   Recent Labs   Lab 12/05/20  0430 12/04/20  0420 12/03/20  0446 12/02/20  0430 12/01/20  1020 11/29/20  1230 11/29/20  1230   PH 7.40 7.42 7.42 7.49* 7.42   < > 7.41   PCO2 60* 57* 57* 59* 61*   < > 58*   PO2 118* 87 69* 111* 82   < > 61*   HCO3 37* 37* 37* 44* 40*   < > 37*   O2PER  --   --  50 60 60  --  50%    < > = values in this interval not displayed.     Ventilation Mode: PCV Plus assist  (Pressure Control Ventilation/ Assist Control)  FiO2 (%): 50 %  Rate Set (breaths/minute): 22 breaths/min  PEEP (cm H2O): 10 cmH2O  Oxygen Concentration (%): 50 %  Resp: 20    Brown Campos, RT

## 2020-12-05 NOTE — PROGRESS NOTES
Chart Check:    On IV heparin for aortic thrombus. Notes reviewed. Plans for care conference 12/5 with Palliative Care.     No new recommendations at this time. Please reach out if questions or concerns.    Montrell Brown  [facility]  [phone] (office)

## 2020-12-05 NOTE — PLAN OF CARE
Pt experienced single event of vent dysynchrony resolved with prn dose of versed. Cont to diurese. Remains unresponsive. Na nl this am. FIO2 down to 50%. Senna held for increasingly soft stools. K replaced per protocol. Son, Kt, updated via phone when called in. Cont current care.

## 2020-12-05 NOTE — PROGRESS NOTES
Critical Care Progress Note      12/05/2020    Name: Aly Sorensen MRN#: [id number]   Age: 80 year old YOB: 1940     [facility] Day# 25                 Problem List:   Principal Problem:    Acute respiratory failure with hypoxia (H)  Active Problems:    Pneumonia due to 2019 novel coronavirus    Hypokalemia    Aortic thrombus (H)    DVT (deep venous thrombosis) (H)           Summary/Hospital Course:     80M h/o prostate ca and melanoma now presents with respiratory failure due to covid 19 pna.  Admitted 11/10, intubated 11/22.  Tension ptx on 11/23 now s/p ch tube placement.  Now on inhaled flolan.  No longer requiring paralysis/proning.      Assessment and plan :     Aly Sorensen IS a 80 year old male admitted on 11/10/2020 for covid19 pna.   I have personally reviewed the daily labs, imaging studies, cultures and discussed the case with referring physician and consulting physicians.   My assessment and plan by system for this patient is as follows:    Neurology/Psychiatry:   1. Sedation:  midaz drip  2. Analgesia: fentanyl drip    Cardiovascular:   1.  Shock: likely vasoplegic due to sedation vs obstructive due to intrathoracic pressures from mechanical ventilation.  Continue levophed as needed.  2.  Afib with rvr:  Resolved.  Now completed amio drip.     Pulmonary/Ventilator Management:   1. Acute hypoxemic respiratory failure, vent dependent:  In setting of covid pna.  Continue lung protective tidal volumes, and inhaled flolan.  Now tolerating supined position .  Continue PEEP 10 today, weaning fio2 as able.  Transitioned to pressure control mode overnight for improved vent synchrony.    GI and Nutrition :   1. Continue tf  2.  Famotidine for pud prophy    Renal/Fluids/Electrolytes:   1. Creatinine ok 0.53  2. Metabolic alkalosis:  Resolved:  7.40/60/118/37  3. HyperNa:  Resolved : sodium 142--decreased fw back to 60 q4.  4. Diuresing with lasix 40 bid     Infectious Disease:   1. covid-19 pna:  Remdesivir finished, steroid course completed.  Tapering stress steroids.  2. S/p empiric course of merrem.  No pathogen on culture.    Endocrine:   1. Hyperglycemia:  Continue sliding scale insulin.     Hematology/Oncology:   1. Wbc stable 15.5- in setting of infection and steroids.  2. Anemia:  9.6 monitor.  3.  pltlts 266 acceptable  4.  DVT: heparin drip    ICU Prophylaxis:   1. DVT: Hep drip  2. VAP: HOB 30 degrees, chlorhexidine rinse  3. Stress Ulcer: H2 blocker  4. Restraints: Nonviolent soft two point restraints required and necessary for patient safety and continued cares and good effect as patient continues to pull at necessary lines, tubes despite education and distraction. Will readdress daily.   5. Wound care - per unit routine   6. Feeding - tf  7. Family Update: family meeting today pending.  8. Disposition - icu         Interim History:   Unable to obtain history directly due to intubated/vented status. No events overnight.  Trying to slowly wean fio2 today.         Key Medications:       chlorhexidine  15 mL Mouth/Throat Q12H     famotidine  20 mg Intravenous Q12H     furosemide  40 mg Intravenous Q12H     hydrocortisone sodium succinate PF  50 mg Intravenous Daily     insulin aspart  1-6 Units Subcutaneous Q4H     polyethylene glycol  17 g Oral Daily     sennosides  5 mL Oral At Bedtime     sodium chloride (PF)  10 mL Intracatheter Q8H     sodium phosphate  9 mmol Intravenous Once       epoprostenol (VELETRI) 20 mcg/mL in sterile water inhalation solution 20 ng/kg/min (12/05/20 1021)     fentaNYL 150 mcg/hr (12/04/20 2228)     heparin 1,450 Units/hr (12/05/20 0832)     - MEDICATION INSTRUCTIONS -       midazolam 8 mg/hr (12/05/20 0850)     - MEDICATION INSTRUCTIONS -       norepinephrine Stopped (12/01/20 0850)     - MEDICATION INSTRUCTIONS -       propofol Stopped (11/28/20 1312)     sodium chloride 20 mL/hr at 12/03/20 1537     vecuronium (NORCURON) infusion ADULT Stopped (12/01/20 0820)                Physical Examination:   Temp:  [98.7  F (37.1  C)-100  F (37.8  C)] 99.8  F (37.7  C)  Pulse:  [78-89] 78  Resp:  [20] 20  MAP:  [66 mmHg-111 mmHg] 66 mmHg  Arterial Line BP: ()/(46-68) 99/46  FiO2 (%):  [50 %] 50 %  SpO2:  [92 %-98 %] 96 %      Intake/Output Summary (Last 24 hours) at 12/5/2020 1033  Last data filed at 12/5/2020 0800  Gross per 24 hour   Intake 2770 ml   Output 3215 ml   Net -445 ml         Wt Readings from Last 4 Encounters:   12/05/20 76.8 kg (169 lb 5 oz)   08/20/20 83 kg (183 lb)   12/11/19 81.9 kg (180 lb 8 oz)   10/28/19 82.4 kg (181 lb 11.2 oz)     Arterial Line BP: ()/(46-68) 99/46  MAP:  [66 mmHg-111 mmHg] 66 mmHg  Ventilation Mode: PCV Plus assist  (Pressure Control Ventilation/ Assist Control)  FiO2 (%): 50 %  Rate Set (breaths/minute): 22 breaths/min  PEEP (cm H2O): 10 cmH2O  Oxygen Concentration (%): 45 %  Resp: 20    Recent Labs   Lab 12/05/20  0430 12/04/20  0420 12/03/20  0446 12/02/20  0430 12/01/20  1020 11/29/20  1230 11/29/20  1230   PH 7.40 7.42 7.42 7.49* 7.42   < > 7.41   PCO2 60* 57* 57* 59* 61*   < > 58*   PO2 118* 87 69* 111* 82   < > 61*   HCO3 37* 37* 37* 44* 40*   < > 37*   O2PER  --   --  50 60 60  --  50%    < > = values in this interval not displayed.       GEN: no acute distress   HEENT: head ncat, sclera anicteric, OP patent, trachea midline   NECK: supple  PULM: unlabored synchronous with vent, clear anteriorly    CV/COR: RRR S1S2 no gallop,  No rub, no murmur  ABD: soft nontender, hypoactive bowel sounds, no mass  EXT:  No Edema;   Warm x4  NEURO: sedated/paralyzed.  Unable to assess.  SKIN: no obvious rash  LINES: clean, dry intact         Data:   All data and imaging reviewed     ROUTINE ICU LABS (Last four results)  CMP  Recent Labs   Lab 12/05/20  0430 12/04/20  1810 12/04/20  0420 12/03/20  1800 12/03/20  0446 12/02/20  0430 12/02/20  0430 11/30/20  0220 11/30/20  0220 11/29/20  0402 11/29/20  0402    145* 145*  --  145*   < > 145*    < > 141  --  142   POTASSIUM 3.7 3.8 3.7 3.7 3.6   < > 3.1*   < > 4.2  --  4.6   CHLORIDE 104 106 108  --  108   < > 102   < > 102  --  106   CO2 37* 36* 36*  --  36*   < > 41*   < > 36*  --  35*   ANIONGAP 1* 3 1*  --  1*   < > 2*   < > 3  --  1*   * 179* 173*  --  160*   < > 166*   < > 173*  --  123*   BUN 38* 37* 36*  --  36*   < > 36*   < > 36*  --  43*   CR 0.53* 0.59* 0.58*  --  0.57*   < > 0.52*   < > 0.52*  --  0.57*   GFRESTIMATED >90 >90 >90  --  >90   < > >90   < > >90  --  >90   GFRESTBLACK >90 >90 >90  --  >90   < > >90   < > >90  --  >90   ELIA 7.9* 8.1* 7.9*  --  7.8*   < > 7.7*   < > 8.1*  --  8.3*   MAG 2.3 2.3 2.2  --  2.5*   < > 2.4*   < > 2.3   < >  --    PHOS 3.2  --  3.0  --  3.1  --  2.2*   < > 2.8   < >  --    PROTTOTAL  --   --   --   --   --   --   --   --  5.2*  --  5.5*   ALBUMIN  --   --   --   --   --   --   --   --  2.4*  --  2.0*   BILITOTAL  --   --   --   --   --   --   --   --  0.9  --  0.6   ALKPHOS  --   --   --   --   --   --   --   --  60  --  63   AST  --   --   --   --   --   --   --   --  23  --  19   ALT  --   --   --   --   --   --   --   --  28  --  24    < > = values in this interval not displayed.     CBC  Recent Labs   Lab 12/05/20  0430 12/04/20  0420 12/03/20  0446 12/02/20  0430   WBC 15.5* 15.9* 16.4* 16.6*   RBC 3.08* 3.08* 2.93* 2.91*   HGB 9.6* 9.6* 9.1* 9.2*   HCT 30.7* 30.5* 29.1* 28.5*    99 99 98   MCH 31.2 31.2 31.1 31.6   MCHC 31.3* 31.5 31.3* 32.3   RDW 14.6 14.5 14.3 13.9    246 244 222     INRNo lab results found in last 7 days.  Arterial Blood Gas  Recent Labs   Lab 12/05/20  0430 12/04/20  0420 12/03/20  0446 12/02/20  0430 12/01/20  1020 11/29/20  1230 11/29/20  1230   PH 7.40 7.42 7.42 7.49* 7.42   < > 7.41   PCO2 60* 57* 57* 59* 61*   < > 58*   PO2 118* 87 69* 111* 82   < > 61*   HCO3 37* 37* 37* 44* 40*   < > 37*   O2PER  --   --  50 60 60  --  50%    < > = values in this interval not displayed.       All cultures:  No  results for input(s): CULT in the last 168 hours.  No results found for this or any previous visit (from the past 24 hour(s)).  Labs (personally reviewed):  See a/p    Billing: This patient is critically ill: Yes. Total critical care time today 45 min.

## 2020-12-05 NOTE — PROGRESS NOTES
SPIRITUAL HEALTH SERVICES  SPIRITUAL ASSESSMENT Progress Note  [facility] ICU     REFERRAL SOURCE: Unit  referral     Delivered oil to bedside nurse to assist with a virtual service of blessing and anointing which was led by Eric millard from [facility] in [place] for patient and family members. Arrangements were arranged by unit  in coordination with daughter.     PLAN: [facility] continues to be available for support.     Janet Garces  Associate   Pager [phone]  Phone [phone]

## 2020-12-05 NOTE — PROGRESS NOTES
[facility]  Palliative Care Daily Progress Note       Recommendations & Counseling       Goals of care are restorative although the family realizes that the patient's prognosis is very poor and his chances of making a recovery back to his pre-hospital baseline is very slim and if he were to recover, it would take months to years to recover. The family is very worried that their mom will die and they do not know how they will tell their dad about her death when he become alert.     Our team will continue to follow for support and assistance in care conferences, goals of care as needed    Will plan to check in weekly           Thank you for the opportunity to continue to participate in the care of this patient and family.  Please feel free to contact on-call palliative provider with any emergent needs.  We can be reached via team pager [phone] (answered 8-4:30 Monday-Friday); after-hours answering service ([phone]);     Attestation:    Total time on the floor involved in the patient's care: 45 minutes; care conference with Dr Figueroa and myself, grown children, Austin and his wife, Raoul and his wife, Lakshmi and her  Aurora Verduzco and her sig other.   Total time spent in counseling/care coordination: >50%  Bhakti Seals MD / Palliative Medicine Physician     Assessments          Aly Sorensen is a 80 year old male with PMH significant for prostate cancer s/p prostatectomy 10/2005 with PSA recently normal, melanoma, and recent diagnosis of COVID infection (+ test 11/7), who was admitted on 11/10/2020 with worsening shortness of breath and declining oxygen saturations. Pt's hospital course has been notable for acute respiratory failure with waxing and waning O2 needs. He required an ICU stay earlier in the hospitalization, but stabilized and returned to the floor, only to have worsening respiratory status again. He is now intubated and mechanically ventilated in the ICU with  ARDS. He was requiring paralytic and proning but is no longer requiring either. He has developed coagulopathy with a new RLE DVT, aortic arch thrombus, and a possible splenic infarct.      Today, the patient was seen for:  Pt and family support      Prognosis, Goals, or Advance Care Planning was addressed today with: Yes. Care conference with Dr Figueroa and myself, grown children, Austin and wife, Raoul and wife, Lakshmi and , Dax, Aurora and sig other. Family understands that both their parents are critically ill and they have heard Dr Figueroa say that he has not seen someone as sick as their mother ever recover and that she will likely die and that their dad is not as sick as their mom but that he is still very sick and likely won't recover either. Dr Figueroa stated that is patient was to recover, he would not be how he was before the hospitalization and that it would take months and possibly years to make any recovery.   Mood, coping, and/or meaning in the context of serious illness were addressed today: Yes.  Summary/Comments: family feels very well supported by their community and parish.             Interval History:     Chart review/discussion with unit or clinical team members:   No longer requiring paralytics or proning;              Review of Systems:     Intubated and sedated          Medications:     I have reviewed this patient's medication profile and medications during this hospitalization.           Physical Exam:   Vitals were reviewed  Temp: 98.5  F (36.9  C) Temp src: Axillary   Pulse: 76   Resp: 20 SpO2: 95 % O2 Device: Mechanical Ventilator    Gen: intubated and sedated             Data Reviewed:     Reviewed recent labs and pertinent imaging

## 2020-12-06 NOTE — PLAN OF CARE
Sedation unchanged, withdraws and has gag with suctioning.  Had BM.  Family conference today, no changes to plan.

## 2020-12-06 NOTE — PROGRESS NOTES
Chart Check:    On IV heparin for aortic thrombus. Notes reviewed.  Family meeting yesterday.  Patient slowly improving but still a long way to go.  No decisions were made patient remains DNR.     No new recommendations at this time. Please reach out if questions or concerns.    Montrell Brown  [facility]  [phone] (office)

## 2020-12-06 NOTE — PROGRESS NOTES
Critical Care Progress Note      12/06/2020    Name: Aly Sorensen MRN#: [id number]   Age: 80 year old YOB: 1940     [facility] [place] Day# 26                 Problem List:   Principal Problem:    Acute respiratory failure with hypoxia (H)  Active Problems:    Pneumonia due to 2019 novel coronavirus    Hypokalemia    Aortic thrombus (H)    DVT (deep venous thrombosis) (H)           Summary/Hospital Course:     80M h/o prostate ca and melanoma now presents with respiratory failure due to covid 19 pna.  Admitted 11/10, intubated 11/22.  Tension ptx on 11/23 now s/p ch tube placement.  Now on inhaled flolan.  No longer requiring paralysis/proning.      Assessment and plan :     Aly Sorensen IS a 80 year old male admitted on 11/10/2020 for covid19 pna.   I have personally reviewed the daily labs, imaging studies, cultures and discussed the case with referring physician and consulting physicians.   My assessment and plan by system for this patient is as follows:    Neurology/Psychiatry:   1. Sedation:  midaz drip  2. Analgesia: fentanyl drip    Cardiovascular:   1.  Shock: likely vasoplegic due to sedation vs obstructive due to intrathoracic pressures from mechanical ventilation.  Continue levophed as needed.  2.  Afib with rvr:  Resolved.  Now completed amio drip.     Pulmonary/Ventilator Management:   1. Acute hypoxemic respiratory failure, vent dependent:  In setting of covid pna.  Continue lung protective tidal volumes, and inhaled flolan.  Now tolerating supined position .  Continue PEEP 10 today, weaning fio2 as able.  Transitioned to pressure control mode overnight for improved vent synchrony, trying to decrease his pressure over peep to 20 today to decrease driving pressure (recent ptx).    GI and Nutrition :   1. Continue tf  2.  Famotidine for pud prophy    Renal/Fluids/Electrolytes:   1. Creatinine ok 0.49  2. Metabolic alkalosis: Mild:  7.42/62/83/39 (on 45%)  3. HyperNa:  Resolved : sodium 142--continue  fw at 60 q4.  4. Diuresing with lasix 40 bid     Infectious Disease:   1. covid-19 pna: Remdesivir finished, steroid course completed.  Tapering stress steroids.  2. S/p empiric course of merrem.  No pathogen on culture.    Endocrine:   1. Hyperglycemia:  Continue sliding scale insulin.     Hematology/Oncology:   1. Wbc stable 13.1- in setting of infection and steroids.  2. Anemia:  9.5 monitor.  3.  pltlts 282 acceptable  4.  DVT: heparin drip    ICU Prophylaxis:   1. DVT: Hep drip  2. VAP: HOB 30 degrees, chlorhexidine rinse  3. Stress Ulcer: H2 blocker  4. Restraints: Nonviolent soft two point restraints required and necessary for patient safety and continued cares and good effect as patient continues to pull at necessary lines, tubes despite education and distraction. Will readdress daily.   5. Wound care - per unit routine   6. Feeding - tf  7. Family Update: see below  8. Disposition - icu    Family meeting yesterday with son Raoul and 4 other children and their spouses. We discussed that although he is slowly improving, he still has a long way to go and that recovery will be slow and difficult given his age and severity of illness.  We also disussed that although he has made some progress, survival is still far from guaranteed.  No new decisions made.  Remains DNR.         Interim History:   Unable to obtain history directly due to intubated/vented status. No events overnight.  Continuing to slowly wean fio2 today.         Key Medications:       chlorhexidine  15 mL Mouth/Throat Q12H     famotidine  20 mg Intravenous Q12H     furosemide  40 mg Intravenous Q12H     hydrocortisone sodium succinate PF  50 mg Intravenous Daily     insulin aspart  1-6 Units Subcutaneous Q4H     polyethylene glycol  17 g Oral Daily     sennosides  5 mL Oral At Bedtime     sodium chloride (PF)  10 mL Intracatheter Q8H     sodium phosphate  9 mmol Intravenous Once       epoprostenol (VELETRI) 20 mcg/mL in sterile water inhalation  solution 20 ng/kg/min (12/06/20 0433)     fentaNYL 150 mcg/hr (12/06/20 0816)     heparin 1,450 Units/hr (12/06/20 0300)     - MEDICATION INSTRUCTIONS -       midazolam 8 mg/hr (12/05/20 2200)     - MEDICATION INSTRUCTIONS -       norepinephrine Stopped (12/01/20 0850)     - MEDICATION INSTRUCTIONS -       propofol Stopped (11/28/20 1312)     sodium chloride 20 mL/hr at 12/05/20 2201     vecuronium (NORCURON) infusion ADULT Stopped (12/01/20 0820)               Physical Examination:   Temp:  [98.3  F (36.8  C)-99  F (37.2  C)] 98.3  F (36.8  C)  Pulse:  [76-90] 82  Resp:  [20-24] 24  MAP:  [63 mmHg-87 mmHg] 69 mmHg  Arterial Line BP: ()/(45-64) 123/52  FiO2 (%):  [45 %] 45 %  SpO2:  [93 %-96 %] 95 %        Intake/Output Summary (Last 24 hours) at 12/6/2020 0837  Last data filed at 12/6/2020 0800  Gross per 24 hour   Intake 3046.5 ml   Output 2925 ml   Net 121.5 ml           Wt Readings from Last 4 Encounters:   12/06/20 77.4 kg (170 lb 10.2 oz)   08/20/20 83 kg (183 lb)   12/11/19 81.9 kg (180 lb 8 oz)   10/28/19 82.4 kg (181 lb 11.2 oz)     Arterial Line BP: ()/(45-64) 123/52  MAP:  [63 mmHg-87 mmHg] 69 mmHg  Ventilation Mode: PCV Plus assist  (Pressure Control Ventilation/ Assist Control)  FiO2 (%): 45 %  Rate Set (breaths/minute): 22 breaths/min  PEEP (cm H2O): 10 cmH2O  Oxygen Concentration (%): 45 %  Resp: 24    Recent Labs   Lab 12/06/20  0435 12/05/20  0430 12/04/20  0420 12/03/20  0446 12/02/20  0430 12/01/20  1020 11/29/20  1230 11/29/20  1230   PH 7.42 7.40 7.42 7.42 7.49* 7.42   < > 7.41   PCO2 62* 60* 57* 57* 59* 61*   < > 58*   PO2 83 118* 87 69* 111* 82   < > 61*   HCO3 39* 37* 37* 37* 44* 40*   < > 37*   O2PER  --   --   --  50 60 60  --  50%    < > = values in this interval not displayed.       GEN: no acute distress   HEENT: head ncat, sclera anicteric, OP patent, trachea midline   NECK: supple  PULM: unlabored synchronous with vent, clear anteriorly    CV/COR: RRR S1S2 no gallop,  No  rub, no murmur  ABD: soft nontender, hypoactive bowel sounds, no mass  EXT:  No Edema;   Warm x4  NEURO: sedated/paralyzed.  Unable to assess.  SKIN: no obvious rash  LINES: clean, dry intact         Data:   All data and imaging reviewed     ROUTINE ICU LABS (Last four results)  CMP  Recent Labs   Lab 12/06/20  0435 12/05/20  0430 12/04/20  1810 12/04/20  0420 12/03/20  0446 12/03/20 0446 11/30/20  0220 11/30/20  0220    142 145* 145*  --  145*   < > 141   POTASSIUM 3.6 3.7 3.8 3.7   < > 3.6   < > 4.2   CHLORIDE 103 104 106 108  --  108   < > 102   CO2 37* 37* 36* 36*  --  36*   < > 36*   ANIONGAP 2* 1* 3 1*  --  1*   < > 3   * 166* 179* 173*  --  160*   < > 173*   BUN 39* 38* 37* 36*  --  36*   < > 36*   CR 0.49* 0.53* 0.59* 0.58*  --  0.57*   < > 0.52*   GFRESTIMATED >90 >90 >90 >90  --  >90   < > >90   GFRESTBLACK >90 >90 >90 >90  --  >90   < > >90   ELIA 8.2* 7.9* 8.1* 7.9*  --  7.8*   < > 8.1*   MAG 2.3 2.3 2.3 2.2  --  2.5*   < > 2.3   PHOS 3.3 3.2  --  3.0  --  3.1   < > 2.8   PROTTOTAL  --   --   --   --   --   --   --  5.2*   ALBUMIN  --   --   --   --   --   --   --  2.4*   BILITOTAL  --   --   --   --   --   --   --  0.9   ALKPHOS  --   --   --   --   --   --   --  60   AST  --   --   --   --   --   --   --  23   ALT  --   --   --   --   --   --   --  28    < > = values in this interval not displayed.     CBC  Recent Labs   Lab 12/06/20  0435 12/05/20  0430 12/04/20  0420 12/03/20  0446   WBC 13.1* 15.5* 15.9* 16.4*   RBC 3.05* 3.08* 3.08* 2.93*   HGB 9.5* 9.6* 9.6* 9.1*   HCT 30.3* 30.7* 30.5* 29.1*   MCV 99 100 99 99   MCH 31.1 31.2 31.2 31.1   MCHC 31.4* 31.3* 31.5 31.3*   RDW 14.7 14.6 14.5 14.3    266 246 244     INRNo lab results found in last 7 days.  Arterial Blood Gas  Recent Labs   Lab 12/06/20  0435 12/05/20  0430 12/04/20  0420 12/03/20  0446 12/02/20  0430 12/01/20  1020 11/29/20  1230 11/29/20  1230   PH 7.42 7.40 7.42 7.42 7.49* 7.42   < > 7.41   PCO2 62* 60* 57* 57* 59*  61*   < > 58*   PO2 83 118* 87 69* 111* 82   < > 61*   HCO3 39* 37* 37* 37* 44* 40*   < > 37*   O2PER  --   --   --  50 60 60  --  50%    < > = values in this interval not displayed.       All cultures:  No results for input(s): CULT in the last 168 hours.  No results found for this or any previous visit (from the past 24 hour(s)).  Labs (personally reviewed):  See a/p    Billing: This patient is critically ill: Yes. Total critical care time today 45 min.

## 2020-12-06 NOTE — PROGRESS NOTES
[facility] ICU RESPIRATORY NOTE           Date of Admission: 11/10/2020     Date of Intubation (most recent): 11/22/2020     Reason for Mechanical Ventilation: Respiratory failure     Number of Days on Mechanical Ventilation: 15     Met Criteria for Spontaneous Breathing Trial: No     Reason for No Spontaneous Breathing Trial: Per MD     Significant Events Today: None     ABG Results:   Recent Labs   Lab 12/05/20  0430 12/04/20  0420 12/03/20  0446 12/02/20  0430 12/01/20  1020 11/29/20  1230 11/29/20  1230   PH 7.40 7.42 7.42 7.49* 7.42   < > 7.41   PCO2 60* 57* 57* 59* 61*   < > 58*   PO2 118* 87 69* 111* 82   < > 61*   HCO3 37* 37* 37* 44* 40*   < > 37*   O2PER  --   --  50 60 60  --  50%    < > = values in this interval not displayed.     Ventilation Mode: PCV Plus assist  (Pressure Control Ventilation/ Assist Control)  FiO2 (%): 45 %  Rate Set (breaths/minute): 22 breaths/min  PEEP (cm H2O): 10 cmH2O  Oxygen Concentration (%): 45 %  Resp: 24    Brown Campos, RT

## 2020-12-06 NOTE — PLAN OF CARE
No acute changes t/o noc. Abgs remain unchanged. On 45% FIO2. K replacement per protocol. OG placed to suction with output. Cont current care.

## 2020-12-07 NOTE — PLAN OF CARE
No changes t/o noc. Single event of fighting vent, abd muscle use, stacking breaths. Had been giving prn fentanyl occasionally for cares. With this event also required prn dose of versed to assist improvement in O2 saturation. FIO2 up to 50% fm 45% based on abg results. K replacement. Remains on heparin gtt. Repeated Covid swab. Cont current care. Per family cont path until later this week.

## 2020-12-07 NOTE — PROGRESS NOTES
Counts include 234 beds at the [facility] ICU RESPIRATORY NOTE        Date of Admission: 11/10/2020    Date of Intubation (most recent): 11/22/2020    Reason for Mechanical Ventilation: Respiratory failure.     Number of Days on Mechanical Ventilation: 16    Met Criteria for Spontaneous Breathing Trial: No    Reason for No Spontaneous Breathing Trial: pt on darnell amount of Oxygen and PEEP level.    Significant Events Today: none.    ABG Results:   Recent Labs   Lab 12/07/20  0430 12/06/20  1355 12/06/20  0435 12/05/20  0430 12/03/20  0446 12/03/20  0446 12/02/20  0430 12/01/20  1020   PH 7.39 7.44 7.42 7.40   < > 7.42 7.49* 7.42   PCO2 70* 59* 62* 60*   < > 57* 59* 61*   PO2 77* 94 83 118*   < > 69* 111* 82   HCO3 42* 40* 39* 37*   < > 37* 44* 40*   O2PER  --  45%  --   --   --  50 60 60    < > = values in this interval not displayed.       Current Vent Settings: Ventilation Mode: PCV Plus assist  (Pressure Control Ventilation/ Assist Control)  FiO2 (%): 50 %  Rate Set (breaths/minute): 22 breaths/min  PEEP (cm H2O): 10 cmH2O  Oxygen Concentration (%): 50 %  Resp: 26        Plan: pt will remain on full support of mech vent. RT to continue monitor the pt's respiratory status and manege the vent prn per [facility]AT.  Raza Pickard, RT

## 2020-12-07 NOTE — PROGRESS NOTES
[facility]  WO Nurse Inpatient Wound Assessment   Late entry: pt assessed late on 12-4-20    Reason for consultation: Evaluate and treat Rt upper lip suspected PI                                           Urethral meatal suspected PI    Assessment   Rt upper lip:  Mucosal PI secondary to ETT and prone positioning   Urethral Meatal: mucosal PI secondary to silverman and prone positioning    Treatment Plan   Wound care to Rt upper lip mucosal PI and urethral Meatal PI: BID and prn   1. Assess with full skin inspections BID and reposition silvreman tube  2. Off load ETT per ICU protocol  3. Keep lips moisturized  4. Apply Bacitracin to meatal opening BID     Orders  IN Epic  Recommended provider order: NA  WOC Nurse follow-up plan: weekly and prn   Nursing to notify the Provider(s) and re-consult the WO Nurse if wound(s) deteriorates or new skin concern.    Patient History  According to provider note(s):   Aly Sorensen MRN# [id number]   Age: 80 year old YOB: 1940      Date of Admission: 11/10/2020     Primary care provider: Zev Austin     CODE STATUS: DNR. Pt condition guarded.      Problem List:      Principal Problem:    Acute respiratory failure with hypoxia (H)  Active Problems:    Pneumonia due to 2019 novel coronavirus    Hypokalemia    Aortic thrombus (H)    DVT (deep venous thrombosis) (H)          Treatment goals for next 24 hours:   1.  Lung protective ventilation with permissive hypercapnia.  Could increase respirator if needed.  2.  Amiodarone GTT for A. fib  3.  Lasix/albumin goal net negative ~1 L  4.  Rest in supine position x4 to 6 hours and then reprone.       Objective Data  Containment of urine/stool: Silverman for UIC     Active Diet Order: TF  Orders Placed This Encounter      NPO for Medical/Clinical Reasons Except for: No Exceptions    Output:   I/O last 3 completed shifts:  In: 2983.83 [I.V.:1063.83; NG/GT:480]  Out: 3000 [Urine:2800; Emesis/NG  output:200]    Risk Assessment:   Sensory Perception: 1-->completely limited  Moisture: 3-->occasionally moist  Activity: 1-->bedfast  Mobility: 1-->completely immobile  Nutrition: 2-->probably inadequate  Friction and Shear: 2-->potential problem  Niko Score: 10                          Labs:   Recent Labs   Lab 12/07/20  0430   HGB 9.9*   WBC 13.2*       Physical Exam    #1 Wound Location:  Rt upper lip  Wound History: Pt proned, vented /paralyzed secondary CoVid, pneumonia and respiratory failure  Date of last WO photo:  11-30-20                      WO photo: 12-4-20                  Measurements (length x width x depth, in cm):  2.0cm x .5cm x superficial. Slightly smaller and more demarcated and evidence healing on medial edge  Wound Base: 100% red base but drier  Palpation of the wound bed: firm   Temperature: warm   Drainage: None  Odor: None  Pain: Unable to determine    #2: Urethral meatus      WO Photo:  11-30-20                                       WO photo: 12-4-20                Measurements (length x width x depth, in cm): less penile edema with small amt ecchymosis to meatal opening. Previous reddened area appears resolved today   Wound Base: intact with non-blanchable ecchymosis   Palpation of the wound bed: firm   Temperature: warm   Drainage: None  Odor: None  Pain: Unable to determine    Interventions  Current support surface: Isolibrium  Current off-loading measures: Pillows/Taps, today pt tolerating supine position  Visual inspection of wound(s) completed. Continue current POC. No new wounds noted to lips.   Discussed plan of care with Discussed care with THOMAS Robert WO THOMAS

## 2020-12-07 NOTE — PROGRESS NOTES
Comprehensive Daily ICU Note        Aly Sorensen MRN# [id number]   Age: 80 year old YOB: 1940     Date of Admission: 11/10/2020    Primary care provider: Zev Austin     CODE STATUS: DNR      Problem List:         Principal Problem:    Acute respiratory failure with hypoxia (H)  Active Problems:    Pneumonia due to 2019 novel coronavirus    Hypokalemia    Aortic thrombus (H)    DVT (deep venous thrombosis) (H)             Treatment goals for next 24 hours:   Continue pressure support mode  Wean sedation if possible    Aurora Kerr MD        Subjective/ Last 24 hours:   80M h/o prostate ca and melanoma now presents with respiratory failure due to covid 19 pna.  Admitted 11/10, intubated 11/22.  Tension ptx on 11/23 now s/p ch tube placement.  Now on inhaled flolan.  No longer requiring paralysis/proning. Nurse reporting difficulties with ventilator synchrony today. Improved when placed on PS mode.            Mechanical Ventilation/Vitalsigns/IsandOs:       Temp:  [98.2  F (36.8  C)-100.5  F (38.1  C)] 98.6  F (37  C)  Pulse:  [] 85  Resp:  [20-35] 26  BP: (124)/(81) 124/81  MAP:  [71 mmHg-97 mmHg] 77 mmHg  Arterial Line BP: (108-148)/(48-70) 125/54  FiO2 (%):  [45 %-50 %] 50 %  SpO2:  [93 %-98 %] 97 %      Ventilation Mode: PCV Plus assist  (Pressure Control Ventilation/ Assist Control)  FiO2 (%): 50 %  Rate Set (breaths/minute): 22 breaths/min  PEEP (cm H2O): 10 cmH2O  Pressure Support (cm H2O): 14 cmH2O  Oxygen Concentration (%): 50 %  Resp: 26    Chest tube: no air leak, no output    Day since 11/22    Peak airway pressure: set at 30      Intake/Output Summary (Last 24 hours) at 12/7/2020 1613  Last data filed at 12/7/2020 1400  Gross per 24 hour   Intake 2547.33 ml   Output 2900 ml   Net -352.67 ml     Net IO Since Admission: -577.84 mL [12/07/20 1613]           Physical Examination:     General: Stated age, intubated and sedated  HEENT: ET tube, NJ tube  Lungs: LCTAB   CVS:  RRR  Abdomen: soft  Extremities/musculoskeletal: normal  Neurology: heavily sedated  Skin: normal  Psychiatry: unable  Exam of Line sites:  Left sided chest tube bandage clean, PICC and arterial lines clean            Feeding/Glucose:     Orders Placed This Encounter      NPO for Medical/Clinical Reasons Except for: No Exceptions        Recent Labs   Lab 12/07/20  1323 12/07/20  0823 12/07/20  0430 12/06/20  2359 12/06/20  2007 12/06/20  1755 12/06/20  1558 12/06/20  0435 12/06/20  0435 12/05/20  0430 12/05/20  0430 12/04/20  1810 12/04/20  1810 12/04/20  0420 12/04/20  0420   GLC  --   --  168*  --   --  175*  --   --  172*  --  166*  --  179*  --  173*   * 180* 153* 157* 163*  --  213*   < > 157*   < > 174*   < >  --    < >  --     < > = values in this interval not displayed.                Medications:       chlorhexidine  15 mL Mouth/Throat Q12H     famotidine  20 mg Oral or Feeding Tube BID     furosemide  40 mg Intravenous Q12H     hydrocortisone sodium succinate PF  50 mg Intravenous Daily     insulin aspart  1-6 Units Subcutaneous Q4H     polyethylene glycol  17 g Oral Daily     sennosides  5 mL Oral At Bedtime     sodium chloride (PF)  10 mL Intracatheter Q8H     sodium phosphate  9 mmol Intravenous Once          epoprostenol (VELETRI) 20 mcg/mL in sterile water inhalation solution 20 ng/kg/min (12/07/20 1234)     fentaNYL 150 mcg/hr (12/07/20 1326)     heparin 1,450 Units/hr (12/07/20 1318)     - MEDICATION INSTRUCTIONS -       midazolam 8 mg/hr (12/07/20 1048)     - MEDICATION INSTRUCTIONS -       norepinephrine Stopped (12/01/20 0850)     - MEDICATION INSTRUCTIONS -       propofol Stopped (11/28/20 1312)     sodium chloride 20 mL/hr at 12/06/20 2041     vecuronium (NORCURON) infusion ADULT Stopped (12/01/20 0820)              Labs:         ROUTINE ICU LABS (Last four results)  CMP  Recent Labs   Lab 12/07/20  0430 12/06/20  1755 12/06/20  0435 12/05/20  0430 12/04/20  0420 12/04/20  0420     145* 142 142   < > 145*   POTASSIUM 3.6 4.0 3.6 3.7   < > 3.7   CHLORIDE 102 104 103 104   < > 108   CO2 40* 39* 37* 37*   < > 36*   ANIONGAP <1* 2* 2* 1*   < > 1*   * 175* 172* 166*   < > 173*   BUN 39* 39* 39* 38*   < > 36*   CR 0.50* 0.50* 0.49* 0.53*   < > 0.58*   GFRESTIMATED >90 >90 >90 >90   < > >90   GFRESTBLACK >90 >90 >90 >90   < > >90   ELIA 8.3* 8.2* 8.2* 7.9*   < > 7.9*   MAG 2.4* 2.4* 2.3 2.3   < > 2.2   PHOS 3.4  --  3.3 3.2  --  3.0    < > = values in this interval not displayed.     CBC  Recent Labs   Lab 12/07/20  0430 12/06/20  0435 12/05/20  0430 12/04/20  0420   WBC 13.2* 13.1* 15.5* 15.9*   RBC 3.19* 3.05* 3.08* 3.08*   HGB 9.9* 9.5* 9.6* 9.6*   HCT 32.0* 30.3* 30.7* 30.5*    99 100 99   MCH 31.0 31.1 31.2 31.2   MCHC 30.9* 31.4* 31.3* 31.5   RDW 14.9 14.7 14.6 14.5    282 266 246     INRNo lab results found in last 7 days.  Arterial Blood Gas  Recent Labs   Lab 12/07/20  1500 12/07/20  0430 12/06/20  1355 12/06/20  0435 12/03/20  0446 12/03/20  0446 12/02/20  0430 12/01/20  1020   PH 7.40 7.39 7.44 7.42   < > 7.42 7.49* 7.42   PCO2 68* 70* 59* 62*   < > 57* 59* 61*   PO2 127* 77* 94 83   < > 69* 111* 82   HCO3 42* 42* 40* 39*   < > 37* 44* 40*   O2PER  --   --  45%  --   --  50 60 60    < > = values in this interval not displayed.         Cultures:  No results for input(s): CULT in the last 168 hours.  Blood culture:  Invalid input(s): BC   Urine culture:  No results for input(s): URC in the last 168 hours.          Imaging/Other results:     Recent Results (from the past 24 hour(s))   XR Chest Port 1 View    Narrative    XR CHEST PORT 1 VW 12/7/2020 8:08 AM    HISTORY: hypoxemia    COMPARISON: 12/1/2020      Impression    IMPRESSION: Endotracheal tube in the midtrachea, 2 enteric tubes  coursing below the left hemidiaphragm, right arm PICC tip in the high  right atrium and left-sided chest tube remain in place. Slight  improvement in patchy interstitial and airspace  opacities in both  lungs. Stable retrocardiac atelectasis/consolidation. No significant  pneumothorax. Normal pleural effusions. The cardiac and mediastinal  silhouettes are normal.    DEE DEE LOPEZ MD                    Assessment and Plan:     Aly Sorensen IS a 80 year old male admitted on 11/10/2020 for covid19 ARDS  I have personally reviewed the daily labs, imaging studies, cultures and discussed the case with referring physician and consulting physicians.   My assessment and plan by system for this patient is as follows:    Neurology/Psychiatry:   Has required heavy sedation to tolerate lung protective settings.   - Now that on PS mode. Trial of increasing goal RASS.     Cardiovascular:   1.  Shock: likely vasoplegic due to sedation vs obstructive due to intrathoracic pressures from mechanical ventilation.  Improved.  2.  Afib with rvr: had resolved and has now gone into afib again, but with normal HR. Is on anticoagulation  - Rate control if necessary.     Pulmonary/Ventilator Management:   1. Acute hypoxemic respiratory failure, vent dependent:  In setting of covid pna. Increasing ventilator dyssynchrony leading to increased need for sedatives. More synchronous on PS mode.  - Continue pressure support mode. If patient doesn't make significant progress in next few days need to discuss tracheostomy with family.  2. Pneumothorax secondary to COVID. Chest tube in place  - Remove if continues to make progress and stays on PS mode.     GI and Nutrition :   1. Continue tf  2.  Famotidine for pud prophy    Renal/Fluids/Electrolytes:   1. No current acute issues  - Aim for normal electrolytes and net even fluid balance.     Infectious Disease:   1. covid-19 pna: Remdesivir finished, steroid course completed.  Tapering stress steroids.  2. S/p empiric course of merrem.  No pathogen on culture.    Endocrine:   1. Hyperglycemia:  Continue sliding scale insulin.     Hematology/Oncology:   1. Stable  4.  DVT and aortic  thrombus: heparin drip    ICU Prophylaxis:   1. DVT: Hep drip  2. VAP: HOB 30 degrees, chlorhexidine rinse  3. Stress Ulcer: H2 blocker  4. Restraints: Nonviolent soft two point restraints required and necessary for patient safety and continued cares and good effect as patient continues to pull at necessary lines, tubes despite education and distraction. Will readdress daily.   5. Wound care - per unit routine   6. Feeding - tf  7. Family Update:  Raoul on phone.Care conference was held on Saturday with plan to go forward with current care plan   8. Disposition - icu    Critical Care time: 40 minutes    Aurora Kerr MD

## 2020-12-07 NOTE — PLAN OF CARE
Little change, having soft BM's, also thick brown (stool?) from OG.   Family updated, no change to sedation or vent.

## 2020-12-07 NOTE — PROGRESS NOTES
DATE:  12/7/2020   TIME OF RECEIPT FROM LAB:  1400  LAB TEST:  COVID  LAB VALUE:  POSITIVE  RESULTS GIVEN WITH READ-BACK TO (PROVIDER):  Dr. De La O

## 2020-12-07 NOTE — PROGRESS NOTES
Chart Check:    On IV heparin for aortic thrombus. Notes reviewed.  Family meeting. Palliative Care notes reviewed. Family still wishing for restorative approach.  Patient slowly improving but still a long way to go. Now DNR.    No new recommendations at this time. Please reach out if questions or concerns.      Abelardo EAGLE, CNP  [facility]  [phone] (office) or [phone] (cell)

## 2020-12-08 NOTE — PROGRESS NOTES
CLINICAL NUTRITION SERVICES - REASSESSMENT NOTE      Recommendations Ordered by Registered Dietitian (RD): Add Certavite daily per [facility] protocol    Malnutrition: (11/18)  % Weight Loss:  > 2% in 1 week (severe malnutrition)  % Intake:  <75% for > 7 days (non-severe malnutrition)  Subcutaneous Fat Loss:  Unable to determine  Muscle Loss:  Unable to determine  Fluid Retention:  None noted     Malnutrition Diagnosis: Non-Severe malnutrition  In Context of:  Acute illness or injury        EVALUATION OF PROGRESS TOWARD GOALS   Diet:  NPO    Nutrition Support:  Patient continues on goal TF regimen as follows ~    Nutrition Support Enteral:  Type of Feeding Tube: Nasoduodenal   Enteral Frequency:  Continuous  Enteral Regimen: Isosource 1.5 at 60 mL/hr   Total Enteral Provisions: 2160 kcal (29 kcal/kg), 98 g protein (1.3 g/kg), 20 g fiber, 232 g CHO, 1100 mg H2O  Free Water Flush: 60 mL every 4 hours     Intake/Tolerance:    K normal, Mg 2.4 (H), Phos normal   BGM  - Medium sliding scale insulin    mL   I/O 2739/4701, wt 74.7 kg (down 3.3 kg from admit) - IV Lasix   BM x 5 yesterday       ASSESSED NUTRITION NEEDS:  Dosing Weight:  74.7 kg (12/7)  Estimated Final Energy Needs (>1 week hospitalized):  9829-6680 kcals (25-30 Kcal/Kg)  Justification: maintenance  Estimated Protein Needs:   grams protein (1.2-1.5 g pro/Kg)  Justification: hypercatabolism with acute illness      NEW FINDINGS:   12/7:  [facility]   Rt upper lip:  Mucosal PI secondary to ETT and prone positioning   Urethral Meatal: mucosal PI secondary to silverman and prone positioning    Previous Goals (12/3):   EN regimen will continue to meet % of assessed nutrition needs  Evaluation: Met    Previous Nutrition Diagnosis (12/3):   No nutrition diagnosis identified at this time. TF currently running at goal without issues, will continue to follow  Evaluation: No change      CURRENT NUTRITION DIAGNOSIS  No nutrition diagnosis identified at this time      INTERVENTIONS  Recommendations / Nutrition Prescription  Continue Isosource 1.5 at 60 mL/hr as above   Add Certavite daily per [facility] protocol     Implementation  Multivitamin/mineral supplement therapy:  Ordered as above   Collaboration and Referral of Nutrition care:  Patient discussed today during interdisciplinary bedside rounds     Goals  Isosource 1.5 at 60 mL/hr will continue to meet % needs     MONITORING AND EVALUATION:  Progress towards goals will be monitored and evaluated per protocol and Practice Guidelines    Betsy Aguiar RD, LD, CNSC   Clinical Dietitian - [facility]

## 2020-12-08 NOTE — PROGRESS NOTES
[facility] ICU RESPIRATORY NOTE        Date of Admission: 11/10/2020    Date of Intubation (most recent): 11/22/2020    Reason for Mechanical Ventilation: Respiratory failure due to Covid     Number of Days on Mechanical Ventilation: 17    Met Criteria for Spontaneous Breathing Trial: Yes - Pt remains on PS 14/10 50%     Reason for No Spontaneous Breathing Trial: Done     Significant Events Today: none during this shift     ABG Results:   Recent Labs   Lab 12/08/20  0350 12/07/20  1500 12/07/20  0430 12/06/20  1355 12/03/20  0446 12/03/20  0446 12/02/20  0430   PH 7.39 7.40 7.39 7.44   < > 7.42 7.49*   PCO2 74* 68* 70* 59*   < > 57* 59*   PO2 98 127* 77* 94   < > 69* 111*   HCO3 44* 42* 42* 40*   < > 37* 44*   O2PER 50%  --   --  45%  --  50 60    < > = values in this interval not displayed.       Current Vent Settings: Ventilation Mode: CPAP/PS  (Continuous positive airway pressure with Pressure Support)  FiO2 (%): 50 %  Rate Set (breaths/minute): 22 breaths/min  PEEP (cm H2O): 10 cmH2O  Pressure Support (cm H2O): 14 cmH2O  Oxygen Concentration (%): 50 %  Resp: 26      Skin Assessment: pt has ulcer on upper lip.    Plan: continue with current settings as long as pt tolerates.    Sola Lamas, RT

## 2020-12-08 NOTE — PROGRESS NOTES
Chart Check:    On IV heparin for aortic thrombus. Notes reviewed.  Family meeting. Palliative Care notes reviewed. Family still wishing for restorative approach.  Patient slowly improving but still a long way to go. Now DNR. Remains intubated, but seems to tolerate pressure support ventilation.    No new recommendations at this time. Please reach out if questions or concerns.      Abelardo EAGLE, CNP  [facility]  [phone] (office) or [phone] (cell)

## 2020-12-08 NOTE — PROGRESS NOTES
Comprehensive Daily ICU Note        Aly Sorensen MRN# [id number]   Age: 80 year old YOB: 1940     Date of Admission: 11/10/2020    Primary care provider: Zev Austin     CODE STATUS: DNR      Problem List:     Principal Problem:    Acute respiratory failure with hypoxia (H)  Active Problems:    Pneumonia due to 2019 novel coronavirus    Hypokalemia    Aortic thrombus (H)    DVT (deep venous thrombosis) (H)             Treatment goals for next 24 hours:   Return to CMV and see if this helps blood pressure and tachycardia  Continue sedation.     Aurora Kerr MD        Subjective/ Last 24 hours:   80M h/o prostate ca and melanoma now presents with respiratory failure due to covid 19 pna.  Admitted 11/10, intubated 11/22.  Tension ptx on 11/23 now s/p ch tube placement.  Now on inhaled flolan.  No longer requiring paralysis/proning. Nurse reporting difficulties with ventilator synchrony which was improved when placed on PS mode. However, this morning more tachycardic and hypertensive. Sedatives were not effective.         Mechanical Ventilation/Vitalsigns/IsandOs:       Temp:  [97.7  F (36.5  C)-100.5  F (38.1  C)] 100  F (37.8  C)  Pulse:  [] 95  Resp:  [26] 26  BP: (124)/(81) 124/81  MAP:  [67 mmHg-97 mmHg] 79 mmHg  Arterial Line BP: (108-144)/(41-70) 144/54  FiO2 (%):  [50 %] 50 %  SpO2:  [95 %-100 %] 96 %      Ventilation Mode: CPAP/PS  (Continuous positive airway pressure with Pressure Support)  FiO2 (%): 50 %  Rate Set (breaths/minute): 22 breaths/min  PEEP (cm H2O): 10 cmH2O  Pressure Support (cm H2O): 14 cmH2O  Oxygen Concentration (%): 50 %  Resp: 26    Chest tube: no air leak, no output    Day since 11/22    Peak airway pressure: not applicable. On PS mode.       Intake/Output Summary (Last 24 hours) at 12/8/2020 0721  Last data filed at 12/8/2020 0600  Gross per 24 hour   Intake 2612.43 ml   Output 4701 ml   Net -2088.57 ml   Net: -2 liters           Physical Examination:      General: Stated age, intubated and sedated  HEENT: ET tube, NJ tube  Lungs: LCTAB, prolonged expiratory phase  CVS: RRR, tachycardic  Abdomen: soft  Extremities/musculoskeletal: normal  Neurology: heavily sedated  Skin: normal  Psychiatry: unable  Exam of Line sites:  Left sided chest tube bandage clean, PICC and arterial lines clean            Feeding/Glucose:     Orders Placed This Encounter      NPO for Medical/Clinical Reasons Except for: No Exceptions        Recent Labs   Lab 12/08/20  0350 12/07/20  2319 12/07/20  1949 12/07/20  1626 12/07/20  1323 12/07/20  0823 12/07/20  0430 12/06/20  1755 12/06/20  1755 12/06/20  0435 12/06/20  0435 12/05/20  0430 12/05/20  0430 12/04/20  1810 12/04/20  1810   *  --   --   --   --   --  168*  --  175*  --  172*  --  166*  --  179*   BGM  --  89 167* 200* 226* 180* 153*   < >  --    < > 157*   < > 174*   < >  --     < > = values in this interval not displayed.                Medications:       chlorhexidine  15 mL Mouth/Throat Q12H     famotidine  20 mg Oral or Feeding Tube BID     furosemide  40 mg Intravenous Q12H     hydrocortisone sodium succinate PF  50 mg Intravenous Daily     insulin aspart  1-6 Units Subcutaneous Q4H     polyethylene glycol  17 g Oral Daily     sennosides  5 mL Oral At Bedtime     sodium chloride (PF)  10 mL Intracatheter Q8H     sodium phosphate  9 mmol Intravenous Once          epoprostenol (VELETRI) 20 mcg/mL in sterile water inhalation solution 20 ng/kg/min (12/08/20 0402)     fentaNYL 150 mcg/hr (12/08/20 0404)     heparin 1,400 Units/hr (12/08/20 0525)     - MEDICATION INSTRUCTIONS -       midazolam 8 mg/hr (12/08/20 0036)     - MEDICATION INSTRUCTIONS -       norepinephrine Stopped (12/01/20 0850)     - MEDICATION INSTRUCTIONS -       propofol Stopped (12/07/20 1829)     sodium chloride 20 mL/hr at 12/06/20 2041              Labs:         ROUTINE ICU LABS (Last four results)  CMP  Recent Labs   Lab 12/08/20  0350 12/07/20  0430  12/06/20  1755 12/06/20  0435 12/05/20  0430    142 145* 142 142   POTASSIUM 4.0 3.6 4.0 3.6 3.7   CHLORIDE 103 102 104 103 104   CO2 43* 40* 39* 37* 37*   ANIONGAP <1* <1* 2* 2* 1*   * 168* 175* 172* 166*   BUN 37* 39* 39* 39* 38*   CR 0.54* 0.50* 0.50* 0.49* 0.53*   GFRESTIMATED >90 >90 >90 >90 >90   GFRESTBLACK >90 >90 >90 >90 >90   ELIA 8.1* 8.3* 8.2* 8.2* 7.9*   MAG 2.4* 2.4* 2.4* 2.3 2.3   PHOS 4.0 3.4  --  3.3 3.2     CBC  Recent Labs   Lab 12/08/20  0350 12/07/20  0430 12/06/20  0435 12/05/20  0430   WBC 15.6* 13.2* 13.1* 15.5*   RBC 3.09* 3.19* 3.05* 3.08*   HGB 9.6* 9.9* 9.5* 9.6*   HCT 31.6* 32.0* 30.3* 30.7*   * 100 99 100   MCH 31.1 31.0 31.1 31.2   MCHC 30.4* 30.9* 31.4* 31.3*   RDW 15.0 14.9 14.7 14.6    335 282 266     INRNo lab results found in last 7 days.  Arterial Blood Gas  Recent Labs   Lab 12/08/20  0350 12/07/20  1500 12/07/20  0430 12/06/20  1355 12/03/20  0446 12/03/20  0446 12/02/20  0430   PH 7.39 7.40 7.39 7.44   < > 7.42 7.49*   PCO2 74* 68* 70* 59*   < > 57* 59*   PO2 98 127* 77* 94   < > 69* 111*   HCO3 44* 42* 42* 40*   < > 37* 44*   O2PER 50%  --   --  45%  --  50 60    < > = values in this interval not displayed.         Cultures:  No results for input(s): CULT in the last 168 hours.  Blood culture:  Invalid input(s): BC   Urine culture:  No results for input(s): URC in the last 168 hours.          Imaging/Other results:     Recent Results (from the past 24 hour(s))   XR Chest Port 1 View    Narrative    XR CHEST PORT 1 VW 12/7/2020 8:08 AM    HISTORY: hypoxemia    COMPARISON: 12/1/2020      Impression    IMPRESSION: Endotracheal tube in the midtrachea, 2 enteric tubes  coursing below the left hemidiaphragm, right arm PICC tip in the high  right atrium and left-sided chest tube remain in place. Slight  improvement in patchy interstitial and airspace opacities in both  lungs. Stable retrocardiac atelectasis/consolidation. No significant  pneumothorax.  Normal pleural effusions. The cardiac and mediastinal  silhouettes are normal.    DEE DEE LOPEZ MD              Assessment and Plan:     Aly Sorensen IS a 80 year old male admitted on 11/10/2020 for covid19 ARDS  I have personally reviewed the daily labs, imaging studies, cultures and discussed the case with referring physician and consulting physicians.   My assessment and plan by system for this patient is as follows:    Neurology/Psychiatry:   Has required heavy sedation to tolerate lung protective settings. Attempted to wean sedation between 12/7 and 12/8 without success.   - Wean sedation if possible.    Cardiovascular:   1.  Shock: likely vasoplegic due to sedation vs obstructive due to intrathoracic pressures from mechanical ventilation. Improved.  2.  Afib with rvr: back in AFib temporarily evening of 12/7 but now back in NSR.  - Rate control if necessary.     Pulmonary/Ventilator Management:   1. Acute hypoxemic respiratory failure, vent dependent:  In setting of covid pna. Tolerating PS mode yesterday but high BP/HR today.  - Place on CMV and  Monitor for normalization of vital signs. Further adjustments pending this.  - If patient doesn't make significant progress in next few days need to discuss tracheostomy with family.  2. Pneumothorax secondary to COVID. Chest tube in place  - Remove if makes progress and stays on PS mode.     GI and Nutrition :   1. Continue tf  2.  Famotidine for pud prophy    Renal/Fluids/Electrolytes:   1. No current acute issues  - Aim for normal electrolytes and net even fluid balance.     Infectious Disease:   1. covid-19 pna: Remdesivir finished, steroid course completed.  Tapering stress steroids.  2. S/p empiric course of merrem.  No pathogen on culture.    Endocrine:   1. Hyperglycemia:  Continue sliding scale insulin.     Hematology/Oncology:   1. Stable  4.  DVT and aortic thrombus: heparin drip    ICU Prophylaxis:   1. DVT: Hep drip  2. VAP: HOB 30 degrees,  chlorhexidine rinse  3. Stress Ulcer: H2 blocker  4. Restraints: Nonviolent soft two point restraints required and necessary for patient safety and continued cares and good effect as patient continues to pull at necessary lines, tubes despite education and distraction. Will readdress daily.   5. Wound care - per unit routine   6. Feeding - tf  7. Family Update: calling day Silveira  8. Disposition - icu    Critical Care time: 40 minutes    Aurora Kerr MD

## 2020-12-08 NOTE — PLAN OF CARE
Patient remains sedated. RASS goal (-3- -4).  Remains intubated. See vent changes. Overbreathing vent and stacks breaths. PRN Fentanyl and Versed given. Full strength Veletri.   Tele: NSR. Brief period of Afib now NSR. BP stable.   Chest tube with no drainage. Dressing CDI.   TF at goal. BM x 2. Blood sugar Q 4 with sliding scale insulin.  Lasix given with good response.

## 2020-12-09 NOTE — PROGRESS NOTES
Comprehensive Daily ICU Note        Aly Sorensen MRN# [id number]   Age: 80 year old YOB: 1940     Date of Admission: 11/10/2020    Primary care provider: Zev Austin     CODE STATUS: DNR      Problem List:     Principal Problem:    Acute respiratory failure with hypoxia (H)  Active Problems:    Pneumonia due to 2019 novel coronavirus    Hypokalemia    Aortic thrombus (H)    DVT (deep venous thrombosis) (H)             Treatment goals for next 24 hours:   Return to CMV and see if this helps blood pressure and tachycardia  Continue sedation.     Aurora Kerr MD        Subjective/ Last 24 hours:   80M h/o prostate ca and melanoma now presents with respiratory failure due to covid 19 pna.  Admitted 11/10, intubated 11/22.  Tension ptx on 11/23 now s/p ch tube placement.  Now on inhaled flolan.  No longer requiring paralysis/proning. Nurse reporting difficulties with ventilator synchrony which was improved when placed on PS mode. However, this morning more tachycardic and hypertensive. Sedatives were not effective. Switched back to CMV and vital signs returned to normal. Fluid that looks like stool is now coming out of NG tube.          Mechanical Ventilation/Vitalsigns/IsandOs:       Temp:  [100  F (37.8  C)-102  F (38.9  C)] 100.2  F (37.9  C)  Pulse:  [] 93  MAP:  [53 mmHg-104 mmHg] 99 mmHg  Arterial Line BP: ()/(39-72) 157/68  FiO2 (%):  [50 %] 50 %  SpO2:  [93 %-99 %] 96 %      Ventilation Mode: CMV/AC  (Continuous Mandatory Ventilation/ Assist Control)  FiO2 (%): 50 %  Rate Set (breaths/minute): 22 breaths/min  Tidal Volume Set (mL): 400 mL  PEEP (cm H2O): 10 cmH2O  Pressure Support (cm H2O): 14 cmH2O  Oxygen Concentration (%): 50 %    Chest tube: no air leak, no output    Day since 11/22    Peak airway pressure:  20s    Intake/Output Summary (Last 24 hours) at 12/9/2020 1039  Last data filed at 12/9/2020 1000  Gross per 24 hour   Intake 2160.97 ml   Output 2350 ml   Net -189.03 ml    - 3.5 liters           Physical Examination:     General: Stated age, intubated and sedated  HEENT: ET tube, NJ tube  Lungs: LCTAB anteriorly  CVS: RRR  Abdomen: soft  Extremities/musculoskeletal: normal  Neurology: heavily sedated  Skin: normal  Psychiatry: unable  Exam of Line sites:  Left sided chest tube bandage clean, PICC and arterial lines clean            Feeding/Glucose:     Orders Placed This Encounter      NPO for Medical/Clinical Reasons Except for: No Exceptions        Recent Labs   Lab 12/09/20  0826 12/09/20  0430 12/09/20  0002 12/08/20  2130 12/08/20  1619 12/08/20  1208 12/08/20  0809 12/08/20  0350 12/07/20  0430 12/07/20  0430 12/06/20  1755 12/06/20  1755 12/06/20  0435 12/06/20  0435 12/05/20  0430 12/05/20  0430   GLC  --  189*  --   --   --   --   --  108*  --  168*  --  175*  --  172*  --  166*   *  --  106* 110* 112* 157* 110*  --    < > 153*   < >  --    < > 157*   < > 174*    < > = values in this interval not displayed.                Medications:       chlorhexidine  15 mL Mouth/Throat Q12H     famotidine  20 mg Oral or Feeding Tube BID     furosemide  40 mg Intravenous Q12H     hydrocortisone sodium succinate PF  50 mg Intravenous Daily     insulin aspart  1-6 Units Subcutaneous Q4H     multivitamins w/minerals  15 mL Per Feeding Tube Daily     polyethylene glycol  17 g Oral Daily     sennosides  5 mL Oral At Bedtime     sodium chloride (PF)  10 mL Intracatheter Q8H          epoprostenol (VELETRI) 20 mcg/mL in sterile water inhalation solution 20 ng/kg/min (12/09/20 1027)     fentaNYL 150 mcg/hr (12/09/20 0145)     heparin 1,400 Units/hr (12/08/20 2345)     - MEDICATION INSTRUCTIONS -       midazolam 8 mg/hr (12/09/20 0145)     - MEDICATION INSTRUCTIONS -       norepinephrine Stopped (12/09/20 1002)     - MEDICATION INSTRUCTIONS -       propofol Stopped (12/07/20 1829)     sodium chloride 20 mL/hr at 12/06/20 2041              Labs:         ROUTINE ICU LABS (Last four  results)  CMP  Recent Labs   Lab 12/09/20  0430 12/08/20  0350 12/07/20  0430 12/06/20  1755 12/06/20  0435    143 142 145* 142   POTASSIUM 3.1* 4.0 3.6 4.0 3.6   CHLORIDE 102 103 102 104 103   CO2 43* 43* 40* 39* 37*   ANIONGAP <1* <1* <1* 2* 2*   * 108* 168* 175* 172*   BUN 41* 37* 39* 39* 39*   CR 0.60* 0.54* 0.50* 0.50* 0.49*   GFRESTIMATED >90 >90 >90 >90 >90   GFRESTBLACK >90 >90 >90 >90 >90   ELIA 7.7* 8.1* 8.3* 8.2* 8.2*   MAG 2.7* 2.4* 2.4* 2.4* 2.3   PHOS 3.2 4.0 3.4  --  3.3     CBC  Recent Labs   Lab 12/09/20  0430 12/08/20  0350 12/07/20  0430 12/06/20  0435   WBC 14.6* 15.6* 13.2* 13.1*   RBC 2.99* 3.09* 3.19* 3.05*   HGB 9.2* 9.6* 9.9* 9.5*   HCT 29.4* 31.6* 32.0* 30.3*   MCV 98 102* 100 99   MCH 30.8 31.1 31.0 31.1   MCHC 31.3* 30.4* 30.9* 31.4*   RDW 14.7 15.0 14.9 14.7    358 335 282     INRNo lab results found in last 7 days.  Arterial Blood Gas  Recent Labs   Lab 12/09/20  0430 12/08/20  0350 12/07/20  1500 12/07/20  0430 12/06/20  1355 12/03/20  0446 12/03/20  0446   PH 7.43 7.39 7.40 7.39 7.44   < > 7.42   PCO2 69* 74* 68* 70* 59*   < > 57*   PO2 109* 98 127* 77* 94   < > 69*   HCO3 45* 44* 42* 42* 40*   < > 37*   O2PER 50 50%  --   --  45%  --  50    < > = values in this interval not displayed.         Cultures:  No results for input(s): CULT in the last 168 hours.  Blood culture:  Invalid input(s): BC   Urine culture:  No results for input(s): URC in the last 168 hours.          Imaging/Other results:     No results found for this or any previous visit (from the past 24 hour(s)).           Assessment and Plan:     Aly Sorensen IS a 80 year old male admitted on 11/10/2020 for covid19 ARDS  I have personally reviewed the daily labs, imaging studies, cultures and discussed the case with referring physician and consulting physicians.   My assessment and plan by system for this patient is as follows:    Neurology/Psychiatry:   Has required heavy sedation to tolerate lung  protective settings. Attempted to wean sedation between 12/7 and 12/8 without success.   - Continue sedation    Cardiovascular:   1.  Shock: likely vasoplegic due to sedation vs obstructive due to intrathoracic pressures from mechanical ventilation. Improved.  2.  Afib with rvr: back in AFib temporarily evening of 12/7 but back in NSR.  - Rate control if necessary.     Pulmonary/Ventilator Management:   1. Acute hypoxemic respiratory failure, vent dependent:  In setting of covid pna. Tolerating PS mode 12/7 but WOB was more than patient could handle so he was placed back on CMV.  - Continue CMV  - If family is desirous of aggressive support patient will require a tracheostomy. He would need to show considerable improvmeent first.  Prognosis would be very poor for this.    2. Pneumothorax secondary to COVID. Chest tube in place  - Keep in place    GI and Nutrition :   1. Holding TF given output. Consider restarting tomorrow  2.  Famotidine for pud prophy    Renal/Fluids/Electrolytes:   1. Metabolic alkalosis correcting for progressive respiratory acidosis. Slightly low potassium today.   - Aim for normal electrolytes and net even fluid balance.     Infectious Disease:   1. covid-19 pna: Remdesivir finished, steroid course completed.  Tapering stress steroids.  2. S/p empiric course of merrem.  No pathogen on culture.    Endocrine:   1. Hyperglycemia:  Continue sliding scale insulin.     Hematology/Oncology:   1. Stable  4.  DVT and aortic thrombus: heparin drip    ICU Prophylaxis:   1. DVT: Hep drip  2. VAP: HOB 30 degrees, chlorhexidine rinse  3. Stress Ulcer: H2 blocker  4. Restraints: Nonviolent soft two point restraints required and necessary for patient safety and continued cares and good effect as patient continues to pull at necessary lines, tubes despite education and distraction. Will readdress daily.   5. Wound care - per unit routine   6. Feeding - tf  7. Family Update: palliative called today  8.  Disposition - icu    Critical Care time: 35 minutes    Aurora Kerr MD

## 2020-12-09 NOTE — PLAN OF CARE
This morning pt was on CPAP/PS ventilation, hypertensive [id number], .  D/W , she switched him back to CMV, BP improved to 120-130's and HR 80s.  This afternoon fentanyl decreased to 100mcg/hr today, pt looking comfortable and BP was in 90's; pt continues to look comfortable and BP has improved to 100's-110s.  Febrile, APAP via FT given this evening.    BMx1.  UOP large after lasix, now decreasing, cloudy.  Family 'visited' pt via iPad midday.  Updated by MD.      Addendum: BP has drifted to SBP 90's and MAP 50's, D/W , started levophed at 1855.

## 2020-12-09 NOTE — PROGRESS NOTES
Chart Check:     On IV heparin for aortic thrombus. Notes reviewed.  Family meeting. Palliative Care notes reviewed. Family still wishing for restorative approach.  Patient slowly improving but still a long way to go. Now DNR. Remains intubated, but seems to tolerate pressure support ventilation.    No new recommendations at this time. Please reach out if questions or concerns.    Otoniel Quinn  Nurse Practitioner  [facility]  [phone]

## 2020-12-09 NOTE — PROGRESS NOTES
[facility]  Palliative Care Daily Progress Note       Recommendations & Counseling       Updated son Raoul on pt's condition in the last 24hrs (vent desynchrony, did not tolerate pressure support very long before developing worsening fatigue and work of breathing, now back on full vent settings. Also reviewed new fevers and fecal material noted in his OG).     Prepared Raoul that pt's prognosis continues to remain poor, and we are not seeing signs of recovery this week     Did suggest that if comfort measures/compassionate extubation is being considered for pt's wife in the coming days, this could be pursued at the same time for Lucrecia    Visitor policy was reviewed again in setting of comfort measures     Raoul and family intend to convene this evening to review status of their parents. I will plan to touch base with Raoul again tomorrow      Case was reviewed with Dr. Kerr and bedside RN Lake.    FCO Hidalgo CNP  [facility]  Contact information available via [facility] Paging/Directory      Thank you for the opportunity to participate in the care of this patient and family. Our team: will continue to follow.     During regular M-F work hours ([phone]) -- if you are not sure who specifically to contact -- please contact us in [facility] Smart Web.     After regular work hours and on weekends/holidays, you can call our answering service at [phone].     Attestation:  Total time on the floor involved in the patient's care: 45 minutes  Total time spent in counseling/care coordination: >50%     Assessments          Aly Sorensen is a 80 year old male with PMH significant for prostate cancer s/p prostatectomy 10/2005 with PSA recently normal, melanoma, and recent diagnosis of COVID infection (+ test 11/7), who was admitted on 11/10/2020 with worsening shortness of breath and declining oxygen saturations. Pt's hospital course has been notable for acute respiratory failure with  waxing and waning O2 needs. He required an ICU stay earlier in the hospitalization, but stabilized and returned to the floor, only to have worsening respiratory status again. He is now intubated and mechanically ventilated in the ICU. He initially required paralysis and proning. He has developed coagulopathy with a new RLE DVT, aortic arch thrombus, and a possible splenic infarct. He also developed a tension PTX with chest tube now in place. He was making very subtle improvements last week. He is requiring heavy sedation to tolerate lung protective settings. He had vent desynchrony yesterday, put on PS, yet did not tolerate very long before developing worsening work of breathing. Now back on full vent settings and requiring heavy sedation again. Off and on pressor support. New notable fecal matter coming from OG.     Today, the patient was seen for:  Pt and family support, goals of care     Visited pt in the ICU. He is assessed through glass doors. He is sedated, intubated, vented. Appears comfortable. Wife in a shared room.      Spoke with son Raoul via phone. He continually verbalizes understanding that his mother is not doing well, and is not expected to survive this hospitalization. He and family are making preparations to proceed with eventual comfort measures/compassionate extubation.     We review overnight events for Lucrecia, including vent desynchrony yesterday, put on PS, yet did not tolerate very long before developing worsening work of breathing. Now back on full vent settings and requiring heavy sedation again. Off and on pressor support. New notable fecal matter coming from OG.     I express concern that although pt's wife has been clearly dying more quickly than Lucrecia, it is becoming more evident with time that Lucrecia is deteriorating, and also appears to be dying, just at a slower rate.     As we explore compassionate extubation/comfort measures for pt's wife, I offer the consideration of also putting Lucrecia on  comfort measures with planned compassionate extubation. Raoul and I discussed how comfort measures could look for his parents, potentially pushing the beds together and moving toward compassionate extubation on both of them at the same time. We acknowledge that this may be very difficult to fully wrap our minds around. Emotional support provided as we explore the unimaginable, and acknowledge that none of us are expecting their family to accept what is happening.    Raoul and his family are coming together tonight to talk about what to do next for both mom and dad.      Raoul and I reviewed what comfort measures look like, and the use of opioids and anxiolytics to treat dyspnea/air hunger at end of life. I think both of his parents could be kept very comfortable in that setting. We suspect that neither would live longer than minutes following extubation.      Together we also reviewed the visitor policy in the setting of comfort measures.      Raoul and I intend to be in touch tomorrow.     Prognosis, Goals, or Advance Care Planning was addressed today with: Yes.  Mood, coping, and/or meaning in the context of serious illness were addressed today: Yes.  Summary/Comments: Family feel very well supported by their community and [place]. Appreciate support from spiritual health team as well.             Interval History:     Chart review/discussion with unit or clinical team members:   Continues in ICU, vented, critically ill. Vent desynchrony yesterday, put on PS, yet did not tolerate very long before developing worsening work of breathing. Now back on full vent settings and requiring heavy sedation again. Off and on pressor support. New notable fecal matter coming from OG.     Per patient or family/caregivers today:  Critically ill.     Key Palliative Symptoms:  We are not helping to manage these symptoms currently in this patient.    Patient is on opioids: bowels not assessed today.           Review of Systems:     Besides  "above, an additional N/A system ROS was reviewed and is unremarkable          Medications:     I have reviewed this patient's medication profile and medications during this hospitalization.    Noted meds:    Hydrocortisone 50mg IV daily   Lasix 40mg IV BID   Veletri inh neb  Fentanyl gtt 150mcg/hr   Heparin gtt   Versed gtt 7mg/hr  Levophed gtt now stopped   Propofol gtt now stopped   Fentanyl 25-50mcg IV Q30 mins PRN pain   Versed 1-2mg IV Q30 mins PNR agitation            Physical Exam:   /81 (BP Location: Left arm)   Pulse 92   Temp 100.4  F (38  C)   Resp 26   Ht 1.702 m (5' 7\")   Wt 74.1 kg (163 lb 5.8 oz)   SpO2 96%   BMI 25.59 kg/m    CONSTITUTIONAL: Critically ill man seen sedated in ICU bed, intubated, mechanically ventilated            Data Reviewed:     Recent imaging reviewed, my comments on pertinents:   Results for orders placed or performed during the hospital encounter of 11/10/20   CT Chest Pulmonary Embolism w Contrast    Impression    IMPRESSION:  1.  No evidence of pulmonary embolism. Thoracic aorta is unremarkable.    2.  Patchy groundglass opacities within both lungs concerning for  viral pneumonitis. ARDS could appear similar.    NICHOLAS DE LEON MD   XR Chest Port 1 View    Impression    IMPRESSION: Peripheral ground-glass infiltrates compatible with  history of COVID-19 pneumonia.    EBONY ROCHA MD   CT Chest Pulmonary Embolism w Contrast    Impression    IMPRESSION:  1.  No pulmonary embolism demonstrated.  2.  Extensive bilateral groundglass and interstitial infiltrates  compatible with history of COVID pneumonia.  3.  New filling defects within the distal aortic arch and mid to  distal descending thoracic aorta from the comparison 9 days prior.  This is most consistent with mural thrombus.  4.  Small wedge-shaped hypoenhancing area in the spleen, possibly a  small splenic infarct.    Aortic and pulmonary findings discussed with Dr. Maldonado on November 19, 2020 at 1:43 " PM.    EBONY ROCHA MD    Lower Extremity Venous Duplex Bilateral    Impression    IMPRESSION: Positive for mild right calf DVT.    BROOKLYN COVARRUBIAS MD   XR Chest Port 1 View    Impression    IMPRESSION: Single portable AP view of the chest was obtained.  Endotracheal tube tip projects over lower thoracic trachea  approximately 3 cm from the dalia. Enteric tube crosses the diaphragm  with the distal tip outside the field of view. An esophageal  temperature probe distal tip projects over the distal esophagus.  Stable cardiomediastinal silhouette. Bilateral airspace pulmonary  opacities, worrisome for infection. No significant pleural effusion or  pneumothorax.    MINESH BENTON MD   XR Chest Port 1 View    Impression    IMPRESSION: Endotracheal tube tip just below the clavicle heads. Nasogastric tube tip projects over the gastric fundus. Worsening bilateral pulmonary infiltrates. No effusion. Normal heart size.   XR Chest Port 1 View    Impression    IMPRESSION: There is a large left pneumothorax, causing complete  atelectasis of the left lung and mild displacement of the mediastinum  to the right. Hazy opacities in the right mid lung are unchanged.  Endotracheal tube is unchanged in position, with tip 6.7 cm above the  dalia. Enteric tube, tip not seen but below the diaphragm.    The referring service is aware of the pneumothorax, and at the time of  this interpretation there is subsequent imaging demonstrating left  chest tube placement.    ANDREINA SUNSHINE MD   XR Chest Port 1 View    Impression    IMPRESSION: There has been interval placement of a single left apical  chest tube. Previously described large left pneumothorax has almost  entirely resolved, with a small residual pneumothorax noted at the  left lung base. Mild subcutaneous emphysema is present in the left  chest wall. Hazy opacities in the right mid and lower lung and left  midlung are not significantly changed. Endotracheal tube is  unchanged.  Enteric tube is unchanged, with tip near the gastric fundus.    ANDREINA SUNSHINE MD   XR Chest Port 1 View    Impression    IMPRESSION: Portable chest. Left chest tube is again in place with a  small left apical pneumothorax. Subcutaneous emphysema is no longer  evident. Patchy airspace opacities in the mid and lower lungs overall  appear stable if not slightly improved. No evidence of right  pneumothorax. No significant pleural effusions appreciated on this  study. Heart is normal in size. Endotracheal tube is in good position  approximately 3-4 cm above the dalia. Nasogastric tube extends below  the left hemidiaphragm presumably in the stomach.    NICHOLAS DE LEON MD   XR Abdomen Port 1 View    Impression    IMPRESSION: Feeding tube in good position with the tip in the distal  duodenum. Bowel gas pattern is nonobstructed.    MICHELLE HAYS MD   XR Chest Port 1 View    Impression    IMPRESSION: Endotracheal tube tip 4 cm above the dalia. PH probe in the esophagus. Endotracheal tube tip below the diaphragm. Single left-sided chest tube. No pneumothorax. Normal heart size and pulmonary vascularity. Patchy bilateral mid and lower lung   infiltrates, greater on the left. No significant bony abnormalities. The left-sided pneumothorax present on 11/24/2020 is not seen on today's exam.     XR Chest Port 1 View    Impression    IMPRESSION: Endotracheal tube tip in the midtrachea, esophageal  temperature probe, enteric tube and left apically directed chest tube  in place. Slight worsening of patchy airspace opacities in both lungs  predominantly in the left mid and lower lungs. No pneumothorax. Normal  heart size.    DEE DEE LOPEZ MD   XR Chest Port 1 View    Impression    IMPRESSION: Endotracheal tube 3 cm above dalia. Enteric tube tip below lower aspect of film. Probe over mid esophageal region. Left chest tube. Bilateral infiltrates.   XR Chest Port 1 View    Impression    IMPRESSION: New right PICC line tip  in the SVC. Left chest tube  unchanged. No pneumothorax. Bilateral pulmonary infiltrates have not  appreciably changed. Endotracheal tube remains in good position above  the dalia. Esophageal probe in the mid esophagus. Feeding tube  courses below the diaphragm.     MICHELLE HAYS MD   CT Chest Abdomen Pelvis w/o Contrast    Impression    IMPRESSION:  1. No convincing hemorrhage demonstrated in the chest, abdomen, and  pelvis.  2. Increased pulmonary consolidative changes and extensive infiltrates  bilaterally.    EBONY ROCHA MD   XR Chest Port 1 View    Impression    IMPRESSION: Support lines stable. Patchy bilateral infiltrates  increased from previous. Left chest tube tip at the apex. No definite  pneumothorax evident in this position.    EBONY ROCHA MD   XR Chest Port 1 View    Impression    IMPRESSION: Left chest tube. Endotracheal tube, probe, enteric tube and right PICC line again noted. Diffuse, bilateral infiltrates. No visible pneumothorax.   XR Chest Port 1 View    Impression    IMPRESSION: Stable position of the left-sided chest tube. No definitive evidence for significant left-sided pneumothorax. Endotracheal tube tip in good position in the mid trachea. Enteric tube extending below level of the EG junction the upper stomach.   Feeding tube extending into the duodenum. Extensive bilateral pulmonary infiltrates unchanged. Normal heart size and pulmonary vascularity.       Recent lab data reviewed, my comments on pertinents:   Na 144  K 3.1  Creat 0.6  WBC 14.6  Hgb 9.2  Plt 450

## 2020-12-09 NOTE — PLAN OF CARE
[facility] Intensive Care Unit   Nursing Note                                                       Neuro: Unresponsive except occasional grimace  Does not follow commands.  Cardiovascular:  On low dose pressor.  Pulmonary:  Tolerating ventilator on versed and fentanyl.  Oxygen saturation > 92  GI/:  Adequate UOP with diuresing.  Endocrine: Glucose well controlled.  Skin:  Intact except lip wound.  Protective mepilex applied to sacrum.  Restraints: Not in use or necessary.  AM labs noted; electrolytes replaced as indicated.  Continue to monitor closely.    Recent Labs   Lab 12/09/20  0430 12/08/20  0350 12/07/20  1500 12/07/20  0430 12/06/20  1355 12/03/20  0446 12/03/20  0446   PH 7.43 7.39 7.40 7.39 7.44   < > 7.42   PCO2 69* 74* 68* 70* 59*   < > 57*   PO2 109* 98 127* 77* 94   < > 69*   HCO3 45* 44* 42* 42* 40*   < > 37*   O2PER 50 50%  --   --  45%  --  50    < > = values in this interval not displayed.       ROUTINE IP LABS (Last four results)  BMP  Recent Labs   Lab 12/09/20  0430 12/08/20  0350 12/07/20  0430 12/06/20  1755    143 142 145*   POTASSIUM 3.1* 4.0 3.6 4.0   CHLORIDE 102 103 102 104   ELIA 7.7* 8.1* 8.3* 8.2*   CO2 43* 43* 40* 39*   BUN 41* 37* 39* 39*   CR 0.60* 0.54* 0.50* 0.50*   * 108* 168* 175*     CBC  Recent Labs   Lab 12/09/20  0430 12/08/20  0350 12/07/20  0430 12/06/20  0435   WBC 14.6* 15.6* 13.2* 13.1*   RBC 2.99* 3.09* 3.19* 3.05*   HGB 9.2* 9.6* 9.9* 9.5*   HCT 29.4* 31.6* 32.0* 30.3*   MCV 98 102* 100 99   MCH 30.8 31.1 31.0 31.1   MCHC 31.3* 30.4* 30.9* 31.4*   RDW 14.7 15.0 14.9 14.7    358 335 282     Blood Glucose  Glucose (mg/dL)   Date Value   12/09/2020 106 (H)   12/08/2020 110 (H)   12/08/2020 112 (H)   12/08/2020 157 (H)   12/08/2020 110 (H)   12/07/2020 89       Intake/Output Summary (Last 24 hours) at 12/9/2020 0633  Last data filed at 12/9/2020 0600  Gross per 24 hour   Intake 2392.17 ml   Output 2425 ml   Net -32.83 ml       Harish  Luis  [facility]  Intensive Care Unit

## 2020-12-10 NOTE — PROGRESS NOTES
Chart Check:      On IV heparin for aortic thrombus.    No new recommendations at this time. Please reach out if questions or concerns.    Abelardo EAGLE, CNP  [facility]  [phone] (office) or [phone] (cell)

## 2020-12-10 NOTE — DEATH PRONOUNCEMENT
MD DEATH PRONOUNCEMENT    Called to pronounce Aly Sorensen dead.    Physical Exam: Unresponsive to noxious stimuli, Breath sounds absent, Heart sounds absent and Pupillary light reflex absent    Patient was pronounced dead at 2:13 PM, December 10, 2020.    Preliminary Cause of Death: COVID-19    Principal Problem:    Acute respiratory failure with hypoxia (H)  Active Problems:    Pneumonia due to 2019 novel coronavirus    Hypokalemia    Aortic thrombus (H)    DVT (deep venous thrombosis) (H)       Infectious disease present?: YES    Communicable disease present? (examples: HIV, chicken pox, TB, Ebola, CJD) :  YES    Multi-drug resistant organism present? (example: MRSA): NO    Please consider an autopsy if any of the following exist:  NO Unexpected or unexplained death during or following any dental, medical, or surgical diagnostic treatment procedures.   NO Death of mother at or up to seven days after delivery.     NO All  and pediatric deaths.     NO Death where the cause is sufficiently obscure to delay completion of the death certificate.   NO Deaths in which autopsy would confirm a suspected illness/condition that would affect surviving family members or recipients of transplanted organs.     The following deaths must be reported to the 's Office:  NO A death that may be due entirely or in part to any factors other than natural disease (recent surgery, recent trauma, suspected abuse/neglect).   NO A death that may be an accident, suicide, or homicide.     NO Any sudden, unexpected death in which there is no prior history of significant heart disease or any other condition associated with sudden death.   NO A death under suspicious, unusual, or unexpected circumstances.    NO Any death which is apparently due to natural causes but in which the  does not have a personal physician familiar with the patient s medical history, social, or environmental situation or the circumstances of the  terminal event.   NO Any death apparently due to Sudden Infant Death Syndrome.     NO Deaths that occur during, in association with, or as consequences of a diagnostic, therapeutic, or anesthetic procedure.   NO Any death in which a fracture of a major bone has occurred within the past (6) six months.   NO A death of persons note seen by their physician within 120 days of demise.     NO Any death in which the  was an inmate of a public institution or was in the custody of Law Enforcement personnel.   NO  All unexpected deaths of children   NO Solid organ donors   NO Unidentified bodies   NO Deaths of persons whose bodies are to be cremated or otherwise disposed of so that the bodies will later be unavailable for examination;   NO Deaths unattended by a physician outside of a licensed healthcare facility or licensed residential hospice program   NO Deaths occurring within 24 hours of arrival to a health care facility if death is unexpected.    NO Deaths associated with the decedent s employment.   NO Deaths attributed to acts of terrorism.   NO Any death in which there is uncertainty as to whether it is a medical examiner s care should be discussed with the medical investigator.        Body disposition: Body released to the morgue.

## 2020-12-10 NOTE — PROGRESS NOTES
NEURO: Sedated    RESPIRATORY: Remains intubated.    CARDIOVASCULAR: SR. Keeping map above 65 with levophed.    GASTROINTESTINAL: Keofeed clamped. OG to lis.    GENITOURINARY: Urinary catheter in place.    SKIN: Protective mepilex in place on coccyx.    PLAN OF CARE: Following heparin protocol. hepxa due at 11:45.

## 2020-12-10 NOTE — PROGRESS NOTES
[facility]  Palliative Care Daily Progress Note       Recommendations & Counseling       Family aware of pt's decline in last 24hrs (new crepitus likely 2/2 PTX)     Family electing to move toward comfort measures, at the same time as pt's wife     Lake RN has kindly arranged pt and wife's beds side by side, holding hands     Visitor policy reviewed for end of life cares reviewed with family     Prepare for compassionate extubation/vent removal after family has a chance to visit     Continue versed gtt 8mg/hr and fentanyl gtt 150mcg/hr. Premedicate with versed 8mg IV, fentanyl 150mcg IV 10 minutes before planned vent removal, robinul 0.2mg IV 30-60 mins before planned extubation. Additional versed 8-12mg IV [id number] PRN anxiety/agitation, fentanyl 150-200mcg IV [id number] PRN pain, dyspnea available     Family prepared that pt is expected to die within minutes of ventilator removal      Case was reviewed with Dr. Kerr and bedside RN Lake.    FCO Hidalgo CNP  [facility]  Contact information available via [facility] Paging/Directory      Thank you for the opportunity to participate in the care of this patient and family. Our team: will continue to follow.     During regular M-F work hours ([id number]) -- if you are not sure who specifically to contact -- please contact us in [facility] Smart Web.     After regular work hours and on weekends/holidays, you can call our answering service at [phone].     Attestation:  Total time on the floor involved in the patient's care: 120 minutes  Total time spent in counseling/care coordination: >50%     Assessments          Aly Sorensen is a 80 year old male with PMH significant for prostate cancer s/p prostatectomy 10/2005 with PSA recently normal, melanoma, and recent diagnosis of COVID infection (+ test 11/7), who was admitted on 11/10/2020 with worsening shortness of breath and declining oxygen saturations. Pt's hospital course  has been notable for acute respiratory failure with waxing and waning O2 needs. He required an ICU stay earlier in the hospitalization, but stabilized and returned to the floor, only to have worsening respiratory status again. He is now intubated and mechanically ventilated in the ICU. He initially required paralysis and proning. He has developed coagulopathy with a new RLE DVT, aortic arch thrombus, and a possible splenic infarct. He also developed a tension PTX with chest tube now in place. He was making very subtle improvements last week. He is requiring heavy sedation to tolerate lung protective settings. He had vent desynchrony 2 days ago, put on PS, yet did not tolerate very long before developing worsening work of breathing. Now back on full vent settings and requiring heavy sedation again. Off and on pressor support. New notable fecal matter coming from OG. Overnight, pt developed new crepitus, concerning for a new PTX. Pt appears to be actively dying.     Today, the patient was seen for:  Pt and family support, goals of care     Reviewed overnight events with Lake GUZMAN.    Spoke with son Raoul via phone. He is aware of pt's deteriorating condition and he and family are preparing to move forward with comfort measures for both of their parents.     Raoul and I discussed end of life cares. He hopes that his parents can lie side by side, holding hands. We talked about the visitor policy and what to expect at end of life.    I spent additional time on the unit reviewing case with Dr. Kerr and Lake GUZMAN. I awaited arrival of family members and answered questions/concerns to the best of my ability. Emotional support provided.     Prognosis, Goals, or Advance Care Planning was addressed today with: Yes.  Mood, coping, and/or meaning in the context of serious illness were addressed today: Yes.  Summary/Comments: Family feel very well supported by their community and parish. Appreciate support from spiritual health team as  "well.             Interval History:     Chart review/discussion with unit or clinical team members:   Continues in ICU, vented, critically ill. New crepitus concerning for new PTX. Pt appears to be actively dying.     Per patient or family/caregivers today:  Critically ill.     Key Palliative Symptoms:  # Pain severity the last 12 hours: none  # Dyspnea severity the last 12 hours: none  # Nausea severity the last 12 hours: none  # Anxiety severity the last 12 hours: none    Patient is on opioids: bowels not assessed today.           Review of Systems:     Besides above, an additional N/A system ROS was reviewed and is unremarkable          Medications:     I have reviewed this patient's medication profile and medications during this hospitalization.    Noted meds:    Versed gtt 8mg/hr  Fentanyl gtt 150mcg/hr  Premedicate with versed 8mg IV, fentanyl 150mcg IV 10 minutes before planned vent removal, robinul 0.2mg IV 30-60 mins before planned extubation  Versed 8-12mg IV [id number] PRN anxiety/agitation  Fentanyl 150-200mcg IV [id number] PRN pain, dyspnea   Propofol gtt   Atropine, robinul   Veletri inh, heparin gtt, leveophed gtt to be discontinued upon vent removal            Physical Exam:   /81 (BP Location: Left arm)   Pulse 91   Temp 100  F (37.8  C)   Resp 26   Ht 1.702 m (5' 7\")   Wt 73 kg (160 lb 15 oz)   SpO2 98%   BMI 25.21 kg/m    CONSTITUTIONAL: Critically ill man seen sedated in ICU bed, intubated, mechanically ventilated   NECK: Right neck mass 2/2 air            Data Reviewed:     Recent imaging reviewed, my comments on pertinents:   Results for orders placed or performed during the hospital encounter of 11/10/20   CT Chest Pulmonary Embolism w Contrast    Impression    IMPRESSION:  1.  No evidence of pulmonary embolism. Thoracic aorta is unremarkable.    2.  Patchy groundglass opacities within both lungs concerning for  viral pneumonitis. ARDS could appear similar.    NICHOLAS DE LEON MD   XR Chest " Port 1 View    Impression    IMPRESSION: Peripheral ground-glass infiltrates compatible with  history of COVID-19 pneumonia.    EBONY ROCHA MD   CT Chest Pulmonary Embolism w Contrast    Impression    IMPRESSION:  1.  No pulmonary embolism demonstrated.  2.  Extensive bilateral groundglass and interstitial infiltrates  compatible with history of COVID pneumonia.  3.  New filling defects within the distal aortic arch and mid to  distal descending thoracic aorta from the comparison 9 days prior.  This is most consistent with mural thrombus.  4.  Small wedge-shaped hypoenhancing area in the spleen, possibly a  small splenic infarct.    Aortic and pulmonary findings discussed with Dr. Maldonado on November 19, 2020 at 1:43 PM.    EBONY ROCHA MD   US Lower Extremity Venous Duplex Bilateral    Impression    IMPRESSION: Positive for mild right calf DVT.    BROOKLYN COVARRUBIAS MD   XR Chest Port 1 View    Impression    IMPRESSION: Single portable AP view of the chest was obtained.  Endotracheal tube tip projects over lower thoracic trachea  approximately 3 cm from the dalia. Enteric tube crosses the diaphragm  with the distal tip outside the field of view. An esophageal  temperature probe distal tip projects over the distal esophagus.  Stable cardiomediastinal silhouette. Bilateral airspace pulmonary  opacities, worrisome for infection. No significant pleural effusion or  pneumothorax.    MINESH BENTON MD   XR Chest Port 1 View    Impression    IMPRESSION: Endotracheal tube tip just below the clavicle heads. Nasogastric tube tip projects over the gastric fundus. Worsening bilateral pulmonary infiltrates. No effusion. Normal heart size.   XR Chest Port 1 View    Impression    IMPRESSION: There is a large left pneumothorax, causing complete  atelectasis of the left lung and mild displacement of the mediastinum  to the right. Hazy opacities in the right mid lung are unchanged.  Endotracheal tube is unchanged in position, with  tip 6.7 cm above the  dalia. Enteric tube, tip not seen but below the diaphragm.    The referring service is aware of the pneumothorax, and at the time of  this interpretation there is subsequent imaging demonstrating left  chest tube placement.    ANDREINA SUNSHINE MD   XR Chest Port 1 View    Impression    IMPRESSION: There has been interval placement of a single left apical  chest tube. Previously described large left pneumothorax has almost  entirely resolved, with a small residual pneumothorax noted at the  left lung base. Mild subcutaneous emphysema is present in the left  chest wall. Hazy opacities in the right mid and lower lung and left  midlung are not significantly changed. Endotracheal tube is unchanged.  Enteric tube is unchanged, with tip near the gastric fundus.    ANDREINA SUNSHINE MD   XR Chest Port 1 View    Impression    IMPRESSION: Portable chest. Left chest tube is again in place with a  small left apical pneumothorax. Subcutaneous emphysema is no longer  evident. Patchy airspace opacities in the mid and lower lungs overall  appear stable if not slightly improved. No evidence of right  pneumothorax. No significant pleural effusions appreciated on this  study. Heart is normal in size. Endotracheal tube is in good position  approximately 3-4 cm above the dalia. Nasogastric tube extends below  the left hemidiaphragm presumably in the stomach.    NICHOLAS DE LEON MD   XR Abdomen Port 1 View    Impression    IMPRESSION: Feeding tube in good position with the tip in the distal  duodenum. Bowel gas pattern is nonobstructed.    MICHELLE HAYS MD   XR Chest Port 1 View    Impression    IMPRESSION: Endotracheal tube tip 4 cm above the dalia. PH probe in the esophagus. Endotracheal tube tip below the diaphragm. Single left-sided chest tube. No pneumothorax. Normal heart size and pulmonary vascularity. Patchy bilateral mid and lower lung   infiltrates, greater on the left. No significant bony abnormalities.  The left-sided pneumothorax present on 11/24/2020 is not seen on today's exam.     XR Chest Port 1 View    Impression    IMPRESSION: Endotracheal tube tip in the midtrachea, esophageal  temperature probe, enteric tube and left apically directed chest tube  in place. Slight worsening of patchy airspace opacities in both lungs  predominantly in the left mid and lower lungs. No pneumothorax. Normal  heart size.    DEE DEE LOPEZ MD   XR Chest Port 1 View    Impression    IMPRESSION: Endotracheal tube 3 cm above dalia. Enteric tube tip below lower aspect of film. Probe over mid esophageal region. Left chest tube. Bilateral infiltrates.   XR Chest Port 1 View    Impression    IMPRESSION: New right PICC line tip in the SVC. Left chest tube  unchanged. No pneumothorax. Bilateral pulmonary infiltrates have not  appreciably changed. Endotracheal tube remains in good position above  the dalia. Esophageal probe in the mid esophagus. Feeding tube  courses below the diaphragm.     MICHELLE HAYS MD   CT Chest Abdomen Pelvis w/o Contrast    Impression    IMPRESSION:  1. No convincing hemorrhage demonstrated in the chest, abdomen, and  pelvis.  2. Increased pulmonary consolidative changes and extensive infiltrates  bilaterally.    EBONY ROCHA MD   XR Chest Port 1 View    Impression    IMPRESSION: Support lines stable. Patchy bilateral infiltrates  increased from previous. Left chest tube tip at the apex. No definite  pneumothorax evident in this position.    EBONY ROCHA MD   XR Chest Port 1 View    Impression    IMPRESSION: Left chest tube. Endotracheal tube, probe, enteric tube and right PICC line again noted. Diffuse, bilateral infiltrates. No visible pneumothorax.   XR Chest Port 1 View    Impression    IMPRESSION: Stable position of the left-sided chest tube. No definitive evidence for significant left-sided pneumothorax. Endotracheal tube tip in good position in the mid trachea. Enteric tube extending below level of the  EG junction the upper stomach.   Feeding tube extending into the duodenum. Extensive bilateral pulmonary infiltrates unchanged. Normal heart size and pulmonary vascularity.       Recent lab data reviewed, my comments on pertinents:   Na 146  K 3.1  Creat 0.54  WBC 14.9  Hgb 9.3  Plt 471

## 2020-12-10 NOTE — PLAN OF CARE
CMV all day, no weaning, no PSV.  Sedation unchanged.   Heparin/Xa therapeutic.  Still febrile, less so than yesterday.  Frequent oral cares to ulcerated lips.  TF remains on hold due to fecal-appearing OG output.  Having soft brown BM's.  Family is meeting together at home today to discuss POC, palliative involved.

## 2020-12-10 NOTE — PROGRESS NOTES
Noted patient has changed to comfort care status.  Will be available if more aggressive nutrition intervention desired.  Betsy Aguiar RD, LD, CNSC   Clinical Dietitian - [facility]

## 2020-12-13 PROBLEM — J80 ARDS (ADULT RESPIRATORY DISTRESS SYNDROME) (H): Status: ACTIVE | Noted: 2020-12-13

## 2020-12-13 PROBLEM — J93.9 PNEUMOTHORAX: Status: ACTIVE | Noted: 2020-12-13

## 2020-12-14 NOTE — DISCHARGE SUMMARY
[facility]    Discharge Summary  [facility] Intensive Care    Date of Admission:  11/10/2020  Date of Discharge:  12/10/2020  2:13 PM  Discharging Provider: Aurora Kerr    Discharge Diagnoses   Principal Problem:    Acute respiratory failure with hypoxia (H)  Active Problems:    Pneumonia due to 2019 novel coronavirus    Hypokalemia    Aortic thrombus (H)    DVT (deep venous thrombosis) (H)    ARDS (adult respiratory distress syndrome) (H)    Pneumothorax        History of Present Illness   Aly Sorensen is an 80 year old male who presented with hypoxic respiratory failure from COVID-19    Hospital Course   Aly Sorensen was admitted on 11/10/2020.  The following problems were addressed during his hospitalization:    Neurology/Psychiatry:   Has required heavy sedation to tolerate lung protective settings.     Cardiovascular:   1.  Shock: likely vasoplegic due to sedation vs obstructive due to intrathoracic pressures from mechanical ventilation.   2.  Afib with rvr: back in AFib temporarily evening of 12/7 but back in NSR.      Pulmonary/Ventilator Management:   1. ARDS from Covid with progressive hypercapnic and hypoxic respiratory failure  - Compassionately extubated   2. Pneumothorax secondary to COVID. Chest tube in place. On th day of death patient appeared to have a new pneumothorax. Unable to determine laterality. Goals of care changed to comfort.  - Keep in place    Renal/Fluids/Electrolytes:   1. Metabolic alkalosis correcting for progressive respiratory acidosis.     Infectious Disease:   1. covid-19 pna: Remdesivir finished, steroid course completed.  Tapering stress steroids.  2. S/p empiric course of merrem.  No pathogen on culture.      Hematology/Oncology:   1.   DVT and aortic thrombus:     Aurora Kerr MD      Primary Care Physician   Zev Austin        Time Spent on this Encounter   I, Aurora Kerr MD, personally saw the patient  today and spent less than or equal to 30 minutes discharging this patient.    Discharge Disposition   Patient  during this admission  Condition at discharge:     Consultations This Hospital Stay   SPIRITUAL HEALTH SERVICES IP CONSULT  INTENSIVIST IP CONSULT  INFECTIOUS DISEASES IP CONSULT  PULMONARY IP CONSULT  CARE MANAGEMENT / SOCIAL WORK IP CONSULT  HEMATOLOGY & ONCOLOGY IP CONSULT  PALLIATIVE CARE ADULT IP CONSULT  PHARMACY TO DOSE VANCO  PHARMACY IP CONSULT  NUTRITION SERVICES ADULT IP CONSULT  PHARMACY IP CONSULT  VASCULAR ACCESS ADULT IP CONSULT  PHARMACY IP CONSULT  PHARMACY IP CONSULT  WOUND OSTOMY CONTINENCE NURSE  IP CONSULT  WOUND OSTOMY CONTINENCE NURSE  IP CONSULT  CARE MANAGEMENT / SOCIAL WORK IP CONSULT    Aurora Kerr MD

## 2021-04-13 NOTE — PROGRESS NOTES
Chris Sorensen is a 79 year old male who presents to clinic today for the following health issues:    HPI     Follow up on cough and thyroid biopsy  He has many questions about his thyroid biopsy   Curiously his cough resolved completely   He denies wheezing or dyspnea   He has questions about his ideal body weight and how to loose weight     Patient Active Problem List   Diagnosis     Malignant neoplasm of prostate (H)     Melanoma of skin (H)     Spondylosis of lumbar region without myelopathy or radiculopathy     History of total right knee replacement     Thyroid nodule     Past Surgical History:   Procedure Laterality Date     ARTHROPLASTY KNEE  12/8/2011    Procedure:ARTHROPLASTY KNEE; Left Total Knee Arthroplasty (JILLIAN)^; Surgeon:MICHAEL PELAEZ; Location: OR     ARTHROPLASTY KNEE Right 12/28/2018    Procedure: RIGHT TOTAL KNEE ARTHROPLASTY;  Surgeon: Michael Pelaez MD;  Location:  OR     BACK SURGERY       COLONOSCOPY       GENITOURINARY SURGERY  history of prostatectomy     HERNIA REPAIR  inguinal hernia repair as a teenager     ORTHOPEDIC SURGERY  back fusion 2002     PHACOEMULSIFICATION CLEAR CORNEA WITH STANDARD INTRAOCULAR LENS IMPLANT Right 11/10/2015    Procedure: PHACOEMULSIFICATION CLEAR CORNEA WITH STANDARD INTRAOCULAR LENS IMPLANT;  Surgeon: Criss Pulliam MD;  Location:  [facility]     PHACOEMULSIFICATION CLEAR CORNEA WITH STANDARD INTRAOCULAR LENS IMPLANT Left 11/17/2015    Procedure: PHACOEMULSIFICATION CLEAR CORNEA WITH STANDARD INTRAOCULAR LENS IMPLANT;  Surgeon: Criss Pulliam MD;  Location:  [facility]     removal of melanoma[  approx. 20 years ago       Social History     Tobacco Use     Smoking status: Former Smoker     Types: Cigars     Smokeless tobacco: Never Used     Tobacco comment: quit smoking cigars 2008   Substance Use Topics     Alcohol use: No     Alcohol/week: 3.0 - 4.0 standard drinks     Types: 3 - 4 Standard drinks or equivalent per week     Frequency: Never  "    Comment: none for last yr     Family History   Problem Relation Age of Onset     Uterine Cancer Mother      Colon Cancer Father      Diabetes Brother      Heart Failure Brother          Current Outpatient Medications   Medication Sig Dispense Refill     Acetaminophen (TYLENOL 8 HOUR PO)        budesonide-formoterol (SYMBICORT) 80-4.5 MCG/ACT Inhaler Inhale 1-2 puffs into the lungs 2 times daily as needed (wheezing, shorntess of breath, and cough) 1 Inhaler 11     IBUPROFEN PO        albuterol (PROAIR HFA/PROVENTIL HFA/VENTOLIN HFA) 108 (90 Base) MCG/ACT inhaler Inhale 2 puffs into the lungs every 6 hours as needed for shortness of breath / dyspnea or wheezing (Patient not taking: Reported on 12/11/2019) 1 Inhaler 11     No Known Allergies      Reviewed and updated as needed this visit by Provider           Objective    /68 (BP Location: Left arm, Patient Position: Sitting, Cuff Size: Adult Regular)   Pulse 59   Temp 97  F (36.1  C) (Oral)   Ht 1.664 m (5' 5.5\")   Wt 81.9 kg (180 lb 8 oz)   SpO2 98%   BMI 29.58 kg/m    Body mass index is 29.58 kg/m .  Physical Exam   GEN:  Well appearing   HEENT:  Thyroid gland felt normal no palpable nodules  PULM:   The lungs are clear to auscultation bilaterally.   Breathing not labored     Labs pending         Assessment & Plan     1. Cough  He would like to see how he does off of symbicort due to the cost of the drug  If cough reoccurs off symbicort can restart     2. Thyroid nodule  We can recheck an ultrasound in 2022         Counseled on diet and exercise interventions to promote weight loss     Return in about 3 months (around 3/11/2020) for Preventive Visit.    Zev Austin MD  [facility]    Total face to face contact time was greater than 20 minutes of which more than 50% of this time was spent counseling and coordinating care regarding the above topics.        " Tone is normal, moving all extremities well, reflexes normal for age.

## (undated) DEVICE — BLADE SAW SAGITTAL STRK 13X90X1.27MM HD SYS 6 6113-127-090

## (undated) DEVICE — BONE CEMENT MIXEVAC III HI VAC KIT  0206-015-000

## (undated) DEVICE — ESU GROUND PAD UNIVERSAL W/O CORD

## (undated) DEVICE — PREP CHLORAPREP 26ML TINTED ORANGE  260815

## (undated) DEVICE — DRSG XEROFORM 5X9" 8884431605

## (undated) DEVICE — BLADE SAW SAGITTAL STRK 18X90X1.37MM HD SYS 6 6118-137-090

## (undated) DEVICE — SUCTION IRR SYSTEM W/O TIP INTERPULSE HANDPIECE 0210-100-000

## (undated) DEVICE — GLOVE PROTEXIS POWDER FREE 7.5 ORTHOPEDIC 2D73ET75

## (undated) DEVICE — SOLUTION WOUND CLEANSING 3/4OZ 10% PVP EA-L3011FB-50

## (undated) DEVICE — BLADE SAW SAGITTAL STRK 19.5X95X1.27MM 2108-109-000S15

## (undated) DEVICE — SU ETHIBOND 1 CT-1 30" X425H

## (undated) DEVICE — DRAPE STERI TOWEL LG 1010

## (undated) DEVICE — NDL 22GA 1" 305155

## (undated) DEVICE — SUCTION TIP YANKAUER STR K87

## (undated) DEVICE — MANIFOLD NEPTUNE 4 PORT 700-20

## (undated) DEVICE — SU VICRYL 2-0 CT-1 27" UND J259H

## (undated) DEVICE — CAST PADDING 6" STERILE 9046S

## (undated) DEVICE — DRSG ABDOMINAL 07 1/2X8" 7197D

## (undated) DEVICE — SYR 30ML LL W/O NDL 302832

## (undated) DEVICE — GLOVE PROTEXIS BLUE W/NEU-THERA 8.0  2D73EB80

## (undated) DEVICE — LINEN TOWEL PACK X5 5464

## (undated) DEVICE — DRSG KERLIX FLUFFS X5

## (undated) DEVICE — DRAPE SHEET REV FOLD 3/4 9349

## (undated) DEVICE — SOL NACL 0.9% IRRIG 1000ML BOTTLE 07138-09

## (undated) DEVICE — ESU PENCIL W/SMOKE EVAC NEPTUNE STRYKER 0703-046-000

## (undated) DEVICE — PACK TOTAL KNEE SOP15TKFSD

## (undated) DEVICE — IMM KNEE 20" 0814-2660

## (undated) DEVICE — CAST PADDING 4" COTTON WEBRIL UNSTERILE 9084

## (undated) DEVICE — DRAPE STERI U 1015

## (undated) DEVICE — CAST PADDING 6" UNSTERILE 9046

## (undated) DEVICE — IMP INSERT TIBIAL HOWM TRI SIZE 5 11MM 5532-G-511: Type: IMPLANTABLE DEVICE | Site: KNEE | Status: NON-FUNCTIONAL

## (undated) DEVICE — WRAP EZY KNEE

## (undated) DEVICE — BONE CLEANING TIP INTERPULSE  0210-010-000

## (undated) DEVICE — BONE WAX 2.5GM W31G

## (undated) DEVICE — SOL WATER IRRIG 1000ML BOTTLE 2F7114

## (undated) DEVICE — HOOD FLYTE W/PEELAWAY 408-800-100

## (undated) DEVICE — SU VICRYL 0 CT-1 27" J340H

## (undated) DEVICE — BLADE KNIFE SURG 10 371110

## (undated) RX ORDER — HYDROMORPHONE HYDROCHLORIDE 1 MG/ML
INJECTION, SOLUTION INTRAMUSCULAR; INTRAVENOUS; SUBCUTANEOUS
Status: DISPENSED
Start: 2018-12-28

## (undated) RX ORDER — LIDOCAINE HYDROCHLORIDE 20 MG/ML
INJECTION, SOLUTION EPIDURAL; INFILTRATION; INTRACAUDAL; PERINEURAL
Status: DISPENSED
Start: 2018-12-28

## (undated) RX ORDER — FENTANYL CITRATE 50 UG/ML
INJECTION, SOLUTION INTRAMUSCULAR; INTRAVENOUS
Status: DISPENSED
Start: 2018-12-28

## (undated) RX ORDER — CEFAZOLIN SODIUM 2 G/100ML
INJECTION, SOLUTION INTRAVENOUS
Status: DISPENSED
Start: 2018-12-28

## (undated) RX ORDER — DEXAMETHASONE SODIUM PHOSPHATE 4 MG/ML
INJECTION, SOLUTION INTRA-ARTICULAR; INTRALESIONAL; INTRAMUSCULAR; INTRAVENOUS; SOFT TISSUE
Status: DISPENSED
Start: 2018-12-28

## (undated) RX ORDER — ONDANSETRON 2 MG/ML
INJECTION INTRAMUSCULAR; INTRAVENOUS
Status: DISPENSED
Start: 2018-12-28

## (undated) RX ORDER — PROPOFOL 10 MG/ML
INJECTION, EMULSION INTRAVENOUS
Status: DISPENSED
Start: 2018-12-28

## (undated) RX ORDER — CEFAZOLIN SODIUM 1 G/3ML
INJECTION, POWDER, FOR SOLUTION INTRAMUSCULAR; INTRAVENOUS
Status: DISPENSED
Start: 2018-12-28

## (undated) RX ORDER — BUPIVACAINE HYDROCHLORIDE AND EPINEPHRINE 5; 5 MG/ML; UG/ML
INJECTION, SOLUTION EPIDURAL; INTRACAUDAL; PERINEURAL
Status: DISPENSED
Start: 2018-12-28

## (undated) RX ORDER — PROPOFOL 10 MG/ML
INJECTION, EMULSION INTRAVENOUS
Status: DISPENSED
Start: 2020-01-01